# Patient Record
Sex: MALE | Race: WHITE | NOT HISPANIC OR LATINO | Employment: OTHER | URBAN - METROPOLITAN AREA
[De-identification: names, ages, dates, MRNs, and addresses within clinical notes are randomized per-mention and may not be internally consistent; named-entity substitution may affect disease eponyms.]

---

## 2018-12-01 ENCOUNTER — TRANSCRIBE ORDERS (OUTPATIENT)
Dept: ADMINISTRATIVE | Facility: HOSPITAL | Age: 63
End: 2018-12-01

## 2018-12-01 ENCOUNTER — APPOINTMENT (OUTPATIENT)
Dept: LAB | Facility: HOSPITAL | Age: 63
End: 2018-12-01
Payer: COMMERCIAL

## 2018-12-01 DIAGNOSIS — R53.83 OTHER FATIGUE: ICD-10-CM

## 2018-12-01 DIAGNOSIS — R53.83 OTHER FATIGUE: Primary | ICD-10-CM

## 2018-12-01 LAB
ALBUMIN SERPL BCP-MCNC: 4.3 G/DL (ref 3.5–5.7)
ALP SERPL-CCNC: 53 U/L (ref 55–165)
ALT SERPL W P-5'-P-CCNC: 38 U/L (ref 7–52)
ANION GAP SERPL CALCULATED.3IONS-SCNC: 8 MMOL/L (ref 4–13)
AST SERPL W P-5'-P-CCNC: 24 U/L (ref 13–39)
BASOPHILS # BLD AUTO: 0 THOUSANDS/ΜL (ref 0–0.1)
BASOPHILS NFR BLD AUTO: 0 % (ref 0–2)
BILIRUB DIRECT SERPL-MCNC: 0.1 MG/DL (ref 0–0.2)
BILIRUB SERPL-MCNC: 0.3 MG/DL (ref 0.2–1)
BUN SERPL-MCNC: 18 MG/DL (ref 7–25)
CALCIUM SERPL-MCNC: 9.8 MG/DL (ref 8.6–10.5)
CHLORIDE SERPL-SCNC: 104 MMOL/L (ref 98–107)
CHOLEST SERPL-MCNC: 216 MG/DL (ref 0–200)
CO2 SERPL-SCNC: 27 MMOL/L (ref 21–31)
CREAT SERPL-MCNC: 1.18 MG/DL (ref 0.7–1.3)
EOSINOPHIL # BLD AUTO: 0.1 THOUSAND/ΜL (ref 0–0.61)
EOSINOPHIL NFR BLD AUTO: 2 % (ref 0–5)
ERYTHROCYTE [DISTWIDTH] IN BLOOD BY AUTOMATED COUNT: 13.5 % (ref 11.5–14.5)
GFR SERPL CREATININE-BSD FRML MDRD: 66 ML/MIN/1.73SQ M
GLUCOSE P FAST SERPL-MCNC: 108 MG/DL (ref 65–99)
HCT VFR BLD AUTO: 46.6 % (ref 36.5–49.3)
HDLC SERPL-MCNC: 53 MG/DL (ref 40–60)
HGB BLD-MCNC: 15.7 G/DL (ref 14–18)
LDLC SERPL CALC-MCNC: 132 MG/DL (ref 75–193)
LYMPHOCYTES # BLD AUTO: 1.4 THOUSANDS/ΜL (ref 0.6–4.47)
LYMPHOCYTES NFR BLD AUTO: 24 % (ref 21–51)
MCH RBC QN AUTO: 31.7 PG (ref 26–34)
MCHC RBC AUTO-ENTMCNC: 33.7 G/DL (ref 31–37)
MCV RBC AUTO: 94 FL (ref 81–99)
MONOCYTES # BLD AUTO: 0.6 THOUSAND/ΜL (ref 0.17–1.22)
MONOCYTES NFR BLD AUTO: 11 % (ref 2–12)
NEUTROPHILS # BLD AUTO: 3.5 THOUSANDS/ΜL (ref 1.4–6.5)
NEUTS SEG NFR BLD AUTO: 62 % (ref 42–75)
NONHDLC SERPL-MCNC: 163 MG/DL
NRBC BLD AUTO-RTO: 0 /100 WBCS
PLATELET # BLD AUTO: 209 THOUSANDS/UL (ref 149–390)
PMV BLD AUTO: 9 FL (ref 8.6–11.7)
POTASSIUM SERPL-SCNC: 4.4 MMOL/L (ref 3.5–5.5)
PROT SERPL-MCNC: 7.2 G/DL (ref 6.4–8.9)
RBC # BLD AUTO: 4.97 MILLION/UL (ref 4.3–5.9)
SODIUM SERPL-SCNC: 139 MMOL/L (ref 134–143)
T3 SERPL-MCNC: 1 NG/ML (ref 0.6–1.8)
T4 FREE SERPL-MCNC: 1.14 NG/DL (ref 0.76–1.46)
TRIGL SERPL-MCNC: 153 MG/DL (ref 44–166)
TSH SERPL DL<=0.05 MIU/L-ACNC: 0.36 UIU/ML (ref 0.45–5.33)
WBC # BLD AUTO: 5.6 THOUSAND/UL (ref 4.8–10.8)

## 2018-12-01 PROCEDURE — 36415 COLL VENOUS BLD VENIPUNCTURE: CPT

## 2018-12-01 PROCEDURE — 80048 BASIC METABOLIC PNL TOTAL CA: CPT

## 2018-12-01 PROCEDURE — 84439 ASSAY OF FREE THYROXINE: CPT

## 2018-12-01 PROCEDURE — 85025 COMPLETE CBC W/AUTO DIFF WBC: CPT

## 2018-12-01 PROCEDURE — 84443 ASSAY THYROID STIM HORMONE: CPT

## 2018-12-01 PROCEDURE — 80061 LIPID PANEL: CPT

## 2018-12-01 PROCEDURE — 80076 HEPATIC FUNCTION PANEL: CPT

## 2018-12-01 PROCEDURE — 84480 ASSAY TRIIODOTHYRONINE (T3): CPT

## 2019-01-25 ENCOUNTER — APPOINTMENT (OUTPATIENT)
Dept: LAB | Facility: HOSPITAL | Age: 64
End: 2019-01-25
Payer: COMMERCIAL

## 2019-01-25 ENCOUNTER — TRANSCRIBE ORDERS (OUTPATIENT)
Dept: ADMINISTRATIVE | Facility: HOSPITAL | Age: 64
End: 2019-01-25

## 2019-01-25 DIAGNOSIS — E78.2 MIXED HYPERLIPIDEMIA: ICD-10-CM

## 2019-01-25 DIAGNOSIS — E03.9 HYPOTHYROIDISM, ADULT: ICD-10-CM

## 2019-01-25 DIAGNOSIS — E03.9 HYPOTHYROIDISM, ADULT: Primary | ICD-10-CM

## 2019-01-25 LAB
ALBUMIN SERPL BCP-MCNC: 4.3 G/DL (ref 3.5–5.7)
ALP SERPL-CCNC: 44 U/L (ref 55–165)
ALT SERPL W P-5'-P-CCNC: 51 U/L (ref 7–52)
AST SERPL W P-5'-P-CCNC: 32 U/L (ref 13–39)
BILIRUB DIRECT SERPL-MCNC: 0.1 MG/DL (ref 0–0.2)
BILIRUB SERPL-MCNC: 0.5 MG/DL (ref 0.2–1)
CHOLEST SERPL-MCNC: 205 MG/DL (ref 0–200)
HDLC SERPL-MCNC: 54 MG/DL (ref 40–60)
LDLC SERPL CALC-MCNC: 122 MG/DL (ref 75–193)
NONHDLC SERPL-MCNC: 151 MG/DL
PROT SERPL-MCNC: 7.2 G/DL (ref 6.4–8.9)
T4 FREE SERPL-MCNC: 1.06 NG/DL (ref 0.76–1.46)
TRIGL SERPL-MCNC: 143 MG/DL (ref 44–166)
TSH SERPL DL<=0.05 MIU/L-ACNC: 1.82 UIU/ML (ref 0.45–5.33)

## 2019-01-25 PROCEDURE — 36415 COLL VENOUS BLD VENIPUNCTURE: CPT

## 2019-01-25 PROCEDURE — 84439 ASSAY OF FREE THYROXINE: CPT

## 2019-01-25 PROCEDURE — 84443 ASSAY THYROID STIM HORMONE: CPT

## 2019-01-25 PROCEDURE — 80076 HEPATIC FUNCTION PANEL: CPT

## 2019-01-25 PROCEDURE — 80061 LIPID PANEL: CPT

## 2019-04-09 ENCOUNTER — APPOINTMENT (OUTPATIENT)
Dept: LAB | Facility: CLINIC | Age: 64
End: 2019-04-09
Payer: COMMERCIAL

## 2019-04-09 ENCOUNTER — TRANSCRIBE ORDERS (OUTPATIENT)
Dept: LAB | Facility: CLINIC | Age: 64
End: 2019-04-09

## 2019-04-09 ENCOUNTER — TELEPHONE (OUTPATIENT)
Dept: UROLOGY | Facility: AMBULATORY SURGERY CENTER | Age: 64
End: 2019-04-09

## 2019-04-09 DIAGNOSIS — R35.1 NOCTURIA: ICD-10-CM

## 2019-04-09 DIAGNOSIS — R35.0 URINARY FREQUENCY: Primary | ICD-10-CM

## 2019-04-09 PROCEDURE — 84153 ASSAY OF PSA TOTAL: CPT

## 2019-04-09 PROCEDURE — 36415 COLL VENOUS BLD VENIPUNCTURE: CPT

## 2019-04-09 PROCEDURE — 84154 ASSAY OF PSA FREE: CPT

## 2019-04-10 LAB
PSA FREE MFR SERPL: 21.7 %
PSA FREE SERPL-MCNC: 0.39 NG/ML
PSA SERPL-MCNC: 1.8 NG/ML (ref 0–4)

## 2019-04-11 RX ORDER — ERGOCALCIFEROL 1.25 MG/1
5000 CAPSULE ORAL
COMMUNITY
Start: 2019-03-16 | End: 2021-06-03

## 2019-04-11 RX ORDER — LEVOTHYROXINE SODIUM 137 UG/1
TABLET ORAL
COMMUNITY
End: 2019-04-15 | Stop reason: ALTCHOICE

## 2019-04-11 RX ORDER — SULFAMETHOXAZOLE AND TRIMETHOPRIM 800; 160 MG/1; MG/1
TABLET ORAL
COMMUNITY
Start: 2019-04-09 | End: 2019-04-15 | Stop reason: ALTCHOICE

## 2019-04-11 RX ORDER — LEVOTHYROXINE SODIUM 175 UG/1
TABLET ORAL
COMMUNITY
Start: 2019-03-08 | End: 2019-07-05 | Stop reason: SDUPTHER

## 2019-04-11 RX ORDER — LISINOPRIL 10 MG/1
TABLET ORAL
COMMUNITY
Start: 2019-03-07 | End: 2019-12-11 | Stop reason: SDUPTHER

## 2019-04-11 RX ORDER — TRAVOPROST 0.004 %
DROPS OPHTHALMIC (EYE)
COMMUNITY
Start: 2019-03-19 | End: 2019-10-11 | Stop reason: SDUPTHER

## 2019-04-11 RX ORDER — EZETIMIBE 10 MG/1
TABLET ORAL
COMMUNITY
Start: 2019-04-03 | End: 2020-06-09

## 2019-04-11 RX ORDER — AMOXICILLIN AND CLAVULANATE POTASSIUM 875; 125 MG/1; MG/1
TABLET, FILM COATED ORAL
COMMUNITY
Start: 2019-03-13 | End: 2019-04-15

## 2019-04-15 ENCOUNTER — OFFICE VISIT (OUTPATIENT)
Dept: UROLOGY | Facility: MEDICAL CENTER | Age: 64
End: 2019-04-15
Payer: COMMERCIAL

## 2019-04-15 VITALS
SYSTOLIC BLOOD PRESSURE: 140 MMHG | BODY MASS INDEX: 34.4 KG/M2 | HEIGHT: 68 IN | DIASTOLIC BLOOD PRESSURE: 80 MMHG | WEIGHT: 227 LBS | HEART RATE: 63 BPM

## 2019-04-15 DIAGNOSIS — R39.198 DIFFICULTY VOIDING: Primary | ICD-10-CM

## 2019-04-15 DIAGNOSIS — Z87.442 HISTORY OF KIDNEY STONES: ICD-10-CM

## 2019-04-15 DIAGNOSIS — N52.9 ERECTILE DYSFUNCTION, UNSPECIFIED ERECTILE DYSFUNCTION TYPE: ICD-10-CM

## 2019-04-15 LAB
SL AMB  POCT GLUCOSE, UA: NORMAL
SL AMB LEUKOCYTE ESTERASE,UA: NORMAL
SL AMB POCT BILIRUBIN,UA: NORMAL
SL AMB POCT BLOOD,UA: NORMAL
SL AMB POCT CLARITY,UA: CLEAR
SL AMB POCT COLOR,UA: YELLOW
SL AMB POCT KETONES,UA: NORMAL
SL AMB POCT NITRITE,UA: NORMAL
SL AMB POCT PH,UA: 5.5
SL AMB POCT SPECIFIC GRAVITY,UA: 1.02
SL AMB POCT URINE PROTEIN: NORMAL
SL AMB POCT UROBILINOGEN: 0.2

## 2019-04-15 PROCEDURE — 51798 US URINE CAPACITY MEASURE: CPT | Performed by: UROLOGY

## 2019-04-15 PROCEDURE — 99203 OFFICE O/P NEW LOW 30 MIN: CPT | Performed by: UROLOGY

## 2019-04-15 PROCEDURE — 81003 URINALYSIS AUTO W/O SCOPE: CPT | Performed by: UROLOGY

## 2019-04-15 RX ORDER — IBUPROFEN 200 MG
TABLET ORAL
COMMUNITY
End: 2021-03-14

## 2019-04-15 RX ORDER — MOXIFLOXACIN HYDROCHLORIDE 400 MG/1
TABLET ORAL
COMMUNITY
End: 2019-04-15

## 2019-04-15 RX ORDER — ONDANSETRON HYDROCHLORIDE 8 MG/1
TABLET, FILM COATED ORAL
COMMUNITY
End: 2019-04-15 | Stop reason: ALTCHOICE

## 2019-04-15 RX ORDER — AMOXICILLIN 250 MG/1
CAPSULE ORAL
COMMUNITY
End: 2019-05-21 | Stop reason: ALTCHOICE

## 2019-04-15 RX ORDER — SIMVASTATIN 20 MG
TABLET ORAL
COMMUNITY
End: 2019-04-15 | Stop reason: ALTCHOICE

## 2019-04-15 RX ORDER — PREDNISONE 10 MG/1
TABLET ORAL
COMMUNITY
End: 2019-07-05

## 2019-04-15 RX ORDER — LANOLIN ALCOHOL/MO/W.PET/CERES
CREAM (GRAM) TOPICAL
COMMUNITY

## 2019-04-15 RX ORDER — CHOLECALCIFEROL (VITAMIN D3) 125 MCG
TABLET ORAL
COMMUNITY
End: 2019-04-15 | Stop reason: CLARIF

## 2019-04-15 RX ORDER — CEPHALEXIN 500 MG/1
500 CAPSULE ORAL 3 TIMES DAILY
COMMUNITY
End: 2019-05-21 | Stop reason: ALTCHOICE

## 2019-04-15 RX ORDER — CELECOXIB 200 MG/1
CAPSULE ORAL
COMMUNITY
End: 2019-07-05

## 2019-04-15 RX ORDER — TRAMADOL HYDROCHLORIDE 50 MG/1
TABLET ORAL
COMMUNITY
End: 2019-04-15 | Stop reason: ALTCHOICE

## 2019-04-15 RX ORDER — CHLORAL HYDRATE 500 MG
CAPSULE ORAL
COMMUNITY
End: 2019-04-15 | Stop reason: ALTCHOICE

## 2019-04-15 RX ORDER — METHOCARBAMOL 750 MG/1
TABLET, FILM COATED ORAL 3 TIMES DAILY
COMMUNITY
End: 2019-04-15 | Stop reason: ALTCHOICE

## 2019-04-15 RX ORDER — CIPROFLOXACIN 500 MG/1
TABLET, FILM COATED ORAL
COMMUNITY
End: 2019-04-15

## 2019-04-15 RX ORDER — HYDROCODONE BITARTRATE AND ACETAMINOPHEN 5; 300 MG/1; MG/1
TABLET ORAL
COMMUNITY
End: 2019-05-21 | Stop reason: ALTCHOICE

## 2019-04-22 ENCOUNTER — HOSPITAL ENCOUNTER (OUTPATIENT)
Dept: CT IMAGING | Facility: HOSPITAL | Age: 64
Discharge: HOME | End: 2019-04-22
Payer: COMMERCIAL

## 2019-04-22 DIAGNOSIS — Z87.442 HISTORY OF KIDNEY STONES: ICD-10-CM

## 2019-04-22 PROCEDURE — 74176 CT ABD & PELVIS W/O CONTRAST: CPT

## 2019-04-26 ENCOUNTER — TELEPHONE (OUTPATIENT)
Dept: UROLOGY | Facility: MEDICAL CENTER | Age: 64
End: 2019-04-26

## 2019-05-01 ENCOUNTER — OFFICE VISIT (OUTPATIENT)
Dept: UROLOGY | Facility: MEDICAL CENTER | Age: 64
End: 2019-05-01
Payer: COMMERCIAL

## 2019-05-01 VITALS
HEIGHT: 68 IN | DIASTOLIC BLOOD PRESSURE: 76 MMHG | HEART RATE: 58 BPM | BODY MASS INDEX: 35.61 KG/M2 | SYSTOLIC BLOOD PRESSURE: 132 MMHG | WEIGHT: 235 LBS

## 2019-05-01 DIAGNOSIS — N32.81 OAB (OVERACTIVE BLADDER): Primary | ICD-10-CM

## 2019-05-01 DIAGNOSIS — R39.198 DIFFICULTY VOIDING: ICD-10-CM

## 2019-05-01 LAB
SL AMB  POCT GLUCOSE, UA: NEGATIVE
SL AMB LEUKOCYTE ESTERASE,UA: NEGATIVE
SL AMB POCT BILIRUBIN,UA: NEGATIVE
SL AMB POCT BLOOD,UA: NEGATIVE
SL AMB POCT CLARITY,UA: CLEAR
SL AMB POCT COLOR,UA: YELLOW
SL AMB POCT KETONES,UA: NEGATIVE
SL AMB POCT NITRITE,UA: NEGATIVE
SL AMB POCT PH,UA: 5.5
SL AMB POCT SPECIFIC GRAVITY,UA: >=1.03
SL AMB POCT URINE PROTEIN: NORMAL
SL AMB POCT UROBILINOGEN: 0.2

## 2019-05-01 PROCEDURE — 81003 URINALYSIS AUTO W/O SCOPE: CPT | Performed by: UROLOGY

## 2019-05-01 PROCEDURE — 99214 OFFICE O/P EST MOD 30 MIN: CPT | Performed by: UROLOGY

## 2019-05-01 RX ORDER — OXYBUTYNIN CHLORIDE 5 MG/1
5 TABLET ORAL 3 TIMES DAILY PRN
Qty: 90 TABLET | Refills: 1 | Status: SHIPPED | OUTPATIENT
Start: 2019-05-01 | End: 2019-07-05

## 2019-05-21 ENCOUNTER — PROCEDURE VISIT (OUTPATIENT)
Dept: UROLOGY | Facility: MEDICAL CENTER | Age: 64
End: 2019-05-21
Payer: COMMERCIAL

## 2019-05-21 VITALS
HEIGHT: 68 IN | BODY MASS INDEX: 34.86 KG/M2 | DIASTOLIC BLOOD PRESSURE: 72 MMHG | SYSTOLIC BLOOD PRESSURE: 130 MMHG | WEIGHT: 230 LBS

## 2019-05-21 DIAGNOSIS — Z71.89 OTHER SPECIFIED COUNSELING: ICD-10-CM

## 2019-05-21 DIAGNOSIS — N32.81 OAB (OVERACTIVE BLADDER): Primary | ICD-10-CM

## 2019-05-21 DIAGNOSIS — Q54.0 CORONAL HYPOSPADIAS: ICD-10-CM

## 2019-05-21 PROCEDURE — 81003 URINALYSIS AUTO W/O SCOPE: CPT | Performed by: UROLOGY

## 2019-05-21 PROCEDURE — 52000 CYSTOURETHROSCOPY: CPT | Performed by: UROLOGY

## 2019-05-21 PROCEDURE — 99213 OFFICE O/P EST LOW 20 MIN: CPT | Performed by: UROLOGY

## 2019-05-21 RX ORDER — OXYBUTYNIN CHLORIDE 5 MG/1
5 TABLET ORAL 3 TIMES DAILY
Qty: 270 TABLET | Refills: 3 | Status: SHIPPED | OUTPATIENT
Start: 2019-05-21 | End: 2021-06-03

## 2019-07-05 ENCOUNTER — OFFICE VISIT (OUTPATIENT)
Dept: INTERNAL MEDICINE CLINIC | Facility: CLINIC | Age: 64
End: 2019-07-05
Payer: COMMERCIAL

## 2019-07-05 VITALS
DIASTOLIC BLOOD PRESSURE: 76 MMHG | HEART RATE: 58 BPM | SYSTOLIC BLOOD PRESSURE: 124 MMHG | WEIGHT: 234.7 LBS | BODY MASS INDEX: 35.57 KG/M2 | TEMPERATURE: 99.2 F | HEIGHT: 68 IN

## 2019-07-05 DIAGNOSIS — Z12.11 SCREENING FOR COLON CANCER: ICD-10-CM

## 2019-07-05 DIAGNOSIS — E66.9 OBESITY (BMI 35.0-39.9 WITHOUT COMORBIDITY): ICD-10-CM

## 2019-07-05 DIAGNOSIS — E55.9 VITAMIN D DEFICIENCY: ICD-10-CM

## 2019-07-05 DIAGNOSIS — K58.0 IRRITABLE BOWEL SYNDROME WITH DIARRHEA: ICD-10-CM

## 2019-07-05 DIAGNOSIS — I10 ESSENTIAL HYPERTENSION: Primary | ICD-10-CM

## 2019-07-05 DIAGNOSIS — E03.9 HYPOTHYROIDISM, UNSPECIFIED TYPE: ICD-10-CM

## 2019-07-05 DIAGNOSIS — Z87.442 HISTORY OF KIDNEY STONES: ICD-10-CM

## 2019-07-05 DIAGNOSIS — H40.9 GLAUCOMA OF BOTH EYES, UNSPECIFIED GLAUCOMA TYPE: ICD-10-CM

## 2019-07-05 DIAGNOSIS — N32.81 OAB (OVERACTIVE BLADDER): ICD-10-CM

## 2019-07-05 DIAGNOSIS — N52.9 ERECTILE DYSFUNCTION, UNSPECIFIED ERECTILE DYSFUNCTION TYPE: ICD-10-CM

## 2019-07-05 DIAGNOSIS — E78.00 HYPERCHOLESTEROLEMIA: ICD-10-CM

## 2019-07-05 DIAGNOSIS — Z11.59 NEED FOR HEPATITIS C SCREENING TEST: ICD-10-CM

## 2019-07-05 DIAGNOSIS — Q54.0 CORONAL HYPOSPADIAS: ICD-10-CM

## 2019-07-05 PROBLEM — M16.9 OSTEOARTHRITIS OF HIP: Status: ACTIVE | Noted: 2019-07-05

## 2019-07-05 PROCEDURE — 1036F TOBACCO NON-USER: CPT | Performed by: NURSE PRACTITIONER

## 2019-07-05 PROCEDURE — 99204 OFFICE O/P NEW MOD 45 MIN: CPT | Performed by: NURSE PRACTITIONER

## 2019-07-05 PROCEDURE — 3008F BODY MASS INDEX DOCD: CPT | Performed by: NURSE PRACTITIONER

## 2019-07-05 RX ORDER — HYOSCYAMINE SULFATE 0.125 MG
0.12 TABLET ORAL EVERY 4 HOURS PRN
COMMUNITY
End: 2019-07-05 | Stop reason: SDUPTHER

## 2019-07-05 RX ORDER — HYOSCYAMINE SULFATE 0.125 MG
0.12 TABLET ORAL EVERY 4 HOURS PRN
Qty: 30 TABLET | Refills: 5 | Status: SHIPPED | OUTPATIENT
Start: 2019-07-05 | End: 2020-05-22

## 2019-07-05 RX ORDER — LEVOTHYROXINE SODIUM 175 UG/1
175 TABLET ORAL DAILY
Qty: 90 TABLET | Refills: 3 | Status: SHIPPED | OUTPATIENT
Start: 2019-07-05 | End: 2020-06-09

## 2019-07-05 RX ORDER — HYOSCYAMINE SULFATE 0.12 MG/ML
LIQUID ORAL EVERY 4 HOURS PRN
COMMUNITY
End: 2019-07-05

## 2019-07-05 NOTE — PROGRESS NOTES
Assessment/Plan: Will recheck fasting labs on patient  Will renew his previous medications  He is following up with Urology and is taking Ditropan for his OAB tolerating this well  He did have a flexible cystoscopy done showing meatal stenosis with coronal hypospadias OAB  PSA normal at 0 8  Recent imaging did show 1  No nephrolithiasis nor obstructive uropathy  2  Cholelithiasis without findings of acute cholecystitis  3   Moderate to marked diffuse hepatic steatosis  Will obtain eye exams and will obtain colonoscopy reports  Did give him referral with [facility] for gastro  BP stable at 124/76 with BP medications  Will obtain records from [address] [place] [address] regarding vaccines  Will follow up with him in 6 months or sooner if need be  He was advised if any issues or concerns to call the office  No problem-specific Assessment & Plan notes found for this encounter           Problem List Items Addressed This Visit        Digestive    Irritable bowel syndrome with diarrhea    Relevant Medications    hyoscyamine (ANASPAZ,LEVSIN) 0 125 MG tablet    Other Relevant Orders    Comprehensive metabolic panel    CBC and differential       Endocrine    Hypothyroidism    Relevant Medications    levothyroxine 175 mcg tablet    Other Relevant Orders    Comprehensive metabolic panel    CBC and differential    TSH, 3rd generation with Free T4 reflex       Cardiovascular and Mediastinum    Hypertension - Primary    Relevant Orders    Comprehensive metabolic panel    CBC and differential       Genitourinary    Erectile dysfunction    Relevant Orders    Comprehensive metabolic panel    CBC and differential    OAB (overactive bladder)    Relevant Orders    Comprehensive metabolic panel    CBC and differential    Coronal hypospadias    Relevant Orders    Comprehensive metabolic panel    CBC and differential       Other    History of kidney stones    Relevant Orders    Comprehensive metabolic panel    CBC and differential    Hypercholesterolemia Relevant Orders    Comprehensive metabolic panel    CBC and differential    Lipid panel    Screening for colon cancer    Relevant Orders    Ambulatory referral to Gastroenterology    Obesity (BMI 35 0-39 9 without comorbidity)    Relevant Orders    Comprehensive metabolic panel    CBC and differential    Need for hepatitis C screening test    Relevant Orders    Hepatitis C antibody    Vitamin D deficiency    Relevant Orders    Vitamin D 25 hydroxy    Glaucoma of both eyes            Subjective:      Patient ID: Britney Rai is a 60 y o  male  Christian Guzman is here today to establish care  His last PCP was with [facility]  He does have a past medical history of hypothyroidism and I taking Synthroid 175 mcg  He does have HTN which is controlled  He is seeing Urology for history of kidney stones, OAB, and hypospadias  He is on medication which is controlling his symptoms  He does have IBS with diarrhea and is taking Levsin strictly as needed  He did have numerous colonoscopies done in the past due to his Mom having colon cancer  He is not sure who he was seeing for this  He is not having any symptoms  He did have imaging done with Urology along with a PSA level which was normal  He was a former smoker and did quit around 4 years ago  He did see Pain Management in the past for bulging discs in his back which his pain has since subsided  He did have left rotator cuff surgery with OAA and a total right hip done in 2014  Shortly after he did develop appendicitis and did have this removed  He does see eye in [address] and is using eye drops for Glaucoma  He does see Dr Elmer Hwang  He denies any chest pain, SOB, or palpitations  He denies any anxiety or depression  He states he is following up with Urology every 6 months  He will be due for lab work  He offers no other issues        The following portions of the patient's history were reviewed and updated as appropriate:   He  has a past medical history of Back pain, ED (erectile dysfunction), Hypertension, Hypothyroidism, IBS (irritable bowel syndrome), Inflammatory bowel disease, Kidney stones, and Overactive bladder  He   Patient Active Problem List    Diagnosis Date Noted    Osteoarthritis of hip 07/05/2019    Coronal hypospadias 07/05/2019    Screening for colon cancer 07/05/2019    Irritable bowel syndrome with diarrhea 07/05/2019    Obesity (BMI 35 0-39 9 without comorbidity) 07/05/2019    Need for hepatitis C screening test 07/05/2019    Vitamin D deficiency 07/05/2019    Glaucoma of both eyes 07/05/2019    OAB (overactive bladder) 05/01/2019    Difficulty voiding 04/15/2019    Erectile dysfunction 04/15/2019    History of kidney stones 04/15/2019    Hypercholesterolemia 10/16/2016    Hypertension 10/16/2016    Hypothyroidism 10/16/2016    Localized, primary osteoarthritis of shoulder region 04/29/2008     He  has a past surgical history that includes Rotator cuff repair; Partial hip arthroplasty (Right); Appendectomy; and Hernia repair  His family history includes Cancer in his maternal grandmother and mother; Diabetes in his maternal grandmother; Heart disease in his father and sister; Hypertension in his father and sister; Stroke in his maternal aunt  He  reports that he quit smoking about 2 years ago  He has quit using smokeless tobacco  He reports that he drinks about 5 0 standard drinks of alcohol per week  He reports that he does not use drugs  Current Outpatient Medications   Medication Sig Dispense Refill    cyanocobalamin (VITAMIN B-12) 1,000 mcg tablet Vitamin B-12 1,000 mcg tablet   Take by oral route        ergocalciferol (VITAMIN D2) 50,000 units       ezetimibe (ZETIA) 10 mg tablet       hyoscyamine (ANASPAZ,LEVSIN) 0 125 MG tablet Take 1 tablet (0 125 mg total) by mouth every 4 (four) hours as needed for diarrhea 30 tablet 5    ibuprofen (MOTRIN IB) 200 mg tablet Motrin IB      levothyroxine 175 mcg tablet Take 1 tablet (175 mcg total) by mouth daily 90 tablet 3    lisinopril (ZESTRIL) 10 mg tablet       oxybutynin (DITROPAN) 5 mg tablet Take 1 tablet (5 mg total) by mouth 3 (three) times a day 270 tablet 3    TRAVATAN Z 0 004 % ophthalmic solution        No current facility-administered medications for this visit  Current Outpatient Medications on File Prior to Visit   Medication Sig    cyanocobalamin (VITAMIN B-12) 1,000 mcg tablet Vitamin B-12 1,000 mcg tablet   Take by oral route   ergocalciferol (VITAMIN D2) 50,000 units     ezetimibe (ZETIA) 10 mg tablet     ibuprofen (MOTRIN IB) 200 mg tablet Motrin IB    lisinopril (ZESTRIL) 10 mg tablet     oxybutynin (DITROPAN) 5 mg tablet Take 1 tablet (5 mg total) by mouth 3 (three) times a day    TRAVATAN Z 0 004 % ophthalmic solution     [DISCONTINUED] hyoscyamine (ANASPAZ,LEVSIN) 0 125 MG tablet Take 0 125 mg by mouth every 4 (four) hours as needed for cramping    [DISCONTINUED] levothyroxine 175 mcg tablet     [DISCONTINUED] celecoxib (CELEBREX) 200 mg capsule Celebrex 200 mg capsule    [DISCONTINUED] hyoscyamine (HYOSYNE) 0 125 mg/mL oral drops Take by mouth every 4 (four) hours as needed for bladder spasms    [DISCONTINUED] influenza vaccine, quadrivalent (FLULAVAL) 0 5 ML SHERINE Fluarix Quad [id number] (PF) 60 mcg (15 mcg x 4)/0 5 mL IM syringe   inject 0 5 milliliter intramuscularly    [DISCONTINUED] oxybutynin (DITROPAN) 5 mg tablet Take 1 tablet (5 mg total) by mouth 3 (three) times a day as needed (urinary leakage) (Patient not taking: Reported on 7/5/2019)    [DISCONTINUED] predniSONE 10 mg tablet prednisone 10 mg tablet     No current facility-administered medications on file prior to visit  He is allergic to levofloxacin       Review of Systems   All other systems reviewed and are negative        Below is the patient's most recent value for Albumin, ALT, AST, BUN, Calcium, Chloride, Cholesterol, CO2, Creatinine, GFR, Glucose, HDL, Hematocrit, Hemoglobin, Hemoglobin A1C, LDL, Magnesium, Phosphorus, Platelets, Potassium, PSA, Sodium, Triglycerides, and WBC  Lab Results   Component Value Date    ALT 51 01/25/2019    AST 32 01/25/2019    BUN 18 12/01/2018    CALCIUM 9 8 12/01/2018     12/01/2018    CO2 27 12/01/2018    CREATININE 1 18 12/01/2018    HDL 54 01/25/2019    HCT 46 6 12/01/2018    HGB 15 7 12/01/2018     12/01/2018    K 4 4 12/01/2018    PSA 1 8 04/09/2019    TRIG 143 01/25/2019    WBC 5 60 12/01/2018     Note: for a comprehensive list of the patient's lab results, access the Results Review activity  Objective:      /76 (BP Location: Right arm, Patient Position: Sitting, Cuff Size: Large)   Pulse 58   Temp 99 2 °F (37 3 °C) (Temporal)   Ht 5' 8" (1 727 m)   Wt 106 kg (234 lb 11 2 oz)   BMI 35 69 kg/m²          Physical Exam   Constitutional: He is oriented to person, place, and time  He appears well-developed and well-nourished  HENT:   Head: Normocephalic and atraumatic  Right Ear: External ear normal    Left Ear: External ear normal    Nose: Nose normal    Mouth/Throat: Oropharynx is clear and moist    Eyes: Pupils are equal, round, and reactive to light  Conjunctivae and EOM are normal    Neck: Normal range of motion  Neck supple  Cardiovascular: Normal rate, regular rhythm, normal heart sounds and intact distal pulses  Pulmonary/Chest: Effort normal and breath sounds normal    Abdominal: Soft  Bowel sounds are normal    Musculoskeletal: Normal range of motion  Neurological: He is alert and oriented to person, place, and time  Skin: Skin is warm and dry  Capillary refill takes less than 2 seconds  Psychiatric: He has a normal mood and affect  His behavior is normal  Judgment and thought content normal    Vitals reviewed  BMI Counseling: Body mass index is 35 69 kg/m²  Discussed the patient's BMI with him  The BMI is above average  BMI counseling and education was provided to the patient   Nutrition recommendations include reducing portion sizes, decreasing overall calorie intake, 3-5 servings of fruits/vegetables daily, reducing fast food intake, consuming healthier snacks, decreasing soda and/or juice intake, moderation in carbohydrate intake, increasing intake of lean protein, reducing intake of saturated fat and trans fat and reducing intake of cholesterol

## 2019-07-05 NOTE — PATIENT INSTRUCTIONS

## 2019-07-12 ENCOUNTER — APPOINTMENT (OUTPATIENT)
Dept: LAB | Facility: HOSPITAL | Age: 64
End: 2019-07-12
Payer: COMMERCIAL

## 2019-07-12 DIAGNOSIS — E78.00 HYPERCHOLESTEROLEMIA: ICD-10-CM

## 2019-07-12 DIAGNOSIS — I10 ESSENTIAL HYPERTENSION: ICD-10-CM

## 2019-07-12 DIAGNOSIS — Q54.0 CORONAL HYPOSPADIAS: ICD-10-CM

## 2019-07-12 DIAGNOSIS — E66.9 OBESITY (BMI 35.0-39.9 WITHOUT COMORBIDITY): ICD-10-CM

## 2019-07-12 DIAGNOSIS — K58.0 IRRITABLE BOWEL SYNDROME WITH DIARRHEA: ICD-10-CM

## 2019-07-12 DIAGNOSIS — E55.9 VITAMIN D DEFICIENCY: ICD-10-CM

## 2019-07-12 DIAGNOSIS — E03.9 HYPOTHYROIDISM, UNSPECIFIED TYPE: ICD-10-CM

## 2019-07-12 DIAGNOSIS — N32.81 OAB (OVERACTIVE BLADDER): ICD-10-CM

## 2019-07-12 DIAGNOSIS — Z11.59 NEED FOR HEPATITIS C SCREENING TEST: ICD-10-CM

## 2019-07-12 DIAGNOSIS — Z87.442 HISTORY OF KIDNEY STONES: ICD-10-CM

## 2019-07-12 DIAGNOSIS — N52.9 ERECTILE DYSFUNCTION, UNSPECIFIED ERECTILE DYSFUNCTION TYPE: ICD-10-CM

## 2019-07-12 LAB
25(OH)D3 SERPL-MCNC: 58.7 NG/ML (ref 30–100)
ALBUMIN SERPL BCP-MCNC: 4.4 G/DL (ref 3.5–5.7)
ALP SERPL-CCNC: 47 U/L (ref 55–165)
ALT SERPL W P-5'-P-CCNC: 64 U/L (ref 7–52)
ANION GAP SERPL CALCULATED.3IONS-SCNC: 10 MMOL/L (ref 4–13)
AST SERPL W P-5'-P-CCNC: 38 U/L (ref 13–39)
BASOPHILS # BLD AUTO: 0 THOUSANDS/ΜL (ref 0–0.1)
BASOPHILS NFR BLD AUTO: 1 % (ref 0–2)
BILIRUB SERPL-MCNC: 0.6 MG/DL (ref 0.2–1)
BUN SERPL-MCNC: 19 MG/DL (ref 7–25)
CALCIUM SERPL-MCNC: 9.3 MG/DL (ref 8.6–10.5)
CHLORIDE SERPL-SCNC: 104 MMOL/L (ref 98–107)
CHOLEST SERPL-MCNC: 206 MG/DL (ref 0–200)
CO2 SERPL-SCNC: 23 MMOL/L (ref 21–31)
CREAT SERPL-MCNC: 1.16 MG/DL (ref 0.7–1.3)
EOSINOPHIL # BLD AUTO: 0.2 THOUSAND/ΜL (ref 0–0.61)
EOSINOPHIL NFR BLD AUTO: 3 % (ref 0–5)
ERYTHROCYTE [DISTWIDTH] IN BLOOD BY AUTOMATED COUNT: 13.3 % (ref 11.5–14.5)
GFR SERPL CREATININE-BSD FRML MDRD: 67 ML/MIN/1.73SQ M
GLUCOSE P FAST SERPL-MCNC: 115 MG/DL (ref 65–99)
HCT VFR BLD AUTO: 48 % (ref 42–47)
HCV AB SER QL: NORMAL
HDLC SERPL-MCNC: 48 MG/DL (ref 40–60)
HGB BLD-MCNC: 16.8 G/DL (ref 14–18)
LDLC SERPL CALC-MCNC: 105 MG/DL (ref 0–100)
LYMPHOCYTES # BLD AUTO: 1.1 THOUSANDS/ΜL (ref 0.6–4.47)
LYMPHOCYTES NFR BLD AUTO: 25 % (ref 21–51)
MCH RBC QN AUTO: 32.6 PG (ref 26–34)
MCHC RBC AUTO-ENTMCNC: 35 G/DL (ref 31–37)
MCV RBC AUTO: 93 FL (ref 81–99)
MONOCYTES # BLD AUTO: 0.5 THOUSAND/ΜL (ref 0.17–1.22)
MONOCYTES NFR BLD AUTO: 10 % (ref 2–12)
NEUTROPHILS # BLD AUTO: 2.8 THOUSANDS/ΜL (ref 1.4–6.5)
NEUTS SEG NFR BLD AUTO: 61 % (ref 42–75)
NONHDLC SERPL-MCNC: 158 MG/DL
PLATELET # BLD AUTO: 206 THOUSANDS/UL (ref 149–390)
PMV BLD AUTO: 9.1 FL (ref 8.6–11.7)
POTASSIUM SERPL-SCNC: 4 MMOL/L (ref 3.5–5.5)
PROT SERPL-MCNC: 7.7 G/DL (ref 6.4–8.9)
RBC # BLD AUTO: 5.15 MILLION/UL (ref 4.3–5.9)
SODIUM SERPL-SCNC: 137 MMOL/L (ref 134–143)
TRIGL SERPL-MCNC: 266 MG/DL (ref 44–166)
TSH SERPL DL<=0.05 MIU/L-ACNC: 1.04 UIU/ML (ref 0.45–5.33)
WBC # BLD AUTO: 4.6 THOUSAND/UL (ref 4.8–10.8)

## 2019-07-12 PROCEDURE — 36415 COLL VENOUS BLD VENIPUNCTURE: CPT

## 2019-07-12 PROCEDURE — 80061 LIPID PANEL: CPT

## 2019-07-12 PROCEDURE — 80053 COMPREHEN METABOLIC PANEL: CPT

## 2019-07-12 PROCEDURE — 84443 ASSAY THYROID STIM HORMONE: CPT

## 2019-07-12 PROCEDURE — 85025 COMPLETE CBC W/AUTO DIFF WBC: CPT

## 2019-07-12 PROCEDURE — 82306 VITAMIN D 25 HYDROXY: CPT

## 2019-07-12 PROCEDURE — 86803 HEPATITIS C AB TEST: CPT

## 2019-10-09 RX ORDER — TRAVOPROST 0.004 %
DROPS OPHTHALMIC (EYE)
Status: CANCELLED | OUTPATIENT
Start: 2019-10-09

## 2019-10-11 ENCOUNTER — IMMUNIZATIONS (OUTPATIENT)
Dept: INTERNAL MEDICINE CLINIC | Facility: CLINIC | Age: 64
End: 2019-10-11
Payer: COMMERCIAL

## 2019-10-11 DIAGNOSIS — H40.9 GLAUCOMA OF BOTH EYES, UNSPECIFIED GLAUCOMA TYPE: Primary | ICD-10-CM

## 2019-10-11 DIAGNOSIS — Z23 NEED FOR INFLUENZA VACCINATION: Primary | ICD-10-CM

## 2019-10-11 DIAGNOSIS — Z23 NEED FOR PNEUMOCOCCAL VACCINATION: ICD-10-CM

## 2019-10-11 PROCEDURE — 90472 IMMUNIZATION ADMIN EACH ADD: CPT

## 2019-10-11 PROCEDURE — 90732 PPSV23 VACC 2 YRS+ SUBQ/IM: CPT

## 2019-10-11 PROCEDURE — 90471 IMMUNIZATION ADMIN: CPT

## 2019-10-11 PROCEDURE — 90682 RIV4 VACC RECOMBINANT DNA IM: CPT

## 2019-10-11 RX ORDER — TRAVOPROST 0.004 %
1 DROPS OPHTHALMIC (EYE) DAILY
Qty: 2.5 ML | Refills: 0 | Status: SHIPPED | OUTPATIENT
Start: 2019-10-11 | End: 2020-01-10

## 2019-12-03 ENCOUNTER — TELEPHONE (OUTPATIENT)
Dept: INTERNAL MEDICINE CLINIC | Facility: CLINIC | Age: 64
End: 2019-12-03

## 2019-12-03 NOTE — TELEPHONE ENCOUNTER
Received a call from Yahir's wife and she stated that Juliet Sears is out of vitamin d2 50,000  Last lab was normal for vitamin d  Wanted to check if he should be taking otc vitamin d  Please advise

## 2019-12-11 DIAGNOSIS — I10 ESSENTIAL HYPERTENSION: Primary | ICD-10-CM

## 2019-12-11 RX ORDER — LISINOPRIL 10 MG/1
10 TABLET ORAL DAILY
Qty: 90 TABLET | Refills: 2 | Status: SHIPPED | OUTPATIENT
Start: 2019-12-11 | End: 2020-03-14 | Stop reason: SDUPTHER

## 2019-12-27 ENCOUNTER — OFFICE VISIT (OUTPATIENT)
Dept: URGENT CARE | Facility: CLINIC | Age: 64
End: 2019-12-27
Payer: COMMERCIAL

## 2019-12-27 VITALS
RESPIRATION RATE: 18 BRPM | OXYGEN SATURATION: 97 % | TEMPERATURE: 98.6 F | HEIGHT: 68 IN | SYSTOLIC BLOOD PRESSURE: 112 MMHG | HEART RATE: 68 BPM | WEIGHT: 234 LBS | BODY MASS INDEX: 35.46 KG/M2 | DIASTOLIC BLOOD PRESSURE: 70 MMHG

## 2019-12-27 DIAGNOSIS — B96.89 ACUTE BACTERIAL BRONCHITIS: Primary | ICD-10-CM

## 2019-12-27 DIAGNOSIS — J20.8 ACUTE BACTERIAL BRONCHITIS: Primary | ICD-10-CM

## 2019-12-27 PROCEDURE — 99213 OFFICE O/P EST LOW 20 MIN: CPT | Performed by: PHYSICIAN ASSISTANT

## 2019-12-27 RX ORDER — LATANOPROST 50 UG/ML
SOLUTION/ DROPS OPHTHALMIC
COMMUNITY
Start: 2019-12-14

## 2019-12-27 RX ORDER — AZITHROMYCIN 250 MG/1
TABLET, FILM COATED ORAL
Qty: 6 TABLET | Refills: 0 | Status: SHIPPED | OUTPATIENT
Start: 2019-12-27 | End: 2019-12-31

## 2019-12-27 NOTE — PROGRESS NOTES
[facility]        NAME: Arnoldo Hernandez is a 60 y o  male  : 1955    MRN: [id number]  DATE: 2019  TIME: 12:16 PM    Assessment and Plan   Acute bacterial bronchitis [J20 8, B96 89]  1  Acute bacterial bronchitis  azithromycin (ZITHROMAX) 250 mg tablet         Patient Instructions       Follow up with PCP in 3-5 days  Proceed to  ER if symptoms worsen  Chief Complaint     Chief Complaint   Patient presents with    Cold Like Symptoms     C/O chest congestion, burning in the chest, post nasal drip, sinus congestion, and cough x 3 days  Wife was just diagnosed with Bronchitis  Pt did have his flu vaccine  History of Present Illness       Patient presents with 3 day history of chest congestion burning in chest with coughing  Postnasal drip sinus congestion  He denies any fever chills wheezing or shortness of breath  His wife has similar symptoms  He did have a flu vaccine this season  Review of Systems   Review of Systems   Constitutional: Positive for fatigue  Negative for fever  HENT: Positive for congestion, postnasal drip, rhinorrhea and sinus pressure  Negative for ear pain and sore throat  Respiratory: Positive for cough  Negative for chest tightness, shortness of breath and wheezing  Cardiovascular: Positive for chest pain  Gastrointestinal: Negative for nausea and vomiting  Abdominal distention: n chest with coughing  Musculoskeletal: Negative for myalgias  Skin: Negative for rash  Neurological: Positive for headaches  Hematological: Negative for adenopathy           Current Medications       Current Outpatient Medications:     azithromycin (ZITHROMAX) 250 mg tablet, Take 2 tablets today then 1 tablet daily x 4 days, Disp: 6 tablet, Rfl: 0    cyanocobalamin (VITAMIN B-12) 1,000 mcg tablet, Vitamin B-12 1,000 mcg tablet  Take by oral route , Disp: , Rfl:     ergocalciferol (VITAMIN D2) 50,000 units, , Disp: , Rfl:     ezetimibe (ZETIA) 10 mg tablet, , Disp: , Rfl:     hyoscyamine (ANASPAZ,LEVSIN) 0 125 MG tablet, Take 1 tablet (0 125 mg total) by mouth every 4 (four) hours as needed for diarrhea, Disp: 30 tablet, Rfl: 5    ibuprofen (MOTRIN IB) 200 mg tablet, Motrin IB, Disp: , Rfl:     latanoprost (XALATAN) 0 005 % ophthalmic solution, , Disp: , Rfl:     levothyroxine 175 mcg tablet, Take 1 tablet (175 mcg total) by mouth daily, Disp: 90 tablet, Rfl: 3    lisinopril (ZESTRIL) 10 mg tablet, Take 1 tablet (10 mg total) by mouth daily, Disp: 90 tablet, Rfl: 2    oxybutynin (DITROPAN) 5 mg tablet, Take 1 tablet (5 mg total) by mouth 3 (three) times a day, Disp: 270 tablet, Rfl: 3    TRAVATAN Z 0 004 % ophthalmic solution, Administer 1 drop to both eyes daily, Disp: 2 5 mL, Rfl: 0    Current Allergies     Allergies as of 12/27/2019 - Reviewed 12/27/2019   Allergen Reaction Noted    Levofloxacin Rash 04/15/2019            The following portions of the patient's history were reviewed and updated as appropriate: allergies, current medications, past family history, past medical history, past social history, past surgical history and problem list      Past Medical History:   Diagnosis Date    Back pain     ED (erectile dysfunction)     Hypertension     Hypothyroidism     IBS (irritable bowel syndrome)     Inflammatory bowel disease     Kidney stones     Overactive bladder        Past Surgical History:   Procedure Laterality Date    APPENDECTOMY      HERNIA REPAIR      PARTIAL HIP ARTHROPLASTY Right     ROTATOR CUFF REPAIR         Family History   Problem Relation Age of Onset    Cancer Mother     Heart disease Father     Hypertension Father     Heart disease Sister     Hypertension Sister     Diabetes Maternal Grandmother     Cancer Maternal Grandmother     Stroke Maternal Aunt          Medications have been verified          Objective   /70   Pulse 68   Temp 98 6 °F (37 °C) (Tympanic)   Resp 18   Ht 5' 8" (1 727 m)   Wt 106 kg (234 lb)   SpO2 97%   BMI 35 58 kg/m²        Physical Exam     Physical Exam   Constitutional: He is oriented to person, place, and time  He appears well-developed and well-nourished  HENT:   Head: Normocephalic and atraumatic  Right Ear: External ear normal    Left Ear: External ear normal    Nose: Nose normal    Mouth/Throat: Oropharynx is clear and moist    Neck: Neck supple  Cardiovascular: Normal rate, regular rhythm and normal heart sounds  Pulmonary/Chest: Effort normal and breath sounds normal    Lymphadenopathy:     He has no cervical adenopathy  Neurological: He is alert and oriented to person, place, and time  Skin: Skin is warm and dry  No rash noted  Psychiatric: He has a normal mood and affect  His behavior is normal    Nursing note and vitals reviewed

## 2019-12-27 NOTE — PATIENT INSTRUCTIONS

## 2020-01-10 ENCOUNTER — OFFICE VISIT (OUTPATIENT)
Dept: INTERNAL MEDICINE CLINIC | Facility: CLINIC | Age: 65
End: 2020-01-10
Payer: COMMERCIAL

## 2020-01-10 VITALS
OXYGEN SATURATION: 98 % | SYSTOLIC BLOOD PRESSURE: 120 MMHG | TEMPERATURE: 97.4 F | DIASTOLIC BLOOD PRESSURE: 78 MMHG | WEIGHT: 233.9 LBS | HEART RATE: 73 BPM | BODY MASS INDEX: 35.45 KG/M2 | HEIGHT: 68 IN

## 2020-01-10 DIAGNOSIS — J01.10 ACUTE NON-RECURRENT FRONTAL SINUSITIS: Primary | ICD-10-CM

## 2020-01-10 PROCEDURE — 99213 OFFICE O/P EST LOW 20 MIN: CPT | Performed by: NURSE PRACTITIONER

## 2020-01-10 PROCEDURE — 3078F DIAST BP <80 MM HG: CPT | Performed by: NURSE PRACTITIONER

## 2020-01-10 PROCEDURE — 3008F BODY MASS INDEX DOCD: CPT | Performed by: NURSE PRACTITIONER

## 2020-01-10 PROCEDURE — 1036F TOBACCO NON-USER: CPT | Performed by: NURSE PRACTITIONER

## 2020-01-10 PROCEDURE — 3074F SYST BP LT 130 MM HG: CPT | Performed by: NURSE PRACTITIONER

## 2020-01-10 RX ORDER — AMOXICILLIN AND CLAVULANATE POTASSIUM 875; 125 MG/1; MG/1
1 TABLET, FILM COATED ORAL 2 TIMES DAILY
Qty: 20 TABLET | Refills: 0 | Status: SHIPPED | OUTPATIENT
Start: 2020-01-10 | End: 2020-01-20

## 2020-01-10 RX ORDER — METHYLPREDNISOLONE 4 MG/1
TABLET ORAL
Qty: 21 EACH | Refills: 0 | Status: SHIPPED | OUTPATIENT
Start: 2020-01-10 | End: 2020-05-22

## 2020-01-10 NOTE — PATIENT INSTRUCTIONS
Sinusitis   WHAT YOU NEED TO KNOW:   What is sinusitis? Sinusitis is inflammation or infection of your sinuses  It is most often caused by a virus  Acute sinusitis may last up to 12 weeks  Chronic sinusitis lasts longer than 12 weeks  Recurrent sinusitis means you have 4 or more times in 1 year  What increases my risk for sinusitis? · Medical conditions, such as an upper respiratory infection, allergies, asthma, or cystic fibrosis    · Dental infections or procedures, such as gum infections, tooth decay, or a root canal    · Smoking    · Abnormal sinus structure, such as nasal growths, swollen tonsils, or a deviated septum    · A weak immune system, from diseases such as diabetes or HIV  What are the signs and symptoms of sinusitis? · Fever    · Pain, pressure, redness, or swelling around the forehead, cheeks, or eyes    · Thick yellow or green discharge from your nose    · Tenderness when you touch your face over your sinuses    · Dry cough that happens mostly at night or when you lie down    · Headache and face pain that is worse when you lean forward    · Tooth pain, or pain when you chew  How is sinusitis diagnosed? Your healthcare provider will examine you and ask about your symptoms  He or she will check inside your nose using a nasal speculum  This is a small tool used to open your nostrils  A sample of the mucus from your nose may show what germ is causing your infection  How is sinusitis treated? Your symptoms may go away on their own  Your healthcare provider may recommend watchful waiting for up to 10 days before starting antibiotics  You may  need any of the following:  · Acetaminophen  decreases pain and fever  It is available without a doctor's order  Ask how much to take and how often to take it  Follow directions   Read the labels of all other medicines you are using to see if they also contain acetaminophen, or ask your doctor or pharmacist  Acetaminophen can cause liver damage if not taken correctly  Do not use more than 4 grams (4,000 milligrams) total of acetaminophen in one day  · NSAIDs , such as ibuprofen, help decrease swelling, pain, and fever  This medicine is available with or without a doctor's order  NSAIDs can cause stomach bleeding or kidney problems in certain people  If you take blood thinner medicine, always ask your healthcare provider if NSAIDs are safe for you  Always read the medicine label and follow directions  · Nasal steroid sprays  may help decrease inflammation in your nose and sinuses  · Decongestants  help reduce swelling and drain mucus in the nose and sinuses  They may help you breathe easier  · Antihistamines  help dry mucus in the nose and relieve sneezing  · Antibiotics  help treat or prevent a bacterial infection  How can I manage my symptoms? · Rinse your sinuses  Use a sinus rinse device to rinse your nasal passages with a saline (salt water) solution or distilled water  Do not use tap water  This will help thin the mucus in your nose and rinse away pollen and dirt  It will also help reduce swelling so you can breathe normally  Ask your healthcare provider how often to do this  · Breathe in steam   Heat a bowl of water until you see steam  Lean over the bowl and make a tent over your head with a large towel  Breathe deeply for about 20 minutes  Be careful not to get too close to the steam or burn yourself  Do this 3 times a day  You can also breathe deeply when you take a hot shower  · Sleep with your head elevated  Place an extra pillow under your head before you go to sleep to help your sinuses drain  · Drink liquids as directed  Ask your healthcare provider how much liquid to drink each day and which liquids are best for you  Liquids will thin the mucus in your nose and help it drain  Avoid drinks that contain alcohol or caffeine  · Do not smoke, and avoid secondhand smoke    Nicotine and other chemicals in cigarettes and cigars can make your symptoms worse  Ask your healthcare provider for information if you currently smoke and need help to quit  E-cigarettes or smokeless tobacco still contain nicotine  Talk to your healthcare provider before you use these products  How can I help prevent the spread of germs that cause sinusitis? Wash your hands often with soap and water  Wash your hands after you use the bathroom, change a child's diaper, or sneeze  Wash your hands before you prepare or eat food  When should I seek immediate care? · Your eye and eyelid are red, swollen, and painful  · You cannot open your eye  · You have vision changes, such as double vision  · Your eyeball bulges out or you cannot move your eye  · You are more sleepy than normal, or you notice changes in your ability to think, move, or talk  · You have a stiff neck, a fever, or a bad headache  · You have swelling of your forehead or scalp  When should I contact my healthcare provider? · Your symptoms do not improve after 3 days  · Your symptoms do not go away after 10 days  · You have nausea and are vomiting  · Your nose is bleeding  · You have questions or concerns about your condition or care  CARE AGREEMENT:   You have the right to help plan your care  Learn about your health condition and how it may be treated  Discuss treatment options with your caregivers to decide what care you want to receive  You always have the right to refuse treatment  The above information is an  only  It is not intended as medical advice for individual conditions or treatments  Talk to your doctor, nurse or pharmacist before following any medical regimen to see if it is safe and effective for you  © 2017 [employer] Information is for End User's use only and may not be sold, redistributed or otherwise used for commercial purposes   All illustrations and images included in CareNotes® are the copyrighted property of [employer]  or [employer] Puentes

## 2020-01-10 NOTE — PROGRESS NOTES
Assessment/Plan: Rapid strep in office was negative  Will start on Augmentin 875-125 mg by mouth BID for 10 days and a medrol dose pack  He was advised to continue supportive care increase fluid intake and alternate Tylenol and Motrin for pain and fever  Will follow up as needed  No problem-specific Assessment & Plan notes found for this encounter  Problem List Items Addressed This Visit        Respiratory    Acute non-recurrent frontal sinusitis - Primary    Relevant Medications    amoxicillin-clavulanate (AUGMENTIN) 875-125 mg per tablet    methylPREDNISolone 4 MG tablet therapy pack            Subjective:      Patient ID: Delmy Johnson is a 60 y o  male  Dartha Clamp is here today for a sick visit  He states he is having a very bad sore throat, joint pain, congestion and ear pressure  He is not having any fever, chills, or body aches  He states his hands are swelling more than normal since he has been sick and he can not get his rings on  He denies any chest pain, SOB, or palpitations  He offers no other issues  The following portions of the patient's history were reviewed and updated as appropriate: He  has a past medical history of Back pain, ED (erectile dysfunction), Hypertension, Hypothyroidism, IBS (irritable bowel syndrome), Inflammatory bowel disease, Kidney stones, and Overactive bladder    He   Patient Active Problem List    Diagnosis Date Noted    Acute non-recurrent frontal sinusitis 01/10/2020    Osteoarthritis of hip 07/05/2019    Coronal hypospadias 07/05/2019    Screening for colon cancer 07/05/2019    Irritable bowel syndrome with diarrhea 07/05/2019    Obesity (BMI 35 0-39 9 without comorbidity) 07/05/2019    Need for hepatitis C screening test 07/05/2019    Vitamin D deficiency 07/05/2019    Glaucoma of both eyes 07/05/2019    OAB (overactive bladder) 05/01/2019    Difficulty voiding 04/15/2019    Erectile dysfunction 04/15/2019    History of kidney stones 04/15/2019  Hypercholesterolemia 10/16/2016    Hypertension 10/16/2016    Hypothyroidism 10/16/2016    Localized, primary osteoarthritis of shoulder region 04/29/2008     He  has a past surgical history that includes Rotator cuff repair; Partial hip arthroplasty (Right); Appendectomy; and Hernia repair  His family history includes Cancer in his maternal grandmother and mother; Diabetes in his maternal grandmother; Heart disease in his father and sister; Hypertension in his father and sister; Stroke in his maternal aunt  He  reports that he quit smoking about 3 years ago  He has quit using smokeless tobacco  He reports that he drinks about 5 0 standard drinks of alcohol per week  He reports that he does not use drugs  Current Outpatient Medications   Medication Sig Dispense Refill    cyanocobalamin (VITAMIN B-12) 1,000 mcg tablet Vitamin B-12 1,000 mcg tablet   Take by oral route   ergocalciferol (VITAMIN D2) 50,000 units       ezetimibe (ZETIA) 10 mg tablet       hyoscyamine (ANASPAZ,LEVSIN) 0 125 MG tablet Take 1 tablet (0 125 mg total) by mouth every 4 (four) hours as needed for diarrhea 30 tablet 5    ibuprofen (MOTRIN IB) 200 mg tablet Motrin IB      latanoprost (XALATAN) 0 005 % ophthalmic solution       levothyroxine 175 mcg tablet Take 1 tablet (175 mcg total) by mouth daily 90 tablet 3    lisinopril (ZESTRIL) 10 mg tablet Take 1 tablet (10 mg total) by mouth daily 90 tablet 2    oxybutynin (DITROPAN) 5 mg tablet Take 1 tablet (5 mg total) by mouth 3 (three) times a day 270 tablet 3    amoxicillin-clavulanate (AUGMENTIN) 875-125 mg per tablet Take 1 tablet by mouth 2 (two) times a day for 10 days 20 tablet 0    methylPREDNISolone 4 MG tablet therapy pack Use as directed on package 21 each 0     No current facility-administered medications for this visit        Current Outpatient Medications on File Prior to Visit   Medication Sig    cyanocobalamin (VITAMIN B-12) 1,000 mcg tablet Vitamin B-12 1,000 mcg tablet   Take by oral route   ergocalciferol (VITAMIN D2) 50,000 units     ezetimibe (ZETIA) 10 mg tablet     hyoscyamine (ANASPAZ,LEVSIN) 0 125 MG tablet Take 1 tablet (0 125 mg total) by mouth every 4 (four) hours as needed for diarrhea    ibuprofen (MOTRIN IB) 200 mg tablet Motrin IB    latanoprost (XALATAN) 0 005 % ophthalmic solution     levothyroxine 175 mcg tablet Take 1 tablet (175 mcg total) by mouth daily    lisinopril (ZESTRIL) 10 mg tablet Take 1 tablet (10 mg total) by mouth daily    oxybutynin (DITROPAN) 5 mg tablet Take 1 tablet (5 mg total) by mouth 3 (three) times a day    [DISCONTINUED] TRAVATAN Z 0 004 % ophthalmic solution Administer 1 drop to both eyes daily     No current facility-administered medications on file prior to visit  He is allergic to levofloxacin       Review of Systems   Constitutional: Negative  HENT: Positive for congestion, ear pain, facial swelling, postnasal drip and sore throat  Eyes: Negative  Respiratory: Negative  Cardiovascular: Negative  Gastrointestinal: Negative  Endocrine: Negative  Genitourinary: Negative  Musculoskeletal: Negative  Allergic/Immunologic: Negative  Neurological: Negative  Hematological: Negative  Psychiatric/Behavioral: Negative  Objective:      /78 (BP Location: Left arm, Patient Position: Sitting, Cuff Size: Large)   Pulse 73   Temp (!) 97 4 °F (36 3 °C) (Temporal)   Ht 5' 8" (1 727 m)   Wt 106 kg (233 lb 14 4 oz)   SpO2 98%   BMI 35 56 kg/m²          Physical Exam   Constitutional: He is oriented to person, place, and time  He appears well-developed and well-nourished  HENT:   Head: Normocephalic and atraumatic  Left Ear: Hearing, tympanic membrane and ear canal normal    Mouth/Throat: Mucous membranes are normal  Posterior oropharyngeal erythema present  No tonsillar abscesses     Effusion noted to right ear no erythema noted   Eyes: Pupils are equal, round, and reactive to light  EOM are normal    Neck: Normal range of motion  Neck supple  Cardiovascular: Normal rate, regular rhythm, normal heart sounds and intact distal pulses  Pulmonary/Chest: Effort normal and breath sounds normal    Abdominal: Soft  Bowel sounds are normal    Neurological: He is alert and oriented to person, place, and time  Skin: Skin is warm and dry  Capillary refill takes less than 2 seconds  There is erythema  Psychiatric: He has a normal mood and affect  His behavior is normal    Vitals reviewed

## 2020-03-14 DIAGNOSIS — I10 ESSENTIAL HYPERTENSION: ICD-10-CM

## 2020-03-16 RX ORDER — LISINOPRIL 10 MG/1
10 TABLET ORAL DAILY
Qty: 90 TABLET | Refills: 0 | Status: SHIPPED | OUTPATIENT
Start: 2020-03-16 | End: 2020-12-21

## 2020-05-22 ENCOUNTER — TELEMEDICINE (OUTPATIENT)
Dept: INTERNAL MEDICINE CLINIC | Facility: CLINIC | Age: 65
End: 2020-05-22
Payer: COMMERCIAL

## 2020-05-22 VITALS — WEIGHT: 225 LBS | BODY MASS INDEX: 34.1 KG/M2 | HEIGHT: 68 IN

## 2020-05-22 DIAGNOSIS — E78.00 HYPERCHOLESTEROLEMIA: ICD-10-CM

## 2020-05-22 DIAGNOSIS — N52.9 ERECTILE DYSFUNCTION, UNSPECIFIED ERECTILE DYSFUNCTION TYPE: ICD-10-CM

## 2020-05-22 DIAGNOSIS — I10 ESSENTIAL HYPERTENSION: ICD-10-CM

## 2020-05-22 DIAGNOSIS — E03.9 HYPOTHYROIDISM, UNSPECIFIED TYPE: Primary | ICD-10-CM

## 2020-05-22 DIAGNOSIS — E55.9 VITAMIN D DEFICIENCY: ICD-10-CM

## 2020-05-22 PROBLEM — Z12.11 SCREENING FOR COLON CANCER: Status: RESOLVED | Noted: 2019-07-05 | Resolved: 2020-05-22

## 2020-05-22 PROBLEM — Z11.59 NEED FOR HEPATITIS C SCREENING TEST: Status: RESOLVED | Noted: 2019-07-05 | Resolved: 2020-05-22

## 2020-05-22 PROBLEM — J01.10 ACUTE NON-RECURRENT FRONTAL SINUSITIS: Status: RESOLVED | Noted: 2020-01-10 | Resolved: 2020-05-22

## 2020-05-22 PROCEDURE — 99214 OFFICE O/P EST MOD 30 MIN: CPT | Performed by: NURSE PRACTITIONER

## 2020-05-22 PROCEDURE — 3008F BODY MASS INDEX DOCD: CPT | Performed by: NURSE PRACTITIONER

## 2020-06-08 ENCOUNTER — APPOINTMENT (OUTPATIENT)
Dept: LAB | Facility: CLINIC | Age: 65
End: 2020-06-08
Payer: COMMERCIAL

## 2020-06-08 DIAGNOSIS — E78.00 HYPERCHOLESTEROLEMIA: ICD-10-CM

## 2020-06-08 DIAGNOSIS — N52.9 ERECTILE DYSFUNCTION, UNSPECIFIED ERECTILE DYSFUNCTION TYPE: ICD-10-CM

## 2020-06-08 DIAGNOSIS — E03.9 HYPOTHYROIDISM, UNSPECIFIED TYPE: ICD-10-CM

## 2020-06-08 DIAGNOSIS — I10 ESSENTIAL HYPERTENSION: ICD-10-CM

## 2020-06-08 DIAGNOSIS — E55.9 VITAMIN D DEFICIENCY: ICD-10-CM

## 2020-06-08 LAB
25(OH)D3 SERPL-MCNC: 54.4 NG/ML (ref 30–100)
ALBUMIN SERPL BCP-MCNC: 4 G/DL (ref 3.5–5)
ALP SERPL-CCNC: 82 U/L (ref 46–116)
ALT SERPL W P-5'-P-CCNC: 76 U/L (ref 12–78)
ANION GAP SERPL CALCULATED.3IONS-SCNC: 4 MMOL/L (ref 4–13)
AST SERPL W P-5'-P-CCNC: 41 U/L (ref 5–45)
BASOPHILS # BLD AUTO: 0.02 THOUSANDS/ΜL (ref 0–0.1)
BASOPHILS NFR BLD AUTO: 0 % (ref 0–1)
BILIRUB SERPL-MCNC: 0.42 MG/DL (ref 0.2–1)
BUN SERPL-MCNC: 15 MG/DL (ref 5–25)
CALCIUM SERPL-MCNC: 8.8 MG/DL (ref 8.3–10.1)
CHLORIDE SERPL-SCNC: 105 MMOL/L (ref 100–108)
CHOLEST SERPL-MCNC: 211 MG/DL (ref 50–200)
CO2 SERPL-SCNC: 28 MMOL/L (ref 21–32)
CREAT SERPL-MCNC: 1.2 MG/DL (ref 0.6–1.3)
EOSINOPHIL # BLD AUTO: 0.16 THOUSAND/ΜL (ref 0–0.61)
EOSINOPHIL NFR BLD AUTO: 3 % (ref 0–6)
ERYTHROCYTE [DISTWIDTH] IN BLOOD BY AUTOMATED COUNT: 13.1 % (ref 11.6–15.1)
GFR SERPL CREATININE-BSD FRML MDRD: 64 ML/MIN/1.73SQ M
GLUCOSE P FAST SERPL-MCNC: 103 MG/DL (ref 65–99)
HCT VFR BLD AUTO: 46.8 % (ref 36.5–49.3)
HDLC SERPL-MCNC: 67 MG/DL
HGB BLD-MCNC: 16.2 G/DL (ref 12–17)
IMM GRANULOCYTES # BLD AUTO: 0.01 THOUSAND/UL (ref 0–0.2)
IMM GRANULOCYTES NFR BLD AUTO: 0 % (ref 0–2)
LDLC SERPL CALC-MCNC: 115 MG/DL (ref 0–100)
LYMPHOCYTES # BLD AUTO: 1.48 THOUSANDS/ΜL (ref 0.6–4.47)
LYMPHOCYTES NFR BLD AUTO: 27 % (ref 14–44)
MCH RBC QN AUTO: 32.5 PG (ref 26.8–34.3)
MCHC RBC AUTO-ENTMCNC: 34.6 G/DL (ref 31.4–37.4)
MCV RBC AUTO: 94 FL (ref 82–98)
MONOCYTES # BLD AUTO: 0.62 THOUSAND/ΜL (ref 0.17–1.22)
MONOCYTES NFR BLD AUTO: 11 % (ref 4–12)
NEUTROPHILS # BLD AUTO: 3.2 THOUSANDS/ΜL (ref 1.85–7.62)
NEUTS SEG NFR BLD AUTO: 59 % (ref 43–75)
NONHDLC SERPL-MCNC: 144 MG/DL
NRBC BLD AUTO-RTO: 0 /100 WBCS
PLATELET # BLD AUTO: 221 THOUSANDS/UL (ref 149–390)
PMV BLD AUTO: 11 FL (ref 8.9–12.7)
POTASSIUM SERPL-SCNC: 4.5 MMOL/L (ref 3.5–5.3)
PROT SERPL-MCNC: 7.8 G/DL (ref 6.4–8.2)
RBC # BLD AUTO: 4.98 MILLION/UL (ref 3.88–5.62)
SODIUM SERPL-SCNC: 137 MMOL/L (ref 136–145)
TRIGL SERPL-MCNC: 144 MG/DL
TSH SERPL DL<=0.05 MIU/L-ACNC: 2.05 UIU/ML (ref 0.36–3.74)
WBC # BLD AUTO: 5.49 THOUSAND/UL (ref 4.31–10.16)

## 2020-06-08 PROCEDURE — 36415 COLL VENOUS BLD VENIPUNCTURE: CPT

## 2020-06-08 PROCEDURE — 82306 VITAMIN D 25 HYDROXY: CPT

## 2020-06-08 PROCEDURE — 80053 COMPREHEN METABOLIC PANEL: CPT

## 2020-06-08 PROCEDURE — 84443 ASSAY THYROID STIM HORMONE: CPT

## 2020-06-08 PROCEDURE — 85025 COMPLETE CBC W/AUTO DIFF WBC: CPT

## 2020-06-08 PROCEDURE — 80061 LIPID PANEL: CPT

## 2020-06-09 DIAGNOSIS — E78.00 HYPERCHOLESTEROLEMIA: Primary | ICD-10-CM

## 2020-06-09 DIAGNOSIS — E03.9 HYPOTHYROIDISM, UNSPECIFIED TYPE: ICD-10-CM

## 2020-06-09 RX ORDER — LEVOTHYROXINE SODIUM 175 UG/1
TABLET ORAL
Qty: 90 TABLET | Refills: 0 | Status: SHIPPED | OUTPATIENT
Start: 2020-06-09 | End: 2020-12-21

## 2020-06-09 RX ORDER — EZETIMIBE 10 MG/1
TABLET ORAL
Qty: 90 TABLET | Refills: 0 | Status: SHIPPED | OUTPATIENT
Start: 2020-06-09 | End: 2020-12-21

## 2020-10-05 ENCOUNTER — OFFICE VISIT (OUTPATIENT)
Dept: INTERNAL MEDICINE CLINIC | Facility: CLINIC | Age: 65
End: 2020-10-05
Payer: COMMERCIAL

## 2020-10-05 VITALS
HEIGHT: 68 IN | BODY MASS INDEX: 34.1 KG/M2 | OXYGEN SATURATION: 97 % | WEIGHT: 225 LBS | HEART RATE: 62 BPM | TEMPERATURE: 99.8 F

## 2020-10-05 DIAGNOSIS — J01.10 ACUTE NON-RECURRENT FRONTAL SINUSITIS: Primary | ICD-10-CM

## 2020-10-05 PROCEDURE — 99213 OFFICE O/P EST LOW 20 MIN: CPT | Performed by: NURSE PRACTITIONER

## 2020-10-05 RX ORDER — AMOXICILLIN AND CLAVULANATE POTASSIUM 875; 125 MG/1; MG/1
1 TABLET, FILM COATED ORAL 2 TIMES DAILY
Qty: 20 TABLET | Refills: 0 | Status: SHIPPED | OUTPATIENT
Start: 2020-10-05 | End: 2020-10-15

## 2020-11-13 ENCOUNTER — IMMUNIZATIONS (OUTPATIENT)
Dept: INTERNAL MEDICINE CLINIC | Facility: CLINIC | Age: 65
End: 2020-11-13
Payer: COMMERCIAL

## 2020-11-13 DIAGNOSIS — Z23 ENCOUNTER FOR IMMUNIZATION: ICD-10-CM

## 2020-11-13 DIAGNOSIS — Z23 NEED FOR PNEUMOCOCCAL VACCINATION: ICD-10-CM

## 2020-11-13 DIAGNOSIS — Z23 NEED FOR INFLUENZA VACCINATION: Primary | ICD-10-CM

## 2020-11-13 PROCEDURE — 90472 IMMUNIZATION ADMIN EACH ADD: CPT

## 2020-11-13 PROCEDURE — 90682 RIV4 VACC RECOMBINANT DNA IM: CPT

## 2020-11-13 PROCEDURE — 90670 PCV13 VACCINE IM: CPT

## 2020-11-13 PROCEDURE — 90471 IMMUNIZATION ADMIN: CPT

## 2020-12-19 DIAGNOSIS — E78.00 HYPERCHOLESTEROLEMIA: ICD-10-CM

## 2020-12-19 DIAGNOSIS — E03.9 HYPOTHYROIDISM, UNSPECIFIED TYPE: ICD-10-CM

## 2020-12-19 DIAGNOSIS — I10 ESSENTIAL HYPERTENSION: ICD-10-CM

## 2020-12-19 RX ORDER — LISINOPRIL 10 MG/1
10 TABLET ORAL DAILY
Qty: 90 TABLET | Refills: 0 | Status: CANCELLED | OUTPATIENT
Start: 2020-12-19

## 2020-12-19 RX ORDER — LEVOTHYROXINE SODIUM 175 UG/1
175 TABLET ORAL DAILY
Qty: 90 TABLET | Refills: 0 | Status: CANCELLED | OUTPATIENT
Start: 2020-12-19

## 2020-12-19 RX ORDER — EZETIMIBE 10 MG/1
10 TABLET ORAL DAILY
Qty: 90 TABLET | Refills: 0 | Status: CANCELLED | OUTPATIENT
Start: 2020-12-19

## 2020-12-21 DIAGNOSIS — E78.00 HYPERCHOLESTEROLEMIA: ICD-10-CM

## 2020-12-21 DIAGNOSIS — E03.9 HYPOTHYROIDISM, UNSPECIFIED TYPE: ICD-10-CM

## 2020-12-21 DIAGNOSIS — I10 ESSENTIAL HYPERTENSION: ICD-10-CM

## 2020-12-21 RX ORDER — LISINOPRIL 10 MG/1
TABLET ORAL
Qty: 90 TABLET | Refills: 0 | Status: SHIPPED | OUTPATIENT
Start: 2020-12-21 | End: 2021-03-18 | Stop reason: SDUPTHER

## 2020-12-21 RX ORDER — EZETIMIBE 10 MG/1
TABLET ORAL
Qty: 90 TABLET | Refills: 0 | Status: SHIPPED | OUTPATIENT
Start: 2020-12-21 | End: 2021-03-19 | Stop reason: SDUPTHER

## 2020-12-21 RX ORDER — LEVOTHYROXINE SODIUM 175 UG/1
TABLET ORAL
Qty: 90 TABLET | Refills: 0 | Status: SHIPPED | OUTPATIENT
Start: 2020-12-21 | End: 2021-03-18 | Stop reason: SDUPTHER

## 2021-02-22 DIAGNOSIS — M25.50 ARTHRALGIA, UNSPECIFIED JOINT: Primary | ICD-10-CM

## 2021-02-22 DIAGNOSIS — M79.10 MYALGIA: ICD-10-CM

## 2021-02-23 ENCOUNTER — LAB (OUTPATIENT)
Dept: LAB | Facility: CLINIC | Age: 66
End: 2021-02-23
Payer: COMMERCIAL

## 2021-02-23 DIAGNOSIS — M25.50 ARTHRALGIA, UNSPECIFIED JOINT: ICD-10-CM

## 2021-02-23 DIAGNOSIS — M79.10 MYALGIA: ICD-10-CM

## 2021-02-23 LAB
CRP SERPL QL: <3 MG/L
ERYTHROCYTE [SEDIMENTATION RATE] IN BLOOD: 45 MM/HOUR (ref 0–19)

## 2021-02-23 PROCEDURE — 86038 ANTINUCLEAR ANTIBODIES: CPT

## 2021-02-23 PROCEDURE — 86430 RHEUMATOID FACTOR TEST QUAL: CPT

## 2021-02-23 PROCEDURE — 86431 RHEUMATOID FACTOR QUANT: CPT

## 2021-02-23 PROCEDURE — 36415 COLL VENOUS BLD VENIPUNCTURE: CPT

## 2021-02-23 PROCEDURE — 86140 C-REACTIVE PROTEIN: CPT

## 2021-02-23 PROCEDURE — 85652 RBC SED RATE AUTOMATED: CPT

## 2021-02-24 LAB
CRYOGLOB RF SER-ACNC: ABNORMAL [IU]/ML
RHEUMATOID FACT SER QL LA: POSITIVE
RYE IGE QN: NEGATIVE

## 2021-02-25 ENCOUNTER — APPOINTMENT (OUTPATIENT)
Dept: RADIOLOGY | Facility: MEDICAL CENTER | Age: 66
End: 2021-02-25
Payer: COMMERCIAL

## 2021-02-25 ENCOUNTER — OFFICE VISIT (OUTPATIENT)
Dept: RHEUMATOLOGY | Facility: CLINIC | Age: 66
End: 2021-02-25
Payer: COMMERCIAL

## 2021-02-25 ENCOUNTER — TELEPHONE (OUTPATIENT)
Dept: OBGYN CLINIC | Facility: HOSPITAL | Age: 66
End: 2021-02-25

## 2021-02-25 VITALS
DIASTOLIC BLOOD PRESSURE: 79 MMHG | BODY MASS INDEX: 33.62 KG/M2 | HEART RATE: 69 BPM | HEIGHT: 68 IN | SYSTOLIC BLOOD PRESSURE: 152 MMHG | TEMPERATURE: 98.1 F | WEIGHT: 221.8 LBS

## 2021-02-25 DIAGNOSIS — M19.90 ARTHRITIS: ICD-10-CM

## 2021-02-25 DIAGNOSIS — M19.90 ARTHRITIS: Primary | ICD-10-CM

## 2021-02-25 DIAGNOSIS — M62.838 MUSCLE SPASM: ICD-10-CM

## 2021-02-25 PROCEDURE — 1036F TOBACCO NON-USER: CPT | Performed by: INTERNAL MEDICINE

## 2021-02-25 PROCEDURE — 99204 OFFICE O/P NEW MOD 45 MIN: CPT | Performed by: INTERNAL MEDICINE

## 2021-02-25 PROCEDURE — 73030 X-RAY EXAM OF SHOULDER: CPT

## 2021-02-25 PROCEDURE — 72050 X-RAY EXAM NECK SPINE 4/5VWS: CPT

## 2021-02-25 PROCEDURE — 3008F BODY MASS INDEX DOCD: CPT | Performed by: INTERNAL MEDICINE

## 2021-02-25 RX ORDER — CALCIUM/MAGNESIUM/ZINC 333-133 MG
TABLET ORAL
COMMUNITY
Start: 2021-02-24

## 2021-02-25 RX ORDER — CHOLECALCIFEROL (VITAMIN D3) 125 MCG
CAPSULE ORAL
COMMUNITY
Start: 2021-02-24

## 2021-02-25 RX ORDER — MELATON/THEAN/VAL/LEM/CHAM/LAV 10MG-200MG
TABLET,IMMED, EXTENDED RELEASE, BIPHASIC ORAL
COMMUNITY
Start: 2021-02-24

## 2021-02-25 RX ORDER — CYCLOBENZAPRINE HCL 5 MG
5 TABLET ORAL
Qty: 30 TABLET | Refills: 3 | Status: SHIPPED | OUTPATIENT
Start: 2021-02-25 | End: 2021-05-27 | Stop reason: SDUPTHER

## 2021-02-25 NOTE — TELEPHONE ENCOUNTER
I spoke with the patient, he has been notified the order for xray is in and reminded him about the lab test

## 2021-02-25 NOTE — PATIENT INSTRUCTIONS
Do right shoulder and neck x-rays  Physical therapy referral made  Can take ibuprofen 600mg twice a day  Take cyclobenzaprine at bedtime  Call if want prescription  Try to get left hand x-rays uploaded    Return to clinic in 3 months    Arthritis   WHAT YOU NEED TO KNOW:   What is arthritis? Arthritis is pain or disease in one or more joints  There are many types of arthritis  Types such as rheumatoid arthritis cause inflammation in the joints  Other types wear away the cartilage between joints, such as osteoarthritis  This makes the bones of the joint rub together when you move the joint  An infection from bacteria, a virus, or a fungus can also cause arthritis  Your symptoms may be constant, or symptoms may come and go  Arthritis often gets worse over time and can cause permanent joint damage  What increases my risk for arthritis? · A family history of arthritis    · Infection, trauma, or injury to the joint    · Obesity    · A disease such as diabetes, heart disease, hypothyroidism, or psoriasis    · An immune deficiency disorder, such as AIDS or lupus    What are the signs and symptoms of arthritis? · Pain, swelling, or stiffness in the joint    · Limited range of motion in the joint    · Warmth or redness over the joint    · Tenderness when you touch the joint    · Stiff joints in the morning that loosen with movement    · A creaking or grinding sound when you move the joint    · Fever    How is arthritis diagnosed? · Blood tests  are used to measure the amount of inflammation in your body  · An x-ray, CT, MRI, or ultrasound  may be used to check for joint damage, swelling, or loss of bone  Do not enter the MRI room with anything metal  Metal can cause serious damage  Tell the healthcare provider if you have any metal in or on your body  · A sample  of the fluid in the joint may be tested for uric acid or calcium crystals, or for signs of infection  How is arthritis treated?   Treatment will depend on the type of arthritis you have and if it is severe  You may need any of the following:  · Acetaminophen  decreases pain and fever  It is available without a doctor's order  Ask how much to take and how often to take it  Follow directions  Read the labels of all other medicines you are using to see if they also contain acetaminophen, or ask your doctor or pharmacist  Acetaminophen can cause liver damage if not taken correctly  Do not use more than 4 grams (4,000 milligrams) total of acetaminophen in one day  · NSAIDs , such as ibuprofen, help decrease swelling, pain, and fever  This medicine is available with or without a doctor's order  NSAIDs can cause stomach bleeding or kidney problems in certain people  If you take blood thinner medicine, always ask your healthcare provider if NSAIDs are safe for you  Always read the medicine label and follow directions  · Prescription pain medicine  may be given  Ask your healthcare provider how to take this medicine safely  Some prescription pain medicines contain acetaminophen  Do not take other medicines that contain acetaminophen without talking to your healthcare provider  Too much acetaminophen may cause liver damage  Prescription pain medicine may cause constipation  Ask your healthcare provider how to prevent or treat constipation  · Steroid medicine  helps reduce swelling and pain  · Surgery  may be needed to repair or replace a damaged joint  What can I do to manage my symptoms? · Rest your painful joint so it can heal   Your healthcare provider may recommend crutches or a walker if the affected joint is in a leg  · Apply ice or heat to the joint  Both can help decrease swelling and pain  Ice may also help prevent tissue damage  Use an ice pack, or put crushed ice in a plastic bag  Cover it with a towel and place it on your joint for 15 to 20 minutes every hour or as directed  You can apply heat for 20 minutes every 2 hours   Heat treatment includes hot packs or heat lamps  · Elevate your joint  Elevation helps reduce swelling and pain  Raise your joint above the level of your heart as often as you can  Prop your painful joint on pillows to keep it above your heart comfortably  What can I do to manage arthritis? · Talk to your healthcare providers about your arthritis medicines  Some medicines may only be needed when you have arthritis pain  You may need to take other medicines every day to prevent arthritis from getting worse  Your healthcare providers will help you understand all your medicines and when to take them  It is important to take the medicines as directed, even if you start to feel better  You can continue to have joint damage and inflammation even if you do not feel it  · Eat a variety of healthy foods  Healthy foods include fruits, vegetables, whole-grain breads, low-fat dairy products, beans, lean meats, and fish  Ask if you need to be on a special diet  A diet rich in calcium and vitamin D may decrease your risk of osteoporosis  Foods high in calcium include milk, cheese, broccoli, and tofu  Vitamin D may be found in meat, fish, fortified milk, cereal and bread  Ask if you need calcium or vitamin D supplements  · Go to physical or occupational therapy as directed  A physical therapist can teach you exercises to improve flexibility and range of motion  You may also be shown non-weight-bearing exercises that are safe for your joints, such as swimming  Exercise can help keep your joints flexible and reduce pain  An occupational therapist can help you learn to do your daily activities when your joints are stiff or sore  · Maintain a healthy weight  Extra weight puts increased pressure on your joints  Ask your healthcare provider what you should weigh   If you need to lose weight, he or she can help you create a weight loss program  Weight loss can help reduce pain and increase your ability to do your activities  · Wear flat or low-heeled shoes  This will help decrease pain and reduce pressure on your ankle, knee, and hip joints  · Do not smoke  Nicotine and other chemicals in cigarettes and cigars can damage your bones and joints  Ask your healthcare provider for information if you currently smoke and need help to quit  E-cigarettes or smokeless tobacco still contain nicotine  Talk to your healthcare provider before you use these products  What support devices can help manage arthritis? · Orthotic shoes or insoles  help support your feet when you walk  · Crutches, a cane, or a walker  may help decrease your risk for falling  They also decrease stress on affected joints  · Devices to prevent falls  include raised toilet seats and bathtub bars to help you get up from sitting  Handrails can be placed in areas where you need balance and support  · Devices to help with support and rest  include splints to wear on your hands and a firm pillow while you sleep  Use a pillow that is firm enough to support your neck and head  When should I call my doctor? · You have a fever and severe joint pain or swelling  · You cannot move the affected joint  · You have severe joint pain you cannot tolerate  · You have a new or worsening rash  · Your pain or swelling does not get better with treatment  · You have questions or concerns about your condition or care  CARE AGREEMENT:   You have the right to help plan your care  Learn about your health condition and how it may be treated  Discuss treatment options with your healthcare providers to decide what care you want to receive  You always have the right to refuse treatment  The above information is an  only  It is not intended as medical advice for individual conditions or treatments  Talk to your doctor, nurse or pharmacist before following any medical regimen to see if it is safe and effective for you    © Copyright [employer] [address] [place] [address] Information is for [employer] use only and may not be sold, redistributed or otherwise used for commercial purposes   All illustrations and images included in CareNotes® are the copyrighted property of A D A M , Inc  or [address] [place], [place] [address]jose bob

## 2021-02-25 NOTE — PROGRESS NOTES
Assessment and Plan: Mario Leong is a 63 y o   male who presents as a Rheumatology consult referred by his PCP BETO Randall* for evaluation of possible inflammatory arthritis  Explained to patient that a RF of 10 should actually be reported as negative, since it is less than the normal lab cutoff of 14  His PRESSLEY-28 score today is 4 67, consistent with moderate inflammatory disease activity  It is possible that he has an inflammatory arthritis, however the description of his pain seems like there is at least a component of osteoarthritis  Ordered right shoulder, cervical spine, and left hand x-rays to evaluate for any inflammatory changes  Ordered anti-CCP to further evaluate for possible Rheumatoid arthritis  He was also asked to get his left hand outside x-rays uploaded into our system  He can take ibuprofen 600 mg p o  b i d  for his joint pain; he is to call if he wants a prescription  For his neck and right shoulder muscle pain present on physical exam, prescribed cyclobenzaprine 5 mg p o  q h s  Physical therapy referral made to address patient's decreased right shoulder range of motion and pain  Plan:  Diagnoses and all orders for this visit:    Arthritis  -     Cyclic citrul peptide antibody, IgG  -     XR shoulder 2+ vw right; Future  -     XR spine cervical complete 4 or 5 vw non injury; Future  -     Ambulatory referral to Physical Therapy; Future  -     XR hand 3+ vw left; Future    Muscle spasm  -     cyclobenzaprine (FLEXERIL) 5 mg tablet; Take 1 tablet (5 mg total) by mouth daily at bedtime    Follow-up plan: Return to clinic in 3 months      HPI  Mario Leong is a 63 y o   male who presents as a Rheumatology consult referred by his PCP EZEKIEL Randall for evaluation of possible inflammatory arthritis  Patient admits that for the past year, his right shoulder, neck, and left hand have been bothering him   Admits to having herniated discs in his back; had injections and physical therapy  Had right hip replacement for avascular necrosis in 11/2014  Had left shoulder rotator cuff repair in 2006  Has been taking ibuprofen 400mg in AM and 200-400mg in PM for his pain  Admits that his left hand had a trigger finger, which he received steroid injections for by hand surgeon at [facility]  Admits that his joint pain is worse in the morning for 1-2 hours and then later in the day  He has been losing strength and having a hard time gripping with his left hand; has dropped a few things with his left hand  His left MCPs and PIPs hurt  Review of Systems  Review of Systems   Constitutional: Negative for chills, fatigue, fever and unexpected weight change  HENT: Negative for mouth sores and trouble swallowing  Eyes: Negative for pain and visual disturbance  Respiratory: Negative for cough and shortness of breath  Cardiovascular: Negative for chest pain and leg swelling  Gastrointestinal: Negative for abdominal pain, blood in stool, constipation, diarrhea and nausea  Musculoskeletal: Positive for arthralgias, back pain, joint swelling, myalgias and neck pain  Skin: Negative for color change and rash  Allergic/Immunologic: Positive for environmental allergies  Neurological: Negative for weakness and numbness  Hematological: Negative for adenopathy  Psychiatric/Behavioral: Negative for sleep disturbance  Allergies  Allergies   Allergen Reactions    Levofloxacin Rash     Tolerated Cipro and Avelox (moxifloxasin)  Home Medications    Current Outpatient Medications:     B Complex-C-Folic Acid (B-Complex/Folic Acid/Vitamin C) TBCR, , Disp: , Rfl:     Cholecalciferol (D-3-5) 125 MCG (5000 UT) capsule, , Disp: , Rfl:     Echinacea 400 MG CAPS, , Disp: , Rfl:     ezetimibe (ZETIA) 10 mg tablet, TAKE 1 TABLET DAILY  , Disp: 90 tablet, Rfl: 0    ibuprofen (MOTRIN IB) 200 mg tablet, 400mg once to twice daily as needed, Disp: , Rfl:     latanoprost (XALATAN) 0 005 % ophthalmic solution, , Disp: , Rfl:     levothyroxine 175 mcg tablet, TAKE 1 TABLET DAILY  , Disp: 90 tablet, Rfl: 0    lisinopril (ZESTRIL) 10 mg tablet, TAKE 1 TABLET DAILY  , Disp: 90 tablet, Rfl: 0    Misc Natural Products (COSAMIN ASU FOR JOINT HEALTH PO), , Disp: , Rfl:     Zinc 50 MG CAPS, , Disp: , Rfl:     cyanocobalamin (VITAMIN B-12) 1,000 mcg tablet, Vitamin B-12 1,000 mcg tablet  Take by oral route , Disp: , Rfl:     cyclobenzaprine (FLEXERIL) 5 mg tablet, Take 1 tablet (5 mg total) by mouth daily at bedtime, Disp: 30 tablet, Rfl: 3    ergocalciferol (VITAMIN D2) 50,000 units, , Disp: , Rfl:     oxybutynin (DITROPAN) 5 mg tablet, Take 1 tablet (5 mg total) by mouth 3 (three) times a day (Patient not taking: Reported on 2/25/2021), Disp: 270 tablet, Rfl: 3    Past Medical History  Past Medical History:   Diagnosis Date    Back pain     ED (erectile dysfunction)     Hypertension     Hypothyroidism     IBS (irritable bowel syndrome)     Inflammatory bowel disease     Kidney stones     Overactive bladder        Past Surgical History   Past Surgical History:   Procedure Laterality Date    APPENDECTOMY      HERNIA REPAIR      PARTIAL HIP ARTHROPLASTY Right     ROTATOR CUFF REPAIR      TRIGGER FINGER RELEASE  02/2021    Left ring finger       Family History    Family History   Problem Relation Age of Onset    Cancer Mother     Heart disease Father     Hypertension Father     Heart disease Sister     Hypertension Sister     Diabetes Maternal Grandmother     Cancer Maternal Grandmother     Stroke Maternal [de-identified]    Oldest daughter - lupus  Many family members - osteoarthritis    Social History  Occupation: was a marine; worked in [employer]; 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  Social History     Substance and Sexual Activity   Alcohol Use Yes    Alcohol/week: 5 0 standard drinks    Types: 5 Shots of liquor per week    Frequency: 2-3 times a week     Social History     Substance and Sexual Activity   Drug Use Not Currently    Types: Marijuana, Cocaine, LSD    Comment: stopped in high school - nothing since     Social History     Tobacco Use   Smoking Status Former Smoker    Quit date: 2017    Years since quittin 1   Smokeless Tobacco Former User    Types: [address]    used chew when in [facility]       Objective:  Vitals:    21 0838   BP: 152/79   BP Location: Left arm   Patient Position: Sitting   Cuff Size: Large   Pulse: 69   Temp: 98 1 °F (36 7 °C)   TempSrc: Temporal   Weight: 101 kg (221 lb 12 8 oz)   Height: 5' 8" (1 727 m)       Physical Exam  Constitutional:       General: He is not in acute distress  Appearance: He is well-developed  HENT:      Head: Normocephalic and atraumatic  Eyes:      General: Lids are normal  No scleral icterus  Conjunctiva/sclera: Conjunctivae normal    Neck:      Musculoskeletal: Neck supple  No muscular tenderness  Thyroid: No thyromegaly  Cardiovascular:      Rate and Rhythm: Normal rate and regular rhythm  Heart sounds: S1 normal and S2 normal  No murmur  No friction rub  Pulmonary:      Effort: Pulmonary effort is normal  No tachypnea or respiratory distress  Breath sounds: Normal breath sounds  No wheezing, rhonchi or rales  Musculoskeletal:         General: Tenderness present  Comments: Cervical spine tenderness, right shoulder limited range of motion, right upper trapezius and right paracervical spine tenderness   Lymphadenopathy:      Head:      Right side of head: No submental or submandibular adenopathy  Left side of head: No submental or submandibular adenopathy  Cervical: No cervical adenopathy  Skin:     General: Skin is warm and dry  Findings: No rash  Nails: There is no clubbing  Neurological:      Mental Status: He is alert  Sensory: No sensory deficit  Psychiatric:         Behavior: Behavior normal  Behavior is cooperative  Musculoskeletal--Peripheral Joint Exam:  Physical Exam     Tenderness:   LUE: wrist and carpometacarpal  Left hand: 1st MCP, 3rd MCP, 4th MCP, 5th MCP, 3rd PIP, 4th PIP and 5th PIP    PRESSLEY-28 tender joint count: 8  PRESSLEY-28 swollen joint count: 0  ESR (mm/hr): 45  Patient global assessment: 30  PRESSLEY-28 score: 4 67 (Moderate Disease Activity)      Reviewed labs and imaging  Imaging:   Final reads pending of left hand, cervical spine, and right shoulder x-rays      Labs:   Lab on 02/23/2021   Component Date Value Ref Range Status    DONNA 02/23/2021 Negative  Negative Final    CRP 02/23/2021 <3 0  <3 0 mg/L Final    Sed Rate 02/23/2021 45* 0 - 19 mm/hour Final    Rheumatoid Factor 02/23/2021 Positive* Negative Final    See RF Quantitation    RF Quantitation 02/23/2021 10 IU/mL* (none) Final

## 2021-02-25 NOTE — TELEPHONE ENCOUNTER
Dr Jeremy Schultz    Patient is asking for a script to have an xray done of his whole hand  He prefers to have this done in [facility] # [phone]

## 2021-02-25 NOTE — TELEPHONE ENCOUNTER
Please let patient know that I put the left hand x-ray order in the system  He should be able to get it done at any [facility] facility  Also, there is a lab (anti-CCP) I still want him to get that is in the system; he can get at any [facility] facility as well      Thanks,  STEVE

## 2021-02-26 ENCOUNTER — APPOINTMENT (OUTPATIENT)
Dept: RADIOLOGY | Facility: CLINIC | Age: 66
End: 2021-02-26
Payer: COMMERCIAL

## 2021-02-26 DIAGNOSIS — M19.90 ARTHRITIS: ICD-10-CM

## 2021-02-26 PROCEDURE — 73130 X-RAY EXAM OF HAND: CPT

## 2021-03-04 ENCOUNTER — EVALUATION (OUTPATIENT)
Dept: PHYSICAL THERAPY | Facility: HOME HEALTHCARE | Age: 66
End: 2021-03-04
Payer: COMMERCIAL

## 2021-03-04 DIAGNOSIS — M19.90 ARTHRITIS: ICD-10-CM

## 2021-03-04 PROCEDURE — 97161 PT EVAL LOW COMPLEX 20 MIN: CPT | Performed by: PHYSICAL THERAPIST

## 2021-03-04 PROCEDURE — 97110 THERAPEUTIC EXERCISES: CPT | Performed by: PHYSICAL THERAPIST

## 2021-03-08 ENCOUNTER — LAB (OUTPATIENT)
Dept: LAB | Facility: CLINIC | Age: 66
End: 2021-03-08
Payer: COMMERCIAL

## 2021-03-08 PROCEDURE — 86200 CCP ANTIBODY: CPT | Performed by: INTERNAL MEDICINE

## 2021-03-08 PROCEDURE — 36415 COLL VENOUS BLD VENIPUNCTURE: CPT | Performed by: INTERNAL MEDICINE

## 2021-03-10 ENCOUNTER — OFFICE VISIT (OUTPATIENT)
Dept: PHYSICAL THERAPY | Facility: HOME HEALTHCARE | Age: 66
End: 2021-03-10
Payer: COMMERCIAL

## 2021-03-10 DIAGNOSIS — M19.90 ARTHRITIS: Primary | ICD-10-CM

## 2021-03-10 LAB — CCP AB SER IA-ACNC: 0.4

## 2021-03-10 PROCEDURE — 97112 NEUROMUSCULAR REEDUCATION: CPT

## 2021-03-10 PROCEDURE — 97110 THERAPEUTIC EXERCISES: CPT

## 2021-03-10 PROCEDURE — 97140 MANUAL THERAPY 1/> REGIONS: CPT

## 2021-03-10 NOTE — PROGRESS NOTES
Daily Note     Today's date: 3/10/2021  Patient name: Zara Sepulveda  : 1955  MRN: [id number]  Referring provider: Gordo Evans MD  Dx:   Encounter Diagnosis     ICD-10-CM    1  Arthritis  M19 90                   Subjective: Pt reports his R shoulder is not too bad this morning and has 2/10 pain  Objective: See treatment diary below      Assessment: Tolerated treatment well  Verbal cues needed t/o exercise to perform correctly  Pt reports increased pain to 3/10 R shoulder with exercise  Pt reports discomfort with UBE  ROM and strength deficits noted t/o R shoulder  Patient would benefit from continued PT      Plan: Continue per plan of care        Re-eval Date: 21    Date 3/4 3/10/21      Visit Count 1 2      FOTO Completed              Precautions:        Manuals 3/4 3/10/21      R shoulder  10 min                               Neuro Re-Ed         MTP/LTP  Green 1 x 15 ea       Josef ER tband   Red 1 x 15       Ball on wall   1 x 10 ea dir      Wall angels                                 Ther Ex        UBE - ALT  10 minutes 10 min       Doorway stretch  UT stretch  Towel stretch  Reviewed for HEP        Pulleys: flex/abd  2 min ea       Finger ladder  1 x 3 ea       Wall slides   1 x 10 ea   Flex/abd      Standing FF/Abd to 90*        SA punches   1 x 15      Supine ABCs   X 1       S/L Abd/ER        Prone flex/HA/ext                                         Ther Activity                        Gait Training                        Modalities

## 2021-03-12 ENCOUNTER — OFFICE VISIT (OUTPATIENT)
Dept: PHYSICAL THERAPY | Facility: HOME HEALTHCARE | Age: 66
End: 2021-03-12
Payer: COMMERCIAL

## 2021-03-12 DIAGNOSIS — M19.90 ARTHRITIS: Primary | ICD-10-CM

## 2021-03-12 PROCEDURE — 97110 THERAPEUTIC EXERCISES: CPT

## 2021-03-12 PROCEDURE — 97140 MANUAL THERAPY 1/> REGIONS: CPT

## 2021-03-12 PROCEDURE — 97112 NEUROMUSCULAR REEDUCATION: CPT

## 2021-03-12 NOTE — PROGRESS NOTES
Daily Note     Today's date: 3/12/2021  Patient name: Suezanne Goodell  : 1955  MRN: [id number]  Referring provider: Donavon Ferrara MD  Dx:   Encounter Diagnosis     ICD-10-CM    1  Arthritis  M19 90                   Subjective: Pt reports his L shoulder is a little sore today and has 2-3/10 pain  Objective: See treatment diary below      Assessment: Tolerated treatment well  No increased pain reported t/o session  Added FF and abd to program today  ROM and strength deficits noted t/o R shoulder  Patient would benefit from continued PT      Plan: Continue per plan of care        Re-eval Date: 21    Date 3/4 3/10/21 3/12/21     Visit Count 1 2 3     FOTO Completed              Precautions:        Manuals 3/4 3/10/21 3/12/21     R shoulder  10 min  10 min                              Neuro Re-Ed         MTP/LTP  Green 1 x 15 ea  Green 1 x 15 ea      Josef ER tband   Red 1 x 15  Red 1 x 15      Ball on wall   1 x 10 ea dir 1 x 10 ea dir      Wall angels                                 Ther Ex        UBE - ALT  10 minutes 10 min  10 min      Doorway stretch  UT stretch  Towel stretch  Reviewed for HEP        Pulleys: flex/abd  2 min ea  2 min ea      Finger ladder  1 x 3 ea  1 x 3 ea      Wall slides   1 x 10 ea   Flex/abd 1 x 10 ea Flex/abd     Standing FF/Abd to 90*   1 x 15 ea      SA punches   1 x 15 1 x 20      Supine ABCs   X 1  X 1      S/L Abd/ER        Prone flex/HA/ext                                         Ther Activity                        Gait Training                        Modalities

## 2021-03-14 DIAGNOSIS — M19.90 ARTHRITIS: Primary | ICD-10-CM

## 2021-03-14 RX ORDER — IBUPROFEN 600 MG/1
600 TABLET ORAL 2 TIMES DAILY PRN
Qty: 60 TABLET | Refills: 6 | Status: SHIPPED | OUTPATIENT
Start: 2021-03-14 | End: 2021-05-27 | Stop reason: SDUPTHER

## 2021-03-17 ENCOUNTER — OFFICE VISIT (OUTPATIENT)
Dept: PHYSICAL THERAPY | Facility: HOME HEALTHCARE | Age: 66
End: 2021-03-17
Payer: COMMERCIAL

## 2021-03-17 DIAGNOSIS — M19.90 ARTHRITIS: Primary | ICD-10-CM

## 2021-03-17 PROCEDURE — 97110 THERAPEUTIC EXERCISES: CPT

## 2021-03-17 PROCEDURE — 97112 NEUROMUSCULAR REEDUCATION: CPT

## 2021-03-17 NOTE — PROGRESS NOTES
Daily Note     Today's date: 3/17/2021  Patient name: Suezanne Goodell  : 1955  MRN: [id number]  Referring provider: Donavon Ferrara MD  Dx:   Encounter Diagnosis     ICD-10-CM    1  Arthritis  M19 90                   Subjective: Pt reports no pain right now but has more pain when he moves his R shoulder  Pt reports he feels he has more mobility R UE  Objective: See treatment diary below      Assessment: Tolerated treatment well  Progressed to wall angles this session, which Pt was challenged with  Some verbal cues needed to perform exercises correctly  Increased reps with MTP/LTP and bilateral theraband ER  Patient would benefit from continued PT to improve ROM, strength and overall function and mobility  Plan: Continue per plan of care        Re-eval Date: 21    Date 3/4 3/10/21 3/12/21 3/17/21    Visit Count 1 2 3 4    FOTO Completed              Precautions:        Manuals 3/4 3/10/21 3/12/21 3/17/21    R shoulder  10 min  10 min  10 min                             Neuro Re-Ed         MTP/LTP  Green 1 x 15 ea  Green 1 x 15 ea  Green 1 x 20 ea     Josef ER tband   Red 1 x 15  Red 1 x 15  Red 1 x 20     Ball on wall   1 x 10 ea dir 1 x 10 ea dir  1 x 10 ea dir     Wall angels     1 x 10                             Ther Ex        UBE - ALT  10 minutes 10 min  10 min  10 min     Doorway stretch  UT stretch  Towel stretch  Reviewed for HEP        Pulleys: flex/abd  2 min ea  2 min ea  2 min ea     Finger ladder  1 x 3 ea  1 x 3 ea  1 x 3 ea     Wall slides   1 x 10 ea   Flex/abd 1 x 10 ea Flex/abd 1 x 10 ea   Flex/abd    Standing FF/Abd to 90*   1 x 15 ea  1 x 15 ea     SA punches   1 x 15 1 x 20  1 x 20     Supine ABCs   X 1  X 1  X 1     S/L Abd/ER        Prone flex/HA/ext                                         Ther Activity                        Gait Training                        Modalities

## 2021-03-18 DIAGNOSIS — E78.00 HYPERCHOLESTEROLEMIA: ICD-10-CM

## 2021-03-18 DIAGNOSIS — E03.9 HYPOTHYROIDISM, UNSPECIFIED TYPE: ICD-10-CM

## 2021-03-18 DIAGNOSIS — I10 ESSENTIAL HYPERTENSION: ICD-10-CM

## 2021-03-18 RX ORDER — EZETIMIBE 10 MG/1
10 TABLET ORAL DAILY
Qty: 90 TABLET | Refills: 0 | Status: CANCELLED | OUTPATIENT
Start: 2021-03-18

## 2021-03-19 ENCOUNTER — OFFICE VISIT (OUTPATIENT)
Dept: PHYSICAL THERAPY | Facility: HOME HEALTHCARE | Age: 66
End: 2021-03-19
Payer: COMMERCIAL

## 2021-03-19 DIAGNOSIS — M19.90 ARTHRITIS: Primary | ICD-10-CM

## 2021-03-19 DIAGNOSIS — E78.00 HYPERCHOLESTEROLEMIA: ICD-10-CM

## 2021-03-19 PROCEDURE — 97140 MANUAL THERAPY 1/> REGIONS: CPT

## 2021-03-19 PROCEDURE — 97110 THERAPEUTIC EXERCISES: CPT

## 2021-03-19 PROCEDURE — 97112 NEUROMUSCULAR REEDUCATION: CPT

## 2021-03-19 RX ORDER — LISINOPRIL 10 MG/1
10 TABLET ORAL DAILY
Qty: 90 TABLET | Refills: 0 | Status: SHIPPED | OUTPATIENT
Start: 2021-03-19 | End: 2021-06-20 | Stop reason: SDUPTHER

## 2021-03-19 RX ORDER — EZETIMIBE 10 MG/1
10 TABLET ORAL DAILY
Qty: 90 TABLET | Refills: 0 | Status: SHIPPED | OUTPATIENT
Start: 2021-03-19 | End: 2021-06-21

## 2021-03-19 RX ORDER — LEVOTHYROXINE SODIUM 175 UG/1
175 TABLET ORAL DAILY
Qty: 90 TABLET | Refills: 0 | Status: SHIPPED | OUTPATIENT
Start: 2021-03-19 | End: 2021-07-17 | Stop reason: SDUPTHER

## 2021-03-19 NOTE — PROGRESS NOTES
Daily Note     Today's date: 3/19/2021  Patient name: Francisco J Botello  : 1955  MRN: [id number]  Referring provider: Margarita Ruth MD  Dx:   Encounter Diagnosis     ICD-10-CM    1  Arthritis  M19 90               Subjective: Pt reports he doesn't have any pain in his R shoulder right now  Objective: See treatment diary below      Assessment: Tolerated treatment well  Progressed to wall push ups, sidelying abd/ER  and added 1 lb weight with standing FF/abd  Pt reports 2-3/10 pain R shoulder with exercise  Pt still challenged with wall angles  Patient would benefit from continued PT to improve ROM, strength and overall function and mobility  Plan: Continue per plan of care        Re-eval Date: 21    Date 3/4 3/10/21 3/12/21 3/17/21 3/19/21   Visit Count 1 2 3 4 5   FOTO Completed     Completed          Precautions:        Manuals 3/4 3/10/21 3/12/21 3/17/21 3/19/21   R shoulder  10 min  10 min  10 min  10 min                            Neuro Re-Ed         MTP/LTP  Green 1 x 15 ea  Green 1 x 15 ea  Green 1 x 20 ea  Green 1 x 20 ea    Josef ER tband   Red 1 x 15  Red 1 x 15  Red 1 x 20  Red 1 x 20    Ball on wall   1 x 10 ea dir 1 x 10 ea dir  1 x 10 ea dir  1 x 10 ea dir   Wall angels     1 x 10  1 x 10                            Ther Ex        UBE - ALT  10 minutes 10 min  10 min  10 min  10 min    Doorway stretch  UT stretch  Towel stretch  Reviewed for HEP        Pulleys: flex/abd  2 min ea  2 min ea  2 min ea  2 min ea    Finger ladder  1 x 3 ea  1 x 3 ea  1 x 3 ea  1 x 3 ea    Wall slides   1 x 10 ea   Flex/abd 1 x 10 ea Flex/abd 1 x 10 ea   Flex/abd 1 x 10 ea   Flex/abd   Standing FF/Abd to 90*   1 x 15 ea  1 x 15 ea  1 x 15 ea 1#   SA punches   1 x 15 1 x 20  1 x 20  1 x 20    Supine ABCs   X 1  X 1  X 1  X 1    S/L Abd/ER     1 x 15 ea    Prone flex/HA/ext         Wall push ups      1 x 15                            Ther Activity                        Gait Training Modalities

## 2021-03-24 ENCOUNTER — OFFICE VISIT (OUTPATIENT)
Dept: PHYSICAL THERAPY | Facility: HOME HEALTHCARE | Age: 66
End: 2021-03-24
Payer: COMMERCIAL

## 2021-03-24 DIAGNOSIS — M19.90 ARTHRITIS: Primary | ICD-10-CM

## 2021-03-24 PROCEDURE — 97110 THERAPEUTIC EXERCISES: CPT

## 2021-03-24 PROCEDURE — 97112 NEUROMUSCULAR REEDUCATION: CPT

## 2021-03-24 PROCEDURE — 97140 MANUAL THERAPY 1/> REGIONS: CPT

## 2021-03-24 NOTE — PROGRESS NOTES
Daily Note     Today's date: 3/24/2021  Patient name: Jacoby Hatch  : 1955  MRN: [id number]  Referring provider: Deysi Nassar MD  Dx:   Encounter Diagnosis     ICD-10-CM    1  Arthritis  M19 90                   Subjective: Pt reports he feels pretty good this morning and has no pain  Objective: See treatment diary below      Assessment: Tolerated treatment well  A few verbal cues needed t/o exercise to perform correctly  Pt reprots 2-3/10 pain R shoulder with exercise  Increased to green theraband with bilateral ER  Tightness noted t/o R shoulder with stretching  Patient would benefit from continued PT      Plan: Continue per plan of care        Re-eval Date: 21    Date 3/24/21 3/10/21 3/12/21 3/17/21 3/19/21   Visit Count 6 2 3 4 5   FOTO     Completed          Precautions:        Manuals 3/24/21 3/10/21 3/12/21 3/17/21 3/19/21   R shoulder 10 min  10 min  10 min  10 min  10 min                            Neuro Re-Ed         MTP/LTP Green 1 x 20 ea  Green 1 x 15 ea  Green 1 x 15 ea  Green 1 x 20 ea  Green 1 x 20 ea    Josef ER tband  Green 1 x 20  Red 1 x 15  Red 1 x 15  Red 1 x 20  Red 1 x 20    Ball on wall  1 x 10 ea dir 1 x 10 ea dir 1 x 10 ea dir  1 x 10 ea dir  1 x 10 ea dir   Wall angels  1 x 10    1 x 10  1 x 10                            Ther Ex        UBE - ALT  10 minutes 10 min  10 min  10 min  10 min    Doorway stretch  UT stretch  Towel stretch  Reviewed for HEP        Pulleys: flex/abd 2 min ea  2 min ea  2 min ea  2 min ea  2 min ea    Finger ladder 1 x 3 ea  1 x 3 ea  1 x 3 ea  1 x 3 ea  1 x 3 ea    Wall slides  1 x 10 ea   F/ex/abd 1 x 10 ea   Flex/abd 1 x 10 ea Flex/abd 1 x 10 ea   Flex/abd 1 x 10 ea   Flex/abd   Standing FF/Abd to 90* 1 x 15 ea 1 #  1 x 15 ea  1 x 15 ea  1 x 15 ea 1#   SA punches  1 x 20  1 x 15 1 x 20  1 x 20  1 x 20    Supine ABCs  X 1  X 1  X 1  X 1  X 1    S/L Abd/ER 1 x 15 ea     1 x 15 ea    Prone flex/HA/ext         Wall push ups  1 x 15     1 x 15 Ther Activity                        Gait Training                        Modalities

## 2021-03-26 ENCOUNTER — OFFICE VISIT (OUTPATIENT)
Dept: PHYSICAL THERAPY | Facility: HOME HEALTHCARE | Age: 66
End: 2021-03-26
Payer: COMMERCIAL

## 2021-03-26 DIAGNOSIS — M19.90 ARTHRITIS: Primary | ICD-10-CM

## 2021-03-26 PROCEDURE — 97112 NEUROMUSCULAR REEDUCATION: CPT

## 2021-03-26 PROCEDURE — 97140 MANUAL THERAPY 1/> REGIONS: CPT

## 2021-03-26 PROCEDURE — 97110 THERAPEUTIC EXERCISES: CPT

## 2021-03-26 NOTE — PROGRESS NOTES
Daily Note     Today's date: 3/26/2021  Patient name: Debby Galicia  : 1955  MRN: [id number]  Referring provider: Elvira Rodriguez MD  Dx:   Encounter Diagnosis     ICD-10-CM    1  Arthritis  M19 90                   Subjective: Pt reports he has no pain in his R shoulder right now  Objective: See treatment diary below      Assessment: Tolerated treatment well  Some verbal cue needed to perform exercises correctly  Pt reports pain R shoulder with exercise  Progressed to 1 lb weight with supine abc's and punches today  End range tightness t/o R shoulder with stretching  Patient would benefit from continued PT      Plan: Continue per plan of care        Re-eval Date: 21    Date 3/24/21 3/26/21 3/12/21 3/17/21 3/19/21   Visit Count 6 7 3 4 5   FOTO     Completed          Precautions:        Manuals 3/24/21 3/26/21 3/12/21 3/17/21 3/19/21   R shoulder 10 min  10 min  10 min  10 min  10 min                            Neuro Re-Ed         MTP/LTP Green 1 x 20 ea  Green 1 x 20 ea  Green 1 x 15 ea  Green 1 x 20 ea  Green 1 x 20 ea    Josef ER tband  Green 1 x 20  Green 1 x 20 Red 1 x 15  Red 1 x 20  Red 1 x 20    Ball on wall  1 x 10 ea dir 1 x 10 ea dir 1 x 10 ea dir  1 x 10 ea dir  1 x 10 ea dir   Wall angels  1 x 10  1 x 10   1 x 10  1 x 10                            Ther Ex        UBE - ALT  10 minutes 10 min  10 min  10 min  10 min    Doorway stretch  UT stretch  Towel stretch  Reviewed for HEP        Pulleys: flex/abd 2 min ea  2 min ea  2 min ea  2 min ea  2 min ea    Finger ladder 1 x 3 ea  1 x 3 ea  1 x 3 ea  1 x 3 ea  1 x 3 ea    Wall slides  1 x 10 ea   F/ex/abd 1 x 10 ea   Flex/abd 1 x 10 ea Flex/abd 1 x 10 ea   Flex/abd 1 x 10 ea   Flex/abd   Standing FF/Abd to 90* 1 x 15 ea 1 # 1 x 15 ea 1# 1 x 15 ea  1 x 15 ea  1 x 15 ea 1#   SA punches  1 x 20  1 x 20 1#  1 x 20  1 x 20  1 x 20    Supine ABCs  X 1  X 1 1# X 1  X 1  X 1    S/L Abd/ER 1 x 15 ea  1 x 15 ea    1 x 15 ea    Prone flex/HA/ext Wall push ups  1 x 15  1 x 15    1 x 15                            Ther Activity                        Gait Training                        Modalities

## 2021-03-31 ENCOUNTER — OFFICE VISIT (OUTPATIENT)
Dept: PHYSICAL THERAPY | Facility: HOME HEALTHCARE | Age: 66
End: 2021-03-31
Payer: COMMERCIAL

## 2021-03-31 DIAGNOSIS — M19.90 ARTHRITIS: Primary | ICD-10-CM

## 2021-03-31 PROCEDURE — 97110 THERAPEUTIC EXERCISES: CPT

## 2021-03-31 PROCEDURE — 97140 MANUAL THERAPY 1/> REGIONS: CPT

## 2021-03-31 PROCEDURE — 97112 NEUROMUSCULAR REEDUCATION: CPT

## 2021-03-31 NOTE — PROGRESS NOTES
Daily Note     Today's date: 3/31/2021  Patient name: Dylan Henning  : 1955  MRN: [id number]  Referring provider: Marie Vogel MD  Dx:   Encounter Diagnosis     ICD-10-CM    1  Arthritis  M19 90                   Subjective: Pt reports he has a little pain in in her R shoulder  Objective: See treatment diary below      Assessment: Tolerated treatment well  Some verbal cues needed to perform exercises correctly  End range tightness noted t/o R shoulder with stretching  Pt still challenged with wall angles  ROM improving R shoulder since SOC  Patient would benefit from continued PT      Plan: Continue per plan of care        Re-eval Date: 21    Date 3/24/21 3/26/21 3/31/21 3/17/21 3/19/21   Visit Count 6 7 8 4 5   FOTO     Completed          Precautions:        Manuals 3/24/21 3/26/21 3/31/21 3/17/21 3/19/21   R shoulder 10 min  10 min  10 min  10 min  10 min                            Neuro Re-Ed         MTP/LTP Green 1 x 20 ea  Green 1 x 20 ea  Green 1 x 20 ea  Green 1 x 20 ea  Green 1 x 20 ea    Josef ER tband  Green 1 x 20  Green 1 x 20 Green  1 x 20  Red 1 x 20  Red 1 x 20    Ball on wall  1 x 10 ea dir 1 x 10 ea dir 1 x 10 ea dir  1 x 10 ea dir  1 x 10 ea dir   Wall angels  1 x 10  1 x 10  1 x 10  1 x 10  1 x 10                            Ther Ex        UBE - ALT  10 minutes 10 min  10 min  10 min  10 min    Doorway stretch  UT stretch  Towel stretch  Reviewed for HEP        Pulleys: flex/abd 2 min ea  2 min ea  2 min ea  2 min ea  2 min ea    Finger ladder 1 x 3 ea  1 x 3 ea  1 x 3 ea  1 x 3 ea  1 x 3 ea    Wall slides  1 x 10 ea   F/ex/abd 1 x 10 ea   Flex/abd 1 x 10 ea Flex/abd 1 x 10 ea   Flex/abd 1 x 10 ea   Flex/abd   Standing FF/Abd to 90* 1 x 15 ea 1 # 1 x 15 ea 1# 1 x 15 ea 1# 1 x 15 ea  1 x 15 ea 1#   SA punches  1 x 20  1 x 20 1#  1 x 20 1# 1 x 20  1 x 20    Supine ABCs  X 1  X 1 1# X 1  1# X 1  X 1    S/L Abd/ER 1 x 15 ea  1 x 15 ea  1 x 15 ea   1 x 15 ea    Prone flex/HA/ext Wall push ups  1 x 15  1 x 15  1 x 15   1 x 15                            Ther Activity                        Gait Training                        Modalities

## 2021-04-02 ENCOUNTER — OFFICE VISIT (OUTPATIENT)
Dept: PHYSICAL THERAPY | Facility: HOME HEALTHCARE | Age: 66
End: 2021-04-02
Payer: COMMERCIAL

## 2021-04-02 DIAGNOSIS — M19.90 ARTHRITIS: Primary | ICD-10-CM

## 2021-04-02 PROCEDURE — 97110 THERAPEUTIC EXERCISES: CPT

## 2021-04-02 PROCEDURE — 97112 NEUROMUSCULAR REEDUCATION: CPT

## 2021-04-02 PROCEDURE — 97140 MANUAL THERAPY 1/> REGIONS: CPT

## 2021-04-02 NOTE — PROGRESS NOTES
Daily Note     Today's date: 2021  Patient name: Tomy Grubbs  : 1955  MRN: [id number]  Referring provider: Gigi Merlos MD  Dx:   Encounter Diagnosis     ICD-10-CM    1  Arthritis  M19 90                   Subjective: Pt reports his R shoulder is sore this morning from vacuuming yesterday  Objective: See treatment diary below      Assessment: Tolerated treatment well  Progressed to red weighted ball with ball on wall exercise, one pound weight with sidelying abd/ER  and incresaed to blue theraband with MTP/LTP  Pt reports a little pain R shoulder with exercise  End range tightness noted t/o R shoulder with stretching  Patient would benefit from continued PT      Plan: Continue per plan of care        Re-eval Date: 21    Date 3/24/21 3/26/21 3/31/21 4/2/21 3/19/21   Visit Count 6 7 8 9 5   FOTO     Completed          Precautions:        Manuals 3/24/21 3/26/21 3/31/21 4/2/21 3/19/21   R shoulder 10 min  10 min  10 min  10 min  10 min                            Neuro Re-Ed         MTP/LTP Green 1 x 20 ea  Green 1 x 20 ea  Green 1 x 20 ea   Blue 1 x 20 ea  Green 1 x 20 ea    Josef ER tband  Green 1 x 20  Green 1 x 20 Green  1 x 20  Green 1 x 20  Red 1 x 20    Ball on wall  1 x 10 ea dir 1 x 10 ea dir 1 x 10 ea dir  1 x 10 ea dir  Red weighted ball 1 x 10 ea dir   Wall angels  1 x 10  1 x 10  1 x 10  1 x 10  1 x 10                            Ther Ex        UBE - ALT  10 minutes 10 min  10 min  10 min  10 min    Doorway stretch  UT stretch  Towel stretch  Reviewed for HEP        Pulleys: flex/abd 2 min ea  2 min ea  2 min ea  2 min ea  2 min ea    Finger ladder 1 x 3 ea  1 x 3 ea  1 x 3 ea  1 x 3 ea  1 x 3 ea    Wall slides  1 x 10 ea   F/ex/abd 1 x 10 ea   Flex/abd 1 x 10 ea Flex/abd 1 x 10 ea   Flex/abd 1 x 10 ea   Flex/abd   Standing FF/Abd to 90* 1 x 15 ea 1 # 1 x 15 ea 1# 1 x 15 ea 1# 1 x 15 ea  1# 1 x 15 ea 1#   SA punches  1 x 20  1 x 20 1#  1 x 20 1# 1 x 20 1# 1 x 20    Supine ABCs  X 1  X 1 1# X 1  1# X 1 1# X 1    S/L Abd/ER 1 x 15 ea  1 x 15 ea  1 x 15 ea  1 x 15 ea 1 # 1 x 15 ea    Prone flex/HA/ext         Wall push ups  1 x 15  1 x 15  1 x 15  1 x 15  1 x 15                            Ther Activity                        Gait Training                        Modalities

## 2021-04-07 ENCOUNTER — OFFICE VISIT (OUTPATIENT)
Dept: PHYSICAL THERAPY | Facility: HOME HEALTHCARE | Age: 66
End: 2021-04-07
Payer: COMMERCIAL

## 2021-04-07 DIAGNOSIS — M19.90 ARTHRITIS: Primary | ICD-10-CM

## 2021-04-07 PROCEDURE — 97112 NEUROMUSCULAR REEDUCATION: CPT

## 2021-04-07 PROCEDURE — 97140 MANUAL THERAPY 1/> REGIONS: CPT

## 2021-04-07 PROCEDURE — 97110 THERAPEUTIC EXERCISES: CPT

## 2021-04-07 NOTE — PROGRESS NOTES
Daily Note     Today's date: 2021  Patient name: Raudel Torres  : 1955  MRN: [id number]  Referring provider: Jose Ramon Pelayo MD  Dx:   Encounter Diagnosis     ICD-10-CM    1  Arthritis  M19 90               Subjective: Pt reports no pain in his R shoulder right now  Objective: See treatment diary below      Assessment: Tolerated treatment well  Progressed to increased two pound weight with standing FF/abd, supine punches and supine ABC's  Increased to black theraband with MTP/LTP  No increased pain reported R shoulder t/o session  Some end range tightness still noted R shoulder with stretching  Patient would benefit from continued PT      Plan: Continue per plan of care        Re-eval Date: 21    Date 3/24/21 3/26/21 3/31/21 4/2/21 4/7/21   Visit Count 6 7 8 9 10   FOTO              Precautions:        Manuals 3/24/21 3/26/21 3/31/21 4/2/21 4/7/21   R shoulder 10 min  10 min  10 min  10 min  10 min                            Neuro Re-Ed         MTP/LTP Green 1 x 20 ea  Green 1 x 20 ea  Green 1 x 20 ea   Blue 1 x 20 ea  Black1 x 20 ea    Josef ER tband  Green 1 x 20  Green 1 x 20 Green  1 x 20   1 x 20  Red 1 x 20    Ball on wall  1 x 10 ea dir 1 x 10 ea dir 1 x 10 ea dir  1 x 10 ea dir  Red weighted ball 1 x 10 ea dir  Red weighted ball    Wall angels  1 x 10  1 x 10  1 x 10  1 x 10  1 x 10                            Ther Ex        UBE - ALT  10 minutes 10 min  10 min  10 min  10 min    Doorway stretch  UT stretch  Towel stretch  Reviewed for HEP        Pulleys: flex/abd 2 min ea  2 min ea  2 min ea  2 min ea  2 min ea    Finger ladder 1 x 3 ea  1 x 3 ea  1 x 3 ea  1 x 3 ea  1 x 3 ea    Wall slides  1 x 10 ea   F/ex/abd 1 x 10 ea   Flex/abd 1 x 10 ea Flex/abd 1 x 10 ea   Flex/abd 1 x 10 ea   Flex/abd   Standing FF/Abd to 90* 1 x 15 ea 1 # 1 x 15 ea 1# 1 x 15 ea 1# 1 x 15 ea  1# 1 x 15 ea 1#   SA punches  1 x 20  1 x 20 1#  1 x 20 1# 1 x 20 1# 1 x 20 2#   Supine ABCs  X 1  X 1 1# X 1  1# X 1 1# X 1 2#   S/L Abd/ER 1 x 15 ea  1 x 15 ea  1 x 15 ea  1 x 15 ea 1 # 1 x 15 ea 2#   Prone flex/HA/ext         Wall push ups  1 x 15  1 x 15  1 x 15  1 x 15  1 x 20                            Ther Activity                        Gait Training                        Modalities

## 2021-04-09 ENCOUNTER — OFFICE VISIT (OUTPATIENT)
Dept: PHYSICAL THERAPY | Facility: HOME HEALTHCARE | Age: 66
End: 2021-04-09
Payer: COMMERCIAL

## 2021-04-09 DIAGNOSIS — M19.90 ARTHRITIS: Primary | ICD-10-CM

## 2021-04-09 PROCEDURE — 97140 MANUAL THERAPY 1/> REGIONS: CPT

## 2021-04-09 PROCEDURE — 97110 THERAPEUTIC EXERCISES: CPT

## 2021-04-09 PROCEDURE — 97112 NEUROMUSCULAR REEDUCATION: CPT

## 2021-04-09 NOTE — PROGRESS NOTES
Daily Note     Today's date: 2021  Patient name: Jillian Serrano  : 1955  MRN: [id number]  Referring provider: Edgard Staton MD  Dx:   Encounter Diagnosis     ICD-10-CM    1  Arthritis  M19 90        Start Time: 1000          Subjective: A little pain and tightness at R sh and some discomfort into the Biceps area  Objective: See treatment diary below    Assessment: Tolerated treatment well  Added t-band sh add to facilitate sh abd movement  Pt with most strength and ROM deficits during abd and ER movements and ex  Pt denied increased pain at end and will use CP at home as needed  Patient would benefit from continued PT    Plan: Continue per plan of care        Re-eval Date: 21    Date 4-9-21 3/26/21 3/31/21 4/2/21 4/7/21   Visit Count 11 7 8 9 10   FOTO              Precautions:        Manuals 4-9-21 3/26/21 3/31/21 4/2/21 4/7/21   R shoulder 10'  10 min  10 min  10 min  10 min                            Neuro Re-Ed         MTP/LTP Black 1 x 20 ea  Green 1 x 20 ea  Green 1 x 20 ea   Blue 1 x 20 ea  Black1 x 20 ea    Josef ER tband  Black 1 x 20  Green 1 x 20 Green  1 x 20   1 x 20  Red 1 x 20    T-band sh add Blue 1 x 10       Ball on wall  Red weighted ball  1 x 10 ea dir 1 x 10 ea dir 1 x 10 ea dir  1 x 10 ea dir  Red weighted ball 1 x 10 ea dir  Red weighted ball    Wall angels  1 x 10  1 x 10  1 x 10  1 x 10  1 x 10                            Ther Ex        UBE - ALT  10 minutes 10 min  10 min  10 min  10 min    Doorway stretch  UT stretch  Towel stretch  Reviewed for HEP        Pulleys: flex/abd 2 min ea  2 min ea  2 min ea  2 min ea  2 min ea    Finger ladder 1 x 3 ea  1 x 3 ea  1 x 3 ea  1 x 3 ea  1 x 3 ea    Wall slides  1 x 10 ea   F/ex/abd 1 x 10 ea   Flex/abd 1 x 10 ea Flex/abd 1 x 10 ea   Flex/abd 1 x 10 ea   Flex/abd   Standing FF/Abd to 90* 1 x 15 ea 1 # 1 x 15 ea 1# 1 x 15 ea 1# 1 x 15 ea  1# 1 x 15 ea 1#   SA punches  2# 1 x 20  1 x 20 1#  1 x 20 1# 1 x 20 1# 1 x 20 2#   Supine ABCs  2#   X 1  X 1 1# X 1  1# X 1 1# X 1 2#   S/L Abd/ER 2#  1 x 15 ea  1 x 15 ea  1 x 15 ea  1 x 15 ea 1 # 1 x 15 ea 2#   Prone flex/HA/ext         Wall push ups  1 x 20 1 x 15  1 x 15  1 x 15  1 x 20                            Ther Activity                        Gait Training                        Modalities

## 2021-04-12 ENCOUNTER — EVALUATION (OUTPATIENT)
Dept: PHYSICAL THERAPY | Facility: HOME HEALTHCARE | Age: 66
End: 2021-04-12
Payer: COMMERCIAL

## 2021-04-12 DIAGNOSIS — M19.90 ARTHRITIS: Primary | ICD-10-CM

## 2021-04-12 PROCEDURE — 97140 MANUAL THERAPY 1/> REGIONS: CPT | Performed by: PHYSICAL THERAPIST

## 2021-04-12 PROCEDURE — 97110 THERAPEUTIC EXERCISES: CPT | Performed by: PHYSICAL THERAPIST

## 2021-04-12 PROCEDURE — 97112 NEUROMUSCULAR REEDUCATION: CPT | Performed by: PHYSICAL THERAPIST

## 2021-04-12 NOTE — PROGRESS NOTES
PT Re-Evaluation     Today's date: 2021  Patient name: Pura Beard  : 1955  MRN: [id number]  Referring provider: Pauline Reyna MD  Dx:   Encounter Diagnosis     ICD-10-CM    1  Arthritis  M19 90                   Assessment  Assessment details: Pt Maria Luisa Berry is a 63 y o  who presents to OPPT with s/s consistent with R shoulder OA  PT notes pt with limited R shoulder ROM, decreased strength, postural dysfunction, joint inflammation, and soft tissue restrctions  Pt notes difficulty with reaching overhead, reaching behind his back, or lifting >5# with R UE  He is R hand dominate making daily activities challenging and painful with increased reliance on L UE to support activities  Pt would benefit from skilled therapy services to address outlined impairments, work towards goals, and restore pts PLOF  Thank you! UPDATE: Pt is demonstrating good improvement in shoulder mobility and strength at this time, with mild strength deficits remaining  Pt also continues to have pain in the shoulder but notes some improvement with daily activities  He will return to see MD again in May; continue with therapy and progression of strengthening program to address remaining deficits  Thank you! Impairments: abnormal or restricted ROM, activity intolerance, impaired physical strength, pain with function, weight-bearing intolerance and poor posture   Understanding of Dx/Px/POC: good   Prognosis: good    Goals  STGs to be achieved in 4 weeks:  -Pt to demonstrate reduced subjective pain rating "at worst" by at least 2-3 points from Initial Eval to allow for reduced pain with ADLs and improved functional activity tolerance  - MET  -Pt to demonstrate full AROM of R shoulder in order to maximize joint mobility and function and allow for progression of exercise program and achievement of goals   - partially met; ongoing   -Pt to demonstrate increased MMT of R shoulder by at least 1/2-1 grade in order to improve safety and stability with ADLs and functional mobility  - MET    LTGs to be achieved in 6-8 weeks:  -Pt will be I with HEP in order to continue to improve quality of life and independence and reduce risk for re-injury    -Pt to demonstrate return to activities of daily living without limiations or restrictions    -Pt to demonstrate ability to lift > 10# without increase in symptoms for return to typical household activities    -Pt to demonstrate ability to reach behind his back comfortably in order to dress without modifications   -Pt to demonstrate improved function as noted by achieving or exceeding predicted score on FOTO outcomes assessment tool  Plan  Plan details: PT provided pt with detailed HEP program; pt verbalized understanding of program    Patient would benefit from: skilled physical therapy  Planned modality interventions: thermotherapy: hydrocollator packs  Planned therapy interventions: manual therapy, neuromuscular re-education, therapeutic activities, therapeutic exercise, postural training, home exercise program and functional ROM exercises  Frequency: 2x week  Duration in weeks: 6  Plan of Care beginning date: 2021  Plan of Care expiration date: 2021  Treatment plan discussed with: patient        Subjective Evaluation    History of Present Illness  Mechanism of injury: Pt reporting that he has been having pain in R shoulder for about the past year  It got a lot worse after heavy shoveling from the 2 foot snow storm last month  Pt notes that he went to PCP re: symptoms and updated imaging completed which was (+) for joint OA  Pt also sent to rheumatologist due to elevated markers on blood work; will follow up as needed  Pt will return to PCP in 2 months  Referral to OPPT for conservative management of s/s at this time  UPDATE: pt notes that he still has pain in the shoulder but feels like his mobility is a lot better     Quality of life: good    Pain  At best pain ratin  At worst pain ratin  Quality: dull ache  Relieving factors: medications (OTC daily )  Aggravating factors: lifting and overhead activity  Progression: worsening    Social Support  Lives with: spouse    Employment status: not working  Hand dominance: right      Diagnostic Tests  X-ray: abnormal  Treatments  Current treatment: physical therapy  Patient Goals  Patient goals for therapy: decreased pain, increased motion, increased strength and independence with ADLs/IADLs          Objective     Postural Observations  Seated posture: fair  Standing posture: good        Palpation     Right   Tenderness of the middle trapezius, pectoralis minor and upper trapezius  Tenderness     Right Shoulder  Tenderness in the biceps tendon (proximal)  Cervical/Thoracic Screen   Cervical range of motion within normal limits    Neurological Testing     Sensation     Shoulder   Left Shoulder   Intact: light touch    Right Shoulder   Intact: light touch    Active Range of Motion   Left Shoulder   Flexion: 160 degrees   Abduction: 140 degrees     Right Shoulder   Flexion: 145 degrees with pain  Abduction: 135 degrees with pain    Strength/Myotome Testing     Left Shoulder     Planes of Motion   Flexion: 4+   Abduction: 4+   External rotation at 0°: 4+   Internal rotation at 0°: 4+     Right Shoulder     Planes of Motion   Flexion: 3+   Abduction: 3+   External rotation at 0°: 4-   Internal rotation at 0°: 4-     Tests     Right Shoulder   Negative drop arm, empty can and painful arc                  Re-eval Date: 21    Date 21      Visit Count 11 12      FOTO              Precautions:        Manuals 21      R shoulder 10'  10'                              Neuro Re-Ed         MTP/LTP Black 1 x 20 ea  Black x 20       Josef ER tband  Black 1 x 20  Black x 20       T-band sh add Blue 1 x 10 Black x 20       Ball on wall  Red weighted ball  1 x 10 ea dir Red ball  1 x 10 ea       Wall angels  1 x 10  1 x 10 Ther Ex        UBE - ALT  10 minutes 10 min       Doorway stretch  UT stretch  Towel stretch  Reviewed for HEP        Pulleys: flex/abd 2 min ea  DC      Finger ladder 1 x 3 ea  10" x 3       Wall slides  1 x 10 ea   F/ex/abd       Standing FF/Abd to 90* 1 x 15 ea 1 # 1# 2 x 10       SA punches  2# 1 x 20  2# 2 x 10       Supine ABCs  2#   X 1  2# x 1       S/L Abd/ER 2#  1 x 15 ea  2# x 20       Prone flex/HA/ext   NV      Wall push ups  1 x 20 2 x 10       Body blade                         Ther Activity        Crate lifts        Cart push/pull                Gait Training                        Modalities

## 2021-04-15 ENCOUNTER — OFFICE VISIT (OUTPATIENT)
Dept: PHYSICAL THERAPY | Facility: HOME HEALTHCARE | Age: 66
End: 2021-04-15
Payer: COMMERCIAL

## 2021-04-15 DIAGNOSIS — M19.90 ARTHRITIS: Primary | ICD-10-CM

## 2021-04-15 PROCEDURE — 97110 THERAPEUTIC EXERCISES: CPT | Performed by: PHYSICAL THERAPIST

## 2021-04-15 PROCEDURE — 97140 MANUAL THERAPY 1/> REGIONS: CPT | Performed by: PHYSICAL THERAPIST

## 2021-04-15 RX ORDER — PREDNISONE 10 MG/1
TABLET ORAL
Qty: 21 TABLET | Refills: 0 | Status: SHIPPED | OUTPATIENT
Start: 2021-04-15 | End: 2021-04-29

## 2021-04-15 NOTE — PROGRESS NOTES
PT Discharge    Today's date: 4/15/2021  Patient name: Emerson Huerta  : 1955  MRN: [id number]  Referring provider: Nicole Martin MD  Dx:   Encounter Diagnosis     ICD-10-CM    1  Arthritis  M19 90                   Assessment  Assessment details: Pt Riri Bliss is a 63 y o  who presents to OPPT with s/s consistent with R shoulder OA  PT notes pt with limited R shoulder ROM, decreased strength, postural dysfunction, joint inflammation, and soft tissue restrctions  Pt notes difficulty with reaching overhead, reaching behind his back, or lifting >5# with R UE  He is R hand dominate making daily activities challenging and painful with increased reliance on L UE to support activities  Pt would benefit from skilled therapy services to address outlined impairments, work towards goals, and restore pts PLOF  Thank you! UPDATE: Pt reporting that he suddenly began to have increasing pain in the R shoulder and he is concerned that he might have a tear in the RTC  He notes that he would like to cx all further therapy appts until he is able to return to see MD for further assessment  PT to discharge pt from [facility] at this time and pt will return to therapy as MD recommends  Thank you! Impairments: abnormal or restricted ROM, activity intolerance, impaired physical strength, pain with function, weight-bearing intolerance and poor posture   Understanding of Dx/Px/POC: good   Prognosis: good    Goals  STGs to be achieved in 4 weeks:  -Pt to demonstrate reduced subjective pain rating "at worst" by at least 2-3 points from Initial Eval to allow for reduced pain with ADLs and improved functional activity tolerance  - MET  -Pt to demonstrate full AROM of R shoulder in order to maximize joint mobility and function and allow for progression of exercise program and achievement of goals   - partially met; ongoing   -Pt to demonstrate increased MMT of R shoulder by at least 1/2-1 grade in order to improve safety and stability with ADLs and functional mobility  - MET    LTGs to be achieved in 6-8 weeks:  -Pt will be I with HEP in order to continue to improve quality of life and independence and reduce risk for re-injury  - MET  -Pt to demonstrate return to activities of daily living without limiations or restrictions  - ONGOING   -Pt to demonstrate ability to lift > 10# without increase in symptoms for return to typical household activities  - ONGOING   -Pt to demonstrate ability to reach behind his back comfortably in order to dress without modifications - ONGOING   -Pt to demonstrate improved function as noted by achieving or exceeding predicted score on FOTO outcomes assessment tool  - progressed       Plan  Plan details: Pt to be D/C from OPPT  Frequency: 2x week  Duration in weeks: 6  Plan of Care beginning date: 2021  Plan of Care expiration date: 2021  Treatment plan discussed with: patient        Subjective Evaluation    History of Present Illness  Mechanism of injury: Pt reporting that he has been having pain in R shoulder for about the past year  It got a lot worse after heavy shoveling from the 2 foot snow storm last month  Pt notes that he went to PCP re: symptoms and updated imaging completed which was (+) for joint OA  Pt also sent to rheumatologist due to elevated markers on blood work; will follow up as needed  Pt will return to PCP in 2 months  Referral to OPPT for conservative management of s/s at this time  UPDATE: pt notes that he still has pain in the shoulder but feels like his mobility is a lot better     Quality of life: good    Pain  At best pain ratin  At worst pain ratin  Quality: dull ache  Relieving factors: medications (OTC daily )  Aggravating factors: lifting and overhead activity  Progression: worsening    Social Support  Lives with: spouse    Employment status: not working  Hand dominance: right      Diagnostic Tests  X-ray: abnormal  Treatments  Current treatment: physical therapy  Patient Goals  Patient goals for therapy: decreased pain, increased motion, increased strength and independence with ADLs/IADLs          Objective     Postural Observations  Seated posture: fair  Standing posture: good        Palpation     Right   Tenderness of the middle trapezius, pectoralis minor and upper trapezius  Tenderness     Right Shoulder  Tenderness in the biceps tendon (proximal)  Cervical/Thoracic Screen   Cervical range of motion within normal limits    Neurological Testing     Sensation     Shoulder   Left Shoulder   Intact: light touch    Right Shoulder   Intact: light touch    Active Range of Motion   Left Shoulder   Flexion: 160 degrees   Abduction: 140 degrees     Right Shoulder   Flexion: 145 degrees with pain  Abduction: 135 degrees with pain    Strength/Myotome Testing     Left Shoulder     Planes of Motion   Flexion: 4+   Abduction: 4+   External rotation at 0°: 4+   Internal rotation at 0°: 4+     Right Shoulder     Planes of Motion   Flexion: 3+   Abduction: 3+   External rotation at 0°: 4-   Internal rotation at 0°: 4-     Tests     Right Shoulder   Negative drop arm, empty can and painful arc                 Re-eval Date: 5/12/21    Date 4-9-21 4/12 5/14     Visit Count 11 12 1520 [facility] completed           Precautions:        Manuals 4-9-21 4/12 4/15     R shoulder 10'  10' 10'                              Neuro Re-Ed         MTP/LTP Black 1 x 20 ea  Black x 20  Black 2 x 10      Josef ER tband  Black 1 x 20  Black x 20  Black 2 x 10      T-band sh add Blue 1 x 10 Black x 20  Black 2 x 10      Ball on wall  Red weighted ball  1 x 10 ea dir Red ball  1 x 10 ea       Wall angels  1 x 10  1 x 10  1 x 10                              Ther Ex        UBE - ALT  10 minutes 10 min  10 min      Doorway stretch  UT stretch  Towel stretch  Reviewed for HEP        Pulleys: flex/abd 2 min ea  DC      Finger ladder 1 x 3 ea  10" x 3  10" x 3 ea      Wall slides  1 x 10 ea   F/ex/abd Standing FF/Abd to 90* 1 x 15 ea 1 # 1# 2 x 10       SA punches  2# 1 x 20  2# 2 x 10  2# 2 x 10      Supine ABCs  2#   X 1  2# x 1  2# x 1      S/L Abd/ER 2#  1 x 15 ea  2# x 20  2# 2 x 10     Prone flex/HA/ext   NV      Wall push ups  1 x 20 2 x 10       Body blade                         Ther Activity        Crate lifts        Cart push/pull                Gait Training                        Modalities

## 2021-04-19 ENCOUNTER — APPOINTMENT (OUTPATIENT)
Dept: PHYSICAL THERAPY | Facility: HOME HEALTHCARE | Age: 66
End: 2021-04-19
Payer: COMMERCIAL

## 2021-04-22 ENCOUNTER — APPOINTMENT (OUTPATIENT)
Dept: PHYSICAL THERAPY | Facility: HOME HEALTHCARE | Age: 66
End: 2021-04-22
Payer: COMMERCIAL

## 2021-04-26 ENCOUNTER — APPOINTMENT (OUTPATIENT)
Dept: PHYSICAL THERAPY | Facility: HOME HEALTHCARE | Age: 66
End: 2021-04-26
Payer: COMMERCIAL

## 2021-04-29 ENCOUNTER — APPOINTMENT (OUTPATIENT)
Dept: PHYSICAL THERAPY | Facility: HOME HEALTHCARE | Age: 66
End: 2021-04-29
Payer: COMMERCIAL

## 2021-04-29 ENCOUNTER — OFFICE VISIT (OUTPATIENT)
Dept: OBGYN CLINIC | Facility: MEDICAL CENTER | Age: 66
End: 2021-04-29
Payer: COMMERCIAL

## 2021-04-29 VITALS
HEART RATE: 64 BPM | DIASTOLIC BLOOD PRESSURE: 83 MMHG | WEIGHT: 221 LBS | BODY MASS INDEX: 33.49 KG/M2 | SYSTOLIC BLOOD PRESSURE: 137 MMHG | HEIGHT: 68 IN

## 2021-04-29 DIAGNOSIS — M75.111 NONTRAUMATIC INCOMPLETE TEAR OF RIGHT ROTATOR CUFF: Primary | ICD-10-CM

## 2021-04-29 PROCEDURE — 99203 OFFICE O/P NEW LOW 30 MIN: CPT | Performed by: ORTHOPAEDIC SURGERY

## 2021-04-29 PROCEDURE — 1036F TOBACCO NON-USER: CPT | Performed by: ORTHOPAEDIC SURGERY

## 2021-04-29 PROCEDURE — 3008F BODY MASS INDEX DOCD: CPT | Performed by: ORTHOPAEDIC SURGERY

## 2021-04-29 NOTE — PROGRESS NOTES
Ortho Sports Medicine Shoulder New Patient Visit     Assesment:   61 y o  male right shoulder possible anterior leading edge rotator cuff tear    Plan:    Conservative treatment:    Ice to shoulder 1-2 times daily, for 20 minutes at a time  Continue home PT program    Imaging: All imaging from today was reviewed by myself and explained to the patient  Can consider future MRI to r/o rotator cuff tear       Injection:    Offered a CSI but due to having 2nd dose COVID vaccine done 4/23/21 it is recommended that there be 2 weeks inbetween CSI and COVID vaccine  He can return at the [address] [place] [address] 5/10/21 for CSI    Surgery:     No surgery is recommended at this point, continue with conservative treatment plan as noted  Follow up:    Return for 5/10/21 soonest appt for CSI  Chief Complaint   Patient presents with    Right Shoulder - Pain       History of Present Illness: The patient is a 61 y o , right hand dominant male whose occupation is retired, referred to me by their primary care physician, seen in clinic for consultation of right shoulder pain  The patient denies a history of diabetes  The patient has a history of thyroid disorder  Pain is located anterior, posterior, lateral   The patient rates the pain as a 5/10  The pain has been present for 4 months  The patient   States that the pain began 4-5 months ago when no throwing and was a big snowstorm this winter  He states that while forming this action the fell moderate right shoulder pain and had not  Patient states that he was seen by primary care doctor or an x-ray that demonstrates [facility] joint osteoarthritis and perform blood work that suggested that there might be rheumatoid arthritis present as well  Patient was then seen by Dr Eric Mayer who ruled out rheumatoid arthritis and instructed to follow-up with orthopedic surgeon    Patient instruct that prior to seeing Dr Eric Mayer he was performing physical therapy that did help with his range of motion and strength but he is unable to alleviate his pain  Patient states that his pain is in the anterior posterior lateral aspect of the shoulder  He does note pain radiating down into the biceps but never past the elbow  He does note that he has significant pain at night that keeps unlock at night or wakes him from sleep  Patient does note still having weakness though therapy has made him stronger  Patient does have history of rotator cuff repair on the contralateral side he states that this pain is starting to feel similar to than but not to the same degree  The pain is characterized as sharp, stabbing, dull, achy  The pain is present daily  Pain is improved by rest, ice, NSAIDS and prednisone  Pain is aggravated by overhead activity, reaching back and rotation  Symptoms include clicking, catching and popping  The patient has weakness  The patient denies numbness and tingling  The patient has tried rest, ice, NSAIDS, physical therapy and prednisone  Shoulder Surgical History:  Left shoulder rotator cuff repair over 15 years ago with [facility]    Past Medical, Social and Family History:  Past Medical History:   Diagnosis Date    Back pain     ED (erectile dysfunction)     Hypertension     Hypothyroidism     IBS (irritable bowel syndrome)     Inflammatory bowel disease     Kidney stones     Overactive bladder      Past Surgical History:   Procedure Laterality Date    APPENDECTOMY      HERNIA REPAIR      PARTIAL HIP ARTHROPLASTY Right     ROTATOR CUFF REPAIR      TRIGGER FINGER RELEASE  02/2021    Left ring finger     Allergies   Allergen Reactions    Levofloxacin Rash     Tolerated Cipro and Avelox (moxifloxasin)         Current Outpatient Medications on File Prior to Visit   Medication Sig Dispense Refill    B Complex-C-Folic Acid (B-Complex/Folic Acid/Vitamin C) TBCR       Cholecalciferol (D-3-5) 125 MCG (5000 UT) capsule       cyanocobalamin (VITAMIN B-12) 1,000 mcg tablet Vitamin B-12 1,000 mcg tablet   Take by oral route   cyclobenzaprine (FLEXERIL) 5 mg tablet Take 1 tablet (5 mg total) by mouth daily at bedtime 30 tablet 3    Echinacea 400 MG CAPS       ergocalciferol (VITAMIN D2) 50,000 units       ezetimibe (ZETIA) 10 mg tablet Take 1 tablet (10 mg total) by mouth daily 90 tablet 0    ibuprofen (MOTRIN) 600 mg tablet Take 1 tablet (600 mg total) by mouth 2 (two) times a day as needed for mild pain or moderate pain 60 tablet 6    latanoprost (XALATAN) 0 005 % ophthalmic solution       levothyroxine 175 mcg tablet Take 1 tablet (175 mcg total) by mouth daily 90 tablet 0    lisinopril (ZESTRIL) 10 mg tablet Take 1 tablet (10 mg total) by mouth daily 90 tablet 0    Misc Natural Products (COSAMIN ASU FOR JOINT HEALTH PO)       oxybutynin (DITROPAN) 5 mg tablet Take 1 tablet (5 mg total) by mouth 3 (three) times a day (Patient not taking: Reported on 2021) 270 tablet 3    predniSONE 10 mg tablet Take 2 tablets (20 mg total) by mouth daily for 7 days, THEN 1 tablet (10 mg total) daily for 7 days  21 tablet 0    Zinc 50 MG CAPS        No current facility-administered medications on file prior to visit  Social History     Socioeconomic History    Marital status: /Civil Union     Spouse name: Not on file    Number of children: Not on file    Years of education: Not on file    Highest education level: Not on file   Occupational History    Not on file   Social Needs    Financial resource strain: Not on file    Food insecurity     Worry: Not on file     Inability: Not on file    Transportation needs     Medical: Not on file     Non-medical: Not on file   Tobacco Use    Smoking status: Former Smoker     Quit date:      Years since quittin 3    Smokeless tobacco: Former User     Types: Chew    Tobacco comment: used chew when in [facility]   Substance and Sexual Activity    Alcohol use:  Yes     Alcohol/week: 5 0 standard drinks Types: 5 Shots of liquor per week     Frequency: 2-3 times a week    Drug use: Not Currently     Types: Marijuana, Cocaine, LSD     Comment: stopped in high school - nothing since    Sexual activity: Not on file   Lifestyle    Physical activity     Days per week: Not on file     Minutes per session: Not on file    Stress: Not on file   Relationships    Social connections     Talks on phone: Not on file     Gets together: Not on file     Attends Faith service: Not on file     Active member of club or organization: Not on file     Attends meetings of clubs or organizations: Not on file     Relationship status: Not on file    Intimate partner violence     Fear of current or ex partner: Not on file     Emotionally abused: Not on file     Physically abused: Not on file     Forced sexual activity: Not on file   Other Topics Concern    Not on file   Social History Narrative    Not on file         I have reviewed the past medical, surgical, social and family history, medications and allergies as documented in the EMR  Review of systems: ROS is negative other than that noted in the HPI  Constitutional: Negative for fatigue and fever  HENT: Negative for sore throat  Respiratory: Negative for shortness of breath  Cardiovascular: Negative for chest pain  Gastrointestinal: Negative for abdominal pain  Endocrine: Negative for cold intolerance and heat intolerance  Genitourinary: Negative for flank pain  Musculoskeletal: Negative for back pain  Skin: Negative for rash  Allergic/Immunologic: Negative for immunocompromised state  Neurological: Negative for dizziness  Psychiatric/Behavioral: Negative for agitation  Physical Exam:    Blood pressure 137/83, pulse 64, height 5' 8" (1 727 m), weight 100 kg (221 lb)      General/Constitutional: NAD, well developed, well nourished  HENT: Normocephalic, atraumatic  CV: Intact distal pulses, regular rate  Resp: No respiratory distress or labored breathing  Lymphatic: No lymphadenopathy palpated  Neuro: Alert and Oriented x 3, no focal deficits  Psych: Normal mood, normal affect, normal judgement, normal behavior  Skin: Warm, dry, no rashes, no erythema      Shoulder focused exam:       RIGHT LEFT    Scapula Atrophy Negative Negative     Winging Negative Negative     Protraction Negative Negative    Rotator cuff SS 4+/5 5/5     IS 5/5 5/5     SubS 5/5 5/5    ROM     170     ER0 60 60                   IRb T6    T6    TTP: AC Positive Negative     Biceps Negative Negative     Coracoid Negative Negative    Special Tests: O'Briens Negative Negative     Moreira-shear Negative Negative     Cross body Adduction Negative Negative     Speeds  Negative Negative     Steve's Negative Negative     Whipple Positive Negative       Neer Negative Negative     Ruelas Negative Negative    Instability: Apprehension & relocation not tested not tested     Load & shift not tested not tested    Other: Crank Negative Negative                 UE NV Exam: +2 Radial pulses bilaterally  Sensation intact to light touch C5-T1 bilaterally, Radial/median/ulnar nerve motor intact      Bilateral elbow, wrist, and and forearm ROM full, painless with passive ROM, no ttp or crepitance throughout extremities below shoulder joint    Cervical ROM is full without pain, numbness or tingling      Shoulder Imaging    X-rays of the right shoulder were reviewed, which demonstrate moderate AC joint OA  I have reviewed the radiology report and agree with their impression

## 2021-05-10 ENCOUNTER — OFFICE VISIT (OUTPATIENT)
Dept: OBGYN CLINIC | Facility: MEDICAL CENTER | Age: 66
End: 2021-05-10
Payer: COMMERCIAL

## 2021-05-10 VITALS
HEART RATE: 70 BPM | BODY MASS INDEX: 34.1 KG/M2 | TEMPERATURE: 97.4 F | DIASTOLIC BLOOD PRESSURE: 71 MMHG | WEIGHT: 225 LBS | HEIGHT: 68 IN | SYSTOLIC BLOOD PRESSURE: 133 MMHG

## 2021-05-10 DIAGNOSIS — M75.111 NONTRAUMATIC INCOMPLETE TEAR OF RIGHT ROTATOR CUFF: Primary | ICD-10-CM

## 2021-05-10 PROCEDURE — 99213 OFFICE O/P EST LOW 20 MIN: CPT | Performed by: ORTHOPAEDIC SURGERY

## 2021-05-10 PROCEDURE — 20610 DRAIN/INJ JOINT/BURSA W/O US: CPT | Performed by: ORTHOPAEDIC SURGERY

## 2021-05-10 RX ORDER — METHYLPREDNISOLONE ACETATE 40 MG/ML
1 INJECTION, SUSPENSION INTRA-ARTICULAR; INTRALESIONAL; INTRAMUSCULAR; SOFT TISSUE
Status: COMPLETED | OUTPATIENT
Start: 2021-05-10 | End: 2021-05-10

## 2021-05-10 RX ORDER — LIDOCAINE HYDROCHLORIDE 10 MG/ML
3 INJECTION, SOLUTION INFILTRATION; PERINEURAL
Status: COMPLETED | OUTPATIENT
Start: 2021-05-10 | End: 2021-05-10

## 2021-05-10 RX ADMIN — LIDOCAINE HYDROCHLORIDE 3 ML: 10 INJECTION, SOLUTION INFILTRATION; PERINEURAL at 11:34

## 2021-05-10 RX ADMIN — METHYLPREDNISOLONE ACETATE 1 ML: 40 INJECTION, SUSPENSION INTRA-ARTICULAR; INTRALESIONAL; INTRAMUSCULAR; SOFT TISSUE at 11:34

## 2021-05-10 NOTE — PROGRESS NOTES
Ortho Sports Medicine Shoulder Follow Up Visit     Assesment:   61 y o  male right shoulder possible anterior leading edge rotator cuff tear, with pain    Plan:    Conservative treatment:    Ice to shoulder 1-2 times daily, for 20 minutes at a time  PT for ROM and strengthening to shoulder, rotator cuff, scapular stabilizers  Imaging:    No imaging was available for review today  Injection:    The risks and benefits of the injection (which include but are not limited to: infection, bleeding,damage to nerve/artery, need for further intervention), as well as the risks and benefits of all alternative treatments were explained and understood  The patient elected to proceed with injection  The procedure was done with aseptic technique, and the patient tolerated the procedure well with no complications  A corticosteroid injection of the subacromial space was performed  The patient should take 1-2 days off of activity, with gradual return to activity as tolerated  Ice to the shoulder 1-2 times daily for 20 minutes, for next 24-48 hrs  Surgery:     No surgery is recommended at this point, continue with conservative treatment plan as noted  Follow up:    Return in about 6 weeks (around 6/21/2021) for Recheck  Chief Complaint   Patient presents with    Right Shoulder - Follow-up       History of Present Illness: The patient is returns for follow up of right shoulder possible anterior leading edge rotator cuff tear  Since the prior visit, He reports minimal improvement  Patient is here for cortisone injection today  Pain is improved by rest and ice  Pain is aggravated by overhead activity, reaching back and rotation  Symptoms include clicking, catching and popping  The patient has weakness  The patient has tried rest, ice and NSAIDS          Shoulder Surgical History:  None    Past Medical, Social and Family History:  Past Medical History:   Diagnosis Date    Back pain     ED (erectile dysfunction)     Hypertension     Hypothyroidism     IBS (irritable bowel syndrome)     Inflammatory bowel disease     Kidney stones     Overactive bladder      Past Surgical History:   Procedure Laterality Date    APPENDECTOMY      HERNIA REPAIR      PARTIAL HIP ARTHROPLASTY Right     ROTATOR CUFF REPAIR      TRIGGER FINGER RELEASE  02/2021    Left ring finger     Allergies   Allergen Reactions    Levofloxacin Rash     Tolerated Cipro and Avelox (moxifloxasin)  Current Outpatient Medications on File Prior to Visit   Medication Sig Dispense Refill    B Complex-C-Folic Acid (B-Complex/Folic Acid/Vitamin C) TBCR       Cholecalciferol (D-3-5) 125 MCG (5000 UT) capsule       cyanocobalamin (VITAMIN B-12) 1,000 mcg tablet Vitamin B-12 1,000 mcg tablet   Take by oral route   cyclobenzaprine (FLEXERIL) 5 mg tablet Take 1 tablet (5 mg total) by mouth daily at bedtime 30 tablet 3    Echinacea 400 MG CAPS       ergocalciferol (VITAMIN D2) 50,000 units       ezetimibe (ZETIA) 10 mg tablet Take 1 tablet (10 mg total) by mouth daily 90 tablet 0    ibuprofen (MOTRIN) 600 mg tablet Take 1 tablet (600 mg total) by mouth 2 (two) times a day as needed for mild pain or moderate pain 60 tablet 6    latanoprost (XALATAN) 0 005 % ophthalmic solution       levothyroxine 175 mcg tablet Take 1 tablet (175 mcg total) by mouth daily 90 tablet 0    lisinopril (ZESTRIL) 10 mg tablet Take 1 tablet (10 mg total) by mouth daily 90 tablet 0    Misc Natural Products (COSAMIN ASU FOR JOINT HEALTH PO)       oxybutynin (DITROPAN) 5 mg tablet Take 1 tablet (5 mg total) by mouth 3 (three) times a day 270 tablet 3    Zinc 50 MG CAPS        No current facility-administered medications on file prior to visit        Social History     Socioeconomic History    Marital status: /Civil Union     Spouse name: Not on file    Number of children: Not on file    Years of education: Not on file    Highest education level: Not on file   Occupational History    Not on file   Social Needs    Financial resource strain: Not on file    Food insecurity     Worry: Not on file     Inability: Not on file    Transportation needs     Medical: Not on file     Non-medical: Not on file   Tobacco Use    Smoking status: Former Smoker     Quit date: 2017     Years since quittin 3    Smokeless tobacco: Former User     Types: Chew    Tobacco comment: used chew when in [facility]   Substance and Sexual Activity    Alcohol use: Yes     Alcohol/week: 5 0 standard drinks     Types: 5 Shots of liquor per week     Frequency: 2-3 times a week    Drug use: Not Currently     Types: Marijuana, Cocaine, LSD     Comment: stopped in high school - nothing since    Sexual activity: Not on file   Lifestyle    Physical activity     Days per week: Not on file     Minutes per session: Not on file    Stress: Not on file   Relationships    Social connections     Talks on phone: Not on file     Gets together: Not on file     Attends Alevism service: Not on file     Active member of club or organization: Not on file     Attends meetings of clubs or organizations: Not on file     Relationship status: Not on file    Intimate partner violence     Fear of current or ex partner: Not on file     Emotionally abused: Not on file     Physically abused: Not on file     Forced sexual activity: Not on file   Other Topics Concern    Not on file   Social History Narrative    Not on file       I have reviewed the past medical, surgical, social and family history, medications and allergies as documented in the EMR  Review of systems: ROS is negative other than that noted in the HPI  Constitutional: Negative for fatigue and fever  Physical Exam:    Blood pressure 133/71, pulse 70, temperature (!) 97 4 °F (36 3 °C), height 5' 8" (1 727 m), weight 102 kg (225 lb)      General/Constitutional: NAD, well developed, well nourished  HENT: Normocephalic, atraumatic  CV: Intact distal pulses, regular rate  Resp: No respiratory distress or labored breathing  Lymphatic: No lymphadenopathy palpated  Neuro: Alert and Oriented x 3, no focal deficits  Psych: Normal mood, normal affect, normal judgement, normal behavior  Skin: Warm, dry, no rashes, no erythema      Shoulder focused exam:       RIGHT LEFT    Scapula Atrophy Negative Negative     Winging Negative Negative     Protraction Negative Negative    Rotator cuff SS 4+/5 5/5     IS 5/5 5/5     SubS 5/5 5/5    ROM     170     ER0 60 60     ER90 90    90     IR90 T6    T6     IRb T6    T6    TTP: AC Positive Negative     Biceps Negative Negative     Coracoid Negative Negative    Special Tests: O'Briens Negative Negative     Moreira-shear Negative Negative     Cross body Adduction Negative Negative     Speeds  Negative Negative     Steve's Negative Negative     Whipple Positive Negative       Neer Negative Negative     Ruelas Negative Negative    Instability: Apprehension & relocation not tested not tested     Load & shift not tested not tested    Other: Crank Negative Negative             Large joint arthrocentesis: R subacromial bursa  Universal Protocol:  Consent: Verbal consent obtained  Risks and benefits: risks, benefits and alternatives were discussed  Consent given by: patient and parent  Time out: Immediately prior to procedure a "time out" was called to verify the correct patient, procedure, equipment, support staff and site/side marked as required  Patient understanding: patient states understanding of the procedure being performed  Patient consent: the patient's understanding of the procedure matches consent given  Procedure consent: procedure consent matches procedure scheduled  Relevant documents: relevant documents present and verified  Test results: test results available and properly labeled  Site marked: the operative site was marked  Radiology Images displayed and confirmed   If images not available, report reviewed: imaging studies available  Patient identity confirmed: verbally with patient    Supporting Documentation  Indications: pain and joint swelling   Procedure Details  Location: shoulder - R subacromial bursa  Needle size: 22 G  Ultrasound guidance: no  Approach: posterolateral  Medications administered: 3 mL lidocaine 1 %; 1 mL methylPREDNISolone acetate 40 mg/mL    Patient tolerance: patient tolerated the procedure well with no immediate complications  Dressing:  Sterile dressing applied          UE NV Exam: +2 Radial pulses bilaterally  Sensation intact to light touch C5-T1 bilaterally, Radial/median/ulnar nerve motor intact    Cervical ROM is full without pain, numbness or tingling      Shoulder Imaging    No imaging was performed today      Scribe Attestation    I,:  Nettie Desai am acting as a scribe while in the presence of the attending physician :       I,:  Lucas Henriquez DO personally performed the services described in this documentation    as scribed in my presence :

## 2021-05-25 ENCOUNTER — OFFICE VISIT (OUTPATIENT)
Dept: INTERNAL MEDICINE CLINIC | Facility: CLINIC | Age: 66
End: 2021-05-25
Payer: COMMERCIAL

## 2021-05-25 VITALS
BODY MASS INDEX: 33.34 KG/M2 | OXYGEN SATURATION: 98 % | DIASTOLIC BLOOD PRESSURE: 80 MMHG | WEIGHT: 220 LBS | TEMPERATURE: 97.6 F | HEIGHT: 68 IN | SYSTOLIC BLOOD PRESSURE: 138 MMHG | HEART RATE: 61 BPM

## 2021-05-25 DIAGNOSIS — E78.00 HYPERCHOLESTEROLEMIA: ICD-10-CM

## 2021-05-25 DIAGNOSIS — Z12.5 SCREENING FOR PROSTATE CANCER: ICD-10-CM

## 2021-05-25 DIAGNOSIS — Z23 NEED FOR TDAP VACCINATION: ICD-10-CM

## 2021-05-25 DIAGNOSIS — I10 ESSENTIAL HYPERTENSION: ICD-10-CM

## 2021-05-25 DIAGNOSIS — Z00.00 ROUTINE ADULT HEALTH MAINTENANCE: Primary | ICD-10-CM

## 2021-05-25 DIAGNOSIS — S61.219A CUT OF FINGER: ICD-10-CM

## 2021-05-25 DIAGNOSIS — E55.9 VITAMIN D DEFICIENCY: ICD-10-CM

## 2021-05-25 DIAGNOSIS — E03.9 HYPOTHYROIDISM, UNSPECIFIED TYPE: ICD-10-CM

## 2021-05-25 PROBLEM — J01.10 ACUTE NON-RECURRENT FRONTAL SINUSITIS: Status: RESOLVED | Noted: 2020-10-05 | Resolved: 2021-05-25

## 2021-05-25 PROCEDURE — 3725F SCREEN DEPRESSION PERFORMED: CPT | Performed by: NURSE PRACTITIONER

## 2021-05-25 PROCEDURE — 99396 PREV VISIT EST AGE 40-64: CPT | Performed by: NURSE PRACTITIONER

## 2021-05-25 PROCEDURE — 90471 IMMUNIZATION ADMIN: CPT | Performed by: NURSE PRACTITIONER

## 2021-05-25 PROCEDURE — 90715 TDAP VACCINE 7 YRS/> IM: CPT | Performed by: NURSE PRACTITIONER

## 2021-05-25 RX ORDER — AMOXICILLIN 500 MG/1
CAPSULE ORAL
COMMUNITY
Start: 2021-05-17 | End: 2022-03-10

## 2021-05-25 NOTE — PROGRESS NOTES
Assessment/Plan: Will repeat fasting labs on patient  Will give Tetanus due to cut on his finger and not remembering when his last TDaP was  Continues to see Ortho and did see Rheumatology as well  He is having dental work June 7th and will be starting Amoxicillin  He is up to date on his other screenings  Will notify him once labs are back  Will follow up in one year or sooner if need be  No problem-specific Assessment & Plan notes found for this encounter  Problem List Items Addressed This Visit        Endocrine    Hypothyroidism    Relevant Orders    Comprehensive metabolic panel    CBC and differential    TSH, 3rd generation with Free T4 reflex       Cardiovascular and Mediastinum    Hypertension    Relevant Orders    Comprehensive metabolic panel    CBC and differential       Musculoskeletal and Integument    Cut of finger       Other    Hypercholesterolemia    Relevant Orders    Comprehensive metabolic panel    CBC and differential    Lipid panel    Vitamin D deficiency    Relevant Orders    Comprehensive metabolic panel    CBC and differential    Vitamin D 25 hydroxy    Routine adult health maintenance - Primary    Relevant Orders    Comprehensive metabolic panel    CBC and differential    Screening for prostate cancer    Relevant Orders    PSA, Total Screen      Other Visit Diagnoses     BMI 33 0-33 9,adult        Need for Tdap vaccination        Relevant Orders    TDAP VACCINE GREATER THAN OR EQUAL TO 6YO IM (Completed)            Subjective:      Patient ID: Trenton Bass is a 61 y o  male  Lorrie Hobbs is for a routine wellness  He is doing well and is due for labs  He did cut his left index finger but is not sure what is cut it on  He states he is not sure if is infected and the skin is bumped up  He states it is sore but is not having any redness or drainage  He states he was not sure if he should see a cardiologist for a workup   He did have a stress test done around 15 years ago which was normal  He is not having any chest pain, SOB, or palpitations  He is sleeping well at night  He did have the covid vaccines  He states he is seeing Ortho for his arthritis in his shoulder  The following portions of the patient's history were reviewed and updated as appropriate:   He  has a past medical history of Back pain, ED (erectile dysfunction), Hypertension, Hypothyroidism, IBS (irritable bowel syndrome), Inflammatory bowel disease, Kidney stones, and Overactive bladder  He   Patient Active Problem List    Diagnosis Date Noted    Routine adult health maintenance 05/25/2021    Screening for prostate cancer 05/25/2021    Cut of finger 05/25/2021    Nontraumatic incomplete tear of right rotator cuff 04/29/2021    Osteoarthritis of hip 07/05/2019    Coronal hypospadias 07/05/2019    Irritable bowel syndrome with diarrhea 07/05/2019    Obesity (BMI 35 0-39 9 without comorbidity) 07/05/2019    Vitamin D deficiency 07/05/2019    Glaucoma of both eyes 07/05/2019    OAB (overactive bladder) 05/01/2019    Difficulty voiding 04/15/2019    Erectile dysfunction 04/15/2019    History of kidney stones 04/15/2019    Hypercholesterolemia 10/16/2016    Hypertension 10/16/2016    Hypothyroidism 10/16/2016    Localized, primary osteoarthritis of shoulder region 04/29/2008     He  has a past surgical history that includes Rotator cuff repair; Partial hip arthroplasty (Right); Appendectomy; Hernia repair; and Trigger finger release (02/2021)  His family history includes Cancer in his maternal grandmother and mother; Diabetes in his maternal grandmother; Heart disease in his father and sister; Hypertension in his father and sister; Stroke in his maternal aunt  He  reports that he quit smoking about 4 years ago  He has quit using smokeless tobacco   His smokeless tobacco use included chew  He reports current alcohol use of about 5 0 standard drinks of alcohol per week  He reports previous drug use   Drugs: Marijuana, Cocaine, and LSD  Current Outpatient Medications   Medication Sig Dispense Refill    B Complex-C-Folic Acid (B-Complex/Folic Acid/Vitamin C) TBCR       Cholecalciferol (D-3-5) 125 MCG (5000 UT) capsule       cyanocobalamin (VITAMIN B-12) 1,000 mcg tablet Vitamin B-12 1,000 mcg tablet   Take by oral route   cyclobenzaprine (FLEXERIL) 5 mg tablet Take 1 tablet (5 mg total) by mouth daily at bedtime 30 tablet 3    Echinacea 400 MG CAPS       ergocalciferol (VITAMIN D2) 50,000 units       ezetimibe (ZETIA) 10 mg tablet Take 1 tablet (10 mg total) by mouth daily 90 tablet 0    ibuprofen (MOTRIN) 600 mg tablet Take 1 tablet (600 mg total) by mouth 2 (two) times a day as needed for mild pain or moderate pain 60 tablet 6    latanoprost (XALATAN) 0 005 % ophthalmic solution       levothyroxine 175 mcg tablet Take 1 tablet (175 mcg total) by mouth daily 90 tablet 0    lisinopril (ZESTRIL) 10 mg tablet Take 1 tablet (10 mg total) by mouth daily 90 tablet 0    Misc Natural Products (COSAMIN ASU FOR JOINT HEALTH PO)       Zinc 50 MG CAPS       amoxicillin (AMOXIL) 500 mg capsule       oxybutynin (DITROPAN) 5 mg tablet Take 1 tablet (5 mg total) by mouth 3 (three) times a day (Patient not taking: Reported on 5/25/2021) 270 tablet 3     No current facility-administered medications for this visit  Current Outpatient Medications on File Prior to Visit   Medication Sig    B Complex-C-Folic Acid (B-Complex/Folic Acid/Vitamin C) TBCR     Cholecalciferol (D-3-5) 125 MCG (5000 UT) capsule     cyanocobalamin (VITAMIN B-12) 1,000 mcg tablet Vitamin B-12 1,000 mcg tablet   Take by oral route      cyclobenzaprine (FLEXERIL) 5 mg tablet Take 1 tablet (5 mg total) by mouth daily at bedtime    Echinacea 400 MG CAPS     ergocalciferol (VITAMIN D2) 50,000 units     ezetimibe (ZETIA) 10 mg tablet Take 1 tablet (10 mg total) by mouth daily    ibuprofen (MOTRIN) 600 mg tablet Take 1 tablet (600 mg total) by mouth 2 (two) times a day as needed for mild pain or moderate pain    latanoprost (XALATAN) 0 005 % ophthalmic solution     levothyroxine 175 mcg tablet Take 1 tablet (175 mcg total) by mouth daily    lisinopril (ZESTRIL) 10 mg tablet Take 1 tablet (10 mg total) by mouth daily    Misc Natural Products (COSAMIN ASU FOR JOINT HEALTH PO)     Zinc 50 MG CAPS     amoxicillin (AMOXIL) 500 mg capsule     oxybutynin (DITROPAN) 5 mg tablet Take 1 tablet (5 mg total) by mouth 3 (three) times a day (Patient not taking: Reported on 5/25/2021)     No current facility-administered medications on file prior to visit  He is allergic to levofloxacin       Review of Systems   All other systems reviewed and are negative  Objective:      /80 (BP Location: Left arm, Patient Position: Sitting, Cuff Size: Large)   Pulse 61   Temp 97 6 °F (36 4 °C) (Temporal)   Ht 5' 8" (1 727 m)   Wt 99 8 kg (220 lb)   SpO2 98%   BMI 33 45 kg/m²          Physical Exam  Vitals signs reviewed  Constitutional:       Appearance: Normal appearance  He is normal weight  HENT:      Head: Normocephalic and atraumatic  Right Ear: Tympanic membrane, ear canal and external ear normal       Left Ear: Tympanic membrane, ear canal and external ear normal       Nose: Nose normal       Mouth/Throat:      Mouth: Mucous membranes are moist       Pharynx: Oropharynx is clear  Eyes:      Extraocular Movements: Extraocular movements intact  Conjunctiva/sclera: Conjunctivae normal       Pupils: Pupils are equal, round, and reactive to light  Neck:      Musculoskeletal: Normal range of motion and neck supple  Cardiovascular:      Rate and Rhythm: Normal rate and regular rhythm  Pulses: Normal pulses  Heart sounds: Normal heart sounds  Pulmonary:      Effort: Pulmonary effort is normal       Breath sounds: Normal breath sounds  Abdominal:      General: Abdomen is flat   Bowel sounds are normal       Palpations: Abdomen is soft  Musculoskeletal: Normal range of motion  Skin:     General: Skin is warm and dry  Capillary Refill: Capillary refill takes less than 2 seconds  Comments: Cut noted to left index finger no redness or drainage noted   Neurological:      General: No focal deficit present  Mental Status: He is alert and oriented to person, place, and time  Mental status is at baseline  Psychiatric:         Mood and Affect: Mood normal          Behavior: Behavior normal          Thought Content: Thought content normal          Judgment: Judgment normal          BMI Counseling: Body mass index is 33 45 kg/m²  The BMI is above normal  Nutrition recommendations include reducing portion sizes, decreasing overall calorie intake, 3-5 servings of fruits/vegetables daily, reducing fast food intake, consuming healthier snacks, decreasing soda and/or juice intake, moderation in carbohydrate intake, increasing intake of lean protein, reducing intake of saturated fat and trans fat and reducing intake of cholesterol

## 2021-05-25 NOTE — PATIENT INSTRUCTIONS
Low Fat Diet   AMBULATORY CARE:   A low-fat diet  is an eating plan that is low in total fat, unhealthy fat, and cholesterol  You may need to follow a low-fat diet if you have trouble digesting or absorbing fat  You may also need to follow this diet if you have high cholesterol  You can also lower your cholesterol by increasing the amount of fiber in your diet  Soluble fiber is a type of fiber that helps to decrease cholesterol levels  Different types of fat in food:   · Limit unhealthy fats  A diet that is high in cholesterol, saturated fat, and trans fat may cause unhealthy cholesterol levels  Unhealthy cholesterol levels increase your risk of heart disease  ? Cholesterol:  Limit intake of cholesterol to less than 200 mg per day  Cholesterol is found in meat, eggs, and dairy  ? Saturated fat:  Limit saturated fat to less than 7% of your total daily calories  Ask your dietitian how many calories you need each day  Saturated fat is found in butter, cheese, ice cream, whole milk, and palm oil  Saturated fat is also found in meat, such as beef, pork, chicken skin, and processed meats  Processed meats include sausage, hot dogs, and bologna  ? Trans fat:  Avoid trans fat as much as possible  Trans fat is used in fried and baked foods  Foods that say trans fat free on the label may still have up to 0 5 grams of trans fat per serving  · Include healthy fats  Replace foods that are high in saturated and trans fat with foods high in healthy fats  This may help to decrease high cholesterol levels  ? Monounsaturated fats: These are found in avocados, nuts, and vegetable oils, such as olive, canola, and sunflower oil  ? Polyunsaturated fats: These can be found in vegetable oils, such as soybean or corn oil  Omega-3 fats can help to decrease the risk of heart disease  Omega-3 fats are found in fish, such as salmon, herring, trout, and tuna   Omega-3 fats can also be found in plant foods, such as walnuts, flaxseed, soybeans, and canola oil  Foods to limit or avoid:   · Grains:      ? Snacks that are made with partially hydrogenated oils, such as chips, regular crackers, and butter-flavored popcorn    ? High-fat baked goods, such as biscuits, croissants, doughnuts, pies, cookies, and pastries    · Dairy:      ? Whole milk, 2% milk, and yogurt and ice cream made with whole milk    ? Half and half creamer, heavy cream, and whipping cream    ? Cheese, cream cheese, and sour cream    · Meats and proteins:      ? High-fat cuts of meat (T-bone steak, regular hamburger, and ribs)    ? Fried meat, poultry (turkey and chicken), and fish    ? Poultry (chicken and turkey) with skin    ? Cold cuts (salami or bologna), hot dogs, bentley, and sausage    ? Whole eggs and egg yolks    · Vegetables and fruits with added fat:      ? Fried vegetables or vegetables in butter or high-fat sauces, such as cream or cheese sauces    ? Fried fruit or fruit served with butter or cream    · Fats:      ? Butter, stick margarine, and shortening    ? Coconut, palm oil, and palm kernel oil    Foods to include:   · Grains:      ? Whole-grain breads, cereals, pasta, and brown rice    ? Low-fat crackers and pretzels    · Vegetables and fruits:      ? Fresh, frozen, or canned vegetables (no salt or low-sodium)    ? Fresh, frozen, dried, or canned fruit (canned in light syrup or fruit juice)    ? Avocado    · Low-fat dairy products:      ? Nonfat (skim) or 1% milk    ? Nonfat or low-fat cheese, yogurt, and cottage cheese    · Meats and proteins:      ? Chicken or turkey with no skin    ? Baked or broiled fish    ? Lean beef and pork (loin, round, extra lean hamburger)    ? Beans and peas, unsalted nuts, soy products    ? Egg whites and substitutes    ? Seeds and nuts    · Fats:      ? Unsaturated oil, such as canola, olive, peanut, soybean, or sunflower oil    ? Soft or liquid margarine and vegetable oil spread    ?  Low-fat salad dressing    Other ways to decrease fat:   · Read food labels before you buy foods  Choose foods that have less than 30% of calories from fat  Choose low-fat or fat-free dairy products  Remember that fat free does not mean calorie free  These foods still contain calories, and too many calories can lead to weight gain  · Trim fat from meat and avoid fried food  Trim all visible fat from meat before you cook it  Remove the skin from poultry  Do not rogers meat, fish, or poultry  Bake, roast, boil, or broil these foods instead  Avoid fried foods  Eat a baked potato instead of [place] fries  Steam vegetables instead of sautéing them in butter  · Add less fat to foods  Use imitation bentley bits on salads and baked potatoes instead of regular bentley bits  Use fat-free or low-fat salad dressings instead of regular dressings  Use low-fat or nonfat butter-flavored topping instead of regular butter or margarine on popcorn and other foods  Ways to decrease fat in recipes:  Replace high-fat ingredients with low-fat or nonfat ones  This may cause baked goods to be drier than usual  You may need to use nonfat cooking spray on pans to prevent food from sticking  You also may need to change the amount of other ingredients, such as water, in the recipe  Try the following:  · Use low-fat or light margarine instead of regular margarine or shortening  · Use lean ground turkey breast or chicken, or lean ground beef (less than 5% fat) instead of hamburger  · Add 1 teaspoon of canola oil to 8 ounces of skim milk instead of using cream or half and half  · Use grated zucchini, carrots, or apples in breads instead of coconut  · Use blenderized, low-fat cottage cheese, plain tofu, or low-fat ricotta cheese instead of cream cheese  · Use 1 egg white and 1 teaspoon of canola oil, or use ¼ cup (2 ounces) of fat-free egg substitute instead of a whole egg       · Replace half of the oil that is called for in a recipe with applesauce when you bake  Use 3 tablespoons of cocoa powder and 1 tablespoon of canola oil instead of a square of baking chocolate  How to increase fiber:  Eat enough high-fiber foods to get 20 to 30 grams of fiber every day  Slowly increase your fiber intake to avoid stomach cramps, gas, and other problems  · Eat 3 ounces of whole-grain foods each day  An ounce is about 1 slice of bread  Eat whole-grain breads, such as whole-wheat bread  Whole wheat, whole-wheat flour, or other whole grains should be listed as the first ingredient on the food label  Replace white flour with whole-grain flour or use half of each in recipes  Whole-grain flour is heavier than white flour, so you may have to add more yeast or baking powder  · Eat a high-fiber cereal for breakfast   Oatmeal is a good source of soluble fiber  Look for cereals that have bran or fiber in the name  Choose whole-grain products, such as brown rice, barley, and whole-wheat pasta  · Eat more beans, peas, and lentils  For example, add beans to soups or salads  Eat at least 5 cups of fruits and vegetables each day  Eat fruits and vegetables with the peel because the peel is high in fiber  © Copyright [address] Information is for End User's use only and may not be sold, redistributed or otherwise used for commercial purposes  All illustrations and images included in CareNotes® are the copyrighted property of [employer]  or [address] [place] [address]  The above information is an  only  It is not intended as medical advice for individual conditions or treatments  Talk to your doctor, nurse or pharmacist before following any medical regimen to see if it is safe and effective for you  Heart Healthy Diet   AMBULATORY CARE:   A heart healthy diet  is an eating plan low in unhealthy fats and sodium (salt)  The plan is high in healthy fats and fiber   A heart healthy diet helps improve your cholesterol levels and lowers your risk for heart disease and stroke  A dietitian will teach you how to read and understand food labels  Heart healthy diet guidelines to follow:   · Choose foods that contain healthy fats  ? Unsaturated fats  include monounsaturated and polyunsaturated fats  Unsaturated fat is found in foods such as soybean, canola, olive, corn, and safflower oils  It is also found in soft tub margarine that is made with liquid vegetable oil  ? Omega-3 fat  is found in certain fish, such as salmon, tuna, and trout, and in walnuts and flaxseed  Eat fish high in omega-3 fats at least 2 times a week  · Get 20 to 30 grams of fiber each day  Fruits, vegetables, whole-grain foods, and legumes (cooked beans) are good sources of fiber  · Limit or do not have unhealthy fats  ? Cholesterol  is found in animal foods, such as eggs and lobster, and in dairy products made from whole milk  Limit cholesterol to less than 200 mg each day  ? Saturated fat  is found in meats, such as bentley and hamburger  It is also found in chicken or turkey skin, whole milk, and butter  Limit saturated fat to less than 7% of your total daily calories  ? Trans fat  is found in packaged foods, such as potato chips and cookies  It is also in hard margarine, some fried foods, and shortening  Do not eat foods that contain trans fats  · Limit sodium as directed  You may be told to limit sodium to 2,000 to 2,300 mg each day  Choose low-sodium or no-salt-added foods  Add little or no salt to food you prepare  Use herbs and spices in place of salt  Include the following in your heart healthy plan:  Ask your dietitian or healthcare provider how many servings to have from each of the following food groups:  · Grains:      ? Whole-wheat breads, cereals, and pastas, and brown rice    ? Low-fat, low-sodium crackers and chips    · Vegetables:      ? Broccoli, green beans, green peas, and spinach    ? Collards, kale, and lima beans    ?  Carrots, sweet potatoes, tomatoes, and peppers    ? Canned vegetables with no salt added    · Fruits:      ? Bananas, peaches, pears, and pineapple    ? Grapes, raisins, and dates    ? Oranges, tangerines, grapefruit, orange juice, and grapefruit juice    ? Apricots, mangoes, melons, and papaya    ? Raspberries and strawberries    ? Canned fruit with no added sugar    · Low-fat dairy:      ? Nonfat (skim) milk, 1% milk, and low-fat almond, cashew, or soy milks fortified with calcium    ? Low-fat cheese, regular or frozen yogurt, and cottage cheese    · Meats and proteins:      ? Lean cuts of beef and pork (loin, leg, round), skinless chicken and turkey    ? Legumes, soy products, egg whites, or nuts    Limit or do not include the following in your heart healthy plan:   · Unhealthy fats and oils:      ? Whole or 2% milk, cream cheese, sour cream, or cheese    ? High-fat cuts of beef (T-bone steaks, ribs), chicken or turkey with skin, and organ meats such as liver    ? Butter, stick margarine, shortening, and cooking oils such as coconut or palm oil    · Foods and liquids high in sodium:      ? Packaged foods, such as frozen dinners, cookies, macaroni and cheese, and cereals with more than 300 mg of sodium per serving    ? Vegetables with added sodium, such as instant potatoes, vegetables with added sauces, or regular canned vegetables    ? Cured or smoked meats, such as hot dogs, bentley, and sausage    ? High-sodium ketchup, barbecue sauce, salad dressing, pickles, olives, soy sauce, or miso    · Foods and liquids high in sugar:      ? Candy, cake, cookies, pies, or doughnuts    ? Soft drinks (soda), sports drinks, or sweetened tea    ? Canned or dry mixes for cakes, soups, sauces, or gravies    Other healthy heart guidelines:   · Do not smoke  Nicotine and other chemicals in cigarettes and cigars can cause lung and heart damage  Ask your healthcare provider for information if you currently smoke and need help to quit  E-cigarettes or smokeless tobacco still contain nicotine  Talk to your healthcare provider before you use these products  · Limit or do not drink alcohol as directed  Alcohol can damage your heart and raise your blood pressure  Your healthcare provider may give you specific daily and weekly limits  The general recommended limit is 1 drink a day for women 21 or older and for men 72 or older  Do not have more than 3 drinks in a day or 7 in a week  The recommended limit is 2 drinks a day for men 24to 59years of age  Do not have more than 4 drinks in a day or 14 in a week  A drink of alcohol is 12 ounces of beer, 5 ounces of wine, or 1½ ounces of liquor  · Exercise regularly  Exercise can help you maintain a healthy weight and improve your blood pressure and cholesterol levels  Regular exercise can also decrease your risk for heart problems  Ask your healthcare provider about the best exercise plan for you  Do not start an exercise program without asking your healthcare provider  Follow up with your doctor or cardiologist as directed:  Write down your questions so you remember to ask them during your visits  © Copyright [address] Information is for End User's use only and may not be sold, redistributed or otherwise used for commercial purposes  All illustrations and images included in CareNotes® are the copyrighted property of [employer] M [employer]  or [address] [place] [address]  The above information is an  only  It is not intended as medical advice for individual conditions or treatments  Talk to your doctor, nurse or pharmacist before following any medical regimen to see if it is safe and effective for you

## 2021-05-26 ENCOUNTER — TRANSCRIBE ORDERS (OUTPATIENT)
Dept: LAB | Facility: CLINIC | Age: 66
End: 2021-05-26

## 2021-05-26 ENCOUNTER — APPOINTMENT (OUTPATIENT)
Dept: LAB | Facility: CLINIC | Age: 66
End: 2021-05-26
Payer: COMMERCIAL

## 2021-05-26 DIAGNOSIS — E55.9 VITAMIN D DEFICIENCY: ICD-10-CM

## 2021-05-26 DIAGNOSIS — E78.00 HYPERCHOLESTEROLEMIA: ICD-10-CM

## 2021-05-26 DIAGNOSIS — Z00.00 ROUTINE ADULT HEALTH MAINTENANCE: ICD-10-CM

## 2021-05-26 DIAGNOSIS — Z12.5 SCREENING FOR PROSTATE CANCER: ICD-10-CM

## 2021-05-26 DIAGNOSIS — I10 ESSENTIAL HYPERTENSION: ICD-10-CM

## 2021-05-26 DIAGNOSIS — E03.9 HYPOTHYROIDISM, UNSPECIFIED TYPE: ICD-10-CM

## 2021-05-26 LAB
25(OH)D3 SERPL-MCNC: 52.3 NG/ML (ref 30–100)
ALBUMIN SERPL BCP-MCNC: 3.7 G/DL (ref 3.5–5)
ALP SERPL-CCNC: 77 U/L (ref 46–116)
ALT SERPL W P-5'-P-CCNC: 100 U/L (ref 12–78)
ANION GAP SERPL CALCULATED.3IONS-SCNC: 5 MMOL/L (ref 4–13)
AST SERPL W P-5'-P-CCNC: 47 U/L (ref 5–45)
BASOPHILS # BLD AUTO: 0.02 THOUSANDS/ΜL (ref 0–0.1)
BASOPHILS NFR BLD AUTO: 0 % (ref 0–1)
BILIRUB SERPL-MCNC: 0.42 MG/DL (ref 0.2–1)
BUN SERPL-MCNC: 20 MG/DL (ref 5–25)
CALCIUM SERPL-MCNC: 8.9 MG/DL (ref 8.3–10.1)
CHLORIDE SERPL-SCNC: 109 MMOL/L (ref 100–108)
CHOLEST SERPL-MCNC: 203 MG/DL (ref 50–200)
CO2 SERPL-SCNC: 27 MMOL/L (ref 21–32)
CREAT SERPL-MCNC: 1.06 MG/DL (ref 0.6–1.3)
EOSINOPHIL # BLD AUTO: 0.08 THOUSAND/ΜL (ref 0–0.61)
EOSINOPHIL NFR BLD AUTO: 1 % (ref 0–6)
ERYTHROCYTE [DISTWIDTH] IN BLOOD BY AUTOMATED COUNT: 13.6 % (ref 11.6–15.1)
GFR SERPL CREATININE-BSD FRML MDRD: 74 ML/MIN/1.73SQ M
GLUCOSE P FAST SERPL-MCNC: 108 MG/DL (ref 65–99)
HCT VFR BLD AUTO: 44.6 % (ref 36.5–49.3)
HDLC SERPL-MCNC: 73 MG/DL
HGB BLD-MCNC: 15 G/DL (ref 12–17)
IMM GRANULOCYTES # BLD AUTO: 0.02 THOUSAND/UL (ref 0–0.2)
IMM GRANULOCYTES NFR BLD AUTO: 0 % (ref 0–2)
LDLC SERPL CALC-MCNC: 114 MG/DL (ref 0–100)
LYMPHOCYTES # BLD AUTO: 1.13 THOUSANDS/ΜL (ref 0.6–4.47)
LYMPHOCYTES NFR BLD AUTO: 20 % (ref 14–44)
MCH RBC QN AUTO: 32.3 PG (ref 26.8–34.3)
MCHC RBC AUTO-ENTMCNC: 33.6 G/DL (ref 31.4–37.4)
MCV RBC AUTO: 96 FL (ref 82–98)
MONOCYTES # BLD AUTO: 0.65 THOUSAND/ΜL (ref 0.17–1.22)
MONOCYTES NFR BLD AUTO: 12 % (ref 4–12)
NEUTROPHILS # BLD AUTO: 3.7 THOUSANDS/ΜL (ref 1.85–7.62)
NEUTS SEG NFR BLD AUTO: 67 % (ref 43–75)
NONHDLC SERPL-MCNC: 130 MG/DL
NRBC BLD AUTO-RTO: 0 /100 WBCS
PLATELET # BLD AUTO: 179 THOUSANDS/UL (ref 149–390)
PMV BLD AUTO: 10.7 FL (ref 8.9–12.7)
POTASSIUM SERPL-SCNC: 4.5 MMOL/L (ref 3.5–5.3)
PROT SERPL-MCNC: 7.5 G/DL (ref 6.4–8.2)
PSA SERPL-MCNC: 2.2 NG/ML (ref 0–4)
RBC # BLD AUTO: 4.64 MILLION/UL (ref 3.88–5.62)
SODIUM SERPL-SCNC: 141 MMOL/L (ref 136–145)
T4 FREE SERPL-MCNC: 0.96 NG/DL (ref 0.76–1.46)
TRIGL SERPL-MCNC: 82 MG/DL
TSH SERPL DL<=0.05 MIU/L-ACNC: 5.49 UIU/ML (ref 0.36–3.74)
WBC # BLD AUTO: 5.6 THOUSAND/UL (ref 4.31–10.16)

## 2021-05-26 PROCEDURE — 80053 COMPREHEN METABOLIC PANEL: CPT

## 2021-05-26 PROCEDURE — 80061 LIPID PANEL: CPT

## 2021-05-26 PROCEDURE — G0103 PSA SCREENING: HCPCS

## 2021-05-26 PROCEDURE — 82306 VITAMIN D 25 HYDROXY: CPT

## 2021-05-26 PROCEDURE — 84443 ASSAY THYROID STIM HORMONE: CPT

## 2021-05-26 PROCEDURE — 36415 COLL VENOUS BLD VENIPUNCTURE: CPT

## 2021-05-26 PROCEDURE — 85025 COMPLETE CBC W/AUTO DIFF WBC: CPT

## 2021-05-26 PROCEDURE — 84439 ASSAY OF FREE THYROXINE: CPT

## 2021-05-27 ENCOUNTER — OFFICE VISIT (OUTPATIENT)
Dept: RHEUMATOLOGY | Facility: CLINIC | Age: 66
End: 2021-05-27
Payer: COMMERCIAL

## 2021-05-27 VITALS
BODY MASS INDEX: 33.34 KG/M2 | HEIGHT: 68 IN | WEIGHT: 220 LBS | DIASTOLIC BLOOD PRESSURE: 79 MMHG | SYSTOLIC BLOOD PRESSURE: 156 MMHG | HEART RATE: 67 BPM

## 2021-05-27 DIAGNOSIS — M62.838 MUSCLE SPASM: ICD-10-CM

## 2021-05-27 DIAGNOSIS — M19.90 OSTEOARTHRITIS, UNSPECIFIED OSTEOARTHRITIS TYPE, UNSPECIFIED SITE: Primary | ICD-10-CM

## 2021-05-27 DIAGNOSIS — M19.90 ARTHRITIS: ICD-10-CM

## 2021-05-27 PROCEDURE — 99214 OFFICE O/P EST MOD 30 MIN: CPT | Performed by: INTERNAL MEDICINE

## 2021-05-27 PROCEDURE — 3008F BODY MASS INDEX DOCD: CPT | Performed by: FAMILY MEDICINE

## 2021-05-27 RX ORDER — IBUPROFEN 600 MG/1
600 TABLET ORAL 2 TIMES DAILY PRN
Qty: 60 TABLET | Refills: 6 | Status: SHIPPED | OUTPATIENT
Start: 2021-05-27 | End: 2021-11-20 | Stop reason: SDUPTHER

## 2021-05-27 RX ORDER — CYCLOBENZAPRINE HCL 5 MG
5 TABLET ORAL
Qty: 30 TABLET | Refills: 6 | Status: SHIPPED | OUTPATIENT
Start: 2021-05-27 | End: 2021-09-07

## 2021-05-27 NOTE — PATIENT INSTRUCTIONS
Continue ibuprofen twice a day as needed  Continue cyclobenzaprine for muscle pain at bedtime as needed    Return to clinic as needed    Osteoarthritis   AMBULATORY CARE:   Osteoarthritis  occurs when cartilage (tissue that cushions a joint) wears away slowly and causes the bones to rub together  Osteoarthritis (OA) is a long-term condition that often affects the hands, neck, lower back, knees, and hips  OA is also called arthrosis or degenerative joint disease  Common signs and symptoms include the following:   · Joint pain that gets worse when you move the joint     · Joint stiffness that decreases after you move the joint     · Decreased range of movement     · Hard, bony enlargement on your fingers or toes    · A grinding or cracking sound when you move your joint    Call your doctor or specialist if:   · You have severe pain  · You cannot move your joint  · You have a fever  · Your joint is red and tender  · You have questions or concerns about your condition or care  Treatment for osteoarthritis  may include any of the following:  · Acetaminophen  decreases pain and fever  It is available without a doctor's order  Ask how much to take and how often to take it  Follow directions  Read the labels of all other medicines you are using to see if they also contain acetaminophen, or ask your doctor or pharmacist  Acetaminophen can cause liver damage if not taken correctly  Do not use more than 4 grams (4,000 milligrams) total of acetaminophen in one day  · NSAIDs , such as ibuprofen, help decrease swelling, pain, and fever  This medicine is available with or without a doctor's order  NSAIDs can cause stomach bleeding or kidney problems in certain people  If you take blood thinner medicine, always ask your healthcare provider if NSAIDs are safe for you  Always read the medicine label and follow directions  · Capsaicin cream  may help decrease pain in your joint       · Prescription pain medicine may be given  Ask your healthcare provider how to take this medicine safely  Some prescription pain medicines contain acetaminophen  Do not take other medicines that contain acetaminophen without talking to your healthcare provider  Too much acetaminophen may cause liver damage  Prescription pain medicine may cause constipation  Ask your healthcare provider how to prevent or treat constipation  · A steroid injection  may be given if your symptoms get worse  · Physical therapy  is used to teach you exercises to help improve movement and strength, and to decrease pain  A physical therapist may move an area with his or her hands  For example, he or she may move your leg in certain ways to treat osteoarthritis in your hip  · Ultrasound  may be used to treat osteoarthritis in certain areas, such as your knee  Ultrasound produces heat that can relieve pain  · Surgery  may be needed if other treatments do not work  Manage your symptoms:   · Stay active  Physical activity may reduce your pain and improve your ability to do daily activities  Avoid activities that cause pain  Ask your healthcare provider what type of exercise would be best for you  · Maintain a healthy weight  This helps decrease the strain on the joints in your back, hips, knees, ankles, and feet  Ask your healthcare provider what a healthy weight is for you  He or she can help you create a weight loss plan if you are overweight  · Use heat or ice on your joints as directed  Heat and ice help decrease pain, swelling, and muscle spasms  For heat, use a heating pad on a low setting for 20 minutes, or take a warm bath  For ice, use an ice pack, or put crushed ice in a plastic bag  Cover it with a towel before you place it on your joint  Use ice for 15 minutes every hour  · Massage the muscles around the joint  Massage helps relieve pain and stiffness  Your healthcare provider or a physical therapist can show you how to do this   If you have hip OA, another person may need to help you massage the area  · Use a cane, crutches, or a walker if directed  These help protect and relieve pressure on your ankle, knee, and hip joints  You may also be prescribed shoe inserts to decrease pressure in your joints  · Wear flat or low-heeled shoes  This will help decrease pain and reduce pressure on your ankle, knee, and hip joints  Follow up with your healthcare provider as directed:  Write down your questions so you remember to ask them during your visits  © Copyright [address] Information is for End User's use only and may not be sold, redistributed or otherwise used for commercial purposes  All illustrations and images included in CareNotes® are the copyrighted property of [employer]  or [facility] Ann-Marie Tejeda   The above information is an  only  It is not intended as medical advice for individual conditions or treatments  Talk to your doctor, nurse or pharmacist before following any medical regimen to see if it is safe and effective for you

## 2021-05-27 NOTE — PROGRESS NOTES
Assessment and Plan: Anand Alford is a 61 y o   male who presents for follow-up of his generalized osteoarthritis  Patient has osteoarthritis of his right AC joint, left hand, in cervical spine based on x-rays  His pain has significantly improved with regularly taking ibuprofen 600 mg p o  B i d  He was also diagnosed with right shoulder rotator cuff tear, and the pain and function has significantly improved with physical therapy, steroid injection, and application of thermal patch along with ibuprofen  His neck and right shoulder muscle spasms have also improved with cyclobenzaprine 5mg at bedtime as needed, which he only takes rarely  Overall, patient feels a lot better than his last visit  He can continue ibuprofen 600 mg p o  B i d  p r n  For joint pain and cyclobenzaprine 5 mg p o  Q h s  As needed for muscle pain; PCP can continue to refill these medications as long as patient finds them helpful  He does not need to return to Rheumatology clinic unless he has new or worsening symptoms  Plan:  Diagnoses and all orders for this visit:    Osteoarthritis, unspecified osteoarthritis type, unspecified site    Muscle spasm  -     cyclobenzaprine (FLEXERIL) 5 mg tablet; Take 1 tablet (5 mg total) by mouth daily at bedtime    Arthritis  -     ibuprofen (MOTRIN) 600 mg tablet; Take 1 tablet (600 mg total) by mouth 2 (two) times a day as needed for mild pain or moderate pain    Follow-up plan: Return to clinic as needed         Rheumatic Disease Summary  Anand Alford is a 63 y o   male who presents as a Rheumatology consult referred by his PCP EZEKIEL Souza for evaluation of possible inflammatory arthritis  Explained to patient that a RF of 10 should actually be reported as negative, since it is less than the normal lab cutoff of 14  His PRESSLEY-28 score today is 4 67, consistent with moderate inflammatory disease activity    It is possible that he has an inflammatory arthritis, however the description of his pain seems like there is at least a component of osteoarthritis  Ordered right shoulder, cervical spine, and left hand x-rays to evaluate for any inflammatory changes  Ordered anti-CCP to further evaluate for possible Rheumatoid arthritis  He was also asked to get his left hand outside x-rays uploaded into our system  He can take ibuprofen 600 mg p o  b i d  for his joint pain; he is to call if he wants a prescription  For his neck and right shoulder muscle pain present on physical exam, prescribed cyclobenzaprine 5 mg p o  q h s  Physical therapy referral made to address patient's decreased right shoulder range of motion and pain  HPI  Christiano Peralta is a 61 y o   male who presents for follow-up of his generalized osteoarthritis  Was found to have right rotator cuff tear by Orthopedics, doing conservative management; right shoulder cortisone injection significantly helped  Physical therapy for right shoulder has helped; also uses thermal patch for neck and right shoulder pain  Has been taking ibuprofen 600mg po bid for OA pain  Takes cyclobenzaprine at bedtime for neck and shoulder muscle pain a few times a month  The following portions of the patient's history were reviewed and updated as appropriate: allergies, current medications, past family history, past medical history, past social history, past surgical history and problem list     Review of Systems:   Review of Systems   Constitutional: Negative for chills, fatigue, fever and unexpected weight change  HENT: Negative for mouth sores and trouble swallowing  Eyes: Negative for pain and visual disturbance  Respiratory: Negative for cough and shortness of breath  Cardiovascular: Negative for chest pain and leg swelling  Gastrointestinal: Negative for constipation and diarrhea  Musculoskeletal: Positive for arthralgias, back pain, joint swelling, myalgias and neck pain  Skin: Negative for color change and rash  Allergic/Immunologic: Positive for environmental allergies  Neurological: Negative for weakness  Hematological: Negative for adenopathy  Psychiatric/Behavioral: Negative for sleep disturbance  Home Medications:    Current Outpatient Medications:     B Complex-C-Folic Acid (B-Complex/Folic Acid/Vitamin C) TBCR, , Disp: , Rfl:     Cholecalciferol (D-3-5) 125 MCG (5000 UT) capsule, , Disp: , Rfl:     cyanocobalamin (VITAMIN B-12) 1,000 mcg tablet, Vitamin B-12 1,000 mcg tablet  Take by oral route , Disp: , Rfl:     cyclobenzaprine (FLEXERIL) 5 mg tablet, Take 1 tablet (5 mg total) by mouth daily at bedtime, Disp: 30 tablet, Rfl: 6    Echinacea 400 MG CAPS, , Disp: , Rfl:     ergocalciferol (VITAMIN D2) 50,000 units, , Disp: , Rfl:     ezetimibe (ZETIA) 10 mg tablet, Take 1 tablet (10 mg total) by mouth daily, Disp: 90 tablet, Rfl: 0    ibuprofen (MOTRIN) 600 mg tablet, Take 1 tablet (600 mg total) by mouth 2 (two) times a day as needed for mild pain or moderate pain, Disp: 60 tablet, Rfl: 6    latanoprost (XALATAN) 0 005 % ophthalmic solution, , Disp: , Rfl:     levothyroxine 175 mcg tablet, Take 1 tablet (175 mcg total) by mouth daily, Disp: 90 tablet, Rfl: 0    lisinopril (ZESTRIL) 10 mg tablet, Take 1 tablet (10 mg total) by mouth daily, Disp: 90 tablet, Rfl: 0    Misc Natural Products (COSAMIN ASU FOR JOINT HEALTH PO), , Disp: , Rfl:     Zinc 50 MG CAPS, , Disp: , Rfl:     amoxicillin (AMOXIL) 500 mg capsule, , Disp: , Rfl:     oxybutynin (DITROPAN) 5 mg tablet, Take 1 tablet (5 mg total) by mouth 3 (three) times a day (Patient not taking: Reported on 5/25/2021), Disp: 270 tablet, Rfl: 3    Objective:    Vitals:    05/27/21 0915   BP: 156/79   Pulse: 67   Weight: 99 8 kg (220 lb)   Height: 5' 8" (1 727 m)       Physical Exam  Constitutional:       General: He is not in acute distress  Appearance: He is well-developed  HENT:      Head: Normocephalic and atraumatic     Eyes: General: Lids are normal  No scleral icterus  Conjunctiva/sclera: Conjunctivae normal    Neck:      Musculoskeletal: Neck supple  No muscular tenderness  Cardiovascular:      Rate and Rhythm: Normal rate and regular rhythm  Heart sounds: S1 normal and S2 normal  No murmur  No friction rub  Pulmonary:      Effort: Pulmonary effort is normal  No tachypnea or respiratory distress  Breath sounds: Normal breath sounds  No wheezing, rhonchi or rales  Musculoskeletal:         General: Tenderness and deformity present  Comments: Left 1st CMC and 4th MCP tenderness; right shoulder tenderness; cervical spine and right paracervical spinal muscle tenderness; difficulty with right shoulder internal rotation and adduction   Skin:     General: Skin is warm and dry  Findings: No rash  Nails: There is no clubbing  Neurological:      Mental Status: He is alert  Sensory: No sensory deficit  Psychiatric:         Behavior: Behavior normal  Behavior is cooperative  Reviewed labs and imaging  Imaging:   Left hand x-rays 2/26/21  IMPRESSION:  Multiarticular osteoarthritis of the left hand  The changes are most advanced at the 1st carpometacarpal joint    Cervical spine x-rays 02/25/2021  Degenerative spondylosis with retrolisthesis at C5-C6 which appears degenerative  Foraminal narrowing  Right shoulder x-rays 2/25/21  Mild degenerative changes of the [facility] [place] by [facility] joint is identified  No erosions are seen      Labs:   Appointment on 05/26/2021   Component Date Value Ref Range Status    Sodium 05/26/2021 141  136 - 145 mmol/L Final    Potassium 05/26/2021 4 5  3 5 - 5 3 mmol/L Final    Chloride 05/26/2021 109* 100 - 108 mmol/L Final    CO2 05/26/2021 27  21 - 32 mmol/L Final    ANION GAP 05/26/2021 5  4 - 13 mmol/L Final    BUN 05/26/2021 20  5 - 25 mg/dL Final    Creatinine 05/26/2021 1 06  0 60 - 1 30 mg/dL Final    Standardized to IDMS reference method    Glucose, Fasting 05/26/2021 108* 65 - 99 mg/dL Final    Specimen collection should occur prior to Sulfasalazine administration due to the potential for falsely depressed results  Specimen collection should occur prior to Sulfapyridine administration due to the potential for falsely elevated results   Calcium 05/26/2021 8 9  8 3 - 10 1 mg/dL Final    AST 05/26/2021 47* 5 - 45 U/L Final    Specimen collection should occur prior to Sulfasalazine administration due to the potential for falsely depressed results   ALT 05/26/2021 100* 12 - 78 U/L Final    Specimen collection should occur prior to Sulfasalazine and/or Sulfapyridine administration due to the potential for falsely depressed results   Alkaline Phosphatase 05/26/2021 77  46 - 116 U/L Final    Total Protein 05/26/2021 7 5  6 4 - 8 2 g/dL Final    Albumin 05/26/2021 3 7  3 5 - 5 0 g/dL Final    Total Bilirubin 05/26/2021 0 42  0 20 - 1 00 mg/dL Final    Use of this assay is not recommended for patients undergoing treatment with eltrombopag due to the potential for falsely elevated results      eGFR 05/26/2021 74  ml/min/1 73sq m Final    WBC 05/26/2021 5 60  4 31 - 10 16 Thousand/uL Final    RBC 05/26/2021 4 64  3 88 - 5 62 Million/uL Final    Hemoglobin 05/26/2021 15 0  12 0 - 17 0 g/dL Final    Hematocrit 05/26/2021 44 6  36 5 - 49 3 % Final    MCV 05/26/2021 96  82 - 98 fL Final    MCH 05/26/2021 32 3  26 8 - 34 3 pg Final    MCHC 05/26/2021 33 6  31 4 - 37 4 g/dL Final    RDW 05/26/2021 13 6  11 6 - 15 1 % Final    MPV 05/26/2021 10 7  8 9 - 12 7 fL Final    Platelets 88/80/1344 179  149 - 390 Thousands/uL Final    nRBC 05/26/2021 0  /100 WBCs Final    Neutrophils Relative 05/26/2021 67  43 - 75 % Final    Immat GRANS % 05/26/2021 0  0 - 2 % Final    Lymphocytes Relative 05/26/2021 20  14 - 44 % Final    Monocytes Relative 05/26/2021 12  4 - 12 % Final    Eosinophils Relative 05/26/2021 1  0 - 6 % Final    Basophils Relative 05/26/2021 0  0 - 1 % Final    Neutrophils Absolute 05/26/2021 3 70  1 85 - 7 62 Thousands/µL Final    Immature Grans Absolute 05/26/2021 0 02  0 00 - 0 20 Thousand/uL Final    Lymphocytes Absolute 05/26/2021 1 13  0 60 - 4 47 Thousands/µL Final    Monocytes Absolute 05/26/2021 0 65  0 17 - 1 22 Thousand/µL Final    Eosinophils Absolute 05/26/2021 0 08  0 00 - 0 61 Thousand/µL Final    Basophils Absolute 05/26/2021 0 02  0 00 - 0 10 Thousands/µL Final    TSH 3RD GENERATON 05/26/2021 5 490* 0 358 - 3 740 uIU/mL Final    Cholesterol 05/26/2021 203* 50 - 200 mg/dL Final    Cholesterol:       Desirable         <200 mg/dl       Borderline         200-239 mg/dl       High              >239           Triglycerides 05/26/2021 82  <=150 mg/dL Final    Triglyceride:     Normal          <150 mg/dl     Borderline High 150-199 mg/dl     High            200-499 mg/dl        Very High       >499 mg/dl    Specimen collection should occur prior to N-Acetylcysteine or Metamizole administration due to the potential for falsely depressed results   HDL, Direct 05/26/2021 73  >=40 mg/dL Final    HDL Cholesterol:       Low     <41 mg/dL  Specimen collection should occur prior to Metamizole administration due to the potential for falsley depressed results   LDL Calculated 05/26/2021 114* 0 - 100 mg/dL Final    LDL Cholesterol:     Optimal           <100 mg/dl     Near Optimal      100-129 mg/dl     Above Optimal       Borderline High 130-159 mg/dl       High            160-189 mg/dl       Very High       >189 mg/dl         This screening LDL is a calculated result  It does not have the accuracy of the Direct Measured LDL in the monitoring of patients with hyperlipidemia and/or statin therapy  Direct Measure LDL (APD607) must be ordered separately in these patients      Non-HDL-Chol (CHOL-HDL) 05/26/2021 130  mg/dl Final    Vit D, 25-Hydroxy 05/26/2021 52 3  30 0 - 100 0 ng/mL Final    PSA 05/26/2021 2 2  0 0 - 4 0 ng/mL Final American Urological Association Guidelines define biochemical recurrence of prostate cancer as a detectable or rising PSA value post-radical prostatectomy that is greater than or equal to 0 2 ng/mL with a second confirmatory level of greater than or equal to 0 2 ng/mL   Free T4 05/26/2021 0 96  0 76 - 1 46 ng/dL Final    Specimen collection should occur prior to Sulfasalazine administration due to the potential for falsely elevated results     Office Visit on 02/25/2021   Component Date Value Ref Range Status    Cyclic Citrullinated Peptide Ab  03/08/2021 0 4  See comment Final   Lab on 02/23/2021   Component Date Value Ref Range Status    DONNA 02/23/2021 Negative  Negative Final    CRP 02/23/2021 <3 0  <3 0 mg/L Final    Sed Rate 02/23/2021 45* 0 - 19 mm/hour Final    Rheumatoid Factor 02/23/2021 Positive* Negative Final    See RF Quantitation    RF Quantitation 02/23/2021 10 IU/mL* (none) Final

## 2021-06-03 ENCOUNTER — OFFICE VISIT (OUTPATIENT)
Dept: INTERNAL MEDICINE CLINIC | Facility: CLINIC | Age: 66
End: 2021-06-03
Payer: COMMERCIAL

## 2021-06-03 VITALS — TEMPERATURE: 97.8 F

## 2021-06-03 DIAGNOSIS — M54.41 CHRONIC RIGHT-SIDED LOW BACK PAIN WITH RIGHT-SIDED SCIATICA: Primary | ICD-10-CM

## 2021-06-03 DIAGNOSIS — G89.29 CHRONIC RIGHT-SIDED LOW BACK PAIN WITH RIGHT-SIDED SCIATICA: Primary | ICD-10-CM

## 2021-06-03 PROCEDURE — 99213 OFFICE O/P EST LOW 20 MIN: CPT | Performed by: FAMILY MEDICINE

## 2021-06-03 PROCEDURE — 1036F TOBACCO NON-USER: CPT | Performed by: FAMILY MEDICINE

## 2021-06-03 PROCEDURE — 96372 THER/PROPH/DIAG INJ SC/IM: CPT | Performed by: FAMILY MEDICINE

## 2021-06-03 RX ORDER — PREDNISONE 10 MG/1
40 TABLET ORAL DAILY
Qty: 20 TABLET | Refills: 0 | Status: SHIPPED | OUTPATIENT
Start: 2021-06-03 | End: 2021-06-03 | Stop reason: SDUPTHER

## 2021-06-03 RX ORDER — PREDNISONE 10 MG/1
40 TABLET ORAL DAILY
Qty: 20 TABLET | Refills: 0 | Status: SHIPPED | OUTPATIENT
Start: 2021-06-03 | End: 2021-06-08

## 2021-06-03 RX ORDER — TRAMADOL HYDROCHLORIDE 50 MG/1
50 TABLET ORAL EVERY 6 HOURS PRN
Qty: 40 TABLET | Refills: 0 | Status: SHIPPED | OUTPATIENT
Start: 2021-06-03 | End: 2021-06-04 | Stop reason: SDUPTHER

## 2021-06-03 RX ORDER — DEXAMETHASONE SODIUM PHOSPHATE 4 MG/ML
4 INJECTION, SOLUTION INTRA-ARTICULAR; INTRALESIONAL; INTRAMUSCULAR; INTRAVENOUS; SOFT TISSUE ONCE
Status: COMPLETED | OUTPATIENT
Start: 2021-06-03 | End: 2021-06-03

## 2021-06-03 RX ORDER — TRAMADOL HYDROCHLORIDE 50 MG/1
50 TABLET ORAL EVERY 6 HOURS PRN
Qty: 40 TABLET | Refills: 0 | Status: SHIPPED | OUTPATIENT
Start: 2021-06-03 | End: 2021-06-03 | Stop reason: SDUPTHER

## 2021-06-03 RX ADMIN — DEXAMETHASONE SODIUM PHOSPHATE 4 MG: 4 INJECTION, SOLUTION INTRA-ARTICULAR; INTRALESIONAL; INTRAMUSCULAR; INTRAVENOUS; SOFT TISSUE at 15:03

## 2021-06-04 DIAGNOSIS — M54.41 CHRONIC RIGHT-SIDED LOW BACK PAIN WITH RIGHT-SIDED SCIATICA: ICD-10-CM

## 2021-06-04 DIAGNOSIS — G89.29 CHRONIC RIGHT-SIDED LOW BACK PAIN WITH RIGHT-SIDED SCIATICA: ICD-10-CM

## 2021-06-04 RX ORDER — TRAMADOL HYDROCHLORIDE 50 MG/1
50 TABLET ORAL EVERY 6 HOURS PRN
Qty: 12 TABLET | Refills: 0 | Status: SHIPPED | OUTPATIENT
Start: 2021-06-04 | End: 2021-07-01 | Stop reason: SDUPTHER

## 2021-06-04 NOTE — PROGRESS NOTES
Assessment/Plan:    No problem-specific Assessment & Plan notes found for this encounter  Diagnoses and all orders for this visit:    Chronic right-sided low back pain with right-sided sciatica  -     dexamethasone (DECADRON) injection 4 mg  -     Discontinue: predniSONE 10 mg tablet; Take 4 tablets (40 mg total) by mouth daily for 5 days  -     Discontinue: traMADol (ULTRAM) 50 mg tablet; Take 1 tablet (50 mg total) by mouth every 6 (six) hours as needed for moderate pain  -     Ambulatory referral to Physical Therapy; Future  -     traMADol (ULTRAM) 50 mg tablet; Take 1 tablet (50 mg total) by mouth every 6 (six) hours as needed for moderate pain  -     predniSONE 10 mg tablet; Take 4 tablets (40 mg total) by mouth daily for 5 days         orders and recommendations as noted above  Discussed with him that this is likely an exacerbation of his previous issues with his low back  Some definite component of sciatica as well as likely arthritic component  Discussed options for treatment  Has not been alleviated with the ibuprofen and muscle relaxants alone  Discussed continuing with the muscle relaxants  Continue with heat to the area  Will have him hold the ibuprofen and start the prednisone as noted above  Start physical therapy  Would consider MRI and possible referral to pain management depending on his response to treatment especially with his history in the past of significant issues with his back  Call or follow-up if symptoms worsen or persist despite this treatment  Subjective:      Patient ID: Khushi Carmona is a 61 y o  male  He presents for problem visit  He had been doing some work around the house including vacuuming about a week ago  He developed back pain over the low back especially on to the right side and into the right leg  Symptoms have worsened significantly and has difficulty pushing with his right leg    Try to drive the car today and could not because of the pain radiating into right leg  Denies any numbness or tingling  Denies any weakness  Denies any bowel or bladder issues with this  He has had similar issues in the past related to disc problems and did have previous evaluation and treatment through Pain Management with alleviation of the symptoms  Has had minor symptoms over the past 10-15 years but not as severe as they had been in the past and not as severe as they are now  The following portions of the patient's history were reviewed and updated as appropriate:   He  has a past medical history of Back pain, ED (erectile dysfunction), Hypertension, Hypothyroidism, IBS (irritable bowel syndrome), Inflammatory bowel disease, Kidney stones, and Overactive bladder  He   Patient Active Problem List    Diagnosis Date Noted    Chronic right-sided low back pain with right-sided sciatica 06/03/2021    Routine adult health maintenance 05/25/2021    Screening for prostate cancer 05/25/2021    Cut of finger 05/25/2021    Nontraumatic incomplete tear of right rotator cuff 04/29/2021    Osteoarthritis of hip 07/05/2019    Coronal hypospadias 07/05/2019    Irritable bowel syndrome with diarrhea 07/05/2019    Obesity (BMI 35 0-39 9 without comorbidity) 07/05/2019    Vitamin D deficiency 07/05/2019    Glaucoma of both eyes 07/05/2019    OAB (overactive bladder) 05/01/2019    Difficulty voiding 04/15/2019    Erectile dysfunction 04/15/2019    History of kidney stones 04/15/2019    Hypercholesterolemia 10/16/2016    Hypertension 10/16/2016    Hypothyroidism 10/16/2016    Localized, primary osteoarthritis of shoulder region 04/29/2008     He  has a past surgical history that includes Rotator cuff repair; Partial hip arthroplasty (Right); Appendectomy; Hernia repair; and Trigger finger release (02/2021)    His family history includes Cancer in his maternal grandmother and mother; Diabetes in his maternal grandmother; Heart disease in his father and sister; Hypertension in his father and sister; Stroke in his maternal aunt  He  reports that he quit smoking about 4 years ago  He has quit using smokeless tobacco   His smokeless tobacco use included chew  He reports current alcohol use of about 5 0 standard drinks of alcohol per week  He reports previous drug use  Drugs: Marijuana, Cocaine, and LSD  Current Outpatient Medications   Medication Sig Dispense Refill    B Complex-C-Folic Acid (B-Complex/Folic Acid/Vitamin C) TBCR       Cholecalciferol (D-3-5) 125 MCG (5000 UT) capsule       cyanocobalamin (VITAMIN B-12) 1,000 mcg tablet Vitamin B-12 1,000 mcg tablet   Take by oral route   cyclobenzaprine (FLEXERIL) 5 mg tablet Take 1 tablet (5 mg total) by mouth daily at bedtime 30 tablet 6    Echinacea 400 MG CAPS       ezetimibe (ZETIA) 10 mg tablet Take 1 tablet (10 mg total) by mouth daily 90 tablet 0    ibuprofen (MOTRIN) 600 mg tablet Take 1 tablet (600 mg total) by mouth 2 (two) times a day as needed for mild pain or moderate pain 60 tablet 6    latanoprost (XALATAN) 0 005 % ophthalmic solution       levothyroxine 175 mcg tablet Take 1 tablet (175 mcg total) by mouth daily 90 tablet 0    lisinopril (ZESTRIL) 10 mg tablet Take 1 tablet (10 mg total) by mouth daily 90 tablet 0    Zinc 50 MG CAPS       amoxicillin (AMOXIL) 500 mg capsule       predniSONE 10 mg tablet Take 4 tablets (40 mg total) by mouth daily for 5 days 20 tablet 0    traMADol (ULTRAM) 50 mg tablet Take 1 tablet (50 mg total) by mouth every 6 (six) hours as needed for moderate pain 40 tablet 0     No current facility-administered medications for this visit  Current Outpatient Medications on File Prior to Visit   Medication Sig    B Complex-C-Folic Acid (B-Complex/Folic Acid/Vitamin C) TBCR     Cholecalciferol (D-3-5) 125 MCG (5000 UT) capsule     cyanocobalamin (VITAMIN B-12) 1,000 mcg tablet Vitamin B-12 1,000 mcg tablet   Take by oral route      cyclobenzaprine (FLEXERIL) 5 mg tablet Take 1 tablet (5 mg total) by mouth daily at bedtime    Echinacea 400 MG CAPS     ezetimibe (ZETIA) 10 mg tablet Take 1 tablet (10 mg total) by mouth daily    ibuprofen (MOTRIN) 600 mg tablet Take 1 tablet (600 mg total) by mouth 2 (two) times a day as needed for mild pain or moderate pain    latanoprost (XALATAN) 0 005 % ophthalmic solution     levothyroxine 175 mcg tablet Take 1 tablet (175 mcg total) by mouth daily    lisinopril (ZESTRIL) 10 mg tablet Take 1 tablet (10 mg total) by mouth daily    Zinc 50 MG CAPS     amoxicillin (AMOXIL) 500 mg capsule      No current facility-administered medications on file prior to visit  He is allergic to levofloxacin       Review of Systems   Constitutional: Positive for activity change  Negative for appetite change, chills and fatigue  Gastrointestinal: Negative for abdominal pain  Genitourinary: Negative for flank pain  Musculoskeletal: Positive for back pain and gait problem  Neurological: Negative for weakness and numbness  Objective:      Temp 97 8 °F (36 6 °C)          Physical Exam  Vitals signs reviewed  Constitutional:       Appearance: He is well-developed and well-groomed  Musculoskeletal:      Comments:   Palpable muscle spasms noted throughout lower thoracic and lumbar region right greater than left; positive straight leg raise on right at approximately 40°   Neurological:      Mental Status: He is alert  Comments:  Slow, antalgic gait   Psychiatric:         Behavior: Behavior is cooperative

## 2021-06-08 ENCOUNTER — APPOINTMENT (OUTPATIENT)
Dept: LAB | Facility: CLINIC | Age: 66
End: 2021-06-08
Payer: COMMERCIAL

## 2021-06-08 DIAGNOSIS — R74.8 ELEVATED LIVER ENZYMES: Primary | ICD-10-CM

## 2021-06-08 DIAGNOSIS — R74.8 ELEVATED LIVER ENZYMES: ICD-10-CM

## 2021-06-08 LAB
ALBUMIN SERPL BCP-MCNC: 3.8 G/DL (ref 3.5–5)
ALP SERPL-CCNC: 66 U/L (ref 46–116)
ALT SERPL W P-5'-P-CCNC: 112 U/L (ref 12–78)
ANION GAP SERPL CALCULATED.3IONS-SCNC: 4 MMOL/L (ref 4–13)
AST SERPL W P-5'-P-CCNC: 65 U/L (ref 5–45)
BILIRUB SERPL-MCNC: 0.73 MG/DL (ref 0.2–1)
BUN SERPL-MCNC: 20 MG/DL (ref 5–25)
CALCIUM SERPL-MCNC: 9.6 MG/DL (ref 8.3–10.1)
CHLORIDE SERPL-SCNC: 102 MMOL/L (ref 100–108)
CO2 SERPL-SCNC: 31 MMOL/L (ref 21–32)
CREAT SERPL-MCNC: 1.14 MG/DL (ref 0.6–1.3)
GFR SERPL CREATININE-BSD FRML MDRD: 68 ML/MIN/1.73SQ M
GLUCOSE P FAST SERPL-MCNC: 125 MG/DL (ref 65–99)
POTASSIUM SERPL-SCNC: 4.1 MMOL/L (ref 3.5–5.3)
PROT SERPL-MCNC: 8 G/DL (ref 6.4–8.2)
SODIUM SERPL-SCNC: 137 MMOL/L (ref 136–145)

## 2021-06-08 PROCEDURE — 80053 COMPREHEN METABOLIC PANEL: CPT

## 2021-06-08 PROCEDURE — 36415 COLL VENOUS BLD VENIPUNCTURE: CPT

## 2021-06-10 DIAGNOSIS — R74.8 ELEVATED LIVER ENZYMES: Primary | ICD-10-CM

## 2021-06-14 ENCOUNTER — EVALUATION (OUTPATIENT)
Dept: PHYSICAL THERAPY | Facility: HOME HEALTHCARE | Age: 66
End: 2021-06-14
Payer: COMMERCIAL

## 2021-06-14 DIAGNOSIS — M54.41 CHRONIC RIGHT-SIDED LOW BACK PAIN WITH RIGHT-SIDED SCIATICA: ICD-10-CM

## 2021-06-14 DIAGNOSIS — G89.29 CHRONIC RIGHT-SIDED LOW BACK PAIN WITH RIGHT-SIDED SCIATICA: ICD-10-CM

## 2021-06-14 PROCEDURE — 97161 PT EVAL LOW COMPLEX 20 MIN: CPT | Performed by: PHYSICAL THERAPIST

## 2021-06-14 PROCEDURE — 97112 NEUROMUSCULAR REEDUCATION: CPT | Performed by: PHYSICAL THERAPIST

## 2021-06-14 PROCEDURE — 97110 THERAPEUTIC EXERCISES: CPT | Performed by: PHYSICAL THERAPIST

## 2021-06-14 NOTE — PROGRESS NOTES
PT Evaluation     Today's date: 2021  Patient name: Bharat Pleitez  : 1955  MRN: [id number]  Referring provider: Griselda Betts MD  Dx:   Encounter Diagnosis     ICD-10-CM    1  Chronic right-sided low back pain with right-sided sciatica  M54 41 Ambulatory referral to Physical Therapy    G89 29                   Assessment  Assessment details: Pt Olive Bello is a 61 yr old male who presents to OPPT with s/s consistent with chronic LBP with acute flare up  Pt presents with limited L/S mobility and core strength deficits, decreased B LE strength, impaired soft tissue mobility/limited flexibility, decreased postural awareness, and demonstrates preference for extension based therapy  Pt with limitations with prolonged sitting/driving, difficulty with stair negotiation, disrupted sleep patterns, and difficulty with lifting/carrying  Pt would benefit from skilled therapy services to address outlined impairments, work towards goals, and restore pts PLOF  Thank you! Impairments: abnormal gait, abnormal or restricted ROM, abnormal movement, activity intolerance, impaired balance, impaired physical strength, lacks appropriate home exercise program, pain with function, weight-bearing intolerance, poor posture  and poor body mechanics  Understanding of Dx/Px/POC: good   Prognosis: good    Goals  STGs to be achieved in 4 weeks:  -Pt to demonstrate reduced subjective pain rating "at worst" by at least 2-3 points from Initial Eval to allow for reduced pain with ADLs and improved functional activity tolerance    -Pt to demonstrate L/S ROM WFL in order to maximize joint mobility and function and allow for progression of exercise program and achievement of goals    -Pt to demonstrate increased MMT of BLE by at least 1/2 grade in order to improve safety and stability with ADLs and functional mobility       LTGs to be achieved upon discharge:   -Pt will be I with HEP in order to continue to improve quality of life and independence and reduce risk for re-injury    -Pt to demonstrate return to activities of daily living without limiations or restrictions    -Pt will return to ambulation > 60 minutes to help facilitate return to community activities independently   -Pt to demonstrate return to work activities without limitations or restrictions    -Pt to demonstrate improved function as noted by achieving or exceeding predicted score on FOTO outcomes assessment tool  Plan  Plan details: PT provided pt with detailed HEP program; pt verbalized understanding of program    Patient would benefit from: skilled physical therapy  Planned modality interventions: cryotherapy and thermotherapy: hydrocollator packs  Planned therapy interventions: abdominal trunk stabilization, balance, manual therapy, neuromuscular re-education, patient education, postural training, therapeutic activities, therapeutic exercise, home exercise program, flexibility and functional ROM exercises  Frequency: 2x week  Duration in weeks: 4  Plan of Care beginning date: 2021  Plan of Care expiration date: 2021  Treatment plan discussed with: patient        Subjective Evaluation    History of Present Illness  Mechanism of injury: Pt reporting that he has been having lower back pain on/off for the past 20 years  He notes that in the past pain will usually resolve on its own with a 1-2 days but this time pain has persisted > 2 weeks  He went to see PCP who gave him a steroid injection, followed by 5 day oral steroid pack  He notes that he is feeling better overall but pain is still present  He notes that PCP has referred to OPPT for conservative management of s/s     Quality of life: good    Pain  At best pain ratin  At worst pain rating: 10  Quality: dull ache, sharp and tight  Relieving factors: medications  Aggravating factors: sitting and lifting    Social Support  Steps to enter house: yes  Stairs in house: yes   Lives in: multiple-level home      Diagnostic Tests  No diagnostic tests performed  Treatments  Previous treatment: injection treatment, medication and physical therapy  Current treatment: physical therapy  Patient Goals  Patient goals for therapy: increased strength, decreased pain, increased motion and independence with ADLs/IADLs          Objective     Concurrent Complaints  Positive for disturbed sleep   Negative for bladder dysfunction and bowel dysfunction    Postural Observations  Seated posture: fair  Standing posture: good        Active Range of Motion     Lumbar   Flexion:  with pain Restriction level: moderate  Extension:  Restriction level: moderate  Left lateral flexion:  Restriction level: minimal  Right lateral flexion:  Restriction level: minimal  Mechanical Assessment    Cervical      Thoracic      Lumbar    Standing flexion: repeated movements   Pain location:peripheralized  Pain level: increased  Standing extension: repeated movements  Pain location: centralized  Pain level: decreased    Strength/Myotome Testing     Left Hip   Planes of Motion   Flexion: 4-  Abduction: 4  Adduction: 4    Right Hip   Planes of Motion   Flexion: 4-  Abduction: 4  Adduction: 4    Ambulation     Ambulation: Stairs   Pattern: reciprocal  Railings: one rail  Pattern: reciprocal  Railings: one rail    General Comments:    Lower quarter screen   Hips: unremarkable               Re-eval Date: 7/26/21    Date 6/14       Visit Count 1       FOTO Completed             Precautions: none       Manuals 6/14                                       Neuro Re-Ed         MTP/LTP with postural cues         Palloff press                         Postural education  HZ        Ther Ex        Treadmill         SBB HEP       Prone lying  HEP       Prone prop   PPU HEP *if shld is tolerable        SLR        S/L SLR        Prone hip extension         Bridges        TA        TA + OH lifts        TA + Heel walk outs         Self HS stretch                         Ther Activity                        Gait Training                        Modalities        CP/MHP prn

## 2021-06-16 ENCOUNTER — OFFICE VISIT (OUTPATIENT)
Dept: PHYSICAL THERAPY | Facility: HOME HEALTHCARE | Age: 66
End: 2021-06-16
Payer: COMMERCIAL

## 2021-06-16 DIAGNOSIS — M54.41 CHRONIC RIGHT-SIDED LOW BACK PAIN WITH RIGHT-SIDED SCIATICA: Primary | ICD-10-CM

## 2021-06-16 DIAGNOSIS — G89.29 CHRONIC RIGHT-SIDED LOW BACK PAIN WITH RIGHT-SIDED SCIATICA: Primary | ICD-10-CM

## 2021-06-16 PROCEDURE — 97112 NEUROMUSCULAR REEDUCATION: CPT | Performed by: PHYSICAL THERAPIST

## 2021-06-16 PROCEDURE — 97110 THERAPEUTIC EXERCISES: CPT | Performed by: PHYSICAL THERAPIST

## 2021-06-16 NOTE — PROGRESS NOTES
Daily Note     Today's date: 2021  Patient name: Army Tapia  : 1955  MRN: [id number]  Referring provider: Nithin Bustamante MD  Dx:   Encounter Diagnosis     ICD-10-CM    1  Chronic right-sided low back pain with right-sided sciatica  M54 41     G89 29                   Subjective: Pt reporting that he had a little set back yesterday because his 50# boxer got out and he had to help get him back inside  Objective: See treatment diary below      Assessment: Pt with mild soreness with transitions on mat program switching from supine to side lying and prone positioning  No significant pain noted and pt with some relief after prone activities  PT to progress in upcoming sessions per pts tolerance  Plan: Continue per plan of care          Re-eval Date: 21    Date       Visit Count 1 2      FOTO Completed             Precautions: none       Manuals                                       Neuro Re-Ed         MTP/LTP with postural cues         Palloff press                         Postural education  HZ        Ther Ex        NuStep   L1 10 minutes       SBB HEP       Prone lying  HEP 2 min static hold       Prone prop   PPU HEP *if shld is tolerable  1 x 10       SLR  2 x 10       S/L SLR  2 x 10       Prone hip extension         Bridges  2 x 10       TA  5" x 15       TA + OH lifts        TA + Heel walk outs   1 x 5       Self HS stretch   30" x 4 alba                       Ther Activity                        Gait Training                        Modalities        CP/MHP prn

## 2021-06-20 DIAGNOSIS — I10 ESSENTIAL HYPERTENSION: ICD-10-CM

## 2021-06-21 DIAGNOSIS — E78.00 HYPERCHOLESTEROLEMIA: ICD-10-CM

## 2021-06-21 RX ORDER — EZETIMIBE 10 MG/1
TABLET ORAL
Qty: 90 TABLET | Refills: 0 | Status: SHIPPED | OUTPATIENT
Start: 2021-06-21 | End: 2021-09-20

## 2021-06-21 RX ORDER — LISINOPRIL 10 MG/1
10 TABLET ORAL DAILY
Qty: 90 TABLET | Refills: 0 | Status: SHIPPED | OUTPATIENT
Start: 2021-06-21 | End: 2021-09-20 | Stop reason: SDUPTHER

## 2021-06-22 ENCOUNTER — OFFICE VISIT (OUTPATIENT)
Dept: PHYSICAL THERAPY | Facility: HOME HEALTHCARE | Age: 66
End: 2021-06-22
Payer: COMMERCIAL

## 2021-06-22 DIAGNOSIS — G89.29 CHRONIC RIGHT-SIDED LOW BACK PAIN WITH RIGHT-SIDED SCIATICA: Primary | ICD-10-CM

## 2021-06-22 DIAGNOSIS — M54.41 CHRONIC RIGHT-SIDED LOW BACK PAIN WITH RIGHT-SIDED SCIATICA: Primary | ICD-10-CM

## 2021-06-22 PROCEDURE — 97112 NEUROMUSCULAR REEDUCATION: CPT

## 2021-06-22 PROCEDURE — 97110 THERAPEUTIC EXERCISES: CPT

## 2021-06-22 NOTE — PROGRESS NOTES
Daily Note     Today's date: 2021  Patient name: Alexander Lawson  : 1955  MRN: [id number]  Referring provider: Katie Ham MD  Dx: No diagnosis found  Start Time:           Subjective: I am having less overall pain and I am doing my ex at home  I have some pain at LB and R shin area  Objective: See treatment diary below    Assessment: Tolerated treatment well  Advanced pt with added T-band ex to improve core stability with good cally and no c/o increased s/s  VC's needed especially during bridges for proper form  Pt reports feeling well at end  B LE HS tightness noted during self stretch  Patient would benefit from continued PT    Plan: Continue per plan of care          Re-eval Date: 21    Date 21     Visit Count 1 2 3     FOTO Completed             Precautions: none       Manuals 21                             Neuro Re-Ed         MTP/LTP with postural cues    Green 1 x 15 ea     Palloff press    Green 1 x 10 ea                     Postural education  HZ        Ther Ex   21     NuStep   L1 10 minutes  L 2  10'     SBB HEP       Prone lying  HEP 2 min static hold  2' static hold     Prone prop   PPU HEP *if shld is tolerable  1 x 10  1 x 10     SLR  2 x 10  2 x 10     S/L SLR  2 x 10  2 x 10     Prone hip extension         Bridges  2 x 10  2 x 10     TA  5" x 15  Seated 5" x 15     TA + OH lifts   Seated 1 x 10     TA + Heel walk outs   1 x 5  1 x 5     Self HS stretch   30" x 4 alba  30" x 4 Alba                     Ther Activity                        Gait Training                        Modalities        CP/MHP prn

## 2021-06-24 ENCOUNTER — OFFICE VISIT (OUTPATIENT)
Dept: PHYSICAL THERAPY | Facility: HOME HEALTHCARE | Age: 66
End: 2021-06-24
Payer: COMMERCIAL

## 2021-06-24 DIAGNOSIS — G89.29 CHRONIC RIGHT-SIDED LOW BACK PAIN WITH RIGHT-SIDED SCIATICA: Primary | ICD-10-CM

## 2021-06-24 DIAGNOSIS — M54.41 CHRONIC RIGHT-SIDED LOW BACK PAIN WITH RIGHT-SIDED SCIATICA: Primary | ICD-10-CM

## 2021-06-24 PROCEDURE — 97112 NEUROMUSCULAR REEDUCATION: CPT

## 2021-06-24 PROCEDURE — 97110 THERAPEUTIC EXERCISES: CPT

## 2021-06-24 NOTE — PROGRESS NOTES
Daily Note     Today's date: 2021  Patient name: Jacqueline Christian  : 1955  MRN: [id number]  Referring provider: Mikey Juarez MD  Dx: No diagnosis found  Subjective: Bend fwd to pick something up bothers me the most  When I do get pain it doesn't last long  Objective: See treatment diary below    Assessment: Pt with very good cally to session and with improved pain free ROM noted  Occasional vc's needed for core control  Pain noted mainly during sit<>stand and sit<>supine transfers  Added standing SL 3-way and Supine LE marches with good cally and no c/o pain  Patient would benefit from continued PT    Plan: Continue per plan of care          Re-eval Date: 21    Date 21    Visit Count 1 2 3 4    FOTO Completed             Precautions: none       Manuals 21                            Neuro Re-Ed         MTP/LTP with postural cues    Green 1 x 15 ea Green 1 x 20 ea    Palloff press    Green 1 x 10 ea Green 1  X 10 ea                    Postural education  HZ        Ther Ex   21    NuStep   L1 10 minutes  L 2  10' L 2  10'    SBB HEP       Prone lying  HEP 2 min static hold  2' static hold 2' static hold    Prone prop   PPU HEP *if shld is tolerable  1 x 10  1 x 10 1 x 10    SLR  2 x 10  2 x 10 2 x 10     S/L SLR  2 x 10  2 x 10 2  x10    Prone hip extension         Bridges  2 x 10  2 x 10 2 x 10    TA  5" x 15  Seated 5" x 15     TA + OH lifts   Seated 2 2# ball   1 x 10 Standing  2 2# ball  1 x 10     TA + Heel walk outs   1 x 5  1 x 5     TA with march    1 x 10    Self HS stretch   30" x 4 alba  30" x 4 Alba 30" x 4 Alba                    Ther Activity                        Gait Training                        Modalities        CP/MHP prn

## 2021-06-25 ENCOUNTER — HOSPITAL ENCOUNTER (OUTPATIENT)
Dept: ULTRASOUND IMAGING | Facility: HOSPITAL | Age: 66
Discharge: HOME | End: 2021-06-25
Payer: COMMERCIAL

## 2021-06-25 DIAGNOSIS — R74.8 ELEVATED LIVER ENZYMES: ICD-10-CM

## 2021-06-25 PROCEDURE — 76705 ECHO EXAM OF ABDOMEN: CPT

## 2021-06-29 ENCOUNTER — OFFICE VISIT (OUTPATIENT)
Dept: PHYSICAL THERAPY | Facility: HOME HEALTHCARE | Age: 66
End: 2021-06-29
Payer: COMMERCIAL

## 2021-06-29 DIAGNOSIS — G89.29 CHRONIC RIGHT-SIDED LOW BACK PAIN WITH RIGHT-SIDED SCIATICA: Primary | ICD-10-CM

## 2021-06-29 DIAGNOSIS — M54.41 CHRONIC RIGHT-SIDED LOW BACK PAIN WITH RIGHT-SIDED SCIATICA: Primary | ICD-10-CM

## 2021-06-29 PROCEDURE — 97110 THERAPEUTIC EXERCISES: CPT

## 2021-06-29 PROCEDURE — 97112 NEUROMUSCULAR REEDUCATION: CPT

## 2021-06-29 NOTE — PROGRESS NOTES
Daily Note     Today's date: 2021  Patient name: Ari Dyson  : 1955  MRN: [id number]  Referring provider: Marv Miller MD  Dx:   Encounter Diagnosis     ICD-10-CM    1  Chronic right-sided low back pain with right-sided sciatica  M54 41     G89 29               Subjective:  Pt reports his back is a little stiff today but has no pain  Objective: See treatment diary below      Assessment: Tolerated treatment well  Verbal cues needed t/o exercise to perform correctly  No increased pain reported t/o session  Core strength deficits noted  Progressed to PPU today with good tolerance  Patient would benefit from continued PT      Plan: Continue per plan of care          Re-eval Date: 21    Date 21   Visit Count 1 2 3 4 5   FOTO Completed             Precautions: none       Manuals 21                           Neuro Re-Ed         MTP/LTP with postural cues    Green 1 x 15 ea Green 1 x 20 ea Green 1 x 20 ea    Palloff press    Green 1 x 10 ea Green 1  X 10 ea Green 1 x 10 ea R/L                   Postural education  HZ        Ther Ex   21    NuStep   L1 10 minutes  L 2  10' L 2  10' L2  10 min    SBB HEP    5" X 10   Prone lying  HEP 2 min static hold  2' static hold 2' static hold 2 min  WAYLON   Prone prop   PPU HEP *if shld is tolerable  1 x 10  1 x 10 1 x 10 1 x 10 PPU   SLR  2 x 10  2 x 10 2 x 10  2 x 10    S/L SLR  2 x 10  2 x 10 2  x10 2 x 10    Prone hip extension         Bridges  2 x 10  2 x 10 2 x 10 2 x 10    TA  5" x 15  Seated 5" x 15     TA + OH lifts   Seated 2 2# ball   1 x 10 Standing  2 2# ball  1 x 10  Standing   2 2# ball   1 x 10    TA + Heel walk outs   1 x 5  1 x 5     TA with march    1 x 10 1 x 10    Self HS stretch   30" x 4 alba  30" x 4 Alba 30" x 4 Alba 30" x 4 Alba                    Ther Activity                        Gait Training                        Modalities        CP/MHP prn

## 2021-07-01 DIAGNOSIS — G89.29 CHRONIC RIGHT-SIDED LOW BACK PAIN WITH RIGHT-SIDED SCIATICA: ICD-10-CM

## 2021-07-01 DIAGNOSIS — M54.41 CHRONIC RIGHT-SIDED LOW BACK PAIN WITH RIGHT-SIDED SCIATICA: ICD-10-CM

## 2021-07-01 NOTE — TELEPHONE ENCOUNTER
Patient requested a refill on this medication (Tramadol) because it is helping, he has a few more left but would like to take them until he sees the specialist next Thursday 7/8  Wife said to thank you for helping him

## 2021-07-02 ENCOUNTER — OFFICE VISIT (OUTPATIENT)
Dept: PHYSICAL THERAPY | Facility: HOME HEALTHCARE | Age: 66
End: 2021-07-02
Payer: COMMERCIAL

## 2021-07-02 DIAGNOSIS — M54.41 CHRONIC RIGHT-SIDED LOW BACK PAIN WITH RIGHT-SIDED SCIATICA: Primary | ICD-10-CM

## 2021-07-02 DIAGNOSIS — G89.29 CHRONIC RIGHT-SIDED LOW BACK PAIN WITH RIGHT-SIDED SCIATICA: Primary | ICD-10-CM

## 2021-07-02 PROCEDURE — 97110 THERAPEUTIC EXERCISES: CPT

## 2021-07-02 PROCEDURE — 97112 NEUROMUSCULAR REEDUCATION: CPT

## 2021-07-02 RX ORDER — TRAMADOL HYDROCHLORIDE 50 MG/1
50 TABLET ORAL EVERY 6 HOURS PRN
Qty: 12 TABLET | Refills: 0 | Status: SHIPPED | OUTPATIENT
Start: 2021-07-02 | End: 2022-08-03 | Stop reason: ALTCHOICE

## 2021-07-02 NOTE — PROGRESS NOTES
Daily Note     Today's date: 2021  Patient name: Galindo Pitts  : 1955  MRN: [id number]  Referring provider: Joseline Dawkins MD  Dx: No diagnosis found  Start Time: 1000          Subjective: I am doing really good and I'm not even sure if I need to continue with PT  I have a back brace that I wear for strenuous work at home like some gardening, vacuming     Objective: See treatment diary below    Assessment: Tolerated treatment well  Patient exhibits good form, lumbar control and posture with TE and reports good understanding and consistency of HEP       Plan: Pt DC to I HEP       Re-eval Date: 21    Date 21   Visit Count 1 2 3 4 5   FOTO Completed             Precautions: none       Manuals 21                           Neuro Re-Ed         MTP/LTP with postural cues  Green 1  x20 ea  Green 1 x 15 ea Green 1 x 20 ea Green 1 x 20 ea    Palloff press  Green 1 x 10 ea R/L  Green 1 x 10 ea Green 1  X 10 ea Green 1 x 10 ea R/L                   Postural education         Ther Ex 21    NuStep  L 2  10' L1 10 minutes  L 2  10' L 2  10' L2  10 min    SBB     5" X 10   Prone lying  2' WAYLON 2 min static hold  2' static hold 2' static hold 2 min  WAYLON   Prone prop   PPU PPU 1 x 10 1 x 10  1 x 10 1 x 10 1 x 10 PPU   SLR 1  X   20 2 x 10  2 x 10 2 x 10  2 x 10    S/L SLR 1 x 20 2 x 10  2 x 10 2  x10 2 x 10    Prone hip extension         Bridges 2 x 10 2 x 10  2 x 10 2 x 10 2 x 10    TA  5" x 15  Seated 5" x 15     TA + OH lifts Standing 2 2# ball  1 x 10  Seated 2 2# ball   1 x 10 Standing  2 2# ball  1 x 10  Standing   2 2# ball   1 x 10    TA + Heel walk outs   1 x 5  1 x 5     TA with march 1 x 10   1 x 10 1 x 10    Self HS stretch  30" x 4 Alba 30" x 4 alba  30" x 4 Alba 30" x 4 Alba 30" x 4 Alba                    Ther Activity                        Gait Training                        Modalities        CP/MHP prn

## 2021-07-06 ENCOUNTER — APPOINTMENT (OUTPATIENT)
Dept: PHYSICAL THERAPY | Facility: HOME HEALTHCARE | Age: 66
End: 2021-07-06
Payer: COMMERCIAL

## 2021-07-06 NOTE — PROGRESS NOTES
[facility] - Outpatient Consultation  Jessica Rosario 61 y o  male MRN: [id number]  Encounter: [id number]          ASSESSMENT AND PLAN:      Viktoria Kerr is a 62 y/o male with HLD, HTN, hypothyroid, s/p appendectomy, and glaucoma here for consultation of elevated liver enzymes  1  Elevated LFTs  2  Cholelithiasis on imaging   3  Alcohol use   AST 65,   U/S showed fatty liver and cholelithiasis  Pt denies any post-prandial pain, n/v  Pt denies any tylenol use, OTC meds or supplements, or new meds  He does admit to drinking about 3-4 cups of bourbon/night  He has stopped drinking about 1 month ago  -etiology of elevated LFTs likely multifactorial with fatty infiltration and alcohol use and less-likely autoimmune, wilsons, iron overload, celiac etiology  -ordered work-up to rule out the above disorders  -continue alcohol cessation  -educated pt about healthy diet and weight loss to prohibit progression of fatty liver  -monitor for withdrawal symptoms    4  Intermittent Diarrhea   5  Family history of colon cancer  Pt has longstanding history of intermittent diarrhea that occurs about 1-2 times/week  He said he was dx with IBS-D in the past but denies any associated abdominal pain  TSH elevated with normal T4, likely subclinical hypothyroid  Pt has family hx of colon cancer in his mother and maternal grandmother; unaware of ages of dx  Last colon on record was 2014 with recommended repeat in 2019  Pt says he believes he had a colon done after 2014 but is not sure  Pt denies constitutional symptoms, bloody or black stools     -colonoscopy ordered but pt would like to call to schedule this as he wants to double check that he has not had a colon recently  -celiac panel ordered    -start daily fiber supplement to bulk up stools  -low FODMAP diet and food journal   -trial of lactose free diet for 2 weeks  -follow up with PCP regarding elevated TSH  -if above recs do not help and diarrhea becomes more consistent, we an trial pt on 2-week course of xifaxan although this is not warranted at this time due to inconsistency of episodes of loose stools  ______________________________________________________________________    HPI:  Roma Lyman is a 60 y/o male with HLD, HTN, hypothyroid, s/p appendectomy, and glaucoma here for consultation of elevated liver enzymes  Pt says that he used to drink 3-4 glasses of bourbon daily for "several years" but stopped drinking completely about 1 month ago  He denies tylenol use and has not been put on new meds; denies OTC meds or supplements  Pt says that he has family history of colon cancer in his mother and maternal grandmother but is unsure what ages they were diagnosed  According to his chart, his last colon was in 2014 but he believes that he has had one since then  Patient says he has never had any pre-cancerous polyps  Patient also denies ant: unintentional weight loss, fevers, chills, night sweats, bloody or black stools  Patient does admit to longstanding intermittent loose stools in that he has diarrhea about 1-2 times/week for many years  He says he has not been able to identify specific foods that trigger it  He does admit to prior diagnosis of IBS-D but says he no longer has any associated abdominal pain  He otherwise says his BMs are normal and "formed" on most days  Pt has surgical history of appendectomy and umbilical hernia repair  Pt is not on blood thinners  Pt does admit to using NSAIDs for his arthritis but says this is not "too often "         REVIEW OF SYSTEMS:    CONSTITUTIONAL: Denies any fever, chills, rigors, and weight loss  HEENT: No earache or tinnitus  Denies hearing loss or visual disturbances  CARDIOVASCULAR: No chest pain or palpitations  RESPIRATORY: Denies any cough, hemoptysis, shortness of breath or dyspnea on exertion  GASTROINTESTINAL: As noted in the History of Present Illness  GENITOURINARY: No problems with urination  Denies any hematuria or dysuria  NEUROLOGIC: No dizziness or vertigo, denies headaches  MUSCULOSKELETAL: Denies any muscle or joint pain  SKIN: Denies skin rashes or itching  ENDOCRINE: Denies excessive thirst  Denies intolerance to heat or cold  PSYCHOSOCIAL: Denies depression or anxiety  Denies any recent memory loss         Historical Information   Past Medical History:   Diagnosis Date    Back pain     ED (erectile dysfunction)     Hypertension     Hypothyroidism     IBS (irritable bowel syndrome)     Inflammatory bowel disease     Kidney stones     Overactive bladder      Past Surgical History:   Procedure Laterality Date    APPENDECTOMY      HERNIA REPAIR      PARTIAL HIP ARTHROPLASTY Right     ROTATOR CUFF REPAIR      TRIGGER FINGER RELEASE  2021    Left ring finger     Social History   Social History     Substance and Sexual Activity   Alcohol Use Yes    Alcohol/week: 5 0 standard drinks    Types: 5 Shots of liquor per week     Social History     Substance and Sexual Activity   Drug Use Not Currently    Types: Marijuana, Cocaine, LSD    Comment: stopped in high school - nothing since     Social History     Tobacco Use   Smoking Status Former Smoker    Quit date:     Years since quittin 5   Smokeless Tobacco Former User    Types: Chew   Tobacco Comment    used chew when in [facility]     Family History   Problem Relation Age of Onset    Cancer Mother     Heart disease Father     Hypertension Father     Heart disease Sister     Hypertension Sister     Diabetes Maternal Grandmother     Cancer Maternal Grandmother     Stroke Maternal Aunt        Meds/Allergies       Current Outpatient Medications:     amoxicillin (AMOXIL) 500 mg capsule    B Complex-C-Folic Acid (B-Complex/Folic Acid/Vitamin C) TBCR    Cholecalciferol (D-3-5) 125 MCG (5000 UT) capsule    cyanocobalamin (VITAMIN B-12) 1,000 mcg tablet    cyclobenzaprine (FLEXERIL) 5 mg tablet    Echinacea 400 MG CAPS    ezetimibe (ZETIA) 10 mg tablet    ibuprofen (MOTRIN) 600 mg tablet    latanoprost (XALATAN) 0 005 % ophthalmic solution    levothyroxine 175 mcg tablet    lisinopril (ZESTRIL) 10 mg tablet    traMADol (ULTRAM) 50 mg tablet    Zinc 50 MG CAPS    Allergies   Allergen Reactions    Levofloxacin Rash     Tolerated Cipro and Avelox (moxifloxasin)  Objective     There were no vitals taken for this visit  There is no height or weight on file to calculate BMI  PHYSICAL EXAM:      General Appearance:   Alert, cooperative, no distress   HEENT:   Normocephalic, atraumatic, anicteric      Neck:  Supple, symmetrical, trachea midline   Lungs:   Clear to auscultation bilaterally; no rales, rhonchi or wheezing; respirations unlabored    Heart[de-identified]   Regular rate and rhythm; no murmur, rub, or gallop  Abdomen:   Soft, non-tender, non-distended; normal bowel sounds; no masses, no organomegaly    Genitalia:   Deferred    Rectal:   Deferred    Extremities:  No cyanosis, clubbing or edema    Pulses:  2+ and symmetric    Skin:  No jaundice, rashes, or lesions    Lymph nodes:  No palpable cervical lymphadenopathy        Lab Results:   No visits with results within 1 Day(s) from this visit  Latest known visit with results is:   Appointment on 06/08/2021   Component Date Value    Sodium 06/08/2021 137     Potassium 06/08/2021 4 1     Chloride 06/08/2021 102     CO2 06/08/2021 31     ANION GAP 06/08/2021 4     BUN 06/08/2021 20     Creatinine 06/08/2021 1 14     Glucose, Fasting 06/08/2021 125*    Calcium 06/08/2021 9 6     AST 06/08/2021 65*    ALT 06/08/2021 112*    Alkaline Phosphatase 06/08/2021 66     Total Protein 06/08/2021 8 0     Albumin 06/08/2021 3 8     Total Bilirubin 06/08/2021 0 73     eGFR 06/08/2021 68          Radiology Results:   US liver    Result Date: 6/25/2021  Narrative: RIGHT UPPER QUADRANT ULTRASOUND INDICATION:     R74 8: Abnormal levels of other serum enzymes  COMPARISON:  None TECHNIQUE:   Real-time ultrasound of the right upper quadrant was performed with a curvilinear transducer with both volumetric sweeps and still imaging techniques  FINDINGS: PANCREAS:  Visualized portions of the pancreas are within normal limits  AORTA AND IVC:  Visualized portions are normal for patient age  LIVER: Size:  Borderline enlarged  The liver measures 16 1 cm in the midclavicular line  Contour:  Surface contour is smooth  Parenchyma:  Increased echogenicity, posterior acoustic attenuation, decreased periportal echogenicity  No evidence of suspicious mass  Limited imaging of the main portal vein shows it to be patent and hepatopetal   BILIARY: Shadowing gallstones No intrahepatic biliary dilatation  CBD measures 4 mm  No choledocholithiasis  Negative Eldridge's sign KIDNEY: Right kidney measures 9 8 x 5 3 cm  Within normal limits  ASCITES:   None       Impression: Hepatomegaly Hepatic moderate steatosis Cholelithiasis Workstation performed: [id number]

## 2021-07-08 ENCOUNTER — OFFICE VISIT (OUTPATIENT)
Dept: GASTROENTEROLOGY | Facility: CLINIC | Age: 66
End: 2021-07-08
Payer: COMMERCIAL

## 2021-07-08 VITALS
DIASTOLIC BLOOD PRESSURE: 68 MMHG | HEIGHT: 69 IN | WEIGHT: 216.5 LBS | TEMPERATURE: 98.1 F | RESPIRATION RATE: 16 BRPM | HEART RATE: 86 BPM | BODY MASS INDEX: 32.07 KG/M2 | SYSTOLIC BLOOD PRESSURE: 120 MMHG

## 2021-07-08 DIAGNOSIS — Z12.11 COLON CANCER SCREENING: ICD-10-CM

## 2021-07-08 DIAGNOSIS — R79.89 ELEVATED LFTS: Primary | ICD-10-CM

## 2021-07-08 PROCEDURE — 99204 OFFICE O/P NEW MOD 45 MIN: CPT | Performed by: PHYSICIAN ASSISTANT

## 2021-07-08 PROCEDURE — 3008F BODY MASS INDEX DOCD: CPT | Performed by: FAMILY MEDICINE

## 2021-07-08 NOTE — PATIENT INSTRUCTIONS
1  Take daily fiber supplement   2  research low FODMAP diet   3  Let us know about colonoscopy     Pt is scheduled with dr Cori Rueda at [facility] on 9/21/21 for a colon, vincent went over golytely with pt in room and he has folder with instructions  Patient is aware she will need a  to and from   She will get a call the day before with an exact time for arrival

## 2021-07-09 ENCOUNTER — APPOINTMENT (OUTPATIENT)
Dept: PHYSICAL THERAPY | Facility: HOME HEALTHCARE | Age: 66
End: 2021-07-09
Payer: COMMERCIAL

## 2021-07-09 ENCOUNTER — APPOINTMENT (OUTPATIENT)
Dept: LAB | Facility: CLINIC | Age: 66
End: 2021-07-09
Payer: COMMERCIAL

## 2021-07-09 DIAGNOSIS — R79.89 ELEVATED LFTS: ICD-10-CM

## 2021-07-09 LAB
ALBUMIN SERPL BCP-MCNC: 3.7 G/DL (ref 3.5–5)
ALP SERPL-CCNC: 75 U/L (ref 46–116)
ALT SERPL W P-5'-P-CCNC: 67 U/L (ref 12–78)
ANION GAP SERPL CALCULATED.3IONS-SCNC: 6 MMOL/L (ref 4–13)
AST SERPL W P-5'-P-CCNC: 39 U/L (ref 5–45)
BILIRUB SERPL-MCNC: 0.46 MG/DL (ref 0.2–1)
BUN SERPL-MCNC: 16 MG/DL (ref 5–25)
CALCIUM SERPL-MCNC: 9.1 MG/DL (ref 8.3–10.1)
CHLORIDE SERPL-SCNC: 105 MMOL/L (ref 100–108)
CO2 SERPL-SCNC: 26 MMOL/L (ref 21–32)
CREAT SERPL-MCNC: 1.09 MG/DL (ref 0.6–1.3)
ERYTHROCYTE [DISTWIDTH] IN BLOOD BY AUTOMATED COUNT: 12.5 % (ref 11.6–15.1)
FERRITIN SERPL-MCNC: 205 NG/ML (ref 8–388)
GFR SERPL CREATININE-BSD FRML MDRD: 71 ML/MIN/1.73SQ M
GLUCOSE P FAST SERPL-MCNC: 113 MG/DL (ref 65–99)
HBV CORE AB SER QL: NORMAL
HBV CORE IGM SER QL: NORMAL
HBV SURFACE AG SER QL: NORMAL
HCT VFR BLD AUTO: 47.8 % (ref 36.5–49.3)
HCV AB SER QL: NORMAL
HGB BLD-MCNC: 16.3 G/DL (ref 12–17)
IRON SATN MFR SERPL: 25 %
IRON SERPL-MCNC: 78 UG/DL (ref 65–175)
MCH RBC QN AUTO: 32.5 PG (ref 26.8–34.3)
MCHC RBC AUTO-ENTMCNC: 34.1 G/DL (ref 31.4–37.4)
MCV RBC AUTO: 95 FL (ref 82–98)
PLATELET # BLD AUTO: 260 THOUSANDS/UL (ref 149–390)
PMV BLD AUTO: 11.6 FL (ref 8.9–12.7)
POTASSIUM SERPL-SCNC: 4.1 MMOL/L (ref 3.5–5.3)
PROT SERPL-MCNC: 7.9 G/DL (ref 6.4–8.2)
RBC # BLD AUTO: 5.02 MILLION/UL (ref 3.88–5.62)
SODIUM SERPL-SCNC: 137 MMOL/L (ref 136–145)
TIBC SERPL-MCNC: 317 UG/DL (ref 250–450)
WBC # BLD AUTO: 5.05 THOUSAND/UL (ref 4.31–10.16)

## 2021-07-09 PROCEDURE — 86705 HEP B CORE ANTIBODY IGM: CPT

## 2021-07-09 PROCEDURE — 82103 ALPHA-1-ANTITRYPSIN TOTAL: CPT

## 2021-07-09 PROCEDURE — 86256 FLUORESCENT ANTIBODY TITER: CPT

## 2021-07-09 PROCEDURE — 83550 IRON BINDING TEST: CPT

## 2021-07-09 PROCEDURE — 83540 ASSAY OF IRON: CPT

## 2021-07-09 PROCEDURE — 86803 HEPATITIS C AB TEST: CPT

## 2021-07-09 PROCEDURE — 82784 ASSAY IGA/IGD/IGG/IGM EACH: CPT

## 2021-07-09 PROCEDURE — 86038 ANTINUCLEAR ANTIBODIES: CPT

## 2021-07-09 PROCEDURE — 86704 HEP B CORE ANTIBODY TOTAL: CPT

## 2021-07-09 PROCEDURE — 80053 COMPREHEN METABOLIC PANEL: CPT

## 2021-07-09 PROCEDURE — 85027 COMPLETE CBC AUTOMATED: CPT

## 2021-07-09 PROCEDURE — 87340 HEPATITIS B SURFACE AG IA: CPT

## 2021-07-09 PROCEDURE — 86255 FLUORESCENT ANTIBODY SCREEN: CPT

## 2021-07-09 PROCEDURE — 82390 ASSAY OF CERULOPLASMIN: CPT

## 2021-07-09 PROCEDURE — 82728 ASSAY OF FERRITIN: CPT

## 2021-07-09 PROCEDURE — 86235 NUCLEAR ANTIGEN ANTIBODY: CPT

## 2021-07-09 PROCEDURE — 86376 MICROSOMAL ANTIBODY EACH: CPT

## 2021-07-09 PROCEDURE — 36415 COLL VENOUS BLD VENIPUNCTURE: CPT

## 2021-07-09 PROCEDURE — 83516 IMMUNOASSAY NONANTIBODY: CPT

## 2021-07-10 LAB
A1AT SERPL-MCNC: 133 MG/DL (ref 101–187)
ACTIN IGG SERPL-ACNC: 5 UNITS (ref 0–19)
CERULOPLASMIN SERPL-MCNC: 23.1 MG/DL (ref 16–31)
ENDOMYSIUM IGA SER QL: NEGATIVE
GLIADIN PEPTIDE IGA SER-ACNC: 4 UNITS (ref 0–19)
GLIADIN PEPTIDE IGG SER-ACNC: 1 UNITS (ref 0–19)
IGA SERPL-MCNC: 330 MG/DL (ref 61–437)
LKM-1 AB SER-ACNC: <20.1 UNITS (ref 0–20)
MITOCHONDRIA M2 IGG SER-ACNC: <20 UNITS (ref 0–20)
TTG IGA SER-ACNC: <2 U/ML (ref 0–3)
TTG IGG SER-ACNC: <2 U/ML (ref 0–5)

## 2021-07-12 LAB — RYE IGE QN: NEGATIVE

## 2021-07-16 DIAGNOSIS — Z12.11 COLON CANCER SCREENING: Primary | ICD-10-CM

## 2021-07-17 DIAGNOSIS — E03.9 HYPOTHYROIDISM, UNSPECIFIED TYPE: ICD-10-CM

## 2021-07-19 RX ORDER — LEVOTHYROXINE SODIUM 175 UG/1
175 TABLET ORAL DAILY
Qty: 90 TABLET | Refills: 0 | Status: SHIPPED | OUTPATIENT
Start: 2021-07-19 | End: 2021-09-20 | Stop reason: SDUPTHER

## 2021-07-29 NOTE — PROGRESS NOTES
PT Discharge    Today's date: 2021  Patient name: Kristel Villareal  : 1955  MRN: [id number]  Referring provider: Andrew Vinson MD  Dx:   Encounter Diagnosis     ICD-10-CM    1  Chronic right-sided low back pain with right-sided sciatica  M54 41     G89 29        Start Time: 1000  Stop Time: 1040  Total time in clinic (min): 40 minutes    Assessment  Assessment details: Pt Katie Mclain is a 61 yr old male who presents to [facility]PPT with s/s consistent with chronic LBP with acute flare up  Pt presents with limited L/S mobility and core strength deficits, decreased B LE strength, impaired soft tissue mobility/limited flexibility, decreased postural awareness, and demonstrates preference for extension based therapy  Pt with limitations with prolonged sitting/driving, difficulty with stair negotiation, disrupted sleep patterns, and difficulty with lifting/carrying  Pt would benefit from skilled therapy services to address outlined impairments, work towards goals, and restore pts PLOF  Thank you! UPDATE: pt was seen in clinic for 6 visits  He was reporting resolution of symptoms at last treatment session and request to be discharged from  [facility]  He was advised to continue with a HEP to maintain current gains from therapy  DC from OPPT at this time as he has reached anticipated goals  Thank you for this referral    Understanding of Dx/Px/POC: good   Prognosis: good    Goals  STGs to be achieved in 4 weeks:  -Pt to demonstrate reduced subjective pain rating "at worst" by at least 2-3 points from Initial Eval to allow for reduced pain with ADLs and improved functional activity tolerance  - MET  -Pt to demonstrate L/S ROM WFL in order to maximize joint mobility and function and allow for progression of exercise program and achievement of goals   - MET  -Pt to demonstrate increased MMT of BLE by at least 1/2 grade in order to improve safety and stability with ADLs and functional mobility  - MET    LTGs to be achieved upon discharge:   -Pt will be I with HEP in order to continue to improve quality of life and independence and reduce risk for re-injury  - MET  -Pt to demonstrate return to activities of daily living without limiations or restrictions  - MET  -Pt will return to ambulation > 60 minutes to help facilitate return to community activities independently - MET  -Pt to demonstrate return to work activities without limitations or restrictions  - MET  -Pt to demonstrate improved function as noted by achieving or exceeding predicted score on FOTO outcomes assessment tool  - MET      Plan  Plan details: DC from OPPT   Frequency: 2x week  Duration in weeks: 4  Plan of Care beginning date: 2021  Plan of Care expiration date: 2021  Treatment plan discussed with: patient        Subjective Evaluation    History of Present Illness  Mechanism of injury: Pt reporting that he has been having lower back pain on/off for the past 20 years  He notes that in the past pain will usually resolve on its own with a 1-2 days but this time pain has persisted > 2 weeks  He went to see PCP who gave him a steroid injection, followed by 5 day oral steroid pack  He notes that he is feeling better overall but pain is still present  He notes that PCP has referred to OPPT for conservative management of s/s     Quality of life: good    Pain  At best pain ratin  At worst pain rating: 10  Quality: dull ache, sharp and tight  Relieving factors: medications  Aggravating factors: sitting and lifting    Social Support  Steps to enter house: yes  Stairs in house: yes   Lives in: multiple-level home      Diagnostic Tests  No diagnostic tests performed  Treatments  Previous treatment: injection treatment, medication and physical therapy  Current treatment: physical therapy  Patient Goals  Patient goals for therapy: increased strength, decreased pain, increased motion and independence with ADLs/IADLs          Objective     Concurrent Complaints  Positive for disturbed sleep   Negative for bladder dysfunction and bowel dysfunction    Postural Observations  Seated posture: fair  Standing posture: good        Active Range of Motion     Lumbar   Flexion:  with pain Restriction level: moderate  Extension:  Restriction level: moderate  Left lateral flexion:  Restriction level: minimal  Right lateral flexion:  Restriction level: minimal  Mechanical Assessment    Cervical      Thoracic      Lumbar    Standing flexion: repeated movements   Pain location:peripheralized  Pain level: increased  Standing extension: repeated movements  Pain location: centralized  Pain level: decreased    Strength/Myotome Testing     Left Hip   Planes of Motion   Flexion: 4-  Abduction: 4  Adduction: 4    Right Hip   Planes of Motion   Flexion: 4-  Abduction: 4  Adduction: 4    Ambulation     Ambulation: Stairs   Pattern: reciprocal  Railings: one rail  Pattern: reciprocal  Railings: one rail    General Comments:    Lower quarter screen   Hips: unremarkable

## 2021-09-07 DIAGNOSIS — M62.838 MUSCLE SPASM: Primary | ICD-10-CM

## 2021-09-07 RX ORDER — CYCLOBENZAPRINE HCL 10 MG
10 TABLET ORAL
Qty: 30 TABLET | Refills: 3 | Status: SHIPPED | OUTPATIENT
Start: 2021-09-07

## 2021-09-13 ENCOUNTER — IMMUNIZATIONS (OUTPATIENT)
Dept: INTERNAL MEDICINE CLINIC | Facility: CLINIC | Age: 66
End: 2021-09-13
Payer: COMMERCIAL

## 2021-09-13 DIAGNOSIS — Z23 ENCOUNTER FOR IMMUNIZATION: Primary | ICD-10-CM

## 2021-09-13 PROCEDURE — 90682 RIV4 VACC RECOMBINANT DNA IM: CPT

## 2021-09-13 PROCEDURE — 90471 IMMUNIZATION ADMIN: CPT

## 2021-09-20 ENCOUNTER — OFFICE VISIT (OUTPATIENT)
Dept: URGENT CARE | Facility: CLINIC | Age: 66
End: 2021-09-20
Payer: COMMERCIAL

## 2021-09-20 ENCOUNTER — TELEPHONE (OUTPATIENT)
Dept: SURGERY | Facility: HOSPITAL | Age: 66
End: 2021-09-20

## 2021-09-20 DIAGNOSIS — E03.9 HYPOTHYROIDISM, UNSPECIFIED TYPE: ICD-10-CM

## 2021-09-20 DIAGNOSIS — E78.00 HYPERCHOLESTEROLEMIA: Primary | ICD-10-CM

## 2021-09-20 DIAGNOSIS — I10 ESSENTIAL HYPERTENSION: Primary | ICD-10-CM

## 2021-09-20 DIAGNOSIS — R05.9 COUGH: Primary | ICD-10-CM

## 2021-09-20 DIAGNOSIS — E78.00 HYPERCHOLESTEROLEMIA: ICD-10-CM

## 2021-09-20 PROCEDURE — 99213 OFFICE O/P EST LOW 20 MIN: CPT | Performed by: PHYSICIAN ASSISTANT

## 2021-09-20 PROCEDURE — U0003 INFECTIOUS AGENT DETECTION BY NUCLEIC ACID (DNA OR RNA); SEVERE ACUTE RESPIRATORY SYNDROME CORONAVIRUS 2 (SARS-COV-2) (CORONAVIRUS DISEASE [COVID-19]), AMPLIFIED PROBE TECHNIQUE, MAKING USE OF HIGH THROUGHPUT TECHNOLOGIES AS DESCRIBED BY CMS-2020-01-R: HCPCS | Performed by: PHYSICIAN ASSISTANT

## 2021-09-20 PROCEDURE — U0005 INFEC AGEN DETEC AMPLI PROBE: HCPCS | Performed by: PHYSICIAN ASSISTANT

## 2021-09-20 RX ORDER — BENZONATATE 200 MG/1
200 CAPSULE ORAL 3 TIMES DAILY PRN
Qty: 20 CAPSULE | Refills: 0 | Status: SHIPPED | OUTPATIENT
Start: 2021-09-20 | End: 2022-07-27

## 2021-09-20 RX ORDER — LISINOPRIL 10 MG/1
10 TABLET ORAL DAILY
Qty: 90 TABLET | Refills: 0 | Status: SHIPPED | OUTPATIENT
Start: 2021-09-20 | End: 2021-12-27 | Stop reason: SDUPTHER

## 2021-09-20 RX ORDER — ALBUTEROL SULFATE 90 UG/1
2 AEROSOL, METERED RESPIRATORY (INHALATION) EVERY 6 HOURS PRN
Qty: 8.5 G | Refills: 0 | Status: SHIPPED | OUTPATIENT
Start: 2021-09-20

## 2021-09-20 RX ORDER — EZETIMIBE 10 MG/1
TABLET ORAL
Qty: 90 TABLET | Refills: 0 | Status: SHIPPED | OUTPATIENT
Start: 2021-09-20 | End: 2021-12-27 | Stop reason: SDUPTHER

## 2021-09-20 RX ORDER — EZETIMIBE 10 MG/1
10 TABLET ORAL DAILY
Qty: 90 TABLET | Refills: 0 | Status: SHIPPED | OUTPATIENT
Start: 2021-09-20 | End: 2022-03-10

## 2021-09-20 RX ORDER — LEVOTHYROXINE SODIUM 175 UG/1
175 TABLET ORAL DAILY
Qty: 90 TABLET | Refills: 0 | Status: SHIPPED | OUTPATIENT
Start: 2021-09-20 | End: 2021-10-21 | Stop reason: SDUPTHER

## 2021-09-20 NOTE — PATIENT INSTRUCTIONS
Patient Instructions   COVID testing initiated  Results may take up to 5-10 days to return, but often come back sooner (2-4 days)     If the patient has a [facility] My Chart account, results may be accessed on line  If the patient does not have the [facility] Chart account, please establish one so results can be accessed  This will be the easiest and quickest way to get a copy of your test results if you require printed documentation  If patient is symptomatic and until results are obtained, home quarantine / self isolation strongly encouraged  If testing is done for screening purposes and patient is not symptomatic, we still recommend masking, social distancing, good hygiene practices be followed  If COVID test is positive, patient / care giver will be contacted by ordering provider or designated staff  If COVID test is positive, please call the primary care provider office to inform of positive test and request follow up evaluation appointment  (Generally, primary care providers are doing telemedicine visits with their positive COVID patients )  If COVID test is positive, please again review all information below  Further questions may be addressed by the primary care provider or the [address] [place] [address] [place] at [phone]  If the patient / caregiver has not heard about test results or has been unable to access results on the patient My Chart account in a timely fashion, please call the provider's office where test was ordered (or Hot Line if applicable)  to inquire about results  If results are negative and patient / care giver has been found to have already accessed results through the [facility], no call will be made  Until results are obtained, home quarantine / self isolation strongly encouraged       If the patient would develop profound weakness, chest pain, shortness of breath please proceed to an emergency room for further evaluation otherwise we do recommend that patient follow-up with their primary care provider in the next 5-7 days if not improving  Symptomatic treatment as needed for symptoms relief based on age / medical status of patient  Things like warm salt water gargles, Tylenol or Ibuprofen (if not contraindicated), drinking plenty of fluids, nasal saline rinses / spray, warm tea with honey (not for patients less than 1 year of age),  etc may provide symptoms relief  [address]     Your healthcare provider and/or public health staff have evaluated you and have determined that you do not need to remain in the hospital at this time  At this time you can be isolated at home where you will be monitored by staff from your local or state health department  You should carefully follow the prevention and isolation steps below until a healthcare provider or local or state health department says that you can return to your normal activities  Stay home except to get medical care     People who are mildly ill with COVID-19 are able to isolate at home during their illness  You should restrict activities outside your home, except for getting medical care  Do not go to work, school, or public areas  Avoid using public transportation, ride-sharing, or taxis  Separate yourself from other people and animals in your home     People: As much as possible, you should stay in a specific room and away from other people in your home  Also, you should use a separate bathroom, if available  Animals: You should restrict contact with pets and other animals while you are sick with COVID-19, just like you would around other people  Although there have not been reports of pets or other animals becoming sick with COVID-19, it is still recommended that people sick with COVID-19 limit contact with animals until more information is known about the virus   When possible, have another member of your household care for your animals while you are sick  If you are sick with COVID-19, avoid contact with your pet, including petting, snuggling, being kissed or licked, and sharing food  If you must care for your pet or be around animals while you are sick, wash your hands before and after you interact with pets and wear a facemask  See COVID-19 and Animals for more information  Call ahead before visiting your doctor     If you have a medical appointment, call the healthcare provider and tell them that you have or may have COVID-19  This will help the healthcare providers office take steps to keep other people from getting infected or exposed  Wear a facemask     You should wear a facemask when you are around other people (e g , sharing a room or vehicle) or pets and before you enter a healthcare providers office  If you are not able to wear a facemask (for example, because it causes trouble breathing), then people who live with you should not stay in the same room with you, or they should wear a facemask if they enter your room  Cover your coughs and sneezes     Cover your mouth and nose with a tissue when you cough or sneeze  Throw used tissues in a lined trash can  Immediately wash your hands with soap and water for at least 20 seconds or, if soap and water are not available, clean your hands with an alcohol-based hand  that contains at least 60% alcohol  Clean your hands often     Wash your hands often with soap and water for at least 20 seconds, especially after blowing your nose, coughing, or sneezing; going to the bathroom; and before eating or preparing food  If soap and water are not readily available, use an alcohol-based hand  with at least 60% alcohol, covering all surfaces of your hands and rubbing them together until they feel dry  Soap and water are the best option if hands are visibly dirty  Avoid touching your eyes, nose, and mouth with unwashed hands       Avoid sharing personal household items     You should not share dishes, drinking glasses, cups, eating utensils, towels, or bedding with other people or pets in your home  After using these items, they should be washed thoroughly with soap and water  Clean all high-touch surfaces everyday     High touch surfaces include counters, tabletops, doorknobs, bathroom fixtures, toilets, phones, keyboards, tablets, and bedside tables  Also, clean any surfaces that may have blood, stool, or body fluids on them  Use a household cleaning spray or wipe, according to the label instructions  Labels contain instructions for safe and effective use of the cleaning product including precautions you should take when applying the product, such as wearing gloves and making sure you have good ventilation during use of the product  Monitor your symptoms     Seek prompt medical attention if your illness is worsening (e g , difficulty breathing)  Before seeking care, call your healthcare provider and tell them that you have, or are being evaluated for, COVID-19  Put on a facemask before you enter the facility  These steps will help the healthcare providers office to keep other people in the office or waiting room from getting infected or exposed  Ask your healthcare provider to call the local or [facility] health department  Persons who are placed under active monitoring or facilitated self-monitoring should follow instructions provided by their local health department or occupational health professionals, as appropriate  If you have a medical emergency and need to call 911, notify the dispatch personnel that you have, or are being evaluated for COVID-19  If possible, put on a facemask before emergency medical services arrive       Discontinuing home isolation     Patients with confirmed COVID-19 should remain under home isolation precautions until the following conditions are met:   § They have had no fever for at least 24 hours (that is one full day of no fever without the use medicine that reduces fevers)  AND  § other symptoms have improved (for example, when their cough or shortness of breath have improved)  AND  § at least 10 days have passed since their symptoms first appeared     Patients with confirmed COVID-19 should also notify close contacts (including their workplace) and ask that they self-quarantine  Currently, close contact is defined as being within 6 feet for for a cumulative total of 15 minutes or more over a 24 hour period starting from 2 days before illness onset  (or, for asymptomatic patients, 2 days prior to test specimen collection)  Close contacts of patients diagnosed with COVID-19 should be instructed by the patient to self-quarantine for 14 days from the last time of their last contact with the patient        Source: [employer]

## 2021-09-20 NOTE — LETTER
September 20, 2021     Patient: Isaac Buck   YOB: 1955   Date of Visit: 9/20/2021       To Whom It May Concern: It is my medical opinion that Nadeem Parker should remain out of work until cleared by lab exam     If you have any questions or concerns, please don't hesitate to call           Sincerely,        Solmon Schwab, PA-C    CC: Isaac Heber

## 2021-09-20 NOTE — PROGRESS NOTES
[address] [facility]        NAME: Sasha Tracy is a 61 y o  male  : 1955    MRN: [id number]  DATE: 2021  TIME: 4:20 PM    Assessment and Plan   Cough [R05]  1  Cough  Novel Coronavirus (Covid-19),PCR [facility] - Office Collection    albuterol (ProAir HFA) 90 mcg/act inhaler    benzonatate (TESSALON) 200 MG capsule         Patient Instructions       Continue to monitor symptoms  If new or worsening symptoms develop, go immediately to Er  Drink plenty of fluids  Follow up with Family Doctor this week  Chief Complaint     Chief Complaint   Patient presents with    Cold Like Symptoms     yesterday         History of Present Illness       Cough  This is a new problem  The current episode started yesterday  The problem has been gradually worsening  The problem occurs every few minutes  The cough is productive of brown sputum  Associated symptoms include myalgias, nasal congestion, postnasal drip and a sore throat  Pertinent negatives include no chills, ear pain, fever, headaches, rash, rhinorrhea, shortness of breath, sweats or wheezing  Nothing aggravates the symptoms  Risk factors for lung disease include smoking/tobacco exposure  He has tried nothing for the symptoms  The treatment provided no relief  No known sick contacts  Pt had COVID and flu vaccine  Review of Systems   Review of Systems   Constitutional: Negative for chills, diaphoresis, fatigue and fever  HENT: Positive for postnasal drip, sinus pressure, sinus pain and sore throat  Negative for congestion, ear pain, rhinorrhea, sneezing and trouble swallowing  Eyes: Negative  Respiratory: Positive for cough  Negative for chest tightness, shortness of breath and wheezing  Cardiovascular: Negative  Gastrointestinal: Negative for abdominal distention, diarrhea, nausea and vomiting  Endocrine: Negative  Genitourinary: Negative for dysuria  Musculoskeletal: Positive for myalgias  Skin: Negative for rash  Allergic/Immunologic: Negative  Neurological: Negative  Negative for light-headedness and headaches  Hematological: Negative  Psychiatric/Behavioral: Negative  Current Medications       Current Outpatient Medications:     B Complex-C-Folic Acid (B-Complex/Folic Acid/Vitamin C) TBCR, , Disp: , Rfl:     Cholecalciferol (D-3-5) 125 MCG (5000 UT) capsule, , Disp: , Rfl:     cyanocobalamin (VITAMIN B-12) 1,000 mcg tablet, Vitamin B-12 1,000 mcg tablet  Take by oral route , Disp: , Rfl:     cyclobenzaprine (FLEXERIL) 10 mg tablet, Take 1 tablet (10 mg total) by mouth daily at bedtime, Disp: 30 tablet, Rfl: 3    Echinacea 400 MG CAPS, , Disp: , Rfl:     ezetimibe (ZETIA) 10 mg tablet, Take 1 tablet (10 mg total) by mouth daily, Disp: 90 tablet, Rfl: 0    latanoprost (XALATAN) 0 005 % ophthalmic solution, , Disp: , Rfl:     lisinopril (ZESTRIL) 10 mg tablet, Take 1 tablet (10 mg total) by mouth daily, Disp: 90 tablet, Rfl: 0    traMADol (ULTRAM) 50 mg tablet, Take 1 tablet (50 mg total) by mouth every 6 (six) hours as needed for moderate pain, Disp: 12 tablet, Rfl: 0    Zinc 50 MG CAPS, , Disp: , Rfl:     albuterol (ProAir HFA) 90 mcg/act inhaler, Inhale 2 puffs every 6 (six) hours as needed for wheezing or shortness of breath, Disp: 8 5 g, Rfl: 0    amoxicillin (AMOXIL) 500 mg capsule, , Disp: , Rfl:     benzonatate (TESSALON) 200 MG capsule, Take 1 capsule (200 mg total) by mouth 3 (three) times a day as needed for cough, Disp: 20 capsule, Rfl: 0    bisacodyl (DULCOLAX) 5 mg EC tablet, Use as directed by office staff (Patient not taking: Reported on 9/20/2021), Disp: 3 tablet, Rfl: 0    ezetimibe (ZETIA) 10 mg tablet, TAKE ONE TABLET DAILY   , Disp: 90 tablet, Rfl: 0    ibuprofen (MOTRIN) 600 mg tablet, Take 1 tablet (600 mg total) by mouth 2 (two) times a day as needed for mild pain or moderate pain (Patient not taking: Reported on 7/8/2021), Disp: 60 tablet, Rfl: 6    levothyroxine 175 mcg tablet, Take 1 tablet (175 mcg total) by mouth daily, Disp: 90 tablet, Rfl: 0    polyethylene glycol (GOLYTELY) 4000 mL solution, Use as directed by office staff (Patient not taking: Reported on 9/20/2021), Disp: 4000 mL, Rfl: 0    Current Allergies     Allergies as of 09/20/2021 - Reviewed 09/20/2021   Allergen Reaction Noted    Levofloxacin Rash 04/15/2019            The following portions of the patient's history were reviewed and updated as appropriate: allergies, current medications, past family history, past medical history, past social history, past surgical history and problem list      Past Medical History:   Diagnosis Date    Back pain     ED (erectile dysfunction)     Hypertension     Hypothyroidism     IBS (irritable bowel syndrome)     Inflammatory bowel disease     Kidney stones     Overactive bladder        Past Surgical History:   Procedure Laterality Date    APPENDECTOMY      HERNIA REPAIR      PARTIAL HIP ARTHROPLASTY Right     ROTATOR CUFF REPAIR      TRIGGER FINGER RELEASE  02/2021    Left ring finger       Family History   Problem Relation Age of Onset    Cancer Mother     Heart disease Father     Hypertension Father     Heart disease Sister     Hypertension Sister     Diabetes Maternal Grandmother     Cancer Maternal Grandmother     Stroke Maternal Aunt          Medications have been verified  Objective   There were no vitals taken for this visit  Physical Exam     Physical Exam  Vitals and nursing note reviewed  Constitutional:       General: He is not in acute distress  Appearance: Normal appearance  He is well-developed  He is not ill-appearing or diaphoretic  HENT:      Head: Normocephalic and atraumatic  Right Ear: Tympanic membrane, ear canal and external ear normal       Left Ear: Tympanic membrane, ear canal and external ear normal       Nose: Congestion present  No rhinorrhea        Mouth/Throat:      Pharynx: Posterior oropharyngeal erythema present  No oropharyngeal exudate  Eyes:      General:         Right eye: No discharge  Left eye: No discharge  Conjunctiva/sclera: Conjunctivae normal    Cardiovascular:      Rate and Rhythm: Normal rate and regular rhythm  Heart sounds: Normal heart sounds  Pulmonary:      Effort: Pulmonary effort is normal  No respiratory distress  Breath sounds: Wheezing (mild) present  No rhonchi or rales  Musculoskeletal:      Cervical back: Normal range of motion and neck supple  Lymphadenopathy:      Cervical: No cervical adenopathy  Skin:     General: Skin is warm  Findings: No rash  Neurological:      Mental Status: He is alert

## 2021-09-20 NOTE — TELEPHONE ENCOUNTER
Patient called stating that he had a slight cough a day or so, but suddenly this afternoon he has post nasal drip on fire, upper chest burning, and cough is worse  After consulting you I told patient, as instructed, that you could put an order in for a COVID swab  Patient decided to go to [facility] to be checked

## 2021-09-21 LAB — SARS-COV-2 RNA RESP QL NAA+PROBE: NEGATIVE

## 2021-10-21 DIAGNOSIS — E03.9 HYPOTHYROIDISM, UNSPECIFIED TYPE: ICD-10-CM

## 2021-10-21 RX ORDER — LEVOTHYROXINE SODIUM 175 UG/1
175 TABLET ORAL DAILY
Qty: 90 TABLET | Refills: 0 | Status: SHIPPED | OUTPATIENT
Start: 2021-10-21 | End: 2022-01-24 | Stop reason: SDUPTHER

## 2021-11-20 DIAGNOSIS — M19.90 ARTHRITIS: ICD-10-CM

## 2021-11-29 ENCOUNTER — IMMUNIZATIONS (OUTPATIENT)
Dept: FAMILY MEDICINE CLINIC | Facility: HOSPITAL | Age: 66
End: 2021-11-29

## 2021-11-29 DIAGNOSIS — Z23 ENCOUNTER FOR IMMUNIZATION: Primary | ICD-10-CM

## 2021-11-29 PROCEDURE — 91306 COVID-19 MODERNA VACC 0.25 ML BOOSTER: CPT

## 2021-11-29 PROCEDURE — 0064A COVID-19 MODERNA VACC 0.25 ML BOOSTER: CPT

## 2021-11-30 RX ORDER — IBUPROFEN 600 MG/1
600 TABLET ORAL 2 TIMES DAILY PRN
Qty: 60 TABLET | Refills: 0 | Status: SHIPPED | OUTPATIENT
Start: 2021-11-30 | End: 2021-12-23 | Stop reason: SDUPTHER

## 2021-12-23 DIAGNOSIS — M19.90 ARTHRITIS: ICD-10-CM

## 2021-12-27 DIAGNOSIS — I10 ESSENTIAL HYPERTENSION: ICD-10-CM

## 2021-12-27 DIAGNOSIS — E78.00 HYPERCHOLESTEROLEMIA: ICD-10-CM

## 2021-12-27 RX ORDER — EZETIMIBE 10 MG/1
10 TABLET ORAL DAILY
Qty: 90 TABLET | Refills: 0 | Status: SHIPPED | OUTPATIENT
Start: 2021-12-27 | End: 2022-07-06 | Stop reason: SDUPTHER

## 2021-12-27 RX ORDER — LISINOPRIL 10 MG/1
10 TABLET ORAL DAILY
Qty: 90 TABLET | Refills: 0 | Status: SHIPPED | OUTPATIENT
Start: 2021-12-27 | End: 2022-07-06 | Stop reason: SDUPTHER

## 2021-12-28 RX ORDER — IBUPROFEN 600 MG/1
600 TABLET ORAL 2 TIMES DAILY PRN
Qty: 60 TABLET | Refills: 0 | Status: SHIPPED | OUTPATIENT
Start: 2021-12-28 | End: 2022-01-24 | Stop reason: SDUPTHER

## 2022-01-25 ENCOUNTER — APPOINTMENT (OUTPATIENT)
Dept: RADIOLOGY | Facility: CLINIC | Age: 67
End: 2022-01-25
Payer: COMMERCIAL

## 2022-01-25 DIAGNOSIS — M25.552 LEFT HIP PAIN: ICD-10-CM

## 2022-01-25 PROCEDURE — 73502 X-RAY EXAM HIP UNI 2-3 VIEWS: CPT

## 2022-03-10 ENCOUNTER — OFFICE VISIT (OUTPATIENT)
Dept: INTERNAL MEDICINE CLINIC | Facility: CLINIC | Age: 67
End: 2022-03-10
Payer: COMMERCIAL

## 2022-03-10 DIAGNOSIS — J01.90 ACUTE NON-RECURRENT SINUSITIS, UNSPECIFIED LOCATION: Primary | ICD-10-CM

## 2022-03-10 PROCEDURE — 99213 OFFICE O/P EST LOW 20 MIN: CPT | Performed by: FAMILY MEDICINE

## 2022-03-10 RX ORDER — AMOXICILLIN AND CLAVULANATE POTASSIUM 875; 125 MG/1; MG/1
1 TABLET, FILM COATED ORAL 2 TIMES DAILY
Qty: 20 TABLET | Refills: 0 | Status: SHIPPED | OUTPATIENT
Start: 2022-03-10 | End: 2022-03-20

## 2022-03-11 VITALS — OXYGEN SATURATION: 97 % | TEMPERATURE: 98.7 F | HEART RATE: 72 BPM

## 2022-03-11 RX ORDER — CYCLOBENZAPRINE HCL 5 MG
TABLET ORAL
COMMUNITY
Start: 2022-01-11 | End: 2022-03-11

## 2022-03-11 NOTE — PROGRESS NOTES
Assessment/Plan:    No problem-specific Assessment & Plan notes found for this encounter  Diagnoses and all orders for this visit:    Acute non-recurrent sinusitis, unspecified location  -     amoxicillin-clavulanate (AUGMENTIN) 875-125 mg per tablet; Take 1 tablet by mouth 2 (two) times a day for 10 days    Other orders  -     Discontinue: cyclobenzaprine (FLEXERIL) 5 mg tablet      orders recommendations as noted above  Fluids  Rest   Tylenol or Motrin as needed  Augmentin as noted above  Take this with food  Mucinex or other over-the-counter medications to loosen mucus if needed  Stay adequately hydrated  Call or follow-up if symptoms worsen or persist       Subjective:      Patient ID: Anand Alford is a 61 y o  male  He presents for acute visit  He started over the last 3 days with increasing nasal congestion and postnasal drip  Feeling hot and cold at times  Increasing fatigue  Feels like he can sleep all the time  Sinus pressure bilaterally  Significant postnasal drip  Slight cough  Denies any nausea, vomiting, or diarrhea  They have been watching their grandson who has been sick  The following portions of the patient's history were reviewed and updated as appropriate:   He  has a past medical history of Back pain, ED (erectile dysfunction), Hypertension, Hypothyroidism, IBS (irritable bowel syndrome), Inflammatory bowel disease, Kidney stones, and Overactive bladder    He   Patient Active Problem List    Diagnosis Date Noted    Acute sinusitis 03/10/2022    Chronic right-sided low back pain with right-sided sciatica 06/03/2021    Routine adult health maintenance 05/25/2021    Screening for prostate cancer 05/25/2021    Cut of finger 05/25/2021    Nontraumatic incomplete tear of right rotator cuff 04/29/2021    Osteoarthritis of hip 07/05/2019    Coronal hypospadias 07/05/2019    Irritable bowel syndrome with diarrhea 07/05/2019    Obesity (BMI 35 0-39 9 without comorbidity) 07/05/2019    Vitamin D deficiency 07/05/2019    Glaucoma of both eyes 07/05/2019    OAB (overactive bladder) 05/01/2019    Erectile dysfunction 04/15/2019    History of kidney stones 04/15/2019    Hypercholesterolemia 10/16/2016    Hypertension 10/16/2016    Hypothyroidism 10/16/2016    Localized, primary osteoarthritis of shoulder region 04/29/2008     He  has a past surgical history that includes Rotator cuff repair; Partial hip arthroplasty (Right); Appendectomy; Hernia repair; and Trigger finger release (02/2021)  His family history includes Cancer in his maternal grandmother and mother; Diabetes in his maternal grandmother; Heart disease in his father and sister; Hypertension in his father and sister; Stroke in his maternal aunt  He  reports that he quit smoking about 5 years ago  He has quit using smokeless tobacco   His smokeless tobacco use included chew  He reports previous alcohol use of about 5 0 standard drinks of alcohol per week  He reports previous drug use  Drugs: Marijuana, Cocaine, and LSD  Current Outpatient Medications   Medication Sig Dispense Refill    albuterol (ProAir HFA) 90 mcg/act inhaler Inhale 2 puffs every 6 (six) hours as needed for wheezing or shortness of breath 8 5 g 0    amoxicillin-clavulanate (AUGMENTIN) 875-125 mg per tablet Take 1 tablet by mouth 2 (two) times a day for 10 days 20 tablet 0    B Complex-C-Folic Acid (B-Complex/Folic Acid/Vitamin C) TBCR       benzonatate (TESSALON) 200 MG capsule Take 1 capsule (200 mg total) by mouth 3 (three) times a day as needed for cough 20 capsule 0    bisacodyl (DULCOLAX) 5 mg EC tablet Use as directed by office staff (Patient not taking: Reported on 9/20/2021) 3 tablet 0    Cholecalciferol (D-3-5) 125 MCG (5000 UT) capsule       cyanocobalamin (VITAMIN B-12) 1,000 mcg tablet Vitamin B-12 1,000 mcg tablet   Take by oral route        cyclobenzaprine (FLEXERIL) 10 mg tablet Take 1 tablet (10 mg total) by mouth daily at bedtime 30 tablet 3    Echinacea 400 MG CAPS       ezetimibe (ZETIA) 10 mg tablet Take 1 tablet (10 mg total) by mouth daily 90 tablet 0    ibuprofen (MOTRIN) 600 mg tablet Take 1 tablet (600 mg total) by mouth 2 (two) times a day as needed for mild pain or moderate pain 60 tablet 3    latanoprost (XALATAN) 0 005 % ophthalmic solution       levothyroxine 175 mcg tablet Take 1 tablet (175 mcg total) by mouth daily 90 tablet 1    lisinopril (ZESTRIL) 10 mg tablet Take 1 tablet (10 mg total) by mouth daily 90 tablet 0    polyethylene glycol (GOLYTELY) 4000 mL solution Use as directed by office staff (Patient not taking: Reported on 9/20/2021) 4000 mL 0    traMADol (ULTRAM) 50 mg tablet Take 1 tablet (50 mg total) by mouth every 6 (six) hours as needed for moderate pain 12 tablet 0    Zinc 50 MG CAPS        No current facility-administered medications for this visit  Current Outpatient Medications on File Prior to Visit   Medication Sig    albuterol (ProAir HFA) 90 mcg/act inhaler Inhale 2 puffs every 6 (six) hours as needed for wheezing or shortness of breath    B Complex-C-Folic Acid (B-Complex/Folic Acid/Vitamin C) TBCR     benzonatate (TESSALON) 200 MG capsule Take 1 capsule (200 mg total) by mouth 3 (three) times a day as needed for cough    bisacodyl (DULCOLAX) 5 mg EC tablet Use as directed by office staff (Patient not taking: Reported on 9/20/2021)    Cholecalciferol (D-3-5) 125 MCG (5000 UT) capsule     cyanocobalamin (VITAMIN B-12) 1,000 mcg tablet Vitamin B-12 1,000 mcg tablet   Take by oral route      cyclobenzaprine (FLEXERIL) 10 mg tablet Take 1 tablet (10 mg total) by mouth daily at bedtime    Echinacea 400 MG CAPS     ezetimibe (ZETIA) 10 mg tablet Take 1 tablet (10 mg total) by mouth daily    ibuprofen (MOTRIN) 600 mg tablet Take 1 tablet (600 mg total) by mouth 2 (two) times a day as needed for mild pain or moderate pain    latanoprost (XALATAN) 0 005 % ophthalmic solution     levothyroxine 175 mcg tablet Take 1 tablet (175 mcg total) by mouth daily    lisinopril (ZESTRIL) 10 mg tablet Take 1 tablet (10 mg total) by mouth daily    polyethylene glycol (GOLYTELY) 4000 mL solution Use as directed by office staff (Patient not taking: Reported on 9/20/2021)    traMADol (ULTRAM) 50 mg tablet Take 1 tablet (50 mg total) by mouth every 6 (six) hours as needed for moderate pain    Zinc 50 MG CAPS     [DISCONTINUED] cyclobenzaprine (FLEXERIL) 5 mg tablet      No current facility-administered medications on file prior to visit  He is allergic to levofloxacin       Review of Systems   Constitutional: Positive for activity change, chills and fatigue  Negative for appetite change  HENT: Positive for congestion, hearing loss, postnasal drip, sinus pressure, sinus pain and sore throat  Respiratory: Positive for cough  Negative for shortness of breath  Cardiovascular: Negative for chest pain and palpitations  Gastrointestinal: Negative for diarrhea, nausea and vomiting  Objective:      Pulse 72   Temp 98 7 °F (37 1 °C)   SpO2 97%          Physical Exam  Vitals reviewed  Constitutional:       Appearance: He is well-developed and well-groomed  HENT:      Head:      Comments: Moist mucous membranes; boggy nasal mucosa with purulent nasal discharge; maxillary sinus tenderness; significant posterior pharyngeal erythema  Cardiovascular:      Rate and Rhythm: Normal rate and regular rhythm  Pulmonary:      Breath sounds: No decreased breath sounds, wheezing or rhonchi  Musculoskeletal:      Cervical back: Neck supple  Lymphadenopathy:      Cervical: No cervical adenopathy  Psychiatric:         Behavior: Behavior is cooperative

## 2022-03-22 ENCOUNTER — TELEPHONE (OUTPATIENT)
Dept: INTERNAL MEDICINE CLINIC | Facility: CLINIC | Age: 67
End: 2022-03-22

## 2022-03-22 NOTE — TELEPHONE ENCOUNTER
Patient's wife called stating that patient is very congested, gets a lot of mucous from nose, post nasal drip and cough during the night  Wife states that they think it is allergies  After consulting Dr Grisel Katz I called and spoke to wife telling her, as instructed, that Dr Grisel Katz said that we will treat it as allergies  He should take either Claritin or Allegra

## 2022-03-23 ENCOUNTER — OFFICE VISIT (OUTPATIENT)
Dept: INTERNAL MEDICINE CLINIC | Facility: CLINIC | Age: 67
End: 2022-03-23
Payer: COMMERCIAL

## 2022-03-23 DIAGNOSIS — J30.9 ALLERGIC RHINITIS, UNSPECIFIED SEASONALITY, UNSPECIFIED TRIGGER: ICD-10-CM

## 2022-03-23 DIAGNOSIS — J32.9 CHRONIC SINUSITIS, UNSPECIFIED LOCATION: Primary | ICD-10-CM

## 2022-03-23 PROCEDURE — 99213 OFFICE O/P EST LOW 20 MIN: CPT | Performed by: FAMILY MEDICINE

## 2022-03-23 RX ORDER — DOXYCYCLINE 100 MG/1
100 CAPSULE ORAL 2 TIMES DAILY
Qty: 20 CAPSULE | Refills: 0 | Status: SHIPPED | OUTPATIENT
Start: 2022-03-23 | End: 2022-04-02

## 2022-03-23 RX ORDER — PREDNISONE 10 MG/1
TABLET ORAL
Qty: 30 TABLET | Refills: 0 | Status: SHIPPED | OUTPATIENT
Start: 2022-03-23 | End: 2022-08-03 | Stop reason: ALTCHOICE

## 2022-03-24 NOTE — PROGRESS NOTES
Assessment/Plan:    No problem-specific Assessment & Plan notes found for this encounter  Diagnoses and all orders for this visit:    Chronic sinusitis, unspecified location  -     doxycycline monohydrate (MONODOX) 100 mg capsule; Take 1 capsule (100 mg total) by mouth 2 (two) times a day for 10 days  -     predniSONE 10 mg tablet; Five pills a day for 2 days, 4 pills a day for 2 days, 3 pills a day for 2 days, 2 pills a day for 2 days, 1 pill a day for 2 days    Allergic rhinitis, unspecified seasonality, unspecified trigger      orders recommendations as noted above  Fluids  Rest   Tylenol or Motrin if needed  Nasal irrigation on a daily basis recommended  Doxycycline as noted above  Prednisone as noted above  Call or follow-up if symptoms worsen or persist       Subjective:      Patient ID: Maria Luz Green is a 61 y o  male  He presents for acute visit  Symptoms began to improve with the Augmentin but began to worsen over the last few days  Increased nasal congestion and sinus pressure  Increased sore throat and postnasal drip  Cough has worsened  Keeping him up at night  Tried allergy medication with no alleviation of his symptoms  Denies any fevers or chills  Has had headache  Denies any chest pain or palpitations  The following portions of the patient's history were reviewed and updated as appropriate:   He  has a past medical history of Back pain, ED (erectile dysfunction), Hypertension, Hypothyroidism, IBS (irritable bowel syndrome), Inflammatory bowel disease, Kidney stones, and Overactive bladder    He   Patient Active Problem List    Diagnosis Date Noted    Allergic rhinitis 03/23/2022    Chronic sinusitis 03/10/2022    Chronic right-sided low back pain with right-sided sciatica 06/03/2021    Routine adult health maintenance 05/25/2021    Screening for prostate cancer 05/25/2021    Cut of finger 05/25/2021    Nontraumatic incomplete tear of right rotator cuff 04/29/2021  Osteoarthritis of hip 07/05/2019    Coronal hypospadias 07/05/2019    Irritable bowel syndrome with diarrhea 07/05/2019    Obesity (BMI 35 0-39 9 without comorbidity) 07/05/2019    Vitamin D deficiency 07/05/2019    Glaucoma of both eyes 07/05/2019    OAB (overactive bladder) 05/01/2019    Erectile dysfunction 04/15/2019    History of kidney stones 04/15/2019    Hypercholesterolemia 10/16/2016    Hypertension 10/16/2016    Hypothyroidism 10/16/2016    Localized, primary osteoarthritis of shoulder region 04/29/2008     He  has a past surgical history that includes Rotator cuff repair; Partial hip arthroplasty (Right); Appendectomy; Hernia repair; and Trigger finger release (02/2021)  His family history includes Cancer in his maternal grandmother and mother; Diabetes in his maternal grandmother; Heart disease in his father and sister; Hypertension in his father and sister; Stroke in his maternal aunt  He  reports that he quit smoking about 5 years ago  He has quit using smokeless tobacco   His smokeless tobacco use included chew  He reports previous alcohol use of about 5 0 standard drinks of alcohol per week  He reports previous drug use  Drugs: Marijuana, Cocaine, and LSD  Current Outpatient Medications   Medication Sig Dispense Refill    albuterol (ProAir HFA) 90 mcg/act inhaler Inhale 2 puffs every 6 (six) hours as needed for wheezing or shortness of breath 8 5 g 0    B Complex-C-Folic Acid (B-Complex/Folic Acid/Vitamin C) TBCR       benzonatate (TESSALON) 200 MG capsule Take 1 capsule (200 mg total) by mouth 3 (three) times a day as needed for cough 20 capsule 0    bisacodyl (DULCOLAX) 5 mg EC tablet Use as directed by office staff (Patient not taking: Reported on 9/20/2021) 3 tablet 0    Cholecalciferol (D-3-5) 125 MCG (5000 UT) capsule       cyanocobalamin (VITAMIN B-12) 1,000 mcg tablet Vitamin B-12 1,000 mcg tablet   Take by oral route        cyclobenzaprine (FLEXERIL) 10 mg tablet Take 1 tablet (10 mg total) by mouth daily at bedtime 30 tablet 3    doxycycline monohydrate (MONODOX) 100 mg capsule Take 1 capsule (100 mg total) by mouth 2 (two) times a day for 10 days 20 capsule 0    Echinacea 400 MG CAPS       ezetimibe (ZETIA) 10 mg tablet Take 1 tablet (10 mg total) by mouth daily 90 tablet 0    ibuprofen (MOTRIN) 600 mg tablet Take 1 tablet (600 mg total) by mouth 2 (two) times a day as needed for mild pain or moderate pain 60 tablet 3    latanoprost (XALATAN) 0 005 % ophthalmic solution       levothyroxine 175 mcg tablet Take 1 tablet (175 mcg total) by mouth daily 90 tablet 1    lisinopril (ZESTRIL) 10 mg tablet Take 1 tablet (10 mg total) by mouth daily 90 tablet 0    polyethylene glycol (GOLYTELY) 4000 mL solution Use as directed by office staff (Patient not taking: Reported on 9/20/2021) 4000 mL 0    predniSONE 10 mg tablet Five pills a day for 2 days, 4 pills a day for 2 days, 3 pills a day for 2 days, 2 pills a day for 2 days, 1 pill a day for 2 days 30 tablet 0    traMADol (ULTRAM) 50 mg tablet Take 1 tablet (50 mg total) by mouth every 6 (six) hours as needed for moderate pain 12 tablet 0    Zinc 50 MG CAPS        No current facility-administered medications for this visit  Current Outpatient Medications on File Prior to Visit   Medication Sig    albuterol (ProAir HFA) 90 mcg/act inhaler Inhale 2 puffs every 6 (six) hours as needed for wheezing or shortness of breath    B Complex-C-Folic Acid (B-Complex/Folic Acid/Vitamin C) TBCR     benzonatate (TESSALON) 200 MG capsule Take 1 capsule (200 mg total) by mouth 3 (three) times a day as needed for cough    bisacodyl (DULCOLAX) 5 mg EC tablet Use as directed by office staff (Patient not taking: Reported on 9/20/2021)    Cholecalciferol (D-3-5) 125 MCG (5000 UT) capsule     cyanocobalamin (VITAMIN B-12) 1,000 mcg tablet Vitamin B-12 1,000 mcg tablet   Take by oral route      cyclobenzaprine (FLEXERIL) 10 mg tablet Take 1 tablet (10 mg total) by mouth daily at bedtime    Echinacea 400 MG CAPS     ezetimibe (ZETIA) 10 mg tablet Take 1 tablet (10 mg total) by mouth daily    ibuprofen (MOTRIN) 600 mg tablet Take 1 tablet (600 mg total) by mouth 2 (two) times a day as needed for mild pain or moderate pain    latanoprost (XALATAN) 0 005 % ophthalmic solution     levothyroxine 175 mcg tablet Take 1 tablet (175 mcg total) by mouth daily    lisinopril (ZESTRIL) 10 mg tablet Take 1 tablet (10 mg total) by mouth daily    polyethylene glycol (GOLYTELY) 4000 mL solution Use as directed by office staff (Patient not taking: Reported on 9/20/2021)    traMADol (ULTRAM) 50 mg tablet Take 1 tablet (50 mg total) by mouth every 6 (six) hours as needed for moderate pain    Zinc 50 MG CAPS      No current facility-administered medications on file prior to visit  He is allergic to levofloxacin       Review of Systems   Constitutional: Positive for fatigue  Negative for chills, fever and unexpected weight change  HENT: Positive for congestion, postnasal drip, sinus pressure, sinus pain and sore throat  Respiratory: Positive for cough  Cardiovascular: Negative for chest pain and palpitations  Gastrointestinal: Negative for abdominal pain, diarrhea, nausea and vomiting  Musculoskeletal: Negative for arthralgias  Skin: Negative for rash  Allergic/Immunologic: Negative for immunocompromised state  Neurological: Negative for light-headedness  Psychiatric/Behavioral: Positive for sleep disturbance  Objective: There were no vitals taken for this visit  Physical Exam  Constitutional:       General: He is not in acute distress  Appearance: He is well-developed and well-groomed     HENT:      Head:      Comments: Moist mucous membranes; boggy nasal mucosa with purulent nasal discharge; maxillary sinus tenderness; posterior pharyngeal erythema  Cardiovascular:      Rate and Rhythm: Normal rate and regular rhythm  Heart sounds: No murmur heard  Pulmonary:      Breath sounds: No decreased breath sounds, wheezing or rhonchi  Lymphadenopathy:      Cervical: No cervical adenopathy  Neurological:      Mental Status: He is alert  Psychiatric:         Behavior: Behavior is cooperative

## 2022-04-25 ENCOUNTER — HOSPITAL ENCOUNTER (EMERGENCY)
Facility: HOSPITAL | Age: 67
Discharge: HOME | End: 2022-04-26
Attending: EMERGENCY MEDICINE
Payer: MEDICARE

## 2022-04-25 DIAGNOSIS — J06.9 VIRAL URI WITH COUGH: Primary | ICD-10-CM

## 2022-04-25 PROCEDURE — 99284 EMERGENCY DEPT VISIT MOD MDM: CPT

## 2022-04-26 ENCOUNTER — TELEPHONE (OUTPATIENT)
Dept: INTERNAL MEDICINE CLINIC | Facility: CLINIC | Age: 67
End: 2022-04-26

## 2022-04-26 ENCOUNTER — APPOINTMENT (EMERGENCY)
Dept: RADIOLOGY | Facility: HOSPITAL | Age: 67
End: 2022-04-26
Payer: MEDICARE

## 2022-04-26 VITALS
HEIGHT: 68 IN | RESPIRATION RATE: 18 BRPM | HEART RATE: 111 BPM | TEMPERATURE: 98.9 F | SYSTOLIC BLOOD PRESSURE: 108 MMHG | OXYGEN SATURATION: 91 % | WEIGHT: 233.69 LBS | BODY MASS INDEX: 35.42 KG/M2 | DIASTOLIC BLOOD PRESSURE: 63 MMHG

## 2022-04-26 LAB
FLUAV RNA RESP QL NAA+PROBE: NEGATIVE
FLUBV RNA RESP QL NAA+PROBE: NEGATIVE
RSV RNA RESP QL NAA+PROBE: NEGATIVE
SARS-COV-2 RNA RESP QL NAA+PROBE: NEGATIVE

## 2022-04-26 PROCEDURE — 71045 X-RAY EXAM CHEST 1 VIEW: CPT

## 2022-04-26 PROCEDURE — 99285 EMERGENCY DEPT VISIT HI MDM: CPT | Performed by: EMERGENCY MEDICINE

## 2022-04-26 PROCEDURE — 0241U HB NFCT DS VIR RESP RNA 4 TRGT: CPT | Performed by: EMERGENCY MEDICINE

## 2022-04-26 RX ORDER — BENZONATATE 100 MG/1
100 CAPSULE ORAL 3 TIMES DAILY PRN
Qty: 20 CAPSULE | Refills: 0 | Status: SHIPPED | OUTPATIENT
Start: 2022-04-26 | End: 2022-07-27

## 2022-04-26 RX ORDER — ONDANSETRON 4 MG/1
4 TABLET, ORALLY DISINTEGRATING ORAL ONCE
Status: COMPLETED | OUTPATIENT
Start: 2022-04-26 | End: 2022-04-26

## 2022-04-26 RX ORDER — IBUPROFEN 600 MG/1
600 TABLET ORAL ONCE
Status: COMPLETED | OUTPATIENT
Start: 2022-04-26 | End: 2022-04-26

## 2022-04-26 RX ORDER — ACETAMINOPHEN 325 MG/1
650 TABLET ORAL ONCE
Status: COMPLETED | OUTPATIENT
Start: 2022-04-26 | End: 2022-04-26

## 2022-04-26 RX ORDER — ONDANSETRON 4 MG/1
4 TABLET, ORALLY DISINTEGRATING ORAL EVERY 6 HOURS PRN
Qty: 20 TABLET | Refills: 0 | Status: SHIPPED | OUTPATIENT
Start: 2022-04-26 | End: 2022-08-03 | Stop reason: ALTCHOICE

## 2022-04-26 RX ADMIN — ONDANSETRON 4 MG: 4 TABLET, ORALLY DISINTEGRATING ORAL at 00:09

## 2022-04-26 RX ADMIN — ACETAMINOPHEN 650 MG: 325 TABLET ORAL at 00:09

## 2022-04-26 RX ADMIN — IBUPROFEN 600 MG: 600 TABLET ORAL at 00:08

## 2022-04-26 NOTE — TELEPHONE ENCOUNTER
There are not many other options if the codeine is not tolerated and the Tessalon does not work  Could try Phenergan but not much more effective than the Tessalon

## 2022-04-26 NOTE — RESULT ENCOUNTER NOTE
Attempted to call with left acromioclavicular joint widening seen on CXR, likely incidental or possibly post-operative since pt was seen for URI   LMOM to call ER

## 2022-04-26 NOTE — TELEPHONE ENCOUNTER
Yahir called stating he was at the ED and needed a f/u appt  As per our conversation, informed him that it was viral   I spoke to spouse Laura Lopez and she was asking for something for him for the post nasal drip  Stated it gets so bad he wakes up at night coughing  I asked her if he wanted something for cough and stated tessalon does not work and he does not do well with codeine  Stated she just thinks for the post nasal drip    Please send to [facility]

## 2022-04-28 ENCOUNTER — TELEPHONE (OUTPATIENT)
Dept: INTERNAL MEDICINE CLINIC | Facility: CLINIC | Age: 67
End: 2022-04-28

## 2022-04-28 NOTE — TELEPHONE ENCOUNTER
Can you send the cough medicine with codeine to [facility]  Yandy Foley stated he only wants it for bedtime

## 2022-04-28 NOTE — TELEPHONE ENCOUNTER
Spoke to Vimal yesterday and she stated  the Tessalon purls really did not help  I called to check on him today  He stated he can use the cough syrup with codeine if he only takes it at night  Stated now, he wants it and please send to [facility]

## 2022-07-06 DIAGNOSIS — I10 ESSENTIAL HYPERTENSION: ICD-10-CM

## 2022-07-06 DIAGNOSIS — E03.9 HYPOTHYROIDISM, UNSPECIFIED TYPE: ICD-10-CM

## 2022-07-06 DIAGNOSIS — M19.90 ARTHRITIS: ICD-10-CM

## 2022-07-06 DIAGNOSIS — E78.00 HYPERCHOLESTEROLEMIA: ICD-10-CM

## 2022-07-06 RX ORDER — LISINOPRIL 10 MG/1
10 TABLET ORAL DAILY
Qty: 90 TABLET | Refills: 0 | Status: SHIPPED | OUTPATIENT
Start: 2022-07-06 | End: 2022-09-30 | Stop reason: SDUPTHER

## 2022-07-06 RX ORDER — EZETIMIBE 10 MG/1
10 TABLET ORAL DAILY
Qty: 90 TABLET | Refills: 0 | Status: SHIPPED | OUTPATIENT
Start: 2022-07-06 | End: 2022-09-30 | Stop reason: SDUPTHER

## 2022-07-06 RX ORDER — LEVOTHYROXINE SODIUM 175 UG/1
175 TABLET ORAL DAILY
Qty: 90 TABLET | Refills: 0 | Status: SHIPPED | OUTPATIENT
Start: 2022-07-06 | End: 2022-09-30 | Stop reason: SDUPTHER

## 2022-07-06 RX ORDER — IBUPROFEN 600 MG/1
600 TABLET ORAL 2 TIMES DAILY PRN
Qty: 60 TABLET | Refills: 0 | Status: SHIPPED | OUTPATIENT
Start: 2022-07-06 | End: 2022-08-05 | Stop reason: SDUPTHER

## 2022-07-07 ENCOUNTER — TELEPHONE (OUTPATIENT)
Dept: GASTROENTEROLOGY | Facility: CLINIC | Age: 67
End: 2022-07-07

## 2022-07-07 NOTE — TELEPHONE ENCOUNTER
Scheduled date of colonoscopy (as of today): 08/03/2022  Physician performing colonoscopy: Jeffrey  Location of colonoscopy: [place]  Bowel prep reviewed with patient: miralax/ Magnesoum Citrate  Instructions reviewed with patient by: Sobeida  Clearances: no

## 2022-07-27 ENCOUNTER — OFFICE VISIT (OUTPATIENT)
Dept: INTERNAL MEDICINE CLINIC | Facility: CLINIC | Age: 67
End: 2022-07-27
Payer: MEDICARE

## 2022-07-27 VITALS
SYSTOLIC BLOOD PRESSURE: 128 MMHG | TEMPERATURE: 97.9 F | OXYGEN SATURATION: 96 % | HEIGHT: 68 IN | DIASTOLIC BLOOD PRESSURE: 70 MMHG | WEIGHT: 220.8 LBS | BODY MASS INDEX: 33.46 KG/M2 | HEART RATE: 60 BPM

## 2022-07-27 DIAGNOSIS — I10 PRIMARY HYPERTENSION: ICD-10-CM

## 2022-07-27 DIAGNOSIS — E03.9 HYPOTHYROIDISM, UNSPECIFIED TYPE: ICD-10-CM

## 2022-07-27 DIAGNOSIS — Z00.00 MEDICARE ANNUAL WELLNESS VISIT, INITIAL: Primary | ICD-10-CM

## 2022-07-27 DIAGNOSIS — E55.9 VITAMIN D DEFICIENCY: ICD-10-CM

## 2022-07-27 DIAGNOSIS — E78.00 HYPERCHOLESTEROLEMIA: ICD-10-CM

## 2022-07-27 DIAGNOSIS — J32.9 CHRONIC SINUSITIS, UNSPECIFIED LOCATION: ICD-10-CM

## 2022-07-27 DIAGNOSIS — G43.809 OTHER MIGRAINE WITHOUT STATUS MIGRAINOSUS, NOT INTRACTABLE: ICD-10-CM

## 2022-07-27 PROBLEM — G43.909 MIGRAINE: Status: ACTIVE | Noted: 2022-07-27

## 2022-07-27 PROBLEM — S61.219A CUT OF FINGER: Status: RESOLVED | Noted: 2021-05-25 | Resolved: 2022-07-27

## 2022-07-27 PROCEDURE — 99214 OFFICE O/P EST MOD 30 MIN: CPT | Performed by: NURSE PRACTITIONER

## 2022-07-27 PROCEDURE — G0402 INITIAL PREVENTIVE EXAM: HCPCS | Performed by: NURSE PRACTITIONER

## 2022-07-27 PROCEDURE — 1123F ACP DISCUSS/DSCN MKR DOCD: CPT | Performed by: NURSE PRACTITIONER

## 2022-07-27 RX ORDER — SUMATRIPTAN 50 MG/1
50 TABLET, FILM COATED ORAL ONCE AS NEEDED
Qty: 9 TABLET | Refills: 0 | Status: SHIPPED | OUTPATIENT
Start: 2022-07-27 | End: 2022-08-16 | Stop reason: SDUPTHER

## 2022-07-27 NOTE — PROGRESS NOTES
Assessment/Plan:    No problem-specific Assessment & Plan notes found for this encounter  Problem List Items Addressed This Visit        Endocrine    Hypothyroidism       Cardiovascular and Mediastinum    Hypertension       Other    Hypercholesterolemia    Routine adult health maintenance - Primary            Subjective:      Patient ID: Army Tapia is a 74 y o  male  Jelena Carrier is for a routine wellness  The following portions of the patient's history were reviewed and updated as appropriate: He  has a past medical history of Back pain, ED (erectile dysfunction), Hypertension, Hypothyroidism, IBS (irritable bowel syndrome), Inflammatory bowel disease, Kidney stones, and Overactive bladder  He   Patient Active Problem List    Diagnosis Date Noted    Allergic rhinitis 03/23/2022    Chronic sinusitis 03/10/2022    Chronic right-sided low back pain with right-sided sciatica 06/03/2021    Routine adult health maintenance 05/25/2021    Screening for prostate cancer 05/25/2021    Nontraumatic incomplete tear of right rotator cuff 04/29/2021    Osteoarthritis of hip 07/05/2019    Coronal hypospadias 07/05/2019    Irritable bowel syndrome with diarrhea 07/05/2019    Obesity (BMI 35 0-39 9 without comorbidity) 07/05/2019    Vitamin D deficiency 07/05/2019    Glaucoma of both eyes 07/05/2019    OAB (overactive bladder) 05/01/2019    Erectile dysfunction 04/15/2019    History of kidney stones 04/15/2019    Hypercholesterolemia 10/16/2016    Hypertension 10/16/2016    Hypothyroidism 10/16/2016    Localized, primary osteoarthritis of shoulder region 04/29/2008     He  has a past surgical history that includes Rotator cuff repair; Partial hip arthroplasty (Right); Appendectomy; Hernia repair; and Trigger finger release (02/2021)  His family history includes Cancer in his maternal grandmother and mother; Diabetes in his maternal grandmother; Heart disease in his father and sister;  Hypertension in his father and sister; Stroke in his maternal aunt  He  reports that he quit smoking about 5 years ago  He has quit using smokeless tobacco   His smokeless tobacco use included chew  He reports previous alcohol use of about 5 0 standard drinks of alcohol per week  He reports previous drug use  Drugs: Marijuana, Cocaine, and LSD  Current Outpatient Medications   Medication Sig Dispense Refill    B Complex-C-Folic Acid (B-Complex/Folic Acid/Vitamin C) TBCR       Cholecalciferol (D-3-5) 125 MCG (5000 UT) capsule       cyanocobalamin (VITAMIN B-12) 1,000 mcg tablet Vitamin B-12 1,000 mcg tablet   Take by oral route        cyclobenzaprine (FLEXERIL) 10 mg tablet Take 1 tablet (10 mg total) by mouth daily at bedtime (Patient taking differently: Take 10 mg by mouth if needed) 30 tablet 3    Echinacea 400 MG CAPS       ezetimibe (ZETIA) 10 mg tablet Take 1 tablet (10 mg total) by mouth daily 90 tablet 0    guaifenesin-codeine (GUAIFENESIN AC) 100-10 MG/5ML liquid Take 5 mL by mouth 3 (three) times a day as needed for cough 240 mL 0    ibuprofen (MOTRIN) 600 mg tablet Take 1 tablet (600 mg total) by mouth 2 (two) times a day as needed for mild pain or moderate pain (Patient taking differently: Take 600 mg by mouth 2 (two) times a day) 60 tablet 0    latanoprost (XALATAN) 0 005 % ophthalmic solution       levothyroxine 175 mcg tablet Take 1 tablet (175 mcg total) by mouth daily 90 tablet 0    lisinopril (ZESTRIL) 10 mg tablet Take 1 tablet (10 mg total) by mouth daily 90 tablet 0    Zinc 50 MG CAPS       albuterol (ProAir HFA) 90 mcg/act inhaler Inhale 2 puffs every 6 (six) hours as needed for wheezing or shortness of breath (Patient not taking: Reported on 7/27/2022) 8 5 g 0    bisacodyl (DULCOLAX) 5 mg EC tablet Use as directed by office staff (Patient not taking: No sig reported) 3 tablet 0    ondansetron (ZOFRAN-ODT) 4 mg disintegrating tablet Take 1 tablet (4 mg total) by mouth every 6 (six) hours as needed for nausea (Patient not taking: Reported on 7/27/2022) 20 tablet 0    polyethylene glycol (GOLYTELY) 4000 mL solution Use as directed by office staff (Patient not taking: No sig reported) 4000 mL 0    predniSONE 10 mg tablet Five pills a day for 2 days, 4 pills a day for 2 days, 3 pills a day for 2 days, 2 pills a day for 2 days, 1 pill a day for 2 days (Patient not taking: Reported on 7/27/2022) 30 tablet 0    traMADol (ULTRAM) 50 mg tablet Take 1 tablet (50 mg total) by mouth every 6 (six) hours as needed for moderate pain (Patient not taking: Reported on 7/27/2022) 12 tablet 0     No current facility-administered medications for this visit  Current Outpatient Medications on File Prior to Visit   Medication Sig    B Complex-C-Folic Acid (B-Complex/Folic Acid/Vitamin C) TBCR     Cholecalciferol (D-3-5) 125 MCG (5000 UT) capsule     cyanocobalamin (VITAMIN B-12) 1,000 mcg tablet Vitamin B-12 1,000 mcg tablet   Take by oral route      cyclobenzaprine (FLEXERIL) 10 mg tablet Take 1 tablet (10 mg total) by mouth daily at bedtime (Patient taking differently: Take 10 mg by mouth if needed)    Echinacea 400 MG CAPS     ezetimibe (ZETIA) 10 mg tablet Take 1 tablet (10 mg total) by mouth daily    guaifenesin-codeine (GUAIFENESIN AC) 100-10 MG/5ML liquid Take 5 mL by mouth 3 (three) times a day as needed for cough    ibuprofen (MOTRIN) 600 mg tablet Take 1 tablet (600 mg total) by mouth 2 (two) times a day as needed for mild pain or moderate pain (Patient taking differently: Take 600 mg by mouth 2 (two) times a day)    latanoprost (XALATAN) 0 005 % ophthalmic solution     levothyroxine 175 mcg tablet Take 1 tablet (175 mcg total) by mouth daily    lisinopril (ZESTRIL) 10 mg tablet Take 1 tablet (10 mg total) by mouth daily    Zinc 50 MG CAPS     albuterol (ProAir HFA) 90 mcg/act inhaler Inhale 2 puffs every 6 (six) hours as needed for wheezing or shortness of breath (Patient not taking: Reported on 7/27/2022)    bisacodyl (DULCOLAX) 5 mg EC tablet Use as directed by office staff (Patient not taking: No sig reported)    ondansetron (ZOFRAN-ODT) 4 mg disintegrating tablet Take 1 tablet (4 mg total) by mouth every 6 (six) hours as needed for nausea (Patient not taking: Reported on 7/27/2022)    polyethylene glycol (GOLYTELY) 4000 mL solution Use as directed by office staff (Patient not taking: No sig reported)    predniSONE 10 mg tablet Five pills a day for 2 days, 4 pills a day for 2 days, 3 pills a day for 2 days, 2 pills a day for 2 days, 1 pill a day for 2 days (Patient not taking: Reported on 7/27/2022)    traMADol (ULTRAM) 50 mg tablet Take 1 tablet (50 mg total) by mouth every 6 (six) hours as needed for moderate pain (Patient not taking: Reported on 7/27/2022)    [DISCONTINUED] benzonatate (TESSALON PERLES) 100 mg capsule Take 1 capsule (100 mg total) by mouth 3 (three) times a day as needed for cough    [DISCONTINUED] benzonatate (TESSALON) 200 MG capsule Take 1 capsule (200 mg total) by mouth 3 (three) times a day as needed for cough     No current facility-administered medications on file prior to visit  He is allergic to levofloxacin       Review of Systems      Objective: There were no vitals taken for this visit           Physical Exam

## 2022-07-27 NOTE — PATIENT INSTRUCTIONS

## 2022-07-27 NOTE — PROGRESS NOTES
Assessment and Plan: Will repeat fasting labs  Up to date on screenings and labs  Will get Flu vaccine this year  Will start on Imitrex as needed for migraines  Did advise if any worsening to call the office  Continues to see Rheumatology  Will follow up in one year or sooner if need be  Problem List Items Addressed This Visit        Endocrine    Hypothyroidism    Relevant Orders    Comprehensive metabolic panel    CBC and differential    TSH, 3rd generation with Free T4 reflex       Respiratory    Chronic sinusitis    Relevant Orders    Comprehensive metabolic panel    CBC and differential       Cardiovascular and Mediastinum    Hypertension    Relevant Orders    Comprehensive metabolic panel    CBC and differential    Migraine    Relevant Medications    SUMAtriptan (IMITREX) 50 mg tablet    Other Relevant Orders    Comprehensive metabolic panel    CBC and differential       Other    Hypercholesterolemia    Relevant Orders    Comprehensive metabolic panel    CBC and differential    Lipid panel    Vitamin D deficiency    Relevant Orders    Vitamin D 25 hydroxy    Medicare annual wellness visit, initial - Primary    Relevant Orders    Comprehensive metabolic panel    CBC and differential          Depression Screening and Follow-up Plan: Patient was screened for depression during today's encounter  They screened negative with a PHQ-2 score of 0  Preventive health issues were discussed with patient, and age appropriate screening tests were ordered as noted in patient's After Visit Summary  Personalized health advice and appropriate referrals for health education or preventive services given if needed, as noted in patient's After Visit Summary  History of Present Illness:     Patient presents for a Medicare Wellness Visit    Vinod Nedra is for a follow up and medicare wellness  He is doing well but did quit drinking around 6 weeks ago and started with migraines   He states he is getting them weekly and is getting an aura  He did have his vision checked and that is normal  He states he does take Motrin and does see Rheumatology  He denies any chest pain, SOB, or palpitations  He denies any depression or anxiety  He did have his covid vaccines  He is up to date with his other vaccines  He is going for a colonoscopy on the 3rd  He offers no other issues  Patient Care Team:  Valentina Pugh as PCP - General (Family Medicine)  Josh Palmer PA-C as Physician Assistant (Gastroenterology)     Review of Systems:     Review of Systems   Musculoskeletal: Positive for arthralgias and myalgias  Neurological: Positive for headaches  All other systems reviewed and are negative         Problem List:     Patient Active Problem List   Diagnosis    Erectile dysfunction    History of kidney stones    OAB (overactive bladder)    Hypercholesterolemia    Hypertension    Hypothyroidism    Localized, primary osteoarthritis of shoulder region    Osteoarthritis of hip    Coronal hypospadias    Irritable bowel syndrome with diarrhea    Obesity (BMI 35 0-39 9 without comorbidity)    Vitamin D deficiency    Glaucoma of both eyes    Nontraumatic incomplete tear of right rotator cuff    Routine adult health maintenance    Screening for prostate cancer    Chronic right-sided low back pain with right-sided sciatica    Chronic sinusitis    Allergic rhinitis    Medicare annual wellness visit, initial    Migraine      Past Medical and Surgical History:     Past Medical History:   Diagnosis Date    Back pain     ED (erectile dysfunction)     Hypertension     Hypothyroidism     IBS (irritable bowel syndrome)     Inflammatory bowel disease     Kidney stones     Overactive bladder      Past Surgical History:   Procedure Laterality Date    APPENDECTOMY      HERNIA REPAIR      PARTIAL HIP ARTHROPLASTY Right     ROTATOR CUFF REPAIR      TRIGGER FINGER RELEASE  02/2021    Left ring finger      Family History: Family History   Problem Relation Age of Onset   Jai Price Cancer Mother     Heart disease Father     Hypertension Father     Heart disease Sister     Hypertension Sister     Diabetes Maternal Grandmother     Cancer Maternal Grandmother     Stroke Maternal Aunt       Social History:     Social History     Socioeconomic History    Marital status: /Civil Union     Spouse name: None    Number of children: None    Years of education: None    Highest education level: None   Occupational History    None   Tobacco Use    Smoking status: Former Smoker     Quit date:      Years since quittin 5    Smokeless tobacco: Former User     Types: Chew    Tobacco comment: used chew when in [facility] Use    Vaping Use: Never used   Substance and Sexual Activity    Alcohol use: Not Currently     Alcohol/week: 5 0 standard drinks     Types: 5 Shots of liquor per week     Comment: recently stopped 2021    Drug use: Not Currently     Types: Marijuana, Cocaine, LSD     Comment: stopped in high school - nothing since    Sexual activity: None   Other Topics Concern    None   Social History Narrative    None     Social Determinants of Health     Financial Resource Strain: Not on file   Food Insecurity: Not on file   Transportation Needs: Not on file   Physical Activity: Not on file   Stress: Not on file   Social Connections: Not on file   Intimate Partner Violence: Not on file   Housing Stability: Not on file      Medications and Allergies:     Current Outpatient Medications   Medication Sig Dispense Refill    B Complex-C-Folic Acid (B-Complex/Folic Acid/Vitamin C) TBCR       Cholecalciferol (D-3-5) 125 MCG (5000 UT) capsule       cyanocobalamin (VITAMIN B-12) 1,000 mcg tablet Vitamin B-12 1,000 mcg tablet   Take by oral route        cyclobenzaprine (FLEXERIL) 10 mg tablet Take 1 tablet (10 mg total) by mouth daily at bedtime (Patient taking differently: Take 10 mg by mouth if needed) 30 tablet 3    Echinacea 400 MG CAPS       ezetimibe (ZETIA) 10 mg tablet Take 1 tablet (10 mg total) by mouth daily 90 tablet 0    guaifenesin-codeine (GUAIFENESIN AC) 100-10 MG/5ML liquid Take 5 mL by mouth 3 (three) times a day as needed for cough 240 mL 0    ibuprofen (MOTRIN) 600 mg tablet Take 1 tablet (600 mg total) by mouth 2 (two) times a day as needed for mild pain or moderate pain (Patient taking differently: Take 600 mg by mouth 2 (two) times a day) 60 tablet 0    latanoprost (XALATAN) 0 005 % ophthalmic solution       levothyroxine 175 mcg tablet Take 1 tablet (175 mcg total) by mouth daily 90 tablet 0    lisinopril (ZESTRIL) 10 mg tablet Take 1 tablet (10 mg total) by mouth daily 90 tablet 0    SUMAtriptan (IMITREX) 50 mg tablet Take 1 tablet (50 mg total) by mouth once as needed for migraine for up to 1 dose 9 tablet 0    Zinc 50 MG CAPS       albuterol (ProAir HFA) 90 mcg/act inhaler Inhale 2 puffs every 6 (six) hours as needed for wheezing or shortness of breath (Patient not taking: Reported on 7/27/2022) 8 5 g 0    bisacodyl (DULCOLAX) 5 mg EC tablet Use as directed by office staff (Patient not taking: No sig reported) 3 tablet 0    ondansetron (ZOFRAN-ODT) 4 mg disintegrating tablet Take 1 tablet (4 mg total) by mouth every 6 (six) hours as needed for nausea (Patient not taking: Reported on 7/27/2022) 20 tablet 0    polyethylene glycol (GOLYTELY) 4000 mL solution Use as directed by office staff (Patient not taking: No sig reported) 4000 mL 0    predniSONE 10 mg tablet Five pills a day for 2 days, 4 pills a day for 2 days, 3 pills a day for 2 days, 2 pills a day for 2 days, 1 pill a day for 2 days (Patient not taking: Reported on 7/27/2022) 30 tablet 0    traMADol (ULTRAM) 50 mg tablet Take 1 tablet (50 mg total) by mouth every 6 (six) hours as needed for moderate pain (Patient not taking: Reported on 7/27/2022) 12 tablet 0     No current facility-administered medications for this visit  Allergies   Allergen Reactions    Levofloxacin Rash     Tolerated Cipro and Avelox (moxifloxasin)  Immunizations:     Immunization History   Administered Date(s) Administered    COVID-19 MODERNA VACC 0 25 ML IM BOOSTER 11/29/2021    COVID-19 MODERNA VACC 0 5 ML IM 03/26/2021, 04/23/2021    INFLUENZA 09/30/2011, 10/11/2013, 09/23/2016, 09/28/2017, 10/26/2018    Influenza Split 01/28/2013    Influenza, injectable, quadrivalent, preservative free 0 5 mL 10/26/2018    Influenza, recombinant, quadrivalent,injectable, preservative free 10/11/2019, 11/13/2020, 09/13/2021    Pneumococcal Conjugate 13-Valent 11/13/2020    Pneumococcal Polysaccharide PPV23 10/11/2019    Tdap 05/25/2021      Health Maintenance:         Topic Date Due    HIV Screening  07/27/2024 (Originally 4/4/1970)    Colorectal Cancer Screening  02/17/2024    Hepatitis C Screening  Completed         Topic Date Due    COVID-19 Vaccine (4 - Booster for Moderna series) 03/29/2022    Influenza Vaccine (1) 09/01/2022      Medicare Screening Tests and Risk Assessments:     Herman Rizvi is here for his Welcome to Medicare visit  Health Risk Assessment:   Patient rates overall health as good  Patient feels that their physical health rating is slightly worse  Patient is very satisfied with their life  Eyesight was rated as same  Hearing was rated as slightly worse  Patient feels that their emotional and mental health rating is same  Patients states they are never, rarely angry  Patient states they are sometimes unusually tired/fatigued  Pain experienced in the last 7 days has been a lot  Patient's pain rating has been 5/10  Patient states that he has experienced no weight loss or gain in last 6 months  Depression Screening:   PHQ-2 Score: 0      Fall Risk Screening:    In the past year, patient has experienced: no history of falling in past year      Home Safety:  Patient does not have trouble with stairs inside or outside of their home  Patient has working smoke alarms and has working carbon monoxide detector  Home safety hazards include: none  Nutrition:   Current diet is Regular  Medications:   Patient is currently taking over-the-counter supplements  OTC medications include: see medication list  Patient is able to manage medications  Activities of Daily Living (ADLs)/Instrumental Activities of Daily Living (IADLs):   Walk and transfer into and out of bed and chair?: Yes  Dress and groom yourself?: Yes    Bathe or shower yourself?: Yes    Feed yourself?  Yes  Do your laundry/housekeeping?: Yes  Manage your money, pay your bills and track your expenses?: Yes  Make your own meals?: Yes    Do your own shopping?: Yes    Previous Hospitalizations:   Any hospitalizations or ED visits within the last 12 months?: Yes    How many hospitalizations have you had in the last year?: 1-2    Hospitalization Comments: Once for unknown fevers    Advance Care Planning:   Living will: Yes    Durable POA for healthcare: No    Advanced directive: Yes    Advanced directive counseling given: No    Five wishes given: No    Patient declined ACP directive: No    End of Life Decisions reviewed with patient: No    Provider agrees with end of life decisions: Yes      Cognitive Screening:   Provider or family/friend/caregiver concerned regarding cognition?: No    PREVENTIVE SCREENINGS      Cardiovascular Screening:    General: History Lipid Disorder and Risks and Benefits Discussed    Due for: Lipid Panel, Cholesterol and Triglycerides      Diabetes Screening:     General: Risks and Benefits Discussed    Due for: Blood Glucose      Colorectal Cancer Screening:     General: Screening Current    Due for: Colonoscopy - Low Risk      Osteoporosis Screening:    General: Screening Not Indicated      Abdominal Aortic Aneurysm (AAA) Screening:    Risk factors include: age between 73-69 yo and tobacco use        Lung Cancer Screening:     General: Screening Not Indicated      Hepatitis C Screening:    General: Screening Current    Screening, Brief Intervention, and Referral to Treatment (SBIRT)    Screening  Typical number of drinks in a day: 0  Typical number of drinks in a week: 0  Interpretation: Low risk drinking behavior  Single Item Drug Screening:  How often have you used an illegal drug (including marijuana) or a prescription medication for non-medical reasons in the past year? never    Single Item Drug Screen Score: 0  Interpretation: Negative screen for possible drug use disorder     Visual Acuity Screening    Right eye Left eye Both eyes   Without correction:      With correction: 20/20 20/20 20/15        Physical Exam:     /70 (BP Location: Left arm, Patient Position: Sitting, Cuff Size: Standard)   Pulse 60   Temp 97 9 °F (36 6 °C)   Ht 5' 8" (1 727 m)   Wt 100 kg (220 lb 12 8 oz)   SpO2 96%   BMI 33 57 kg/m²     Physical Exam  Vitals reviewed  Constitutional:       Appearance: Normal appearance  He is normal weight  HENT:      Head: Normocephalic and atraumatic  Right Ear: Tympanic membrane, ear canal and external ear normal       Left Ear: Tympanic membrane, ear canal and external ear normal       Nose: Nose normal       Mouth/Throat:      Mouth: Mucous membranes are moist       Pharynx: Oropharynx is clear  Eyes:      Extraocular Movements: Extraocular movements intact  Conjunctiva/sclera: Conjunctivae normal       Pupils: Pupils are equal, round, and reactive to light  Cardiovascular:      Rate and Rhythm: Normal rate and regular rhythm  Pulses: Normal pulses  Heart sounds: Normal heart sounds  Pulmonary:      Effort: Pulmonary effort is normal       Breath sounds: Normal breath sounds  Abdominal:      General: Abdomen is flat  Bowel sounds are normal       Palpations: Abdomen is soft  Musculoskeletal:         General: Normal range of motion  Cervical back: Normal range of motion and neck supple  Skin:     General: Skin is warm and dry  Capillary Refill: Capillary refill takes less than 2 seconds  Neurological:      General: No focal deficit present  Mental Status: He is alert and oriented to person, place, and time  Mental status is at baseline  Psychiatric:         Mood and Affect: Mood normal          Behavior: Behavior normal          Thought Content:  Thought content normal          Judgment: Judgment normal           KRISTEN Starkey

## 2022-08-01 ENCOUNTER — APPOINTMENT (OUTPATIENT)
Dept: LAB | Facility: CLINIC | Age: 67
End: 2022-08-01
Payer: COMMERCIAL

## 2022-08-01 DIAGNOSIS — E78.00 HYPERCHOLESTEROLEMIA: ICD-10-CM

## 2022-08-01 DIAGNOSIS — G43.809 OTHER MIGRAINE WITHOUT STATUS MIGRAINOSUS, NOT INTRACTABLE: ICD-10-CM

## 2022-08-01 DIAGNOSIS — E55.9 VITAMIN D DEFICIENCY: ICD-10-CM

## 2022-08-01 DIAGNOSIS — I10 PRIMARY HYPERTENSION: ICD-10-CM

## 2022-08-01 DIAGNOSIS — J32.9 CHRONIC SINUSITIS, UNSPECIFIED LOCATION: ICD-10-CM

## 2022-08-01 DIAGNOSIS — Z00.00 MEDICARE ANNUAL WELLNESS VISIT, INITIAL: ICD-10-CM

## 2022-08-01 DIAGNOSIS — E03.9 HYPOTHYROIDISM, UNSPECIFIED TYPE: ICD-10-CM

## 2022-08-01 LAB
ALBUMIN SERPL BCP-MCNC: 3.8 G/DL (ref 3.5–5)
ALP SERPL-CCNC: 79 U/L (ref 46–116)
ALT SERPL W P-5'-P-CCNC: 49 U/L (ref 12–78)
ANION GAP SERPL CALCULATED.3IONS-SCNC: 4 MMOL/L (ref 4–13)
AST SERPL W P-5'-P-CCNC: 34 U/L (ref 5–45)
BASOPHILS # BLD AUTO: 0.01 THOUSANDS/ΜL (ref 0–0.1)
BASOPHILS NFR BLD AUTO: 0 % (ref 0–1)
BILIRUB SERPL-MCNC: 0.53 MG/DL (ref 0.2–1)
BUN SERPL-MCNC: 22 MG/DL (ref 5–25)
CALCIUM SERPL-MCNC: 9.8 MG/DL (ref 8.3–10.1)
CHLORIDE SERPL-SCNC: 105 MMOL/L (ref 96–108)
CHOLEST SERPL-MCNC: 187 MG/DL
CO2 SERPL-SCNC: 28 MMOL/L (ref 21–32)
CREAT SERPL-MCNC: 1.24 MG/DL (ref 0.6–1.3)
EOSINOPHIL # BLD AUTO: 0.18 THOUSAND/ΜL (ref 0–0.61)
EOSINOPHIL NFR BLD AUTO: 4 % (ref 0–6)
ERYTHROCYTE [DISTWIDTH] IN BLOOD BY AUTOMATED COUNT: 12.1 % (ref 11.6–15.1)
GFR SERPL CREATININE-BSD FRML MDRD: 60 ML/MIN/1.73SQ M
GLUCOSE P FAST SERPL-MCNC: 93 MG/DL (ref 65–99)
HCT VFR BLD AUTO: 48.6 % (ref 36.5–49.3)
HDLC SERPL-MCNC: 51 MG/DL
HGB BLD-MCNC: 16.5 G/DL (ref 12–17)
IMM GRANULOCYTES # BLD AUTO: 0.01 THOUSAND/UL (ref 0–0.2)
IMM GRANULOCYTES NFR BLD AUTO: 0 % (ref 0–2)
LDLC SERPL CALC-MCNC: 112 MG/DL (ref 0–100)
LYMPHOCYTES # BLD AUTO: 1.31 THOUSANDS/ΜL (ref 0.6–4.47)
LYMPHOCYTES NFR BLD AUTO: 27 % (ref 14–44)
MCH RBC QN AUTO: 31.5 PG (ref 26.8–34.3)
MCHC RBC AUTO-ENTMCNC: 34 G/DL (ref 31.4–37.4)
MCV RBC AUTO: 93 FL (ref 82–98)
MONOCYTES # BLD AUTO: 0.48 THOUSAND/ΜL (ref 0.17–1.22)
MONOCYTES NFR BLD AUTO: 10 % (ref 4–12)
NEUTROPHILS # BLD AUTO: 2.96 THOUSANDS/ΜL (ref 1.85–7.62)
NEUTS SEG NFR BLD AUTO: 59 % (ref 43–75)
NONHDLC SERPL-MCNC: 136 MG/DL
NRBC BLD AUTO-RTO: 0 /100 WBCS
PLATELET # BLD AUTO: 228 THOUSANDS/UL (ref 149–390)
PMV BLD AUTO: 11.2 FL (ref 8.9–12.7)
POTASSIUM SERPL-SCNC: 4.5 MMOL/L (ref 3.5–5.3)
PROT SERPL-MCNC: 8 G/DL (ref 6.4–8.4)
RBC # BLD AUTO: 5.24 MILLION/UL (ref 3.88–5.62)
SODIUM SERPL-SCNC: 137 MMOL/L (ref 135–147)
TRIGL SERPL-MCNC: 120 MG/DL
TSH SERPL DL<=0.05 MIU/L-ACNC: 2.11 UIU/ML (ref 0.45–4.5)
WBC # BLD AUTO: 4.95 THOUSAND/UL (ref 4.31–10.16)

## 2022-08-01 PROCEDURE — 85025 COMPLETE CBC W/AUTO DIFF WBC: CPT

## 2022-08-01 PROCEDURE — 82306 VITAMIN D 25 HYDROXY: CPT

## 2022-08-01 PROCEDURE — 80061 LIPID PANEL: CPT

## 2022-08-01 PROCEDURE — 36415 COLL VENOUS BLD VENIPUNCTURE: CPT

## 2022-08-01 PROCEDURE — 84443 ASSAY THYROID STIM HORMONE: CPT

## 2022-08-01 PROCEDURE — 80053 COMPREHEN METABOLIC PANEL: CPT

## 2022-08-02 LAB — 25(OH)D3 SERPL-MCNC: 69.8 NG/ML (ref 30–100)

## 2022-08-03 ENCOUNTER — HOSPITAL ENCOUNTER (OUTPATIENT)
Dept: GASTROENTEROLOGY | Facility: HOSPITAL | Age: 67
Setting detail: OUTPATIENT SURGERY
Discharge: HOME | End: 2022-08-03
Admitting: INTERNAL MEDICINE
Payer: MEDICARE

## 2022-08-03 ENCOUNTER — ANESTHESIA EVENT (OUTPATIENT)
Dept: GASTROENTEROLOGY | Facility: HOSPITAL | Age: 67
End: 2022-08-03

## 2022-08-03 ENCOUNTER — ANESTHESIA (OUTPATIENT)
Dept: GASTROENTEROLOGY | Facility: HOSPITAL | Age: 67
End: 2022-08-03

## 2022-08-03 VITALS
WEIGHT: 220 LBS | DIASTOLIC BLOOD PRESSURE: 61 MMHG | BODY MASS INDEX: 33.34 KG/M2 | HEART RATE: 64 BPM | HEIGHT: 68 IN | OXYGEN SATURATION: 98 % | RESPIRATION RATE: 18 BRPM | TEMPERATURE: 97.2 F | SYSTOLIC BLOOD PRESSURE: 109 MMHG

## 2022-08-03 DIAGNOSIS — Z12.11 COLON CANCER SCREENING: ICD-10-CM

## 2022-08-03 PROCEDURE — G0121 COLON CA SCRN NOT HI RSK IND: HCPCS | Performed by: INTERNAL MEDICINE

## 2022-08-03 RX ORDER — LIDOCAINE HYDROCHLORIDE 20 MG/ML
INJECTION, SOLUTION EPIDURAL; INFILTRATION; INTRACAUDAL; PERINEURAL AS NEEDED
Status: DISCONTINUED | OUTPATIENT
Start: 2022-08-03 | End: 2022-08-03

## 2022-08-03 RX ORDER — PROPOFOL 10 MG/ML
INJECTION, EMULSION INTRAVENOUS AS NEEDED
Status: DISCONTINUED | OUTPATIENT
Start: 2022-08-03 | End: 2022-08-03

## 2022-08-03 RX ORDER — SODIUM CHLORIDE, SODIUM LACTATE, POTASSIUM CHLORIDE, CALCIUM CHLORIDE 600; 310; 30; 20 MG/100ML; MG/100ML; MG/100ML; MG/100ML
75 INJECTION, SOLUTION INTRAVENOUS CONTINUOUS
Status: DISCONTINUED | OUTPATIENT
Start: 2022-08-03 | End: 2022-08-07 | Stop reason: HOSPADM

## 2022-08-03 RX ADMIN — SODIUM CHLORIDE, SODIUM LACTATE, POTASSIUM CHLORIDE, AND CALCIUM CHLORIDE 75 ML/HR: .6; .31; .03; .02 INJECTION, SOLUTION INTRAVENOUS at 07:54

## 2022-08-03 RX ADMIN — PROPOFOL 200 MG: 10 INJECTION, EMULSION INTRAVENOUS at 08:08

## 2022-08-03 RX ADMIN — PROPOFOL 50 MG: 10 INJECTION, EMULSION INTRAVENOUS at 08:12

## 2022-08-03 RX ADMIN — PROPOFOL 30 MG: 10 INJECTION, EMULSION INTRAVENOUS at 08:20

## 2022-08-03 RX ADMIN — LIDOCAINE HYDROCHLORIDE 5 ML: 20 INJECTION, SOLUTION EPIDURAL; INFILTRATION; INTRACAUDAL; PERINEURAL at 08:08

## 2022-08-03 NOTE — ANESTHESIA PREPROCEDURE EVALUATION
Procedure:  COLONOSCOPY    Relevant Problems   CARDIO   (+) Hypercholesterolemia   (+) Hypertension   (+) Migraine      ENDO   (+) Hypothyroidism      MUSCULOSKELETAL   (+) Chronic right-sided low back pain with right-sided sciatica   (+) Localized, primary osteoarthritis of shoulder region   (+) Osteoarthritis of hip      NEURO/PSYCH   (+) Chronic right-sided low back pain with right-sided sciatica   (+) History of kidney stones   (+) Migraine        Physical Exam    Airway    Mallampati score: II  TM Distance: >3 FB  Neck ROM: full     Dental   No notable dental hx     Cardiovascular  Cardiovascular exam normal    Pulmonary  Pulmonary exam normal     Other Findings        Anesthesia Plan  ASA Score- 2     Anesthesia Type- IV sedation with anesthesia with ASA Monitors  Additional Monitors:   Airway Plan:           Plan Factors-Exercise tolerance (METS): >4 METS  Chart reviewed  EKG reviewed  Imaging results reviewed  Existing labs reviewed  Patient summary reviewed  Patient is not a current smoker  Induction- intravenous  Postoperative Plan-     Informed Consent- Anesthetic plan and risks discussed with patient  I personally reviewed this patient with the CRNA  Discussed and agreed on the Anesthesia Plan with the CRNA  Alicja Phelps

## 2022-08-03 NOTE — DISCHARGE INSTRUCTIONS
Colonoscopy   WHAT YOU NEED TO KNOW:   A colonoscopy is a procedure to examine the inside of your colon (intestine) with a scope  Polyps or tissue growths may have been removed during your colonoscopy  It is normal to feel bloated and to have some abdominal discomfort  You should be passing gas  If you have hemorrhoids or you had polyps removed, you may have a small amount of bleeding  DISCHARGE INSTRUCTIONS:   Seek care immediately if:   You have sudden, severe abdominal pain  You have problems swallowing  You have a large amount of black, sticky bowel movements or blood in your bowel movements  You have sudden trouble breathing  You feel weak, lightheaded, or faint or your heart beats faster than normal for you  Contact your healthcare provider if:   You have a fever and chills  You have nausea or are vomiting  Your abdomen is bloated or feels full and hard  You have abdominal pain  You have black, sticky bowel movements or blood in your bowel movements  You have not had a bowel movement for 3 days after your procedure  You have rash or hives  You have questions or concerns about your procedure  Activity:   Do not lift, strain, or run for 24 hours after your procedure  Rest after your procedure  You have been given medicine to relax you  Do not drive or make important decisions until the day after your procedure  Return to your normal activity as directed  Relieve gas and discomfort from bloating by lying on your right side with a heating pad on your abdomen  You may need to take short walks to help the gas move out  Eat small meals until bloating is relieved  Follow up with your healthcare provider as directed: Write down your questions so you remember to ask them during your visits  If you take a blood thinner, please review the specific instructions from your endoscopist about when you should resume it   These can be found in the Recommendation and Your Medication list sections of this After Visit Summary     o

## 2022-08-03 NOTE — ANESTHESIA POSTPROCEDURE EVALUATION
Post-Op Assessment Note    CV Status:  Stable    Pain management: adequate     Mental Status:  Alert and awake   Hydration Status:  Euvolemic   PONV Controlled:  Controlled   Airway Patency:  Patent      Post Op Vitals Reviewed: Yes      Staff: CRNA         No complications documented      BP   109/61   Temp     Pulse  60   Resp   20   SpO2   100

## 2022-08-03 NOTE — H&P
History and Physical -  [facility]  Pamela Odom 74 y o  male MRN: [id number]                  HPI: Pamela Odom is a 74y o  year old male who presents for colonoscopy for family history of CRC and intermittent diarrhea      REVIEW OF SYSTEMS: Per the HPI, and otherwise unremarkable      Historical Information   Past Medical History:   Diagnosis Date    Back pain     Colon polyp     ED (erectile dysfunction)     Hypertension     Hypothyroidism     IBS (irritable bowel syndrome)     Inflammatory bowel disease     Kidney stones     Overactive bladder      Past Surgical History:   Procedure Laterality Date    APPENDECTOMY      COLONOSCOPY      HERNIA REPAIR      PARTIAL HIP ARTHROPLASTY Right     ROTATOR CUFF REPAIR      TRIGGER FINGER RELEASE  2021    Left ring finger     Social History   Social History     Substance and Sexual Activity   Alcohol Use Not Currently    Alcohol/week: 5 0 standard drinks    Types: 5 Shots of liquor per week    Comment: recently stopped 2021     Social History     Substance and Sexual Activity   Drug Use Not Currently    Types: Marijuana, Cocaine, LSD    Comment: stopped in high school - nothing since     Social History     Tobacco Use   Smoking Status Former Smoker    Quit date:     Years since quittin 5   Smokeless Tobacco Former User    Types: Chew   Tobacco Comment    used chew when in [facility]     Family History   Problem Relation Age of Onset    Cancer Mother     Heart disease Father     Hypertension Father     Heart disease Sister     Hypertension Sister     Diabetes Maternal Grandmother     Cancer Maternal Grandmother     Stroke Maternal Aunt        Meds/Allergies       Current Outpatient Medications:     B Complex-C-Folic Acid (B-Complex/Folic Acid/Vitamin C) TBCR    Cholecalciferol (D-3-5) 125 MCG (5000 UT) capsule    cyanocobalamin (VITAMIN B-12) 1,000 mcg tablet    Echinacea 400 MG CAPS   ezetimibe (ZETIA) 10 mg tablet    ibuprofen (MOTRIN) 600 mg tablet    latanoprost (XALATAN) 0 005 % ophthalmic solution    levothyroxine 175 mcg tablet    lisinopril (ZESTRIL) 10 mg tablet    SUMAtriptan (IMITREX) 50 mg tablet    Zinc 50 MG CAPS    albuterol (ProAir HFA) 90 mcg/act inhaler    bisacodyl (DULCOLAX) 5 mg EC tablet    cyclobenzaprine (FLEXERIL) 10 mg tablet    polyethylene glycol (GOLYTELY) 4000 mL solution    Current Facility-Administered Medications:     lactated ringers infusion, 75 mL/hr, Intravenous, Continuous, 75 mL/hr at 08/03/22 0754    Allergies   Allergen Reactions    Levofloxacin Rash     Tolerated Cipro and Avelox (moxifloxasin)  Objective     /65   Pulse 65   Temp (!) 97 °F (36 1 °C) (Temporal)   Resp 16   Ht 5' 8" (1 727 m)   Wt 99 8 kg (220 lb)   SpO2 98%   BMI 33 45 kg/m²       PHYSICAL EXAM    Gen: NAD  Head: NCAT  CV: RRR  CHEST: Clear  ABD: soft, NT/ND  EXT: no edema      ASSESSMENT/PLAN:  This is a 74y o  year old male here for colonoscopy, and he is stable and optimized for his procedure

## 2022-08-05 DIAGNOSIS — M19.90 ARTHRITIS: ICD-10-CM

## 2022-08-05 RX ORDER — IBUPROFEN 600 MG/1
600 TABLET ORAL 2 TIMES DAILY PRN
Qty: 60 TABLET | Refills: 0 | Status: SHIPPED | OUTPATIENT
Start: 2022-08-05 | End: 2022-09-08 | Stop reason: SDUPTHER

## 2022-08-16 DIAGNOSIS — G43.809 OTHER MIGRAINE WITHOUT STATUS MIGRAINOSUS, NOT INTRACTABLE: ICD-10-CM

## 2022-08-17 RX ORDER — SUMATRIPTAN 50 MG/1
50 TABLET, FILM COATED ORAL ONCE AS NEEDED
Qty: 9 TABLET | Refills: 0 | Status: SHIPPED | OUTPATIENT
Start: 2022-08-17 | End: 2022-10-04 | Stop reason: SDUPTHER

## 2022-08-24 ENCOUNTER — RA CDI HCC (OUTPATIENT)
Dept: OTHER | Facility: HOSPITAL | Age: 67
End: 2022-08-24

## 2022-08-24 NOTE — PROGRESS NOTES
[address]  coding opportunities       Chart reviewed, no opportunity found: CHART REVIEWED, NO OPPORTUNITY FOUND        Patients Insurance     Medicare Insurance: Medicare

## 2022-08-30 ENCOUNTER — OFFICE VISIT (OUTPATIENT)
Dept: INTERNAL MEDICINE CLINIC | Facility: CLINIC | Age: 67
End: 2022-08-30
Payer: MEDICARE

## 2022-08-30 VITALS
BODY MASS INDEX: 33.04 KG/M2 | DIASTOLIC BLOOD PRESSURE: 74 MMHG | HEART RATE: 84 BPM | OXYGEN SATURATION: 98 % | HEIGHT: 68 IN | WEIGHT: 218 LBS | TEMPERATURE: 97.6 F | SYSTOLIC BLOOD PRESSURE: 122 MMHG

## 2022-08-30 DIAGNOSIS — G43.809 OTHER MIGRAINE WITHOUT STATUS MIGRAINOSUS, NOT INTRACTABLE: Primary | ICD-10-CM

## 2022-08-30 PROCEDURE — 99214 OFFICE O/P EST MOD 30 MIN: CPT | Performed by: NURSE PRACTITIONER

## 2022-08-30 RX ORDER — OMEGA-3S/DHA/EPA/FISH OIL 1000-1400
CAPSULE,DELAYED RELEASE (ENTERIC COATED) ORAL
COMMUNITY
Start: 2022-08-01

## 2022-08-30 NOTE — PROGRESS NOTES
Assessment/Plan: Continue Imitrex  Will order an MRI of the brain to rule out anything acute  Did offer referral to Neurology wants to hold off  Did advise taking Vitamin B2 OTC for headaches as well  Will notify once Mri is back  No problem-specific Assessment & Plan notes found for this encounter  Problem List Items Addressed This Visit        Cardiovascular and Mediastinum    Migraine - Primary    Relevant Orders    MRI brain wo contrast            Subjective:      Patient ID: Osborne Lefort is a 74 y o  male  [place] Loss is for an acute visit  He is still having migraines several times a week  He states he is taking the Imitrex and this seems to help  He does have sensitivity to light and sound  He does get tunnel vision  He has not had imaging of his head  He states this did happen when he did stop drinking alcohol  He is up to date on his eye exams  He is trying to pinpoint what is triggering them but they are sporadic  He is taking a Vitamin B complex  He is up to date on his eye exams due to having glaucoma  He feels the pain is on both sides of his head when he does get them but can not be sure  He offers no other issues  The following portions of the patient's history were reviewed and updated as appropriate:   He  has a past medical history of Back pain, Colon polyp, ED (erectile dysfunction), Hypertension, Hypothyroidism, IBS (irritable bowel syndrome), Inflammatory bowel disease, Kidney stones, and Overactive bladder    He   Patient Active Problem List    Diagnosis Date Noted    Medicare annual wellness visit, initial 07/27/2022    Migraine 07/27/2022    Allergic rhinitis 03/23/2022    Chronic sinusitis 03/10/2022    Chronic right-sided low back pain with right-sided sciatica 06/03/2021    Routine adult health maintenance 05/25/2021    Screening for prostate cancer 05/25/2021    Nontraumatic incomplete tear of right rotator cuff 04/29/2021    Osteoarthritis of hip 07/05/2019    Coronal hypospadias 07/05/2019    Irritable bowel syndrome with diarrhea 07/05/2019    Obesity (BMI 35 0-39 9 without comorbidity) 07/05/2019    Vitamin D deficiency 07/05/2019    Glaucoma of both eyes 07/05/2019    OAB (overactive bladder) 05/01/2019    Erectile dysfunction 04/15/2019    History of kidney stones 04/15/2019    Hypercholesterolemia 10/16/2016    Hypertension 10/16/2016    Hypothyroidism 10/16/2016    Localized, primary osteoarthritis of shoulder region 04/29/2008     He  has a past surgical history that includes Rotator cuff repair; Partial hip arthroplasty (Right); Appendectomy; Hernia repair; Trigger finger release (02/2021); and Colonoscopy  His family history includes Cancer in his maternal grandmother and mother; Diabetes in his maternal grandmother; Heart disease in his father and sister; Hypertension in his father and sister; Stroke in his maternal aunt  He  reports that he quit smoking about 5 years ago  He has quit using smokeless tobacco   His smokeless tobacco use included chew  He reports previous alcohol use of about 5 0 standard drinks of alcohol per week  He reports previous drug use  Drugs: Marijuana, Cocaine, and LSD  Current Outpatient Medications   Medication Sig Dispense Refill    B Complex-C-Folic Acid (B-Complex/Folic Acid/Vitamin C) TBCR       Cholecalciferol (D-3-5) 125 MCG (5000 UT) capsule       cyanocobalamin (VITAMIN B-12) 1,000 mcg tablet Vitamin B-12 1,000 mcg tablet   Take by oral route        cyclobenzaprine (FLEXERIL) 10 mg tablet Take 1 tablet (10 mg total) by mouth daily at bedtime (Patient taking differently: Take 10 mg by mouth if needed) 30 tablet 3    Echinacea 400 MG CAPS       ezetimibe (ZETIA) 10 mg tablet Take 1 tablet (10 mg total) by mouth daily 90 tablet 0    Fiber Adult Gummies 2 g CHEW       ibuprofen (MOTRIN) 600 mg tablet Take 1 tablet (600 mg total) by mouth 2 (two) times a day as needed for mild pain or moderate pain 60 tablet 0  latanoprost (XALATAN) 0 005 % ophthalmic solution       levothyroxine 175 mcg tablet Take 1 tablet (175 mcg total) by mouth daily 90 tablet 0    lisinopril (ZESTRIL) 10 mg tablet Take 1 tablet (10 mg total) by mouth daily 90 tablet 0    SUMAtriptan (IMITREX) 50 mg tablet Take 1 tablet (50 mg total) by mouth once as needed for migraine for up to 1 dose 9 tablet 0    Zinc 50 MG CAPS Take 1 capsule by mouth in the morning      albuterol (ProAir HFA) 90 mcg/act inhaler Inhale 2 puffs every 6 (six) hours as needed for wheezing or shortness of breath 8 5 g 0    bisacodyl (DULCOLAX) 5 mg EC tablet Use as directed by office staff 3 tablet 0    polyethylene glycol (GOLYTELY) 4000 mL solution Use as directed by office staff 4000 mL 0     No current facility-administered medications for this visit  Current Outpatient Medications on File Prior to Visit   Medication Sig    B Complex-C-Folic Acid (B-Complex/Folic Acid/Vitamin C) TBCR     Cholecalciferol (D-3-5) 125 MCG (5000 UT) capsule     cyanocobalamin (VITAMIN B-12) 1,000 mcg tablet Vitamin B-12 1,000 mcg tablet   Take by oral route      cyclobenzaprine (FLEXERIL) 10 mg tablet Take 1 tablet (10 mg total) by mouth daily at bedtime (Patient taking differently: Take 10 mg by mouth if needed)    Echinacea 400 MG CAPS     ezetimibe (ZETIA) 10 mg tablet Take 1 tablet (10 mg total) by mouth daily    Fiber Adult Gummies 2 g CHEW     ibuprofen (MOTRIN) 600 mg tablet Take 1 tablet (600 mg total) by mouth 2 (two) times a day as needed for mild pain or moderate pain    latanoprost (XALATAN) 0 005 % ophthalmic solution     levothyroxine 175 mcg tablet Take 1 tablet (175 mcg total) by mouth daily    lisinopril (ZESTRIL) 10 mg tablet Take 1 tablet (10 mg total) by mouth daily    SUMAtriptan (IMITREX) 50 mg tablet Take 1 tablet (50 mg total) by mouth once as needed for migraine for up to 1 dose    Zinc 50 MG CAPS Take 1 capsule by mouth in the morning    albuterol (ProAir HFA) 90 mcg/act inhaler Inhale 2 puffs every 6 (six) hours as needed for wheezing or shortness of breath    bisacodyl (DULCOLAX) 5 mg EC tablet Use as directed by office staff    polyethylene glycol (GOLYTELY) 4000 mL solution Use as directed by office staff     No current facility-administered medications on file prior to visit  He is allergic to levofloxacin       Review of Systems   Neurological: Positive for headaches  All other systems reviewed and are negative  Objective:      /74 (BP Location: Left arm, Patient Position: Sitting, Cuff Size: Large)   Pulse 84   Temp 97 6 °F (36 4 °C)   Ht 5' 8" (1 727 m)   Wt 98 9 kg (218 lb)   SpO2 98%   BMI 33 15 kg/m²          Physical Exam  Vitals reviewed  Constitutional:       Appearance: Normal appearance  He is normal weight  HENT:      Head: Normocephalic and atraumatic  Eyes:      Extraocular Movements: Extraocular movements intact  Conjunctiva/sclera: Conjunctivae normal       Pupils: Pupils are equal, round, and reactive to light  Cardiovascular:      Rate and Rhythm: Normal rate and regular rhythm  Pulses: Normal pulses  Heart sounds: Normal heart sounds  Pulmonary:      Effort: Pulmonary effort is normal       Breath sounds: Normal breath sounds  Musculoskeletal:         General: Normal range of motion  Skin:     General: Skin is warm and dry  Capillary Refill: Capillary refill takes less than 2 seconds  Neurological:      General: No focal deficit present  Mental Status: He is alert and oriented to person, place, and time  Mental status is at baseline  Psychiatric:         Mood and Affect: Mood normal          Behavior: Behavior normal          Thought Content:  Thought content normal          Judgment: Judgment normal

## 2022-09-08 DIAGNOSIS — M19.90 ARTHRITIS: ICD-10-CM

## 2022-09-09 RX ORDER — IBUPROFEN 600 MG/1
600 TABLET ORAL 2 TIMES DAILY PRN
Qty: 60 TABLET | Refills: 0 | Status: SHIPPED | OUTPATIENT
Start: 2022-09-09 | End: 2022-10-27 | Stop reason: SDUPTHER

## 2022-09-13 ENCOUNTER — HOSPITAL ENCOUNTER (OUTPATIENT)
Dept: MRI IMAGING | Facility: HOSPITAL | Age: 67
Discharge: HOME | End: 2022-09-13
Payer: MEDICARE

## 2022-09-13 DIAGNOSIS — G43.809 OTHER MIGRAINE WITHOUT STATUS MIGRAINOSUS, NOT INTRACTABLE: ICD-10-CM

## 2022-09-13 PROCEDURE — G1004 CDSM NDSC: HCPCS

## 2022-09-13 PROCEDURE — 70551 MRI BRAIN STEM W/O DYE: CPT

## 2022-09-19 ENCOUNTER — TELEPHONE (OUTPATIENT)
Dept: INTERNAL MEDICINE CLINIC | Facility: CLINIC | Age: 67
End: 2022-09-19

## 2022-09-19 DIAGNOSIS — R51.9 CHRONIC NONINTRACTABLE HEADACHE, UNSPECIFIED HEADACHE TYPE: Primary | ICD-10-CM

## 2022-09-19 DIAGNOSIS — M19.90 ARTHRITIS: ICD-10-CM

## 2022-09-19 DIAGNOSIS — G89.29 CHRONIC NONINTRACTABLE HEADACHE, UNSPECIFIED HEADACHE TYPE: Primary | ICD-10-CM

## 2022-09-30 DIAGNOSIS — E03.9 HYPOTHYROIDISM, UNSPECIFIED TYPE: ICD-10-CM

## 2022-09-30 DIAGNOSIS — E78.00 HYPERCHOLESTEROLEMIA: ICD-10-CM

## 2022-09-30 DIAGNOSIS — I10 ESSENTIAL HYPERTENSION: ICD-10-CM

## 2022-09-30 RX ORDER — LISINOPRIL 10 MG/1
10 TABLET ORAL DAILY
Qty: 90 TABLET | Refills: 0 | Status: SHIPPED | OUTPATIENT
Start: 2022-09-30

## 2022-09-30 RX ORDER — EZETIMIBE 10 MG/1
10 TABLET ORAL DAILY
Qty: 90 TABLET | Refills: 0 | Status: SHIPPED | OUTPATIENT
Start: 2022-09-30

## 2022-09-30 RX ORDER — LEVOTHYROXINE SODIUM 175 UG/1
175 TABLET ORAL DAILY
Qty: 90 TABLET | Refills: 0 | Status: SHIPPED | OUTPATIENT
Start: 2022-09-30

## 2022-10-04 DIAGNOSIS — G43.809 OTHER MIGRAINE WITHOUT STATUS MIGRAINOSUS, NOT INTRACTABLE: ICD-10-CM

## 2022-10-05 RX ORDER — SUMATRIPTAN 50 MG/1
50 TABLET, FILM COATED ORAL ONCE AS NEEDED
Qty: 9 TABLET | Refills: 0 | Status: SHIPPED | OUTPATIENT
Start: 2022-10-05

## 2022-10-11 PROBLEM — Z00.00 MEDICARE ANNUAL WELLNESS VISIT, INITIAL: Status: RESOLVED | Noted: 2022-07-27 | Resolved: 2022-10-11

## 2022-10-12 PROBLEM — Z00.00 ROUTINE ADULT HEALTH MAINTENANCE: Status: RESOLVED | Noted: 2021-05-25 | Resolved: 2022-10-12

## 2022-10-12 PROBLEM — Z12.5 SCREENING FOR PROSTATE CANCER: Status: RESOLVED | Noted: 2021-05-25 | Resolved: 2022-10-12

## 2022-10-13 ENCOUNTER — OFFICE VISIT (OUTPATIENT)
Dept: INTERNAL MEDICINE CLINIC | Facility: CLINIC | Age: 67
End: 2022-10-13
Payer: MEDICARE

## 2022-10-13 VITALS
BODY MASS INDEX: 33.45 KG/M2 | WEIGHT: 220 LBS | SYSTOLIC BLOOD PRESSURE: 134 MMHG | HEART RATE: 62 BPM | OXYGEN SATURATION: 97 % | TEMPERATURE: 97.3 F | DIASTOLIC BLOOD PRESSURE: 88 MMHG

## 2022-10-13 DIAGNOSIS — M54.31 SCIATICA OF RIGHT SIDE: Primary | ICD-10-CM

## 2022-10-13 PROCEDURE — 99214 OFFICE O/P EST MOD 30 MIN: CPT | Performed by: NURSE PRACTITIONER

## 2022-10-13 PROCEDURE — 96372 THER/PROPH/DIAG INJ SC/IM: CPT | Performed by: NURSE PRACTITIONER

## 2022-10-13 RX ORDER — PREDNISONE 10 MG/1
TABLET ORAL
Qty: 18 TABLET | Refills: 0 | Status: SHIPPED | OUTPATIENT
Start: 2022-10-13

## 2022-10-13 RX ORDER — COVID-19 ANTIGEN TEST
KIT MISCELLANEOUS
COMMUNITY
Start: 2022-10-01

## 2022-10-13 RX ORDER — DEXAMETHASONE SODIUM PHOSPHATE 4 MG/ML
4 INJECTION, SOLUTION INTRA-ARTICULAR; INTRALESIONAL; INTRAMUSCULAR; INTRAVENOUS; SOFT TISSUE ONCE
Status: COMPLETED | OUTPATIENT
Start: 2022-10-13 | End: 2022-10-13

## 2022-10-13 RX ADMIN — DEXAMETHASONE SODIUM PHOSPHATE 4 MG: 4 INJECTION, SOLUTION INTRA-ARTICULAR; INTRALESIONAL; INTRAMUSCULAR; INTRAVENOUS; SOFT TISSUE at 13:27

## 2022-10-13 NOTE — PROGRESS NOTES
Name: Pamela Odom      : 1955      MRN: [id number]  Encounter Provider: KRISTEN Ernst  Encounter Date: 10/13/2022   Encounter department: [address] [place] [address], [address]  Will give Decadron IM today  Will start on a Prednisone tapered dose  Continue Flexeril as needed  Continue to alternated with ice and heat  Will order imaging for the sacral and lumbar spine  Will follow up as needed  1  Sciatica of right side  -     XR sacroiliac joints 3+ views; Future; Expected date: 10/13/2022  -     XR spine lumbar minimum 4 views non injury; Future; Expected date: 10/13/2022  -     predniSONE 10 mg tablet; 30 mg by mouth daily for 3 days, then 20 mg by mouth daily for 3 days, then 10 mg by mouth daily for 3 days, then stop  -     dexamethasone (DECADRON) injection 4 mg         Subjective      Daril Shoe is for an acute visit  He has chronic back issues and did have injuries to his back in the past  He was seeing Pain Management and does see Rheumatology  He did see Ortho and did have an XR done of the right hip and Ortho said this was good  He is having pain in the front and back and does radiate down his leg  He states he is taking Flexeril as needed and was using heat which helped  He states this has been going on for months progressively getting worse  He is willing to go for imaging  He offers no other issues  Back Pain  This is a chronic problem  The current episode started more than 1 month ago  The problem occurs constantly  The problem has been waxing and waning since onset  The pain is present in the gluteal and sacro-iliac  The quality of the pain is described as aching, burning, shooting and stabbing  The pain radiates to the right thigh  The pain is at a severity of 6/10  The pain is the same all the time  The symptoms are aggravated by position, lying down, sitting and twisting  Stiffness is present all day   Associated symptoms include leg pain, numbness, paresthesias, pelvic pain, tingling and weakness  Pertinent negatives include no abdominal pain, bladder incontinence, bowel incontinence, chest pain, dysuria, fever, headaches, paresis, perianal numbness or weight loss  Risk factors include history of osteoporosis and obesity  Review of Systems   Constitutional: Negative for fever and weight loss  Cardiovascular: Negative for chest pain  Gastrointestinal: Negative for abdominal pain and bowel incontinence  Genitourinary: Positive for pelvic pain  Negative for bladder incontinence and dysuria  Musculoskeletal: Positive for back pain  Neurological: Positive for tingling, weakness, numbness and paresthesias  Negative for headaches  All other systems reviewed and are negative        Current Outpatient Medications on File Prior to Visit   Medication Sig   • B Complex-C-Folic Acid (B-Complex/Folic Acid/Vitamin C) TBCR    • Cholecalciferol (D-3-5) 125 MCG (5000 UT) capsule    • cyclobenzaprine (FLEXERIL) 10 mg tablet Take 1 tablet (10 mg total) by mouth daily at bedtime   • Echinacea 400 MG CAPS    • ezetimibe (ZETIA) 10 mg tablet Take 1 tablet (10 mg total) by mouth daily   • Fiber Adult Gummies 2 g CHEW    • ibuprofen (MOTRIN) 600 mg tablet Take 1 tablet (600 mg total) by mouth 2 (two) times a day as needed for mild pain or moderate pain   • latanoprost (XALATAN) 0 005 % ophthalmic solution    • levothyroxine 175 mcg tablet Take 1 tablet (175 mcg total) by mouth daily   • lisinopril (ZESTRIL) 10 mg tablet Take 1 tablet (10 mg total) by mouth daily   • Probiotic Product (FORTIFY 30 BILLION PROBIOT 50+ PO)    • Riboflavin 400 MG CAPS    • SUMAtriptan (IMITREX) 50 mg tablet Take 1 tablet (50 mg total) by mouth once as needed for migraine for up to 1 dose   • Zinc 50 MG CAPS Take 1 capsule by mouth in the morning   • albuterol (ProAir HFA) 90 mcg/act inhaler Inhale 2 puffs every 6 (six) hours as needed for wheezing or shortness of breath   • bisacodyl (DULCOLAX) 5 mg EC tablet Use as directed by office staff   • cyanocobalamin (VITAMIN B-12) 1,000 mcg tablet Vitamin B-12 1,000 mcg tablet   Take by oral route  • Flowflex COVID-19 Ag Home Test KIT  (Patient not taking: Reported on 10/13/2022)   • polyethylene glycol (GOLYTELY) 4000 mL solution Use as directed by office staff       Objective     /88 (BP Location: Left arm, Patient Position: Sitting, Cuff Size: Large)   Pulse 62   Temp (!) 97 3 °F (36 3 °C)   Wt 99 8 kg (220 lb)   SpO2 97%   BMI 33 45 kg/m²     Physical Exam  Vitals reviewed  Constitutional:       Appearance: Normal appearance  He is normal weight  Cardiovascular:      Rate and Rhythm: Normal rate and regular rhythm  Pulses: Normal pulses  Heart sounds: Normal heart sounds  Pulmonary:      Effort: Pulmonary effort is normal       Breath sounds: Normal breath sounds  Musculoskeletal:         General: Normal range of motion  Skin:     General: Skin is warm and dry  Capillary Refill: Capillary refill takes less than 2 seconds  Neurological:      General: No focal deficit present  Mental Status: He is alert and oriented to person, place, and time  Mental status is at baseline  Psychiatric:         Mood and Affect: Mood normal          Behavior: Behavior normal          Thought Content:  Thought content normal          Judgment: Judgment normal        KRISTEN Prado

## 2022-10-14 ENCOUNTER — APPOINTMENT (OUTPATIENT)
Dept: RADIOLOGY | Facility: CLINIC | Age: 67
End: 2022-10-14
Payer: MEDICARE

## 2022-10-14 DIAGNOSIS — M54.31 SCIATICA OF RIGHT SIDE: ICD-10-CM

## 2022-10-14 PROCEDURE — 72202 X-RAY EXAM SI JOINTS 3/> VWS: CPT

## 2022-10-14 PROCEDURE — 72110 X-RAY EXAM L-2 SPINE 4/>VWS: CPT

## 2022-10-21 DIAGNOSIS — M51.36 DEGENERATIVE LUMBAR DISC: Primary | ICD-10-CM

## 2022-10-25 ENCOUNTER — CONSULT (OUTPATIENT)
Dept: PAIN MEDICINE | Facility: CLINIC | Age: 67
End: 2022-10-25
Payer: MEDICARE

## 2022-10-25 VITALS
WEIGHT: 218.4 LBS | BODY MASS INDEX: 33.1 KG/M2 | HEIGHT: 68 IN | DIASTOLIC BLOOD PRESSURE: 70 MMHG | SYSTOLIC BLOOD PRESSURE: 134 MMHG | HEART RATE: 65 BPM

## 2022-10-25 DIAGNOSIS — M51.16 LUMBAR DISC DISEASE WITH RADICULOPATHY: Primary | ICD-10-CM

## 2022-10-25 DIAGNOSIS — M51.36 DEGENERATIVE LUMBAR DISC: ICD-10-CM

## 2022-10-25 PROCEDURE — 99204 OFFICE O/P NEW MOD 45 MIN: CPT | Performed by: ANESTHESIOLOGY

## 2022-10-25 RX ORDER — DULOXETIN HYDROCHLORIDE 20 MG/1
20 CAPSULE, DELAYED RELEASE ORAL DAILY
Qty: 30 CAPSULE | Refills: 1 | Status: SHIPPED | OUTPATIENT
Start: 2022-10-25 | End: 2022-11-24

## 2022-10-25 NOTE — PROGRESS NOTES
Assessment:  1  Lumbar disc disease with radiculopathy    2  Degenerative lumbar disc        Plan:  Patient is a 29-year-old male with complaints of low back pain and leg pain with comprehensive secondary to lumbar degenerative disc disease, lumbar radiculopathy presents for consultation  Patient reports the pain is more of a nagging pain that can last throughout the day resulted in him having to take several breaks in order to accomplish laboring tasks  Majority of the pain child was the patient's right lower extremity in the L4 and L5 nerve root distribution  We will need more diagnostic imaging better assess the pathology lab patient's current presentation  1  We will order MRI of the lumbar spine to better assess the disc is amplified by patient's right lower extremity radiculopathy which appears to be in the L4 and L5 nerve root distribution  2  Will try Cymbalta 20 mg p o  q h s  for lumbar radicular symptoms  3  We will follow up in 1 month to diagnostic imaging response to medication and plan for interventional management    History of Present Illness: The patient is a 74 y o  male who presents for consultation in regards to Back Pain, Leg Pain, and Knee Pain  Symptoms have been present for 4 months  Symptoms began without any precipitating injury or trauma  Pain is reported to be 5 on the numeric rating scale  Symptoms are felt nearly constantly and worst in the no typical pattern  Symptoms are characterized as burning, cramping, shooting, sharp, dull/aching and pressure-like  Symptoms are associated with right leg weakness  Aggravating factors include lying down, bending and walking  Relieving factors include nothing  No change in symptoms with kneeling, standing, sitting, turning the head, relaxation, coughing/sneezing and bowel movements    Treatments that have been helpful include surgery , prior injections including LESI, physical therapy, chiropractic manipulation, home exercise and heat/ice  Medications to relieve symptoms include flexeril  Review of Systems:    Review of Systems   Constitutional: Negative for chills, fatigue and fever  HENT: Positive for hearing loss  Negative for sinus pain, sore throat and trouble swallowing  Eyes: Negative for pain and visual disturbance  Respiratory: Negative for shortness of breath and wheezing  Cardiovascular: Negative for chest pain and palpitations  Gastrointestinal: Negative for abdominal pain, constipation and nausea  Endocrine: Positive for polyuria  Negative for polydipsia  Genitourinary: Negative for difficulty urinating  Musculoskeletal: Positive for myalgias  Negative for arthralgias, gait problem and joint swelling  Joint pain   Skin: Negative for rash  Neurological: Negative for dizziness, weakness and headaches  Hematological: Does not bruise/bleed easily  Psychiatric/Behavioral: Negative for dysphoric mood  The patient is not nervous/anxious  All other systems reviewed and are negative          Past Medical History:   Diagnosis Date   • Back pain    • Colon polyp    • ED (erectile dysfunction)    • Hypertension    • Hypothyroidism    • IBS (irritable bowel syndrome)    • Inflammatory bowel disease    • Kidney stones    • Overactive bladder        Past Surgical History:   Procedure Laterality Date   • APPENDECTOMY     • COLONOSCOPY     • HERNIA REPAIR     • PARTIAL HIP ARTHROPLASTY Right    • ROTATOR CUFF REPAIR     • TRIGGER FINGER RELEASE  2021    Left ring finger       Family History   Problem Relation Age of Onset   • Cancer Mother    • Heart disease Father    • Hypertension Father    • Heart disease Sister    • Hypertension Sister    • Diabetes Maternal Grandmother    • Cancer Maternal Grandmother    • Stroke Maternal Aunt        Social History     Occupational History   • Not on file   Tobacco Use   • Smoking status: Former Smoker     Quit date:      Years since quittin 8   • Smokeless tobacco: Former User     Types: Chew   • Tobacco comment: used chew when in [employer] Use   • Vaping Use: Never used   Substance and Sexual Activity   • Alcohol use: Not Currently     Alcohol/week: 5 0 standard drinks     Types: 5 Shots of liquor per week     Comment: recently stopped june 13th 2021   • Drug use: Not Currently     Types: Marijuana, Cocaine, LSD     Comment: stopped in high school - nothing since   • Sexual activity: Not on file         Current Outpatient Medications:   •  albuterol (ProAir HFA) 90 mcg/act inhaler, Inhale 2 puffs every 6 (six) hours as needed for wheezing or shortness of breath, Disp: 8 5 g, Rfl: 0  •  B Complex-C-Folic Acid (B-Complex/Folic Acid/Vitamin C) TBCR, , Disp: , Rfl:   •  bisacodyl (DULCOLAX) 5 mg EC tablet, Use as directed by office staff, Disp: 3 tablet, Rfl: 0  •  Cholecalciferol (D-3-5) 125 MCG (5000 UT) capsule, , Disp: , Rfl:   •  cyanocobalamin (VITAMIN B-12) 1,000 mcg tablet, Vitamin B-12 1,000 mcg tablet  Take by oral route , Disp: , Rfl:   •  cyclobenzaprine (FLEXERIL) 10 mg tablet, Take 1 tablet (10 mg total) by mouth daily at bedtime, Disp: 30 tablet, Rfl: 6  •  Echinacea 400 MG CAPS, , Disp: , Rfl:   •  ezetimibe (ZETIA) 10 mg tablet, Take 1 tablet (10 mg total) by mouth daily, Disp: 90 tablet, Rfl: 0  •  Fiber Adult Gummies 2 g CHEW, , Disp: , Rfl:   •  Flowflex COVID-19 Ag Home Test KIT, , Disp: , Rfl:   •  ibuprofen (MOTRIN) 600 mg tablet, Take 1 tablet (600 mg total) by mouth 2 (two) times a day as needed for mild pain or moderate pain, Disp: 60 tablet, Rfl: 0  •  latanoprost (XALATAN) 0 005 % ophthalmic solution, , Disp: , Rfl:   •  levothyroxine 175 mcg tablet, Take 1 tablet (175 mcg total) by mouth daily, Disp: 90 tablet, Rfl: 0  •  lisinopril (ZESTRIL) 10 mg tablet, Take 1 tablet (10 mg total) by mouth daily, Disp: 90 tablet, Rfl: 0  •  polyethylene glycol (GOLYTELY) 4000 mL solution, Use as directed by office staff, Disp: 4000 mL, Rfl: 0  • predniSONE 10 mg tablet, 30 mg by mouth daily for 3 days, then 20 mg by mouth daily for 3 days, then 10 mg by mouth daily for 3 days, then stop, Disp: 18 tablet, Rfl: 0  •  Probiotic Product (FORTIFY 30 BILLION PROBIOT 50+ PO), , Disp: , Rfl:   •  Riboflavin 400 MG CAPS, , Disp: , Rfl:   •  SUMAtriptan (IMITREX) 50 mg tablet, Take 1 tablet (50 mg total) by mouth once as needed for migraine for up to 1 dose, Disp: 9 tablet, Rfl: 0  •  Zinc 50 MG CAPS, Take 1 capsule by mouth in the morning, Disp: , Rfl:     Allergies   Allergen Reactions   • Levofloxacin Rash     Tolerated Cipro and Avelox (moxifloxasin)  Physical Exam:    /70   Pulse 65   Ht 5' 8" (1 727 m)   Wt 99 1 kg (218 lb 6 4 oz)   BMI 33 21 kg/m²     Constitutional: normal, well developed, well nourished, alert, in no distress and non-toxic and no overt pain behavior  and obese  Eyes: anicteric  HEENT: grossly intact  Neck: supple, symmetric, trachea midline and no masses   Pulmonary:even and unlabored  Cardiovascular:No edema or pitting edema present  Skin:Normal without rashes or lesions and well hydrated  Psychiatric:Mood and affect appropriate  Neurologic:Cranial Nerves II-XII grossly intact  Musculoskeletal:antalgic    Imaging  LUMBAR SPINE     INDICATION:   M54 31: Sciatica, right side        COMPARISON:  None     VIEWS:  XR SPINE LUMBAR MINIMUM 4 VIEWS NON INJURY  Images: 4     FINDINGS:     There are 5 non rib bearing lumbar vertebral bodies       There is no evidence of acute fracture or destructive osseous lesion      Alignment is unremarkable       Intervertebral disc space narrowing at L1-2, L2-3, L5-S1 with endplate osteophytes present      The pedicles appear intact  Facet arthropathy at L4-5 and L5-S1      Atherosclerotic calcifications      IMPRESSION:     Multilevel degenerative changes of lumbar spine  No orders of the defined types were placed in this encounter

## 2022-10-25 NOTE — PATIENT INSTRUCTIONS
Core Strengthening Exercises   WHAT YOU NEED TO KNOW:   What do I need to know about core strengthening exercises? Your core includes the muscles of your lower back, hip, pelvis, and abdomen  Core strengthening exercises help heal and strengthen these muscles  This helps prevent another injury, and keeps your pelvis, spine, and hips in the correct position  What do I need to know about exercise safety? Talk to your healthcare provider before you start an exercise program  A physical therapist can teach you how to do core strengthening exercises safely  Do the exercises on a mat or firm surface  A firm surface will support your spine and prevent low back pain  Do not do these exercises on a bed  Move slowly and smoothly  Avoid fast or jerky motions  Stop if you feel pain  Core exercises should not be painful  Stop if you feel pain  Breathe normally during core exercises  Do not hold your breath  This may cause an increase in blood pressure and prevent muscle strengthening  Your healthcare provider will tell you when to inhale and exhale during the exercise  Begin all of your exercises with abdominal bracing  Abdominal bracing helps warm up your core muscles  You can also practice abdominal bracing throughout the day  Lie on your back with your knees bent and feet flat on the floor  Place your arms in a relaxed position beside your body  Tighten your abdominal muscles  Pull your belly button in and up toward your spine  Hold for 5 seconds  Relax your muscles  Repeat 10 times  How do I perform core strengthening exercises? Your healthcare provider will tell you how often to do these exercises  The provider will also tell you how many repetitions of each exercise you should do  Hold each exercise for 5 seconds or as directed  As you get stronger, increase your hold to 10 to 15 seconds  You can do some of these exercises on a stability ball, or with a weight   Ask your healthcare provider how to use a stability ball or weight for these exercises:  Bridging:  Lie on your back with your knees bent and feet flat on the floor  Rest your arms at your side  Tighten your buttocks, and then lift your hips 1 inch off the floor  Hold for 5 seconds  When you can do this exercise without pain for 10 seconds, increase the distance you lift your hips  A good goal is to be able to lift your hips so that your shoulders, hips, and knees are in a straight line  Dead bug:  Lie on your back with your knees bent and feet flat on the floor  Place your arms in a relaxed position beside your body  Begin with abdominal bracing  Next, raise one leg, keeping your knee bent  Hold for 5 seconds  Repeat with the other leg  When you can do this exercise without pain for 10 to 15 seconds, you may raise one straight leg and hold  Repeat with the other leg  Quadruped:  Place your hands and knees on the floor  Keep your wrists directly below your shoulders and your knees directly below your hips  Pull your belly button in toward your spine  Do not flatten or arch your back  Tighten your abdominal muscles below your belly button  Hold for 5 seconds  When you can do this exercise without pain for 10 to 15 seconds, you may extend one arm and hold  Repeat on the other side  Side bridge exercises:      Standing side bridge:  Stand next to a wall and extend one arm toward the wall  Place your palm flat on the wall with your fingers pointing upward  Begin with abdominal bracing  Next, without moving your feet, slowly bend your arm to 90 degrees  Hold for 5 seconds  Repeat on the other side  When you can do this exercise without pain for 10 to 15 seconds, you may do the bent leg side bridge on the floor  Bent leg side bridge:  Lie on one side with your legs, hips, and shoulders in a straight line  Prop yourself up onto your forearm so your elbow is directly below your shoulder   Bend your knees back to 90 degrees  Begin with abdominal bracing  Next, lift your hips and balance yourself on your forearm and knees  Hold for 5 seconds  Repeat on the other side  When you can do this exercise without pain for 10 to 15 seconds, you may do the straight leg side bridge on the floor  Straight leg side bridge:  Lie on one side with your legs, hips, and shoulders in a straight line  Prop yourself up onto your forearm so your elbow is directly below your shoulder  Begin with abdominal bracing  Lift your hips off the floor and balance yourself on your forearm and the outside of your flexed foot  Do not let your ankle bend sideways  Hold for 5 seconds  Repeat on the other side  When you can do this exercise without pain for 10 to 15 seconds, ask your healthcare provider for more advanced exercises  Superman:  Lie on your stomach  Extend your arms forward on the floor  Tighten your abdominal muscles and lift your right hand and left leg off the floor  Hold this position  Slowly return to the starting position  Tighten your abdominal muscles and lift your left hand and right leg off the floor  Hold this position  Slowly return to the starting position  Clam:  Lie on your side with your knees bent  Put your bottom arm under your head to keep your neck in line  Put your top hand on your hip to keep your pelvis from moving  Put your heels together, and keep them together during this exercise  Slowly raise your top knee toward the ceiling  Then lower your leg so your knees are together  Repeat this exercise 10 times  Then switch sides and do the exercise 10 times with the other leg  Curl up:  Lie on your back with your knees bent and feet flat on the floor  Place your hands, palms down, underneath your lower back  Next, with your elbows on the floor, lift your shoulders and chest 2 to 3 inches off the floor  Keep your head in line with your shoulders  Hold this position   Slowly return to the starting position  Straight leg raises:  Lie on your back with one leg straight  Bend the other knee and place your foot flat on the floor  Tighten your abdominal muscles  Keep your leg straight and slowly lift it straight up 6 to 12 inches off the floor  Hold this position  Lower your leg slowly  Do as many repetitions as directed on this side  Repeat with the other leg  Plank:  Lie on your stomach  Bend your elbows and place your forearms flat on the floor  Lift your chest, stomach, and knees off the floor  Make sure your elbows are below your shoulders  Your body should be in a straight line  Do not let your hips or lower back sink to the ground  Squeeze your abdominal muscles together and hold for 15 seconds  To make this exercise harder, hold for 30 seconds or lift 1 leg at a time  Bicycles:  Lie on your back  Bend both knees and bring them toward your chest  Your calves should be parallel to the floor  Place the palms of your hands on the back of your head  Straighten your right leg and keep it lifted 2 inches off the floor  Raise your head and shoulders off the floor and twist towards your left  Keep your head and shoulders lifted  Bend your right knee while you straighten your left leg  Keep your left leg 2 inches off the floor  Twist your head and chest towards the left leg  Continue to straighten 1 leg at a time and twist        When should I contact my healthcare provider? You have sharp or worsening pain during exercise or at rest     You have questions or concerns about your condition, care, or exercise program     CARE AGREEMENT:   You have the right to help plan your care  Learn about your health condition and how it may be treated  Discuss treatment options with your healthcare providers to decide what care you want to receive  You always have the right to refuse treatment  The above information is an  only   It is not intended as medical advice for individual conditions or treatments  Talk to your doctor, nurse or pharmacist before following any medical regimen to see if it is safe and effective for you  © Copyright [employer] license of [facility] 2022 Information is for End User's use only and may not be sold, redistributed or otherwise used for commercial purposes   All illustrations and images included in CareNotes® are the copyrighted property of [employer]  or [address] [place] [address]

## 2022-10-26 ENCOUNTER — TELEPHONE (OUTPATIENT)
Dept: INTERNAL MEDICINE CLINIC | Facility: CLINIC | Age: 67
End: 2022-10-26

## 2022-10-26 DIAGNOSIS — U07.1 COVID-19: Primary | ICD-10-CM

## 2022-10-26 RX ORDER — NIRMATRELVIR AND RITONAVIR 300-100 MG
3 KIT ORAL 2 TIMES DAILY
Qty: 30 TABLET | Refills: 0 | Status: SHIPPED | OUTPATIENT
Start: 2022-10-26 | End: 2022-10-31

## 2022-10-26 NOTE — TELEPHONE ENCOUNTER
Patient's wife called stating that he just tested positive home COVID test   They are requesting a script for Paxlovid

## 2022-10-31 ENCOUNTER — TELEPHONE (OUTPATIENT)
Dept: INTERNAL MEDICINE CLINIC | Facility: CLINIC | Age: 67
End: 2022-10-31

## 2022-10-31 NOTE — TELEPHONE ENCOUNTER
Jayna Wharton called because he finished up the Paxlovid today but he is still very tired  I informed him that exhaustion can last for a few days  I also informed him that he is still considered "contagious" until Saturday  He stated that he understood

## 2022-11-04 ENCOUNTER — HOSPITAL ENCOUNTER (OUTPATIENT)
Dept: MRI IMAGING | Facility: HOSPITAL | Age: 67
Discharge: HOME | End: 2022-11-04
Attending: ANESTHESIOLOGY

## 2022-11-04 DIAGNOSIS — M51.36 DEGENERATIVE LUMBAR DISC: ICD-10-CM

## 2022-11-04 DIAGNOSIS — M51.16 LUMBAR DISC DISEASE WITH RADICULOPATHY: ICD-10-CM

## 2022-11-28 ENCOUNTER — OFFICE VISIT (OUTPATIENT)
Dept: PAIN MEDICINE | Facility: CLINIC | Age: 67
End: 2022-11-28

## 2022-11-28 VITALS
BODY MASS INDEX: 33.04 KG/M2 | HEIGHT: 68 IN | DIASTOLIC BLOOD PRESSURE: 75 MMHG | SYSTOLIC BLOOD PRESSURE: 127 MMHG | WEIGHT: 218 LBS | HEART RATE: 54 BPM

## 2022-11-28 DIAGNOSIS — M51.36 DEGENERATIVE LUMBAR DISC: ICD-10-CM

## 2022-11-28 DIAGNOSIS — M54.16 LUMBAR RADICULOPATHY: ICD-10-CM

## 2022-11-28 DIAGNOSIS — M47.816 LUMBAR SPONDYLOSIS: ICD-10-CM

## 2022-11-28 DIAGNOSIS — M51.16 LUMBAR DISC DISEASE WITH RADICULOPATHY: ICD-10-CM

## 2022-11-28 DIAGNOSIS — G89.4 CHRONIC PAIN SYNDROME: Primary | ICD-10-CM

## 2022-11-28 RX ORDER — DULOXETIN HYDROCHLORIDE 20 MG/1
20 CAPSULE, DELAYED RELEASE ORAL DAILY
Qty: 30 CAPSULE | Refills: 1 | Status: SHIPPED | OUTPATIENT
Start: 2022-11-28 | End: 2022-12-28

## 2022-11-28 NOTE — PROGRESS NOTES
Assessment:  1  Chronic pain syndrome    2  Lumbar radiculopathy    3  Lumbar spondylosis    4  Degenerative lumbar disc    5  Lumbar disc disease with radiculopathy        Plan:  While the patient was in the office today, I did have a thorough conversation regarding their chronic pain syndrome, medication management, and treatment plan options  Patient is being seen for follow-up visit  He was initially seen here for consultation on 10/25/2022  An MRI of his lumbar spine was ordered  MRI reveals multilevel degenerative changes  There is a small disc protrusion at L2-3 resulting in mild central and mild left lateral recess stenosis  There is a disc bulge at L5-S1 with marginal osteophytes resulting in moderate bilateral foraminal narrowing  MRI results were reviewed with patient during today's visit  He was started on Cymbalta 20 mg daily during his initial consultation  Tells me that the medication has reduced his pain by up to 60%  Patient previously participated in physical therapy and states that he does exercises at home on a regular basis  We discussed the importance of a lifelong commitment to daily exercises geared toward lumbar core stretching and strengthening  The patient was agreeable and verbalized an understanding  Discussed possibility of lumbar epidural steroid injections  Patient does not feel that his pain is bad enough to warrant interventional therapy at this time  We will consider epidural steroid injections in the future if his pain worsens  Patient tells me that he was previously treated at [facility] pain management with epidural steroid injections which were helpful in the past     The patient will follow-up in 2 months for medication prescription refill and reevaluation  The patient was advised to contact the office should their symptoms worsen in the interim  The patient was agreeable and verbalized an understanding  History of Present Illness:   The patient is a 74 y o  male who presents for a follow up office visit in regards to Back Pain and Knee Pain  The patient’s current symptoms include complaints of low back and right leg pain  Pain predominantly radiates down the posterior right thigh only as far as the knee  Patient tells me that prior to taking Cymbalta pain was radiating all the way down to his foot  Current pain level is a 2-3/10  Quality pain is described as dull, aching, sharp, shooting  Current pain medications includes:  Cymbalta 20 mg daily  His rheumatologist prescribes ibuprofen 600 mg which he uses once daily and Flexeril 10 mg which he uses as needed  The patient reports that this regimen is providing up to 60 % pain relief  The patient is reporting no side effects from this pain medication regimen  I have personally reviewed and/or updated the patient's past medical history, past surgical history, family history, social history, current medications, allergies, and vital signs today  Review of Systems  Review of Systems   Constitutional: Negative for fatigue and unexpected weight change  HENT: Negative for dental problem, ear pain, hearing loss and sneezing  Eyes: Negative for visual disturbance  Respiratory: Negative for cough and chest tightness  Cardiovascular: Negative for leg swelling  Gastrointestinal: Negative for anal bleeding  Endocrine: Negative for heat intolerance  Genitourinary: Negative for flank pain and genital sores  Musculoskeletal: Positive for gait problem  Decreased range of motion  Joint stiffness   Skin: Negative for wound  Allergic/Immunologic: Negative for immunocompromised state  Neurological: Negative for speech difficulty and light-headedness  Hematological: Negative for adenopathy  Psychiatric/Behavioral: Negative for confusion  The patient is not hyperactive  All other systems reviewed and are negative          Past Medical History:   Diagnosis Date   • Back pain • Colon polyp    • ED (erectile dysfunction)    • Hypertension    • Hypothyroidism    • IBS (irritable bowel syndrome)    • Inflammatory bowel disease    • Kidney stones    • Overactive bladder        Past Surgical History:   Procedure Laterality Date   • APPENDECTOMY     • COLONOSCOPY     • HERNIA REPAIR     • PARTIAL HIP ARTHROPLASTY Right    • ROTATOR CUFF REPAIR     • TRIGGER FINGER RELEASE  02/2021    Left ring finger       Family History   Problem Relation Age of Onset   • Cancer Mother    • Heart disease Father    • Hypertension Father    • Heart disease Sister    • Hypertension Sister    • Diabetes Maternal Grandmother    • Cancer Maternal Grandmother    • Stroke Maternal Aunt        Social History     Occupational History   • Not on file   Tobacco Use   • Smoking status: Former   • Smokeless tobacco: Former     Types: Chew   • Tobacco comments:     used chew when in [address] [place] [address] [address] Use   • Vaping Use: Never used   Substance and Sexual Activity   • Alcohol use: Not Currently     Alcohol/week: 5 0 standard drinks     Types: 5 Shots of liquor per week     Comment: recently stopped june 13th 2021   • Drug use: Not Currently     Types: Marijuana, Cocaine, LSD     Comment: stopped in high school - nothing since   • Sexual activity: Not on file         Current Outpatient Medications:   •  B Complex-C-Folic Acid (B-Complex/Folic Acid/Vitamin C) TBCR, , Disp: , Rfl:   •  Cholecalciferol (D-3-5) 125 MCG (5000 UT) capsule, , Disp: , Rfl:   •  cyanocobalamin (VITAMIN B-12) 1,000 mcg tablet, Vitamin B-12 1,000 mcg tablet  Take by oral route , Disp: , Rfl:   •  cyclobenzaprine (FLEXERIL) 10 mg tablet, Take 1 tablet (10 mg total) by mouth daily at bedtime, Disp: 30 tablet, Rfl: 6  •  DULoxetine (CYMBALTA) 20 mg capsule, Take 1 capsule (20 mg total) by mouth daily, Disp: 30 capsule, Rfl: 1  •  Echinacea 400 MG CAPS, , Disp: , Rfl:   •  ezetimibe (ZETIA) 10 mg tablet, Take 1 tablet (10 mg total) by mouth daily, Disp: 90 tablet, Rfl: 0  •  Fiber Adult Gummies 2 g CHEW, , Disp: , Rfl:   •  ibuprofen (MOTRIN) 600 mg tablet, Take 1 tablet (600 mg total) by mouth 2 (two) times a day as needed for mild pain or moderate pain, Disp: 60 tablet, Rfl: 6  •  latanoprost (XALATAN) 0 005 % ophthalmic solution, , Disp: , Rfl:   •  levothyroxine 175 mcg tablet, Take 1 tablet (175 mcg total) by mouth daily, Disp: 90 tablet, Rfl: 0  •  lisinopril (ZESTRIL) 10 mg tablet, Take 1 tablet (10 mg total) by mouth daily, Disp: 90 tablet, Rfl: 0  •  Riboflavin 400 MG CAPS, , Disp: , Rfl:   •  SUMAtriptan (IMITREX) 50 mg tablet, Take 1 tablet (50 mg total) by mouth once as needed for migraine for up to 1 dose, Disp: 9 tablet, Rfl: 0  •  Zinc 50 MG CAPS, Take 1 capsule by mouth in the morning, Disp: , Rfl:   •  albuterol (ProAir HFA) 90 mcg/act inhaler, Inhale 2 puffs every 6 (six) hours as needed for wheezing or shortness of breath, Disp: 8 5 g, Rfl: 0  •  bisacodyl (DULCOLAX) 5 mg EC tablet, Use as directed by office staff, Disp: 3 tablet, Rfl: 0  •  Flowflex COVID-19 Ag Home Test KIT, , Disp: , Rfl:   •  polyethylene glycol (GOLYTELY) 4000 mL solution, Use as directed by office staff, Disp: 4000 mL, Rfl: 0  •  predniSONE 10 mg tablet, 30 mg by mouth daily for 3 days, then 20 mg by mouth daily for 3 days, then 10 mg by mouth daily for 3 days, then stop (Patient not taking: Reported on 10/25/2022), Disp: 18 tablet, Rfl: 0  •  Probiotic Product (FORTIFY 30 BILLION PROBIOT 50+ PO), , Disp: , Rfl:     Allergies   Allergen Reactions   • Levofloxacin Rash     Tolerated Cipro and Avelox (moxifloxasin)  Physical Exam:    /75   Pulse (!) 54   Ht 5' 8" (1 727 m)   Wt 98 9 kg (218 lb)   BMI 33 15 kg/m²     Constitutional:normal, well developed, well nourished, alert, in no distress and non-toxic and no overt pain behavior    Eyes:anicteric  HEENT:grossly intact  Neck:supple, symmetric, trachea midline and no masses   Pulmonary:even and unlabored  Cardiovascular:No edema or pitting edema present  Skin:Normal without rashes or lesions and well hydrated  Psychiatric:Mood and affect appropriate  Neurologic:Cranial Nerves II-XII grossly intact  Musculoskeletal:normal    Imaging  No orders to display       No orders of the defined types were placed in this encounter

## 2022-12-06 ENCOUNTER — TELEPHONE (OUTPATIENT)
Dept: NEUROLOGY | Facility: CLINIC | Age: 67
End: 2022-12-06

## 2022-12-13 ENCOUNTER — CONSULT (OUTPATIENT)
Dept: NEUROLOGY | Facility: CLINIC | Age: 67
End: 2022-12-13

## 2022-12-13 VITALS
DIASTOLIC BLOOD PRESSURE: 82 MMHG | WEIGHT: 220.2 LBS | SYSTOLIC BLOOD PRESSURE: 144 MMHG | BODY MASS INDEX: 33.48 KG/M2 | HEART RATE: 60 BPM

## 2022-12-13 DIAGNOSIS — G43.109 MIGRAINE WITH AURA AND WITHOUT STATUS MIGRAINOSUS, NOT INTRACTABLE: Primary | ICD-10-CM

## 2022-12-13 DIAGNOSIS — G47.33 OBSTRUCTIVE SLEEP APNEA (ADULT) (PEDIATRIC): ICD-10-CM

## 2022-12-13 DIAGNOSIS — M51.36 DEGENERATIVE LUMBAR DISC: ICD-10-CM

## 2022-12-13 RX ORDER — DULOXETIN HYDROCHLORIDE 30 MG/1
30 CAPSULE, DELAYED RELEASE ORAL
Qty: 30 CAPSULE | Refills: 4 | Status: SHIPPED | OUTPATIENT
Start: 2022-12-13 | End: 2023-01-12

## 2022-12-13 NOTE — PROGRESS NOTES
[facility] Consult  PATIENT:  Jr López  MRN:  [id number]  :  1955  DATE OF SERVICE:  2022  REFERRED BY: EZEKIEL Christianson  PMD: [id number], KRISTEN    Assessment/Plan:     Jr López is a delightful 74 y o  male with a past medical history that includes IBS, hypothyroidism, hypertension, sciatica, obesity, kidney stones, glaucoma referred here for evaluation of headache  Initial evaluation 2022     Ms Pablo Freeman reports new onset headaches as of this year  Denies any prior history of headaches as a child or growing up  When he was initially referred to the neurology clinic he endorsed a daily headache  He follows with pain management for low back pain and was started on Cymbalta in the interim, which he reports has helped with both his back pain and his headaches  He was on a very low dose at today's visit so I have recommended increasing it slightly to 30 mg at bedtime  Hopefully this will further decrease his current headache frequency  From an abortive standpoint, sumatriptan works really well for him, but makes him drowsy  He was not interested in changing this at this time  On a separate note, he reports a prior history of obstructive sleep apnea, but was never treated for it  I have recommended a repeat sleep study for further evaluation  I have asked him to keep me updated in about 4 to 5 weeks with the increased dose of Cymbalta so we can decide how to proceed  Migraine with aura and without status migrainosus, not intractable  Degenerative lumbar disc  -     DULoxetine (CYMBALTA) 30 mg delayed release capsule; Take 1 capsule (30 mg total) by mouth daily at bedtime    Obstructive sleep apnea (adult) (pediatric)  -     Home Study; Future    Workup:  - MRI brain without contrast 2022: No acute intracranial findings    T2 and flair hyperintensities that are small and scattered suggestive of chronic microangiopathy  -Sleep study pending    Preventative:  - we discussed headache hygiene and lifestyle factors that may improve headaches  - Cymbalta 30mg  - Currently on through other providers:  Lisinopril (HTN), Cymbalta (back pain)  - Past/ failed/contraindicated: Avoid Topamax due to history of kidney stone/glaucoma  - future options: CGRP med, botox    Acute:  - discussed not taking over-the-counter or prescription pain medications more than 3 days per week to prevent medication overuse/rebound headache  - Sumatriptan 50mg  - Currently on through other providers: none  - Past/ failed/contraindicated: None  - future options:  Triptan, prochlorperazine, Toradol IM or p o , could consider trial of 5 days of Depakote 500 mg nightly or dexamethasone 2 mg daily for prolonged migraine, celina Blandon nurtec  Patient instructions   Additional Testing:   Home study    Headache Calendar  Please maintain a headache calendar  Consider using phone applications such as Migraine [employer] or [employer] Migraine Tracker    Headache/migraine treatment:   Acute medications (for immediate treatment of a headache): It is ok to take ibuprofen, acetaminophen or naproxen (Advil, Tylenol,  Aleve, Excedrin) if they help your headaches you should limit these to No more than 2-3 times a week to avoid medication overuse/rebound headaches  For your more moderate to severe migraines take this medication early  Imitrex (Sumatriptan) 50mg tabs - take one at the onset of headache  May repeat one time after 2 hours if pain has not resolved     (Max 2 a day)     Prescription preventive medications for headaches/migraines   (to take every day to help prevent headaches - not to take at the time of headache):  [x] Cymbalta 30mg at bedtime    *Typically these types of medications take time until you see the benefit, although some may see improvement in days, often it may take weeks, especially if the medication is being titrated up to a beneficial level  Please contact us if there are any concerns or questions regarding the medication  Lifestyle Recommendations:  [x] SLEEP - Maintain a regular sleep schedule: Adults need at least 7-8 hours of uninterrupted a night  Maintain good sleep hygiene:  Going to bed and waking up at consistent times, avoiding excessive daytime naps, avoiding caffeinated beverages in the evening, avoid excessive stimulation in the evening and generally using bed primarily for sleeping  One hour before bedtime would recommend turning lights down lower, decreasing your activity (may read quietly, listen to music at a low volume)  When you get into bed, should eliminate all technology (no texting, emailing, playing with your phone, iPad or tablet in bed)  [x] HYDRATION - Maintain good hydration  Drink  2L of fluid a day (4 typical small water bottles)  [x] DIET - Maintain good nutrition  In particular don't skip meals and try and eat healthy balanced meals regularly  [x] TRIGGERS - Look for other triggers and avoid them: Limit caffeine to 1-2 cups a day or less  Avoid dietary triggers that you have noticed bring on your headaches (this could include aged cheese, peanuts, MSG, aspartame and nitrates)  [x] EXERCISE - physical exercise as we all know is good for you in many ways, and not only is good for your heart, but also is beneficial for your mental health, cognitive health and  chronic pain/headaches  I would encourage at the least 5 days of physical exercise weekly for at least 30 minutes  Education and Follow-up  [x] Please call with any questions or concerns  Of course if any new concerning symptoms go to the emergency department  [x] Follow up in 3 months  CC: We had the pleasure of evaluating Tomy Grubbs in neurological consultation today  Tomy Grubbs is a  right handed male who presents today for evaluation of headaches       History obtained from patient as well as available medical record review  History of Present Illness:   Current medical illnesses  or past medical history include IBS, hypothyroidism, hypertension, sciatica, obesity, kidney stones, glaucoma    Headaches started at what age? 74years old  How often do the headaches occur?   - as of 12/13/2022: 12/30 (Severe: none)   What time of the day do the headaches start? No particular time of day  How long do the headaches last? 4-6 hours if severe enough  Are you ever headache free? Yes    Aura? with aura- describes scintillating scotoma      Where is your headache located and pain quality? Occipital with radiation forward; pressure  What is the intensity of pain? Worst 10/10, Average: 4-5/10  Associated symptoms:   [x] Nausea     [x] Diarrhea (unclear if related)  [x] Stiff or sore neck (chronic)   [x] Photophobia  [x] Prefer dark room  [x] Light-headed or dizzy     [x] Tinnitus (chronic)    Things that make the headache worse? No specific movements    Headache triggers: None    Have you seen someone else for headaches or pain? Yes, pain management for low back pain  Have you had trigger point injection performed and how often? No  Have you had Botox injection performed and how often? No   Have you had epidural injections or transforaminal injections performed? Yes  Have you ever had any Brain imaging? yes MRI    Last eye exam: December 2022 - history of glaucoma    What medications do you take or have you taken for your headaches?    ABORTIVE:    OTC medications: Motrin  Prescription: Imitrex (100% relief, drowsy)    Past/ failed/contraindicated:  OTC medications: Tylenol  Prescription: None    PREVENTIVE:   Cymbalta HS, Lisinopril (HTN)    Past/ failed/contraindicated:  None    LIFESTYLE  Sleep   - averages: about 6-7 hours per night  Problems falling asleep?:   No  Problems staying asleep?:  Yes - due to pain  - No snoring  - Reported prior history of RORO  - Lyons sleepiness scale total: 9    Physical activity: nothing scheduled    Water: about 32oz per day  Caffeine: 1-2 cups of coffee per day    Mood:  Denies history of anxiety or depression or other diagnosed mood disorder    The following portions of the patient's history were reviewed and updated as appropriate: allergies, current medications, past family history, past medical history, past social history, past surgical history and problem list     Pertinent family history:  Family history of headaches:  no known family members with significant headaches  Any family history of aneurysms - No    Pertinent social history:  Work: Retired - worked for [employer]  Education: [employer] with wife    Illicit Drugs: denies  Alcohol/tobacco: Denies tobacco use, alcohol intake: social drinker    Past Medical History:     Past Medical History:   Diagnosis Date   • Back pain    • Colon polyp    • ED (erectile dysfunction)    • HL (hearing loss) 2000    Both ears   • Hypertension    • Hypothyroidism    • IBS (irritable bowel syndrome)    • Inflammatory bowel disease    • Kidney stones    • Migraine July 2022    Started when I stopped drinking   • Overactive bladder    • Vision loss july 2022    Occures with Migrain       Patient Active Problem List   Diagnosis   • Erectile dysfunction   • History of kidney stones   • OAB (overactive bladder)   • Hypercholesterolemia   • Hypertension   • Hypothyroidism   • Localized, primary osteoarthritis of shoulder region   • Osteoarthritis of hip   • Coronal hypospadias   • Irritable bowel syndrome with diarrhea   • Obesity (BMI 35 0-39 9 without comorbidity)   • Vitamin D deficiency   • Glaucoma of both eyes   • Nontraumatic incomplete tear of right rotator cuff   • Chronic right-sided low back pain with right-sided sciatica   • Chronic sinusitis   • Allergic rhinitis   • Migraine   • Sciatica of right side   • Chronic pain syndrome   • Lumbar radiculopathy   • Lumbar spondylosis       Medications:      Current Outpatient Medications   Medication Sig Dispense Refill   • B Complex-C-Folic Acid (B-Complex/Folic Acid/Vitamin C) TBCR      • Cholecalciferol (D-3-5) 125 MCG (5000 UT) capsule      • cyanocobalamin (VITAMIN B-12) 1,000 mcg tablet Vitamin B-12 1,000 mcg tablet   Take by oral route       • cyclobenzaprine (FLEXERIL) 10 mg tablet Take 1 tablet (10 mg total) by mouth daily at bedtime 30 tablet 6   • DULoxetine (CYMBALTA) 20 mg capsule Take 1 capsule (20 mg total) by mouth daily 30 capsule 1   • Echinacea 400 MG CAPS      • ezetimibe (ZETIA) 10 mg tablet Take 1 tablet (10 mg total) by mouth daily 90 tablet 0   • Fiber Adult Gummies 2 g CHEW      • ibuprofen (MOTRIN) 600 mg tablet Take 1 tablet (600 mg total) by mouth 2 (two) times a day as needed for mild pain or moderate pain (Patient taking differently: Take 600 mg by mouth daily) 60 tablet 6   • latanoprost (XALATAN) 0 005 % ophthalmic solution      • levothyroxine 175 mcg tablet Take 1 tablet (175 mcg total) by mouth daily 90 tablet 0   • lisinopril (ZESTRIL) 10 mg tablet Take 1 tablet (10 mg total) by mouth daily 90 tablet 0   • Riboflavin 400 MG CAPS      • Zinc 50 MG CAPS Take 1 capsule by mouth in the morning     • albuterol (ProAir HFA) 90 mcg/act inhaler Inhale 2 puffs every 6 (six) hours as needed for wheezing or shortness of breath (Patient not taking: Reported on 12/13/2022) 8 5 g 0   • bisacodyl (DULCOLAX) 5 mg EC tablet Use as directed by office staff (Patient not taking: Reported on 12/13/2022) 3 tablet 0   • Flowflex COVID-19 Ag Home Test KIT  (Patient not taking: Reported on 10/13/2022)     • polyethylene glycol (GOLYTELY) 4000 mL solution Use as directed by office staff (Patient not taking: Reported on 12/13/2022) 4000 mL 0   • predniSONE 10 mg tablet 30 mg by mouth daily for 3 days, then 20 mg by mouth daily for 3 days, then 10 mg by mouth daily for 3 days, then stop (Patient not taking: Reported on 12/13/2022) 18 tablet 0   • Probiotic Product (FORTIFY 30 BILLION PROBIOT 50+ PO) (Patient not taking: Reported on 2022)     • SUMAtriptan (IMITREX) 50 mg tablet Take 1 tablet (50 mg total) by mouth once as needed for migraine for up to 1 dose (Patient not taking: Reported on 2022) 9 tablet 0     No current facility-administered medications for this visit  Allergies: Allergies   Allergen Reactions   • Levofloxacin Rash     Tolerated Cipro and Avelox (moxifloxasin)           Family History:     Family History   Problem Relation Age of Onset   • Cancer Mother    • Heart disease Father    • Hypertension Father    • Heart disease Sister    • Hypertension Sister    • Diabetes Maternal Grandmother    • Cancer Maternal Grandmother    • Stroke Maternal Aunt    • Dementia Maternal Aunt        Social History:       Social History     Socioeconomic History   • Marital status: /Civil Union     Spouse name: Not on file   • Number of children: Not on file   • Years of education: Not on file   • Highest education level: Not on file   Occupational History   • Not on file   Tobacco Use   • Smoking status: Former     Packs/day: 1 50     Years: 50 00     Pack years: 75 00     Types: Cigarettes, Pipe, Cigars     Start date: 1967     Quit date: 2017     Years since quittin 9   • Smokeless tobacco: Never   • Tobacco comments:     Tobacco free   Vaping Use   • Vaping Use: Never used   Substance and Sexual Activity   • Alcohol use: Not Currently     Alcohol/week: 25 0 standard drinks     Types: 25 Shots of liquor per week     Comment: Stopped 2022   • Drug use: Not Currently     Frequency: 1 0 times per week     Types: Cocaine, Hashish, LSD, Marijuana     Comment: Not since High School   • Sexual activity: Not Currently     Partners: Female     Birth control/protection: Male Sterilization   Other Topics Concern   • Not on file   Social History Narrative   • Not on file     Social Determinants of Health     Financial Resource Strain: Not on file   Food Insecurity: Not on file   Transportation Needs: Not on file   Physical Activity: Not on file   Stress: Not on file   Social Connections: Not on file   Intimate Partner Violence: Not on file   Housing Stability: Not on file         Objective:   Physical Exam:                                                                 Vitals:            Constitutional:    /82 (BP Location: Left arm, Patient Position: Sitting, Cuff Size: Adult)   Pulse 60   Wt 99 9 kg (220 lb 3 2 oz)   BMI 33 48 kg/m²   BP Readings from Last 3 Encounters:   12/13/22 144/82   11/28/22 127/75   10/25/22 134/70     Pulse Readings from Last 3 Encounters:   12/13/22 60   11/28/22 (!) 54   10/25/22 65         Well developed, well nourished, well groomed  No dysmorphic features  HEENT:  Normocephalic atraumatic  Oropharynx is clear and moist  No oral mucosal lesions  Chest:  Respirations regular and unlabored  Cardiovascular:  Distal extremities warm without palpable edema or tenderness, no observed significant swelling  Musculoskeletal:  (see below under neurologic exam for evaluation of motor function and gait)   Skin:  warm and dry, not diaphoretic  No apparent birthmarks or stigmata of neurocutaneous disease  Psychiatric:  Normal behavior and appropriate affect       Neurological Examination:     Mental status/cognitive function:   Orientated to time, place and person  Recent and remote memory intact  Attention span and concentration as well as fund of knowledge are appropriate for age  Normal language and spontaneous speech  Cranial Nerves:  II-visual fields full  Fundi poorly visualized due to pupillary constriction  III, IV, VI-Pupils were equal, round, and reactive to light and accomodation  Extraocular movements were full and conjugate without nystagmus  Conjugate gaze, normal smooth pursuits, normal saccades   V-facial sensation symmetric      VII-facial expression symmetric, intact forehead wrinkle, strong eye closure, symmetric smile    VIII-hearing grossly intact bilaterally   IX, X-palate elevation symmetric, no dysarthria  XI-shoulder shrug strength intact    XII-tongue protrusion midline  Motor Exam: symmetric bulk and tone throughout, no pronator drift  Power/strength 5/5 bilateral upper and lower extremities, no atrophy, fasciculations or abnormal movements noted  Sensory: grossly intact light touch in all extremities  Reflexes: brachioradialis 2+, biceps 2+, knee 2+, ankle 2+ bilaterally  No ankle clonus  Coordination: Finger nose finger intact bilaterally, no apparent dysmetria, ataxia or tremor noted  Gait: steady casual and tandem gait  Pertinent lab results: None     Pertinent Imaging:   MRI brain without contrast 9/13/2022: No acute intracranial findings  T2 and flair hyperintensities that are small and scattered suggestive of chronic microangiopathy    I have personally reviewed imaging and radiology read  Review of Systems:   Constitutional: Positive for fatigue  Negative for appetite change and fever  HENT: Positive for tinnitus  Negative for hearing loss, trouble swallowing and voice change  Eyes: Positive for photophobia and visual disturbance (floaters)  Respiratory: Negative  Negative for shortness of breath  Cardiovascular: Negative  Negative for palpitations  Gastrointestinal: Positive for nausea  Negative for vomiting  Endocrine: Negative  Negative for cold intolerance  Genitourinary: Negative  Negative for dysuria, frequency and urgency  Musculoskeletal: Positive for gait problem (balance), neck pain and neck stiffness  Negative for myalgias  Skin: Negative  Negative for rash  Allergic/Immunologic: Negative  Neurological: Positive for dizziness and light-headedness  Negative for tremors, seizures, syncope, facial asymmetry, speech difficulty, weakness, numbness and headaches  Hematological: Negative  Does not bruise/bleed easily     Psychiatric/Behavioral: Positive for sleep disturbance  Negative for confusion and hallucinations  All other systems reviewed and are negative  I have spent 30 minutes with the patient today in which greater than 50% of this time was spent in counseling/coordination of care regarding Diagnostic results, Prognosis, Risks and benefits of tx options and Impressions  I also spent 15 minutes non face to face for this patient the same day       Activity Minutes   Precharting/reviewing 10   Patient care/counseling 30   Postcharting/care coordination 5       Author:  Idris Garcia DO 12/13/2022 9:06 AM

## 2022-12-13 NOTE — PROGRESS NOTES
Review of Systems   Constitutional: Positive for fatigue  Negative for appetite change and fever  HENT: Positive for tinnitus  Negative for hearing loss, trouble swallowing and voice change  Eyes: Positive for photophobia and visual disturbance (floaters)  Respiratory: Negative  Negative for shortness of breath  Cardiovascular: Negative  Negative for palpitations  Gastrointestinal: Positive for nausea  Negative for vomiting  Endocrine: Negative  Negative for cold intolerance  Genitourinary: Negative  Negative for dysuria, frequency and urgency  Musculoskeletal: Positive for gait problem (balance), neck pain and neck stiffness  Negative for myalgias  Skin: Negative  Negative for rash  Allergic/Immunologic: Negative  Neurological: Positive for dizziness and light-headedness  Negative for tremors, seizures, syncope, facial asymmetry, speech difficulty, weakness, numbness and headaches  Hematological: Negative  Does not bruise/bleed easily  Psychiatric/Behavioral: Positive for sleep disturbance  Negative for confusion and hallucinations  All other systems reviewed and are negative

## 2022-12-13 NOTE — PATIENT INSTRUCTIONS
Additional Testing:   Home study    Headache Calendar  Please maintain a headache calendar  Consider using phone applications such as Migraine [employer] or [employer] Migraine Tracker    Headache/migraine treatment:   Acute medications (for immediate treatment of a headache): It is ok to take ibuprofen, acetaminophen or naproxen (Advil, Tylenol,  Aleve, Excedrin) if they help your headaches you should limit these to No more than 2-3 times a week to avoid medication overuse/rebound headaches  For your more moderate to severe migraines take this medication early  Imitrex (Sumatriptan) 50mg tabs - take one at the onset of headache  May repeat one time after 2 hours if pain has not resolved  (Max 2 a day)     Prescription preventive medications for headaches/migraines   (to take every day to help prevent headaches - not to take at the time of headache):  [x] Cymbalta 30mg at bedtime    *Typically these types of medications take time until you see the benefit, although some may see improvement in days, often it may take weeks, especially if the medication is being titrated up to a beneficial level  Please contact us if there are any concerns or questions regarding the medication  Lifestyle Recommendations:  [x] SLEEP - Maintain a regular sleep schedule: Adults need at least 7-8 hours of uninterrupted a night  Maintain good sleep hygiene:  Going to bed and waking up at consistent times, avoiding excessive daytime naps, avoiding caffeinated beverages in the evening, avoid excessive stimulation in the evening and generally using bed primarily for sleeping  One hour before bedtime would recommend turning lights down lower, decreasing your activity (may read quietly, listen to music at a low volume)  When you get into bed, should eliminate all technology (no texting, emailing, playing with your phone, iPad or tablet in bed)  [x] HYDRATION - Maintain good hydration    Drink  2L of fluid a day (4 typical small water bottles)  [x] DIET - Maintain good nutrition  In particular don't skip meals and try and eat healthy balanced meals regularly  [x] TRIGGERS - Look for other triggers and avoid them: Limit caffeine to 1-2 cups a day or less  Avoid dietary triggers that you have noticed bring on your headaches (this could include aged cheese, peanuts, MSG, aspartame and nitrates)  [x] EXERCISE - physical exercise as we all know is good for you in many ways, and not only is good for your heart, but also is beneficial for your mental health, cognitive health and  chronic pain/headaches  I would encourage at the least 5 days of physical exercise weekly for at least 30 minutes  Education and Follow-up  [x] Please call with any questions or concerns  Of course if any new concerning symptoms go to the emergency department    [x] Follow up in 3 months

## 2022-12-19 ENCOUNTER — TELEPHONE (OUTPATIENT)
Dept: SLEEP CENTER | Facility: CLINIC | Age: 67
End: 2022-12-19

## 2022-12-19 NOTE — TELEPHONE ENCOUNTER
----- Message from Emre Eisenberg MD sent at 12/16/2022  2:14 PM EST -----  Approved    ----- Message -----  From: Cheri Porras  Sent: 70/28/7925  11:12 AM EST  To: Sleep Medicine [address] Provider    This Home sleep study needs approval      If approved please sign and return to clerical pool  If denied please include reasons why  Also provide alternative testing if warranted  Please sign and return to clerical pool

## 2022-12-31 DIAGNOSIS — I10 ESSENTIAL HYPERTENSION: ICD-10-CM

## 2022-12-31 DIAGNOSIS — E78.00 HYPERCHOLESTEROLEMIA: ICD-10-CM

## 2023-01-03 RX ORDER — LISINOPRIL 10 MG/1
10 TABLET ORAL DAILY
Qty: 90 TABLET | Refills: 0 | Status: SHIPPED | OUTPATIENT
Start: 2023-01-03

## 2023-01-03 RX ORDER — EZETIMIBE 10 MG/1
10 TABLET ORAL DAILY
Qty: 90 TABLET | Refills: 0 | Status: SHIPPED | OUTPATIENT
Start: 2023-01-03

## 2023-01-05 ENCOUNTER — HOSPITAL ENCOUNTER (OUTPATIENT)
Dept: SLEEP CENTER | Facility: HOSPITAL | Age: 68
Discharge: HOME | End: 2023-01-05
Attending: STUDENT IN AN ORGANIZED HEALTH CARE EDUCATION/TRAINING PROGRAM

## 2023-01-05 DIAGNOSIS — G47.33 OBSTRUCTIVE SLEEP APNEA (ADULT) (PEDIATRIC): ICD-10-CM

## 2023-01-07 NOTE — PROGRESS NOTES
Home Sleep Study Documentation    HOME STUDY DEVICE: Noxturnal no                                           Jaimie G3 yes      Pre-Sleep Home Study:    Set-up and instructions performed by: Safia Dixon performed demonstration for Patient: yes    Return demonstration performed by Patient: yes    Written instructions provided to Patient: yes    Patient signed consent form: yes        Post-Sleep Home Study:    Additional comments by Patient: none    Home Sleep Study Failed:no:    Failure reason: N/A    Reported or Detected: N/A    Scored by:  DAMARIS Munson

## 2023-01-13 ENCOUNTER — TELEPHONE (OUTPATIENT)
Dept: SLEEP CENTER | Facility: CLINIC | Age: 68
End: 2023-01-13

## 2023-01-13 NOTE — TELEPHONE ENCOUNTER
Call placed to patient  Advised home sleep study is resulted and shows moderate to severe RORO (SB-23  8) with significant hypoxia  Patient to call the office to schedule consultation with Dr Milo Bucio

## 2023-01-22 DIAGNOSIS — E03.9 HYPOTHYROIDISM, UNSPECIFIED TYPE: ICD-10-CM

## 2023-01-23 RX ORDER — LEVOTHYROXINE SODIUM 175 UG/1
175 TABLET ORAL DAILY
Qty: 90 TABLET | Refills: 0 | Status: SHIPPED | OUTPATIENT
Start: 2023-01-23

## 2023-01-30 ENCOUNTER — OFFICE VISIT (OUTPATIENT)
Dept: PAIN MEDICINE | Facility: CLINIC | Age: 68
End: 2023-01-30

## 2023-01-30 VITALS
HEIGHT: 68 IN | DIASTOLIC BLOOD PRESSURE: 65 MMHG | HEART RATE: 64 BPM | WEIGHT: 220 LBS | SYSTOLIC BLOOD PRESSURE: 144 MMHG | BODY MASS INDEX: 33.34 KG/M2

## 2023-01-30 DIAGNOSIS — G89.4 CHRONIC PAIN SYNDROME: Primary | ICD-10-CM

## 2023-01-30 DIAGNOSIS — M47.816 LUMBAR SPONDYLOSIS: ICD-10-CM

## 2023-01-30 DIAGNOSIS — G89.29 CHRONIC RIGHT-SIDED LOW BACK PAIN WITH RIGHT-SIDED SCIATICA: ICD-10-CM

## 2023-01-30 DIAGNOSIS — M54.16 LUMBAR RADICULOPATHY: ICD-10-CM

## 2023-01-30 DIAGNOSIS — M54.41 CHRONIC RIGHT-SIDED LOW BACK PAIN WITH RIGHT-SIDED SCIATICA: ICD-10-CM

## 2023-01-30 NOTE — PROGRESS NOTES
Assessment:  1  Chronic pain syndrome    2  Chronic right-sided low back pain with right-sided sciatica    3  Lumbar radiculopathy    4  Lumbar spondylosis        Plan:  While the patient was in the office today, I did have a thorough conversation regarding their chronic pain syndrome, medication management, and treatment plan options  Patient is being seen for a follow-up visit  Overall, his pain is very minimal   His neurologist recently increased the Cymbalta to 30 mg at bedtime  Overall, his back and thigh pain is about 90% improved  His migraine headaches have almost completely resolved with the increased dosage of Cymbalta  We discussed the importance of a lifelong commitment to daily exercises geared toward lumbar core stretching and strengthening  The patient was agreeable and verbalized an understanding  Continue with home exercise program     I discussed with the patient that at this point time he can follow up with our office on an as-needed basis  I did review the patient that if his pain symptoms should change, worsen, and/or if he would experience any new symptoms he would like to be evaluated for, he should give our office a call  The patient was agreeable and verbalized an understanding  My impressions and treatment recommendations were discussed in detail with the patient who verbalized understanding and had no further questions  Discharge instructions were provided  I personally saw and examined the patient and I agree with the above discussed plan of care  No orders of the defined types were placed in this encounter  No orders of the defined types were placed in this encounter  History of Present Illness:  Mando Valentin is a 74 y o  male who presents for a follow up office visit in regards to Follow-up, Back Pain, and Leg Pain  The patient’s current symptoms include complaints of low back and right leg pain  Current pain levels a 1/10    Quality pain is described as dull and aching  Current medications for pain include Cymbalta 30 mg daily  Rheumatology prescribes Flexeril 10 mg which he takes at bedtime if needed  Medications reduce his pain by up to 90%  He denies side effects from his medications  I have personally reviewed and/or updated the patient's past medical history, past surgical history, family history, social history, current medications, allergies, and vital signs today  Review of Systems   Constitutional: Negative  HENT: Negative  Eyes: Negative  Respiratory: Negative  Cardiovascular: Negative  Gastrointestinal: Negative  Endocrine: Negative  Genitourinary: Negative  Musculoskeletal: Negative  Skin: Negative  Allergic/Immunologic: Negative  Neurological: Negative  Hematological: Negative  Psychiatric/Behavioral: Negative          Patient Active Problem List   Diagnosis   • Erectile dysfunction   • History of kidney stones   • OAB (overactive bladder)   • Hypercholesterolemia   • Hypertension   • Hypothyroidism   • Localized, primary osteoarthritis of shoulder region   • Osteoarthritis of hip   • Coronal hypospadias   • Irritable bowel syndrome with diarrhea   • Obesity (BMI 35 0-39 9 without comorbidity)   • Vitamin D deficiency   • Glaucoma of both eyes   • Nontraumatic incomplete tear of right rotator cuff   • Chronic right-sided low back pain with right-sided sciatica   • Chronic sinusitis   • Allergic rhinitis   • Migraine   • Sciatica of right side   • Chronic pain syndrome   • Lumbar radiculopathy   • Lumbar spondylosis   • Obstructive sleep apnea (adult) (pediatric)       Past Medical History:   Diagnosis Date   • Arthritis 2000   • Back pain    • Colon polyp    • ED (erectile dysfunction)    • Headache(784 0)    • HL (hearing loss) 2000    Both ears   • Hypertension    • Hypothyroidism    • IBS (irritable bowel syndrome)    • Inflammatory bowel disease    • Kidney stones    • Migraine July 2022    Started when I stopped drinking   • Osteoarthritis    • Overactive bladder    • Vision loss 2022    Occures with Migrain       Past Surgical History:   Procedure Laterality Date   • APPENDECTOMY     • COLONOSCOPY     • HERNIA REPAIR     • JOINT REPLACEMENT     • ORTHOPEDIC SURGERY     • PARTIAL HIP ARTHROPLASTY Right    • ROTATOR CUFF REPAIR     • TRIGGER FINGER RELEASE  2021    Left ring finger   • TRIGGER POINT INJECTION         Family History   Problem Relation Age of Onset   • Cancer Mother    • Alcohol abuse Mother         Dead   • Heart disease Father    • Hypertension Father    • Early death Father         Heart Attack @ 39   • Heart disease Sister    • Hypertension Sister    • Diabetes Maternal Grandmother    • Cancer Maternal Grandmother    • Stroke Maternal Aunt    • Dementia Maternal Aunt        Social History     Occupational History   • Not on file   Tobacco Use   • Smoking status: Former     Packs/day: 1 50     Years: 50 00     Pack years: 75 00     Types: Cigarettes, Pipe, Cigars     Start date: 1967     Quit date: 2017     Years since quittin 0   • Smokeless tobacco: Never   • Tobacco comments:     Tobacco free   Vaping Use   • Vaping Use: Never used   Substance and Sexual Activity   • Alcohol use: Not Currently     Alcohol/week: 25 0 standard drinks     Types: 25 Shots of liquor per week     Comment: Stopped 2022   • Drug use: Not Currently     Frequency: 1 0 times per week     Types: Cocaine, Hashish, LSD, Marijuana     Comment: Not since High School   • Sexual activity: Not Currently     Partners: Female     Birth control/protection: Male Sterilization       Current Outpatient Medications on File Prior to Visit   Medication Sig   • B Complex-C-Folic Acid (B-Complex/Folic Acid/Vitamin C) TBCR    • Cholecalciferol (D-3-5) 125 MCG (5000 UT) capsule    • cyanocobalamin (VITAMIN B-12) 1,000 mcg tablet Vitamin B-12 1,000 mcg tablet   Take by oral route     • cyclobenzaprine (FLEXERIL) 10 mg tablet Take 1 tablet (10 mg total) by mouth daily at bedtime   • DULoxetine (CYMBALTA) 30 mg delayed release capsule Take 1 capsule (30 mg total) by mouth daily at bedtime   • Echinacea 400 MG CAPS    • ezetimibe (ZETIA) 10 mg tablet Take 1 tablet (10 mg total) by mouth daily   • Fiber Adult Gummies 2 g CHEW    • ibuprofen (MOTRIN) 600 mg tablet Take 1 tablet (600 mg total) by mouth 2 (two) times a day as needed for mild pain or moderate pain (Patient taking differently: Take 600 mg by mouth daily)   • latanoprost (XALATAN) 0 005 % ophthalmic solution    • levothyroxine 175 mcg tablet Take 1 tablet (175 mcg total) by mouth daily   • lisinopril (ZESTRIL) 10 mg tablet Take 1 tablet (10 mg total) by mouth daily   • Riboflavin 400 MG CAPS    • SUMAtriptan (IMITREX) 50 mg tablet Take 1 tablet (50 mg total) by mouth once as needed for migraine for up to 1 dose   • Zinc 50 MG CAPS Take 1 capsule by mouth in the morning     No current facility-administered medications on file prior to visit  Allergies   Allergen Reactions   • Levofloxacin Rash     Tolerated Cipro and Avelox (moxifloxasin)  Physical Exam:    /65 (BP Location: Left arm, Patient Position: Sitting, Cuff Size: Adult)   Pulse 64   Ht 5' 8" (1 727 m)   Wt 99 8 kg (220 lb)   BMI 33 45 kg/m²     Constitutional:normal, well developed, well nourished, alert, in no distress and non-toxic and no overt pain behavior    Eyes:anicteric  HEENT:grossly intact  Neck:supple, symmetric, trachea midline and no masses   Pulmonary:even and unlabored  Cardiovascular:No edema or pitting edema present  Skin:Normal without rashes or lesions and well hydrated  Psychiatric:Mood and affect appropriate  Neurologic:Cranial Nerves II-XII grossly intact  Musculoskeletal:normal    Imaging

## 2023-02-08 ENCOUNTER — OFFICE VISIT (OUTPATIENT)
Dept: SLEEP CENTER | Facility: CLINIC | Age: 68
End: 2023-02-08

## 2023-02-08 VITALS
WEIGHT: 220.5 LBS | BODY MASS INDEX: 33.42 KG/M2 | HEART RATE: 82 BPM | SYSTOLIC BLOOD PRESSURE: 126 MMHG | DIASTOLIC BLOOD PRESSURE: 72 MMHG | HEIGHT: 68 IN

## 2023-02-08 DIAGNOSIS — H40.9 GLAUCOMA OF BOTH EYES, UNSPECIFIED GLAUCOMA TYPE: ICD-10-CM

## 2023-02-08 DIAGNOSIS — I10 PRIMARY HYPERTENSION: ICD-10-CM

## 2023-02-08 DIAGNOSIS — E66.9 OBESITY (BMI 30-39.9): ICD-10-CM

## 2023-02-08 DIAGNOSIS — G47.33 OBSTRUCTIVE SLEEP APNEA (ADULT) (PEDIATRIC): Primary | ICD-10-CM

## 2023-02-08 DIAGNOSIS — J32.9 CHRONIC SINUSITIS, UNSPECIFIED LOCATION: ICD-10-CM

## 2023-02-08 DIAGNOSIS — G89.29 CHRONIC RIGHT-SIDED LOW BACK PAIN WITH RIGHT-SIDED SCIATICA: ICD-10-CM

## 2023-02-08 DIAGNOSIS — G89.4 CHRONIC PAIN SYNDROME: ICD-10-CM

## 2023-02-08 DIAGNOSIS — M54.41 CHRONIC RIGHT-SIDED LOW BACK PAIN WITH RIGHT-SIDED SCIATICA: ICD-10-CM

## 2023-02-08 NOTE — PROGRESS NOTES
Consultation - Lesley HU  66   74 y o  male  MTW:[birth year]  AAY:[id number]  DOS:2/8/2023    Physician Requesting Consult: [id number], [address]             Reason for Consult : At your kind request I saw Pollo Diaz for initial sleep evaluation today  Home sleep testing was undertaken to evaluate for sleep disordered breathing and patient is here to review results and further options  The study demonstrated: (SB) of 23 8  The lowest SpO2 recorded is 83% and 29 8 minutes during the study was spent with saturations below 90%  The snore index was 9 3%  PFSH, Problem List, Medications & Allergies were reviewed in EMR  Neri Rainey  has a past medical history of Arthritis (2000), Back pain, Colon polyp, ED (erectile dysfunction), Headache(784 0), HL (hearing loss) (2000), Hypertension, Hypothyroidism, IBS (irritable bowel syndrome), Inflammatory bowel disease, Kidney stones, Migraine (July 2022), Osteoarthritis, Overactive bladder, and Vision loss (july 2022)  He has a current medication list which includes the following prescription(s): b-complex/folic acid/vitamin c, J-6-7, vitamin b-12, cyclobenzaprine, echinacea, ezetimibe, fiber adult gummies, ibuprofen, latanoprost, levothyroxine, lisinopril, riboflavin, sumatriptan, zinc, and duloxetine  HPI: He is study was undertaken because of frequent migraine headaches that was occurring almost daily but had virtually resolved since he was started on Cymbalta for radicular symptoms  However, he also notes his wife has witnessed breathing pauses during sleep of several years duration  He is not aware of snoring or breathing difficulties during sleep  Other Complaints: Tired irrespective of sleep  Restless Leg Syndrome: reports no suggestive symptoms; was experiencing back pain and radicular symptoms that have improved on Cymbalta      Parasomnia: reports teeth grinding during sleep;   Sleep Routine (on average): Typical Bedtime: 8 to 9 PM gets OOB: 5 AM TIB:7-8 hrs  Sleep latency:< 15 minutes Sleep Interruptions:3-4/nite [not always sure of the cause and at times may struggle to fall back asleep]  Awakens: by disturbance from pets   Upon awakening: never feels rested  Yahir reports excessive daytime sleepiness [takes planned naps] for up to an hour  He rated [himself] at Total score: 13 /24 on the [place] Sleepiness Scale  Habits:   reports that he quit smoking about 6 years ago  His smoking use included cigarettes, pipe, and cigars  He started smoking about 56 years ago  He has a 75 00 pack-year smoking history  He has never used smokeless tobacco ;  reports that he does not currently use alcohol after a past usage of about 25 0 standard drinks per week ;[ reports that he does not currently use drugs after having used the following drugs: Cocaine, Hashish, LSD, and Marijuana  Frequency: 1 00 time per week  ];[  E-Cigarette/Vaping   • E-Cigarette Use Never User    ]; Caffeine use:limited; Exercise routine: "not enough"  Family History: Negative for sleep disturbance  ROS: Significant for weight has been stable  Nonnie Senters EXAM:  /72 (BP Location: Right arm, Patient Position: Sitting, Cuff Size: Large)   Pulse 82   Ht 5' 8" (1 727 m)   Wt 100 kg (220 lb 8 oz)   BMI 33 53 kg/m²    General: Well groomed male, well appearing, in no apparent distress  Neurological: Alert and orientated; cooperative; Cranial nerves intact;    Psychiatric: Speech: Clear and coherent; normal mood, affect & thought   Skin: Warm and dry; Color& Hydration good; no facial rashes or lesions   HEENT:  Craniofacial anatomy: normal Sinuses: Non-tender  TMJ: Normal   Eyes: EOM's intact; conjunctiva/corneas clear   Ears: Externally appear normal     Nasal Airway: is patent Septum: Intact; Mucosa: Normal; Turbinates: Normal; Rhinorrhea: None  Mouth: Lips: Normal posture; Dentition: missing several, worn down and irregular   Mucosa: Moist; Hard Palate:normal Oropharryx: crowded and AP narrowing Tongue: Mallampati:Class IV and MobileSoft Palate:  redundant  Tonsils: absent  Neck:[is thick and excess fatty tissue;] [  ";] Supple; no abnormal masses; Thyroid: Normal  Trachea: Central     Lymph: No cervical or submandibular Lymhadenopathy  Heart: S1,S2 normal; RRR; no gallop; no murmur s  Lungs: Respiratory Effort: Normal  Air entry good bilaterally  No wheezes  No rales  Abdomen: Obese, soft & non-tender    Extremities: No pedal edema  No clubbing or cyanosis  Musculoskeletal:  Motor normal; Gait: Normal        IMPRESSION: Primary/Secondary Sleep Diagnoses (to Medical or Psych conditions) & Comorbidities   1  Obstructive sleep apnea (adult) (pediatric)  CPAP Study      2  Chronic sinusitis, unspecified location        3  Primary hypertension        4  Chronic right-sided low back pain with right-sided sciatica        5  Chronic pain syndrome        6  Glaucoma of both eyes, unspecified glaucoma type        7  Obesity (BMI 30-39  9)             PLAN:  1  I reviewed results of the Sleep study with the patient  2  With respect to above conditions, I counseled on pathophysiology, diagnosis, treatment options, risks and benefits; inter-relationship and effects on symptoms and comorbidities; risks of no treatment; costs and insurance aspects  3  Patient elected positive airway pressure therapy and is to be scheduled for a titration study  4  I also advised on weight reduction  5  Follow-up to be scheduled after the study to review results and to initiate therapy  Sincerely,      Authenticated electronically on 64/72/21   Board Certified Specialist     Portions of the record may have been created with voice recognition software  Occasional wrong word or "sound a like" substitutions may have occurred due to the inherent limitations of voice recognition software  There may also be notations and random deletions of words or characters from malfunctioning software  Read the chart carefully and recognize, using context, where substitutions/deletions have occurred

## 2023-02-08 NOTE — PATIENT INSTRUCTIONS
What is RORO? Obstructive sleep apnea is a common and serious sleep disorder that causes you to stop breathing during sleep  The airway repeatedly becomes blocked, limiting the amount of air that reaches your lungs  When this happens, you may snore loudly or making choking noises as you try to breathe  Your brain and body becomes oxygen deprived and you may wake up  This may happen a few times a night, or in more severe cases, several hundred times a night  Sleep apnea can make you wake up in the morning feeling tired or unrefreshed even though you have had a full night of sleep  During the day, you may feel fatigued, have difficulty concentrating or you may even unintentionally fall asleep  This is because your body is waking up numerous times throughout the night, even though you might not be conscious of each awakening  The lack of oxygen your body receives can have negative long-term consequences for your health  This includes:  High blood pressure  Heart disease  Irregular heart rhythms  Stroke  Pre-diabetes and diabetes  Depression  Testing  An objective evaluation of your sleep may be needed before your board certified sleep physician can make a diagnosis  Options include:   In-lab overnight sleep study  This type of sleep study requires you to stay overnight at a sleep center, in a bed that may resemble a hotel room  You will sleep with sensors hooked up to various parts of your body  These sensors record your brain waves, heartbeat, breathing and movement  An overnight sleep study also provides your doctor with the most complete information about your sleep  Learn more about an overnight sleep study  Home sleep apnea test  Some patients with high risk factors for obstructive sleep apnea and no other medical disorders may be candidates for a home sleep apnea test  The testing equipment differs in that it is less complicated than what is used in an overnight sleep study   As such, does not give all the data an in-lab will and if negative, may not mean you do not have the problem  Treatment for sleep apnea includes using a continuous positive airway pressure (CPAP) machine to keep your airway open during sleep  A mask is placed over your nose and mouth, or just your nose  The mask is hooked to the CPAP machine that blows a gentle stream of air into the mask when you breathe  This helps keep your airway open so you can breathe more regularly  Extra oxygen may be given to you through the machine  You may be given a mouth device  It looks like a mouth guard or dental retainer and stops your tongue and mouth tissues from blocking your throat while you sleep  Surgery may be needed to remove extra tissues that block your mouth, throat, or nose  Manage sleep apnea:   Do not smoke  Nicotine and other chemicals in cigarettes and cigars can cause lung damage  Ask your healthcare provider for information if you currently smoke and need help to quit  E-cigarettes or smokeless tobacco still contain nicotine  Talk to your healthcare provider before you use these products  Do not drink alcohol or take sedative medicine before you go to sleep  Alcohol and sedatives can relax the muscles and tissues around your throat  This can block the airflow to your lungs  Maintain a healthy weight  Excess tissue around your throat may restrict your breathing  Ask your healthcare provider for information if you need to lose weight  Sleep on your side or use pillows designed to prevent sleep apnea  This prevents your tongue or other tissues from blocking your throat  You can also raise the head of your bed  Driving Safety  Refrain from driving when drowsy  Follow up with your healthcare provider as directed: Write down your questions so you remember to ask them during your visits  Go to AASM website for more information: Sleepeducation  org  What is RORO?    Obstructive sleep apnea is a common and serious sleep disorder that causes you to stop breathing during sleep  The airway repeatedly becomes blocked, limiting the amount of air that reaches your lungs  When this happens, you may snore loudly or making choking noises as you try to breathe  Your brain and body becomes oxygen deprived and you may wake up  This may happen a few times a night, or in more severe cases, several hundred times a night  Sleep apnea can make you wake up in the morning feeling tired or unrefreshed even though you have had a full night of sleep  During the day, you may feel fatigued, have difficulty concentrating or you may even unintentionally fall asleep  This is because your body is waking up numerous times throughout the night, even though you might not be conscious of each awakening  The lack of oxygen your body receives can have negative long-term consequences for your health  This includes:  High blood pressure  Heart disease  Irregular heart rhythms  Stroke  Pre-diabetes and diabetes  Depression  Testing  An objective evaluation of your sleep may be needed before your board certified sleep physician can make a diagnosis  Options include:   In-lab overnight sleep study  This type of sleep study requires you to stay overnight at a sleep center, in a bed that may resemble a hotel room  You will sleep with sensors hooked up to various parts of your body  These sensors record your brain waves, heartbeat, breathing and movement  An overnight sleep study also provides your doctor with the most complete information about your sleep  Learn more about an overnight sleep study  Home sleep apnea test  Some patients with high risk factors for obstructive sleep apnea and no other medical disorders may be candidates for a home sleep apnea test  The testing equipment differs in that it is less complicated than what is used in an overnight sleep study  As such, does not give all the data an in-lab will and if negative, may not mean you do not have the problem    Treatment for sleep apnea includes using a continuous positive airway pressure (CPAP) machine to keep your airway open during sleep  A mask is placed over your nose and mouth, or just your nose  The mask is hooked to the CPAP machine that blows a gentle stream of air into the mask when you breathe  This helps keep your airway open so you can breathe more regularly  Extra oxygen may be given to you through the machine  You may be given a mouth device  It looks like a mouth guard or dental retainer and stops your tongue and mouth tissues from blocking your throat while you sleep  Surgery may be needed to remove extra tissues that block your mouth, throat, or nose  Manage sleep apnea:   Do not smoke  Nicotine and other chemicals in cigarettes and cigars can cause lung damage  Ask your healthcare provider for information if you currently smoke and need help to quit  E-cigarettes or smokeless tobacco still contain nicotine  Talk to your healthcare provider before you use these products  Do not drink alcohol or take sedative medicine before you go to sleep  Alcohol and sedatives can relax the muscles and tissues around your throat  This can block the airflow to your lungs  Maintain a healthy weight  Excess tissue around your throat may restrict your breathing  Ask your healthcare provider for information if you need to lose weight  Sleep on your side or use pillows designed to prevent sleep apnea  This prevents your tongue or other tissues from blocking your throat  You can also raise the head of your bed  Driving Safety  Refrain from driving when drowsy  Follow up with your healthcare provider as directed: Write down your questions so you remember to ask them during your visits  Go to AAS website for more information: Sleepeducation  org  Nursing Support:  When: Monday through Friday 7A-5PM except holidays  Where: Our direct line is [phone]  If you are having a true emergency please call 911    In the event that the line is busy or it is after hours please leave a voice message and we will return your call  Please speak clearly, leaving your full name, birth date, best number to reach you and the reason for your call  Medication refills: We will need the name of the medication, the dosage, the ordering provider, whether you get a 30 or 90 day refill, and the pharmacy name and address  Medications will be ordered by the provider only  Nurses cannot call in prescriptions  Please allow 7 days for medication refills  Physician requested updates: If your provider requested that you call with an update after starting medication, please be ready to provide us the medication and dosage, what time you take your medication, the time you attempt to fall asleep, time you fall asleep, when you wake up, and what time you get out of bed  Sleep Study Results: We will contact you with sleep study results and/or next steps after the physician has reviewed your testing

## 2023-02-08 NOTE — Clinical Note
This patient needs a titration study and is available at short notice    His preference would be to do the study in [facility] but is also willing to come to [facility]

## 2023-03-13 ENCOUNTER — HOSPITAL ENCOUNTER (OUTPATIENT)
Dept: SLEEP CENTER | Facility: HOSPITAL | Age: 68
Discharge: HOME | End: 2023-03-13
Attending: INTERNAL MEDICINE

## 2023-03-13 DIAGNOSIS — G47.33 OBSTRUCTIVE SLEEP APNEA (ADULT) (PEDIATRIC): ICD-10-CM

## 2023-03-14 DIAGNOSIS — G47.33 OSA (OBSTRUCTIVE SLEEP APNEA): Primary | ICD-10-CM

## 2023-03-14 NOTE — PROGRESS NOTES
Sleep Study Documentation    Pre-Sleep Study       Sleep testing procedure explained to patient:YES    Patient napped prior to study:YES- less than 30 minutes  Napped after 2PM: no    Caffeine:Dayshift worker after 12PM   Caffeine use:NO    Alcohol:Dayshift workers after 5PM: Alcohol use:NO    Typical day for patient:YES       Study Documentation    Sleep Study Indications: The patient is here for a CPAP titration  He had a homestudy done that resulted in an SB of 23 8 consisting of hypopneas and obstructive apneas  Sleep Study: Treatment   Optimal PAP pressure: 14cm  Leak:Small  Snore:Eliminated  REM Obtained:yes  Supplemental O2: no    Minimum SaO2 90  Baseline SaO2 94  PAP mask tried (list all)ResMed AirFit F20, ResMed AirFit N20  PAP mask choice (final)ResMed AirFit F20 size medium  PAP mask type:full face  PAP pressure at which snoring was eliminated 5cm  Mode of Therapy:CPAP  ETCO2:No  CPAP changed to BiPAP:No    Mode of Therapy:CPAP    EKG abnormalities: no     EEG abnormalities: no    Sleep Study Recorded < 2 hours: N/A    Sleep Study Recorded > 2 hours but incomplete study: N/A    Sleep Study Recorded 6 hours but no sleep obtained: NO    Patient classification: employed       Post-Sleep Study    Medication used at bedtime or during sleep study:NO    Patient reports time it took to fall asleep:20 to 30 minutes    Patient reports waking up during study:1 to 2 times  Patient reports returning to sleep in 10 to 30 minutes  Patient reports sleeping 6 to 8 hours with dreaming  Patient reports sleep during study:better than usual    Patient rated sleepiness: Somewhat sleepy or tired    PAP treatment:yes: Post PAP treatment patient reports feeling better and  would wear PAP mask at home

## 2023-03-21 ENCOUNTER — TELEPHONE (OUTPATIENT)
Dept: SLEEP CENTER | Facility: CLINIC | Age: 68
End: 2023-03-21

## 2023-03-21 NOTE — TELEPHONE ENCOUNTER
Call placed to patient  Advised CPAP titration study is resulted and Dr Nicho Hare has ordered APAP  DME setup scheduled 4/6/2023 in [place]    Compliance follow-up scheduled 7/24/2023 with Dr Nicho Hare in [place]  Patient added to the cancellation list for a sooner appointment date within the compliance window (5/8/23-7/5/23)  Patient would prefer to see Dr Nicho Hare in the [place] office  Rx for CPAP and clinicals sent to [facility] via [place]

## 2023-03-22 LAB

## 2023-03-30 LAB
DME PARACHUTE DELIVERY DATE EXPECTED: NORMAL
DME PARACHUTE DELIVERY DATE REQUESTED: NORMAL
DME PARACHUTE DELIVERY NOTE: NORMAL
DME PARACHUTE ITEM DESCRIPTION: NORMAL
DME PARACHUTE ORDER STATUS: NORMAL
DME PARACHUTE SUPPLIER NAME: NORMAL
DME PARACHUTE SUPPLIER PHONE: NORMAL

## 2023-04-06 LAB

## 2023-05-15 DIAGNOSIS — G43.109 MIGRAINE WITH AURA AND WITHOUT STATUS MIGRAINOSUS, NOT INTRACTABLE: ICD-10-CM

## 2023-05-15 DIAGNOSIS — M51.36 DEGENERATIVE LUMBAR DISC: ICD-10-CM

## 2023-05-15 RX ORDER — DULOXETIN HYDROCHLORIDE 30 MG/1
30 CAPSULE, DELAYED RELEASE ORAL
Qty: 30 CAPSULE | Refills: 6 | Status: SHIPPED | OUTPATIENT
Start: 2023-05-15 | End: 2023-06-14

## 2023-05-24 ENCOUNTER — TELEPHONE (OUTPATIENT)
Dept: NEUROLOGY | Facility: CLINIC | Age: 68
End: 2023-05-24

## 2023-05-30 ENCOUNTER — OFFICE VISIT (OUTPATIENT)
Dept: NEUROLOGY | Facility: CLINIC | Age: 68
End: 2023-05-30

## 2023-05-30 VITALS
HEIGHT: 68 IN | HEART RATE: 79 BPM | DIASTOLIC BLOOD PRESSURE: 68 MMHG | BODY MASS INDEX: 34.28 KG/M2 | SYSTOLIC BLOOD PRESSURE: 130 MMHG | TEMPERATURE: 98 F | WEIGHT: 226.2 LBS | OXYGEN SATURATION: 97 %

## 2023-05-30 DIAGNOSIS — G47.33 OBSTRUCTIVE SLEEP APNEA (ADULT) (PEDIATRIC): ICD-10-CM

## 2023-05-30 DIAGNOSIS — G43.109 MIGRAINE WITH AURA AND WITHOUT STATUS MIGRAINOSUS, NOT INTRACTABLE: Primary | ICD-10-CM

## 2023-05-30 DIAGNOSIS — M51.36 DEGENERATIVE LUMBAR DISC: ICD-10-CM

## 2023-05-30 NOTE — PROGRESS NOTES
Review of Systems   Constitutional: Negative  Negative for appetite change and fever  HENT: Negative  Negative for hearing loss, tinnitus, trouble swallowing and voice change  Eyes: Negative  Negative for photophobia, pain and visual disturbance  Respiratory: Negative  Negative for shortness of breath  Cardiovascular: Negative  Negative for palpitations  Gastrointestinal: Negative  Negative for nausea and vomiting  Endocrine: Negative  Negative for cold intolerance  Genitourinary: Negative  Negative for dysuria, frequency and urgency  Musculoskeletal: Negative  Negative for gait problem, myalgias and neck pain  Skin: Negative  Negative for rash  Allergic/Immunologic: Negative  Neurological: Negative  Negative for dizziness, tremors, seizures, syncope, facial asymmetry, speech difficulty, weakness, light-headedness, numbness and headaches  Hematological: Negative  Does not bruise/bleed easily  Psychiatric/Behavioral: Negative  Negative for confusion, hallucinations and sleep disturbance  All other systems reviewed and are negative  Since your last visit are your headaches improved    Any change to the headache type? no    What is your current headache frequency: none since starting 30 mg Cymbalta     Are you taking your current medications as prescribed? yes    If no, why not? Do you have any side effects?  drowsiness    How may days per week do you take an abortive medicine? 0

## 2023-05-30 NOTE — PROGRESS NOTES
[facility] Consult - Follow up   PATIENT:  Maurilio Sosa  MRN:  [id number]  :  1955  DATE OF SERVICE:  2023  REFERRED BY: No ref  provider found  PMD: KRISTEN Correa    Assessment/Plan:   Maurilio Sosa is a delightful 74 y o  male with a past medical history that includes IBS, hypothyroidism, hypertension, sciatica, obesity, kidney stones, glaucoma here for f/u evaluation of headache  Since his last visit, he has been doing exceptionally well with the increase in Cymbalta  He is currently taking 30 mg daily without any side effects and reports no headaches since the increase to this dosage  He also underwent a sleep study which showed evidence of moderate to severe obstructive sleep apnea and has been using his CPAP nightly with improvements in daytime fatigue  I have encouraged him to continue following up with the sleep medicine department for treatment of this  Workup:  - MRI brain without contrast 2022: No acute intracranial findings  T2 and flair hyperintensities that are small and scattered suggestive of chronic microangiopathy  - Sleep study 2023:  Moderate to severe obstructive sleep apnea with significant hypoxic burden     Preventative:  - we discussed headache hygiene and lifestyle factors that may improve headaches  - Cymbalta 30mg  - Currently on through other providers:  Lisinopril (HTN), Cymbalta (back pain)  - Past/ failed/contraindicated: Avoid Topamax due to history of kidney stone/glaucoma  - future options: CGRP med, botox     Acute:  - discussed not taking over-the-counter or prescription pain medications more than 2-3 days per week to prevent medication overuse/rebound headache  - Sumatriptan 50mg  - Currently on through other providers: none  - Past/ failed/contraindicated: None  - future options:  Triptan, prochlorperazine, Toradol IM or p o , could consider trial of 5 days of Depakote 500 mg nightly or dexamethasone 2 mg daily for prolonged migraine, celina Enriquez nurtec  Patient instructions   Headache Calendar  Please maintain a headache calendar  Consider using phone applications such as Migraine Mayur or [place] Migraine Tracker     Headache/migraine treatment:   Acute medications (for immediate treatment of a headache): It is ok to take ibuprofen, acetaminophen or naproxen (Advil, Tylenol,  Aleve, Excedrin) if they help your headaches you should limit these to No more than 2-3 times a week to avoid medication overuse/rebound headaches       For your more moderate to severe migraines take this medication early  Imitrex (Sumatriptan) 50mg tabs - take one at the onset of headache  May repeat one time after 2 hours if pain has not resolved  (Max 2 a day)      Prescription preventive medications for headaches/migraines   (to take every day to help prevent headaches - not to take at the time of headache):  [x]? Cymbalta 30mg daily      Lifestyle Recommendations:  [x]? SLEEP - Maintain a regular sleep schedule: Adults need at least 7-8 hours of uninterrupted a night  Maintain good sleep hygiene:  Going to bed and waking up at consistent times, avoiding excessive daytime naps, avoiding caffeinated beverages in the evening, avoid excessive stimulation in the evening and generally using bed primarily for sleeping  One hour before bedtime would recommend turning lights down lower, decreasing your activity (may read quietly, listen to music at a low volume)  When you get into bed, should eliminate all technology (no texting, emailing, playing with your phone, iPad or tablet in bed)  [x]? HYDRATION - Maintain good hydration  Drink  2L of fluid a day (4 typical small water bottles)  [x]? DIET - Maintain good nutrition  In particular don't skip meals and try and eat healthy balanced meals regularly  [x]? TRIGGERS - Look for other triggers and avoid them: Limit caffeine to 1-2 cups a day or less   Avoid dietary triggers that you have noticed bring on your headaches (this could include aged cheese, peanuts, MSG, aspartame and nitrates)  [x]? EXERCISE - physical exercise as we all know is good for you in many ways, and not only is good for your heart, but also is beneficial for your mental health, cognitive health and  chronic pain/headaches  I would encourage at the least 5 days of physical exercise weekly for at least 30 minutes       Education and Follow-up  [x]? Please call with any questions or concerns  Of course if any new concerning symptoms go to the emergency department  [x]? Follow up in 6 months  Subjective:   5/30/23: Since his last visit, he underwent a sleep study which showed moderate to severe obstructive sleep apnea and was evaluated by sleep medicine for CPAP treatment  At todays visit, he reports that his headaches have improved since we increased the Cymbalta to 30mg daily  Denies any side effects  He reports that he is using his CPAP nightly and is less tired during the day  He has not yet had to use Sumatriptan since the increase in Cymbalta  Previous History:  12/13/22: Ms Tashia Chavez reports new onset headaches as of this year  Denies any prior history of headaches as a child or growing up  When he was initially referred to the neurology clinic he endorsed a daily headache  He follows with pain management for low back pain and was started on Cymbalta in the interim, which he reports has helped with both his back pain and his headaches  He was on a very low dose at today's visit so I have recommended increasing it slightly to 30 mg at bedtime  Hopefully this will further decrease his current headache frequency  From an abortive standpoint, sumatriptan works really well for him, but makes him drowsy  He was not interested in changing this at this time  On a separate note, he reports a prior history of obstructive sleep apnea, but was never treated for it    I have recommended a repeat sleep study for further evaluation  I have asked him to keep me updated in about 4 to 5 weeks with the increased dose of Cymbalta so we can decide how to proceed  Past Medical History:     Past Medical History:   Diagnosis Date   • Arthritis 2000   • Back pain    • Colon polyp    • ED (erectile dysfunction)    • Headache(784 0)    • HL (hearing loss) 2000    Both ears   • Hypertension    • Hypothyroidism    • IBS (irritable bowel syndrome)    • Inflammatory bowel disease    • Kidney stones    • Migraine July 2022    Started when I stopped drinking   • Osteoarthritis    • Overactive bladder    • Vision loss july 2022    Occures with Migrain       Patient Active Problem List   Diagnosis   • Erectile dysfunction   • History of kidney stones   • OAB (overactive bladder)   • Hypercholesterolemia   • Hypertension   • Hypothyroidism   • Localized, primary osteoarthritis of shoulder region   • Osteoarthritis of hip   • Coronal hypospadias   • Irritable bowel syndrome with diarrhea   • Obesity (BMI 35 0-39 9 without comorbidity)   • Vitamin D deficiency   • Glaucoma of both eyes   • Nontraumatic incomplete tear of right rotator cuff   • Chronic right-sided low back pain with right-sided sciatica   • Chronic sinusitis   • Allergic rhinitis   • Migraine   • Sciatica of right side   • Chronic pain syndrome   • Lumbar radiculopathy   • Lumbar spondylosis   • Obstructive sleep apnea (adult) (pediatric)   • RORO (obstructive sleep apnea)       Medications:      Current Outpatient Medications   Medication Sig Dispense Refill   • B Complex-C-Folic Acid (B-Complex/Folic Acid/Vitamin C) TBCR      • Cholecalciferol (D-3-5) 125 MCG (5000 UT) capsule      • cyanocobalamin (VITAMIN B-12) 1,000 mcg tablet Vitamin B-12 1,000 mcg tablet   Take by oral route       • cyclobenzaprine (FLEXERIL) 10 mg tablet Take 1 tablet (10 mg total) by mouth daily at bedtime (Patient taking differently: Take 10 mg by mouth if needed) 30 tablet 6   • DULoxetine (CYMBALTA) 30 mg delayed release capsule Take 1 capsule (30 mg total) by mouth daily at bedtime 30 capsule 6   • Echinacea 400 MG CAPS      • ezetimibe (ZETIA) 10 mg tablet Take 1 tablet (10 mg total) by mouth daily 90 tablet 0   • ibuprofen (MOTRIN) 600 mg tablet Take 1 tablet (600 mg total) by mouth 2 (two) times a day as needed for mild pain or moderate pain (Patient taking differently: Take 600 mg by mouth daily) 60 tablet 6   • latanoprost (XALATAN) 0 005 % ophthalmic solution Administer 1 drop to both eyes daily     • levothyroxine 175 mcg tablet Take 1 tablet (175 mcg total) by mouth daily 90 tablet 0   • lisinopril (ZESTRIL) 10 mg tablet Take 1 tablet (10 mg total) by mouth daily 90 tablet 0   • SUMAtriptan (IMITREX) 50 mg tablet Take 1 tablet (50 mg total) by mouth once as needed for migraine for up to 1 dose 9 tablet 0   • Zinc 50 MG CAPS Take 1 capsule by mouth in the morning     • Fiber Adult Gummies 2 g CHEW  (Patient not taking: Reported on 5/30/2023)     • Riboflavin 400 MG CAPS  (Patient not taking: Reported on 5/30/2023)       No current facility-administered medications for this visit  Allergies: Allergies   Allergen Reactions   • Levofloxacin Rash     Tolerated Cipro and Avelox (moxifloxasin)           Family History:     Family History   Problem Relation Age of Onset   • Cancer Mother    • Alcohol abuse Mother         Dead   • Heart disease Father    • Hypertension Father    • Early death Father         Heart Attack @ 39   • Heart disease Sister    • Hypertension Sister    • Diabetes Maternal Grandmother    • Cancer Maternal Grandmother    • Stroke Maternal Aunt    • Dementia Maternal Aunt        Social History:     Social History     Socioeconomic History   • Marital status: /Civil Union     Spouse name: Not on file   • Number of children: Not on file   • Years of education: Not on file   • Highest education level: Not on file   Occupational History   • Not on file   Tobacco "Use   • Smoking status: Former     Packs/day: 1 50     Years: 50 00     Total pack years: 75 00     Types: Cigarettes, Pipe, Cigars     Start date: 1967     Quit date: 2017     Years since quittin 4   • Smokeless tobacco: Never   • Tobacco comments:     Tobacco free   Vaping Use   • Vaping Use: Never used   Substance and Sexual Activity   • Alcohol use: Not Currently     Alcohol/week: 25 0 standard drinks of alcohol     Types: 25 Shots of liquor per week     Comment: Stopped 2022   • Drug use: Not Currently     Frequency: 1 0 times per week     Types: Cocaine, Hashish, LSD, Marijuana     Comment: Not since High School   • Sexual activity: Not Currently     Partners: Female     Birth control/protection: Male Sterilization   Other Topics Concern   • Not on file   Social History Narrative   • Not on file     Social Determinants of Health     Financial Resource Strain: Not on file   Food Insecurity: Not on file   Transportation Needs: Not on file   Physical Activity: Not on file   Stress: Not on file   Social Connections: Not on file   Intimate Partner Violence: Not on file   Housing Stability: Not on file         Objective:   Physical Exam:                                                               Vitals:            Constitutional:  /68 (BP Location: Left arm, Patient Position: Sitting, Cuff Size: Large)   Pulse 79   Temp 98 °F (36 7 °C) (Temporal)   Ht 5' 8\" (1 727 m)   Wt 103 kg (226 lb 3 2 oz)   SpO2 97%   BMI 34 39 kg/m²   BP Readings from Last 3 Encounters:   23 130/68   23 126/72   23 144/65     Pulse Readings from Last 3 Encounters:   23 79   23 82   23 64         Well developed, well nourished, well groomed  No dysmorphic features  HEENT:  Normocephalic atraumatic  See neuro exam   Chest:  Respirations appear regular and unlabored  Cardiovascular:  no observed significant swelling      Musculoskeletal:  (see below under neurologic exam " for evaluation of motor function and gait)   Skin:  warm and dry, not diaphoretic  Psychiatric:  Normal behavior and appropriate affect       Neurological Examination:     Mental status/cognitive function:   Recent and remote memory intact  Attention span and concentration as well as fund of knowledge are appropriate for age  Normal language and spontaneous speech  Cranial Nerves:  III, IV, VI-Pupils were equal, round  Extraocular movements were full and conjugate   VII-facial expression symmetric  VIII-hearing grossly intact bilaterally   Motor Exam: symmetric bulk throughout  no atrophy, fasciculations or abnormal movements noted  Coordination:  no apparent dysmetria, ataxia or tremor noted  Gait: steady casual gait  Review of Systems:   Constitutional: Negative  Negative for appetite change and fever  HENT: Negative  Negative for hearing loss, tinnitus, trouble swallowing and voice change  Eyes: Negative  Negative for photophobia, pain and visual disturbance  Respiratory: Negative  Negative for shortness of breath  Cardiovascular: Negative  Negative for palpitations  Gastrointestinal: Negative  Negative for nausea and vomiting  Endocrine: Negative  Negative for cold intolerance  Genitourinary: Negative  Negative for dysuria, frequency and urgency  Musculoskeletal: Negative  Negative for gait problem, myalgias and neck pain  Skin: Negative  Negative for rash  Allergic/Immunologic: Negative  Neurological: Negative  Negative for dizziness, tremors, seizures, syncope, facial asymmetry, speech difficulty, weakness, light-headedness, numbness and headaches  Hematological: Negative  Does not bruise/bleed easily  Psychiatric/Behavioral: Negative  Negative for confusion, hallucinations and sleep disturbance  All other systems reviewed and are negative      I have spent 10 minutes with Patient  today in which greater than 50% of this time was spent in counseling/coordination of care regarding Diagnostic results, Prognosis, Risks and benefits of tx options, Importance of tx compliance, Impressions, Documenting in the medical record, Reviewing / ordering tests, medicine, procedures   and Obtaining or reviewing history    I also spent 10 minutes non face to face for this patient the same day       Activity Minutes   Precharting/reviewing 5   Patient care/counseling 10   Postcharting/care coordination 5       Author:  Praveen Ching DO 5/30/2023 11:01 AM

## 2023-05-30 NOTE — PATIENT INSTRUCTIONS
Headache Calendar  Please maintain a headache calendar  Consider using phone applications such as Migraine Mayur or [place] Migraine Tracker     Headache/migraine treatment:   Acute medications (for immediate treatment of a headache): It is ok to take ibuprofen, acetaminophen or naproxen (Advil, Tylenol,  Aleve, Excedrin) if they help your headaches you should limit these to No more than 2-3 times a week to avoid medication overuse/rebound headaches  For your more moderate to severe migraines take this medication early  Imitrex (Sumatriptan) 50mg tabs - take one at the onset of headache  May repeat one time after 2 hours if pain has not resolved  (Max 2 a day)      Prescription preventive medications for headaches/migraines   (to take every day to help prevent headaches - not to take at the time of headache):  [x] Cymbalta 30mg daily     *Typically these types of medications take time until you see the benefit, although some may see improvement in days, often it may take weeks, especially if the medication is being titrated up to a beneficial level  Please contact us if there are any concerns or questions regarding the medication  Lifestyle Recommendations:  [x] SLEEP - Maintain a regular sleep schedule: Adults need at least 7-8 hours of uninterrupted a night  Maintain good sleep hygiene:  Going to bed and waking up at consistent times, avoiding excessive daytime naps, avoiding caffeinated beverages in the evening, avoid excessive stimulation in the evening and generally using bed primarily for sleeping  One hour before bedtime would recommend turning lights down lower, decreasing your activity (may read quietly, listen to music at a low volume)  When you get into bed, should eliminate all technology (no texting, emailing, playing with your phone, iPad or tablet in bed)  [x] HYDRATION - Maintain good hydration    Drink  2L of fluid a day (4 typical small water bottles)  [x] DIET - Maintain good nutrition  In particular don't skip meals and try and eat healthy balanced meals regularly  [x] TRIGGERS - Look for other triggers and avoid them: Limit caffeine to 1-2 cups a day or less  Avoid dietary triggers that you have noticed bring on your headaches (this could include aged cheese, peanuts, MSG, aspartame and nitrates)  [x] EXERCISE - physical exercise as we all know is good for you in many ways, and not only is good for your heart, but also is beneficial for your mental health, cognitive health and  chronic pain/headaches  I would encourage at the least 5 days of physical exercise weekly for at least 30 minutes  Education and Follow-up  [x] Please call with any questions or concerns  Of course if any new concerning symptoms go to the emergency department    [x] Follow up in 6 months

## 2023-06-08 ENCOUNTER — OFFICE VISIT (OUTPATIENT)
Dept: SLEEP CENTER | Facility: CLINIC | Age: 68
End: 2023-06-08
Payer: MEDICARE

## 2023-06-08 VITALS
BODY MASS INDEX: 34.19 KG/M2 | HEIGHT: 68 IN | OXYGEN SATURATION: 97 % | HEART RATE: 57 BPM | WEIGHT: 225.6 LBS | DIASTOLIC BLOOD PRESSURE: 60 MMHG | TEMPERATURE: 97.6 F | SYSTOLIC BLOOD PRESSURE: 120 MMHG

## 2023-06-08 DIAGNOSIS — E66.9 OBESITY (BMI 30-39.9): ICD-10-CM

## 2023-06-08 DIAGNOSIS — I10 PRIMARY HYPERTENSION: ICD-10-CM

## 2023-06-08 DIAGNOSIS — H40.9 GLAUCOMA OF BOTH EYES, UNSPECIFIED GLAUCOMA TYPE: ICD-10-CM

## 2023-06-08 DIAGNOSIS — G89.4 CHRONIC PAIN SYNDROME: ICD-10-CM

## 2023-06-08 DIAGNOSIS — G47.33 OSA (OBSTRUCTIVE SLEEP APNEA): Primary | ICD-10-CM

## 2023-06-08 DIAGNOSIS — J32.9 CHRONIC SINUSITIS, UNSPECIFIED LOCATION: ICD-10-CM

## 2023-06-08 PROCEDURE — 99214 OFFICE O/P EST MOD 30 MIN: CPT | Performed by: INTERNAL MEDICINE

## 2023-06-08 NOTE — PATIENT INSTRUCTIONS

## 2023-06-08 NOTE — PROGRESS NOTES
Follow-Up Note - Lesley HU  68   79 y o  male  WKR:0/0/3449  PRANAV:[id number]  DOS:6/8/2023    CC: I saw this patient for follow-up in clinic today for Sleep disordered breathing, Coexisting Sleep and Medical Problems  Interval changes: Treatment was initiated using a ResMed machine in auto titrating mode  A sleep study to titrate Positive airway pressure (PAP) therapy was undertaken  Patient is here to review results and to initiate therapy  The study demonstrated sleep disordered breathing was adequately remediated with PAP at 14 cm H2O     HST in January 2023 demonstrated SB of 23 8  The lowest SpO2 recorded is 83% and 29 8 minutes during the study was spent with saturations below 90%  The snore index was 9 3%  PFSH, Problem List, Medications & Allergies were reviewed in EMR  He  has a past medical history of Arthritis (2000), Back pain, Colon polyp, ED (erectile dysfunction), Headache(784 0), HL (hearing loss) (2000), Hypertension, Hypothyroidism, IBS (irritable bowel syndrome), Inflammatory bowel disease, Kidney stones, Migraine (July 2022), Osteoarthritis, Overactive bladder, and Vision loss (july 2022)  He has a current medication list which includes the following prescription(s): b-complex/folic acid/vitamin c, Y-4-5, cyclobenzaprine, duloxetine, echinacea, ezetimibe, fiber adult gummies, ibuprofen, latanoprost, levothyroxine, lisinopril, sumatriptan, zinc, vitamin b-12, and riboflavin  PHYSIOLOGICAL DATA REVIEW : Using PAP > 4 hours/night 97%  Estimated SB 2 2/hour with pressure of 16 8cm [employer] percentile; patient has not been using non FDA approved devices to sanitize the machine  INTERPRETATION: Compliance is excellent; Pressure setting is:optimal; ;   SUBJECTIVE: With respect to use of PAP, Yahir  is experiencing some adverse effects:dry mouth/throat and pressure insufficient  He derives benefit  Is satisfied with sleep and daytime function     Sleep Routine: "Daija Leong reports getting 8-9 hrs sleep; he has no difficulty initiating or maintaining sleep   He arises spontaneously and feels more refreshed since on Rx  Yahir reports significantly improved  Sleepiness, and is no longer needing daytime naps  He rated [himself] at Total score: 4 /24 on the [place] Sleepiness Scale  Other issues: None reported  Habits:[ reports that he quit smoking about 6 years ago  His smoking use included cigarettes, pipe, and cigars  He started smoking about 56 years ago  He has a 75 00 pack-year smoking history  He has never used smokeless tobacco ], [ reports that he does not currently use alcohol after a past usage of about 25 0 standard drinks of alcohol per week ], [ reports that he does not currently use drugs after having used the following drugs: Cocaine, Hashish, LSD, and Marijuana  Frequency: 1 00 time per week ], Caffeine use:limited; Exercise routine: regular  ROS: Significant for few pounds weight gain  Some nasal symptoms due to environmental allergies and episodic wheezing related to ambient pollution  Pain is controlled  EXAM: /60 (BP Location: Right arm, Cuff Size: Large)   Pulse 57   Temp 97 6 °F (36 4 °C) (Temporal)   Ht 5' 8\" (1 727 m)   Wt 102 kg (225 lb 9 6 oz)   SpO2 97%   BMI 34 30 kg/m²     Wt Readings from Last 3 Encounters:   06/08/23 102 kg (225 lb 9 6 oz)   05/30/23 103 kg (226 lb 3 2 oz)   02/08/23 100 kg (220 lb 8 oz)      Patient is well groomed; well appearing  CNS: Alert, orientated, clear & coherent speech  Psych: cooperative and in no distress  Mental state: Appears normal   H&N: EOMI; NC/AT: No facial pressure marks, no rashes  Skin/Extrem: col & hydration normal; no edema  Resp: Respiratory effort is normal  Physical findings otherwise essentially unchanged from previous  IMPRESSION: Problem List Items & Comorbidities Addressed this Visit    1   RORO (obstructive sleep apnea)  PAP DME Pressure Change    PAP DME Resupply/Reorder " "     2  Chronic sinusitis, unspecified location        3  Primary hypertension        4  Chronic pain syndrome        5  Glaucoma of both eyes, unspecified glaucoma type        6  Obesity (BMI 30-39  9)            PLAN:  1  I reviewed results of prior studies and physiologic data with the patient  2  I discussed treatment options with risks and benefits  3  Treatment with  PAP is medically necessary and Darilyn Power is agreable to continue use  4  Care of equipment, methods to improve comfort using PAP and importance of compliance with therapy were discussed  5  Pressure setting: change 15-18 cmH2O     6  Rx provided to replace supplies and Care coordinated with DME provider  7  Discussed strategies for weight reduction  8  Follow-up is advised in 1 year or sooner if needed to monitor progress, compliance and to adjust therapy  Thank you for allowing me to participate in the care of this patient  Sincerely,     Authenticated electronically on 28/96/94   Board Certified Specialist     Portions of the record may have been created with voice recognition software  Occasional wrong word or \"sound a like\" substitutions may have occurred due to the inherent limitations of voice recognition software  There may also be notations and random deletions of words or characters from malfunctioning software  Read the chart carefully and recognize, using context, where substitutions/deletions have occurred      "

## 2023-06-09 ENCOUNTER — TELEPHONE (OUTPATIENT)
Dept: SLEEP CENTER | Facility: CLINIC | Age: 68
End: 2023-06-09

## 2023-06-09 LAB
DME PARACHUTE DELIVERY DATE REQUESTED: NORMAL
DME PARACHUTE ITEM DESCRIPTION: NORMAL
DME PARACHUTE ORDER STATUS: NORMAL
DME PARACHUTE SUPPLIER NAME: NORMAL
DME PARACHUTE SUPPLIER PHONE: NORMAL

## 2023-06-12 LAB

## 2023-07-10 DIAGNOSIS — I10 ESSENTIAL HYPERTENSION: ICD-10-CM

## 2023-07-10 DIAGNOSIS — G43.109 MIGRAINE WITH AURA AND WITHOUT STATUS MIGRAINOSUS, NOT INTRACTABLE: ICD-10-CM

## 2023-07-10 DIAGNOSIS — M51.36 DEGENERATIVE LUMBAR DISC: ICD-10-CM

## 2023-07-10 RX ORDER — LISINOPRIL 10 MG/1
10 TABLET ORAL DAILY
Qty: 90 TABLET | Refills: 0 | OUTPATIENT
Start: 2023-07-10

## 2023-07-10 RX ORDER — DULOXETIN HYDROCHLORIDE 30 MG/1
30 CAPSULE, DELAYED RELEASE ORAL
Qty: 30 CAPSULE | Refills: 6 | Status: SHIPPED | OUTPATIENT
Start: 2023-07-10 | End: 2023-09-20

## 2023-07-20 ENCOUNTER — RA CDI HCC (OUTPATIENT)
Dept: OTHER | Facility: HOSPITAL | Age: 68
End: 2023-07-20

## 2023-07-20 NOTE — PROGRESS NOTES
[address] [facility] coding opportunities       Chart reviewed, no opportunity found: CHART REVIEWED, NO OPPORTUNITY FOUND        Patients Insurance     Medicare Insurance: Medicare

## 2023-07-31 DIAGNOSIS — E03.9 HYPOTHYROIDISM, UNSPECIFIED TYPE: ICD-10-CM

## 2023-07-31 RX ORDER — LEVOTHYROXINE SODIUM 175 UG/1
175 TABLET ORAL DAILY
Qty: 90 TABLET | Refills: 0 | Status: SHIPPED | OUTPATIENT
Start: 2023-07-31

## 2023-08-01 ENCOUNTER — OFFICE VISIT (OUTPATIENT)
Dept: INTERNAL MEDICINE CLINIC | Facility: CLINIC | Age: 68
End: 2023-08-01
Payer: MEDICARE

## 2023-08-01 VITALS
HEIGHT: 68 IN | OXYGEN SATURATION: 95 % | DIASTOLIC BLOOD PRESSURE: 60 MMHG | SYSTOLIC BLOOD PRESSURE: 120 MMHG | TEMPERATURE: 98.2 F | WEIGHT: 219.3 LBS | BODY MASS INDEX: 33.24 KG/M2 | HEART RATE: 78 BPM

## 2023-08-01 DIAGNOSIS — M54.16 LUMBAR RADICULOPATHY: ICD-10-CM

## 2023-08-01 DIAGNOSIS — I10 PRIMARY HYPERTENSION: ICD-10-CM

## 2023-08-01 DIAGNOSIS — E78.00 HYPERCHOLESTEROLEMIA: ICD-10-CM

## 2023-08-01 DIAGNOSIS — Z12.5 SCREENING FOR PROSTATE CANCER: ICD-10-CM

## 2023-08-01 DIAGNOSIS — E03.9 HYPOTHYROIDISM, UNSPECIFIED TYPE: ICD-10-CM

## 2023-08-01 DIAGNOSIS — G47.33 OBSTRUCTIVE SLEEP APNEA (ADULT) (PEDIATRIC): ICD-10-CM

## 2023-08-01 DIAGNOSIS — Z00.00 MEDICARE ANNUAL WELLNESS VISIT, SUBSEQUENT: Primary | ICD-10-CM

## 2023-08-01 DIAGNOSIS — J30.9 ALLERGIC RHINITIS, UNSPECIFIED SEASONALITY, UNSPECIFIED TRIGGER: ICD-10-CM

## 2023-08-01 PROBLEM — N32.81 OAB (OVERACTIVE BLADDER): Status: RESOLVED | Noted: 2019-05-01 | Resolved: 2023-08-01

## 2023-08-01 PROCEDURE — 99214 OFFICE O/P EST MOD 30 MIN: CPT | Performed by: NURSE PRACTITIONER

## 2023-08-01 PROCEDURE — G0439 PPPS, SUBSEQ VISIT: HCPCS | Performed by: NURSE PRACTITIONER

## 2023-08-01 NOTE — PROGRESS NOTES
Assessment and Plan: Will repeat fasting labs. Up to date on screenings. Deferring CT lung screening at this time. Bp stable. Will follow back up in one year or sooner if need be. Problem List Items Addressed This Visit        Endocrine    Hypothyroidism    Relevant Orders    Comprehensive metabolic panel    CBC and differential    TSH, 3rd generation with Free T4 reflex       Respiratory    Allergic rhinitis    Relevant Orders    Comprehensive metabolic panel    CBC and differential    TSH, 3rd generation with Free T4 reflex    Obstructive sleep apnea (adult) (pediatric)    Relevant Orders    Comprehensive metabolic panel    CBC and differential    TSH, 3rd generation with Free T4 reflex       Cardiovascular and Mediastinum    Hypertension    Relevant Orders    Comprehensive metabolic panel    CBC and differential    TSH, 3rd generation with Free T4 reflex       Nervous and Auditory    Lumbar radiculopathy    Relevant Orders    Comprehensive metabolic panel    CBC and differential    TSH, 3rd generation with Free T4 reflex       Other    Hypercholesterolemia    Relevant Orders    Lipid panel    Screening for prostate cancer    Relevant Orders    PSA, Total Screen    BMI 33.0-33.9,adult    Medicare annual wellness visit, subsequent - Primary        Preventive health issues were discussed with patient, and age appropriate screening tests were ordered as noted in patient's After Visit Summary. Personalized health advice and appropriate referrals for health education or preventive services given if needed, as noted in patient's After Visit Summary. History of Present Illness:     Patient presents for a Medicare Wellness Visit    Lm Marin is for a medicare wellness and follow up. He is doing well and is not having any issues. He denies any chest pain, SOB, or palpitations. He denies any depression or anxiety. He is up to date on his screenings and is deferring a CT lung screening.  He did need refills which he did send on his my chart. He offers no other issues. Patient Care Team:  Danie Esquivel as PCP - General (Family Medicine)  Hermilo Johnson PA-C as Physician Assistant (Gastroenterology)     Review of Systems:     Review of Systems   All other systems reviewed and are negative.        Problem List:     Patient Active Problem List   Diagnosis   • Erectile dysfunction   • History of kidney stones   • Hypercholesterolemia   • Hypertension   • Hypothyroidism   • Localized, primary osteoarthritis of shoulder region   • Osteoarthritis of hip   • Coronal hypospadias   • Irritable bowel syndrome with diarrhea   • Obesity (BMI 35.0-39.9 without comorbidity)   • Vitamin D deficiency   • Glaucoma of both eyes   • Nontraumatic incomplete tear of right rotator cuff   • Chronic right-sided low back pain with right-sided sciatica   • Chronic sinusitis   • Allergic rhinitis   • Migraine   • Sciatica of right side   • Chronic pain syndrome   • Lumbar radiculopathy   • Lumbar spondylosis   • Obstructive sleep apnea (adult) (pediatric)   • RORO (obstructive sleep apnea)   • Screening for prostate cancer   • BMI 33.0-33.9,adult   • Medicare annual wellness visit, subsequent      Past Medical and Surgical History:     Past Medical History:   Diagnosis Date   • Arthritis 2000   • Back pain    • Colon polyp    • ED (erectile dysfunction)    • Headache(784.0)    • HL (hearing loss) 2000    Both ears   • Hypertension    • Hypothyroidism    • IBS (irritable bowel syndrome)    • Inflammatory bowel disease    • Kidney stones    • Migraine July 2022    Started when I stopped drinking   • Osteoarthritis    • Overactive bladder    • Vision loss july 2022    Occures with Migrain     Past Surgical History:   Procedure Laterality Date   • APPENDECTOMY     • COLONOSCOPY     • HERNIA REPAIR     • JOINT REPLACEMENT     • ORTHOPEDIC SURGERY     • PARTIAL HIP ARTHROPLASTY Right    • ROTATOR CUFF REPAIR     • TRIGGER FINGER RELEASE  02/2021 Left ring finger   • TRIGGER POINT INJECTION        Family History:     Family History   Problem Relation Age of Onset   • Cancer Mother    • Alcohol abuse Mother         Dead   • Heart disease Father    • Hypertension Father    • Early death Father         Heart Attack @ 39   • Heart disease Sister    • Hypertension Sister    • Diabetes Maternal Grandmother    • Cancer Maternal Grandmother    • Stroke Maternal Aunt    • Dementia Maternal Aunt       Social History:     Social History     Socioeconomic History   • Marital status: /Civil Union     Spouse name: None   • Number of children: None   • Years of education: None   • Highest education level: None   Occupational History   • None   Tobacco Use   • Smoking status: Former     Packs/day: 1.50     Years: 50.00     Total pack years: 75.00     Types: Cigarettes, Pipe, Cigars     Start date: 1967     Quit date: 2017     Years since quittin.5   • Smokeless tobacco: Never   • Tobacco comments:     Tobacco free   Vaping Use   • Vaping Use: Never used   Substance and Sexual Activity   • Alcohol use: Not Currently     Alcohol/week: 25.0 standard drinks of alcohol     Types: 25 Shots of liquor per week     Comment: Stopped 2022   • Drug use: Not Currently     Frequency: 1.0 times per week     Types: Cocaine, Hashish, LSD, Marijuana     Comment: Not since High School   • Sexual activity: Not Currently     Partners: Female     Birth control/protection: Male Sterilization   Other Topics Concern   • None   Social History Narrative   • None     Social Determinants of Health     Financial Resource Strain: Low Risk  (2023)    Overall Financial Resource Strain (CARDIA)    • Difficulty of Paying Living Expenses: Not very hard   Food Insecurity: Not on file   Transportation Needs: No Transportation Needs (2023)    PRAPARE - Transportation    • Lack of Transportation (Medical): No    • Lack of Transportation (Non-Medical):  No   Physical Activity: Not on file   Stress: Not on file   Social Connections: Not on file   Intimate Partner Violence: Not on file   Housing Stability: Not on file      Medications and Allergies:     Current Outpatient Medications   Medication Sig Dispense Refill   • B Complex-C-Folic Acid (B-Complex/Folic Acid/Vitamin C) TBCR      • Cholecalciferol (D-3-5) 125 MCG (5000 UT) capsule      • cyclobenzaprine (FLEXERIL) 10 mg tablet Take 1 tablet (10 mg total) by mouth daily at bedtime (Patient taking differently: Take 10 mg by mouth if needed) 30 tablet 6   • DULoxetine (CYMBALTA) 30 mg delayed release capsule Take 1 capsule (30 mg total) by mouth daily at bedtime 30 capsule 6   • Echinacea 400 MG CAPS      • ezetimibe (ZETIA) 10 mg tablet Take 1 tablet (10 mg total) by mouth daily 90 tablet 1   • Fiber Adult Gummies 2 g CHEW      • ibuprofen (MOTRIN) 600 mg tablet Take 1 tablet (600 mg total) by mouth 2 (two) times a day as needed for mild pain or moderate pain (Patient taking differently: Take 600 mg by mouth daily) 60 tablet 6   • latanoprost (XALATAN) 0.005 % ophthalmic solution Administer 1 drop to both eyes daily     • levothyroxine 175 mcg tablet Take 1 tablet (175 mcg total) by mouth daily 90 tablet 0   • lisinopril (ZESTRIL) 10 mg tablet Take 1 tablet (10 mg total) by mouth daily 90 tablet 1   • Riboflavin 400 MG CAPS      • SUMAtriptan (IMITREX) 50 mg tablet Take 1 tablet (50 mg total) by mouth once as needed for migraine for up to 1 dose 9 tablet 0   • Zinc 50 MG CAPS Take 1 capsule by mouth in the morning     • cyanocobalamin (VITAMIN B-12) 1,000 mcg tablet Vitamin B-12 1,000 mcg tablet   Take by oral route. (Patient not taking: Reported on 6/8/2023)       No current facility-administered medications for this visit. Allergies   Allergen Reactions   • Levofloxacin Rash     Tolerated Cipro and Avelox (moxifloxasin).         Immunizations:     Immunization History   Administered Date(s) Administered   • COVID-19 MODERNA VACC 0.25 ML IM BOOSTER 11/29/2021   • COVID-19 MODERNA VACC 0.5 ML IM 03/26/2021, 04/23/2021, 11/29/2021, 07/07/2022   • INFLUENZA 09/30/2011, 10/11/2013, 09/23/2016, 09/28/2017, 10/26/2018, 09/13/2021, 01/19/2023   • Influenza Split 01/28/2013   • Influenza, injectable, quadrivalent, preservative free 0.5 mL 10/26/2018   • Influenza, recombinant, quadrivalent,injectable, preservative free 10/11/2019, 11/13/2020, 09/13/2021   • Pneumococcal Conjugate 13-Valent 11/13/2020   • Pneumococcal Polysaccharide PPV23 10/11/2019   • Tdap 05/25/2021      Health Maintenance:         Topic Date Due   • Lung Cancer Screening  Never done   • Colorectal Cancer Screening  08/02/2027   • Hepatitis C Screening  Completed         Topic Date Due   • COVID-19 Vaccine (6 - Moderna series) 09/01/2022   • Influenza Vaccine (1) 09/01/2023      Medicare Screening Tests and Risk Assessments:     Kathryn Aguirre is here for his Subsequent Wellness visit. Health Risk Assessment:   Patient rates overall health as fair. Patient feels that their physical health rating is same. Patient is very satisfied with their life. Eyesight was rated as same. Hearing was rated as slightly worse. Patient feels that their emotional and mental health rating is same. Patients states they are never, rarely angry. Patient states they are sometimes unusually tired/fatigued. Pain experienced in the last 7 days has been some. Patient's pain rating has been 5/10. Patient states that he has experienced no weight loss or gain in last 6 months. Depression Screening:   PHQ-2 Score: 0      Fall Risk Screening: In the past year, patient has experienced: no history of falling in past year      Home Safety:  Patient does not have trouble with stairs inside or outside of their home. Patient has working smoke alarms and has working carbon monoxide detector. Home safety hazards include: none. Nutrition:   Current diet is Regular.      Medications:   Patient is currently taking over-the-counter supplements. OTC medications include: Various vitamins and fiber. Patient is able to manage medications. Activities of Daily Living (ADLs)/Instrumental Activities of Daily Living (IADLs):   Walk and transfer into and out of bed and chair?: Yes  Dress and groom yourself?: Yes    Bathe or shower yourself?: Yes    Feed yourself? Yes  Do your laundry/housekeeping?: Yes  Manage your money, pay your bills and track your expenses?: Yes  Make your own meals?: Yes    Do your own shopping?: Yes    Durable Medical Equipment Suppliers  CPAP    Previous Hospitalizations:   Any hospitalizations or ED visits within the last 12 months?: No      Advance Care Planning:   Living will: Yes    Durable POA for healthcare: Yes    Advanced directive: Yes    Advanced directive counseling given: No    Five wishes given: No    Patient declined ACP directive: No    End of Life Decisions reviewed with patient: No    Provider agrees with end of life decisions: Yes      PREVENTIVE SCREENINGS      Cardiovascular Screening:    General: Screening Not Indicated and History Lipid Disorder      Diabetes Screening:     General: Screening Current      Colorectal Cancer Screening:     General: Screening Current      Abdominal Aortic Aneurysm (AAA) Screening:    Risk factors include: age between 70-75 yo and tobacco use        Hepatitis C Screening:    General: Screening Current    Screening, Brief Intervention, and Referral to Treatment (SBIRT)    Screening  Typical number of drinks in a day: 1  Typical number of drinks in a week: 7  Interpretation: Low risk drinking behavior. AUDIT-C Screenin) How often did you have a drink containing alcohol in the past year? 4 or more times a week  2) How many drinks did you have on a typical day when you were drinking in the past year?  1 to 2  3) How often did you have 6 or more drinks on one occasion in the past year? less than monthly    AUDIT-C Score: 5  Interpretation: Score 4-12 (male): POSITIVE screen for alcohol misuse    AUDIT Screenin) How often during the last year have you found that you were not able to stop drinking once you had started? 0 - never  5) How often during the last year have you failed to do what was normally expected from you because of drinking? 0 - never  6) How often during the last year have you needed a first drink in the morning to get yourself going after a heavy drinking session? 0 - never  7) How often during the last year have you had a feeling of guilt or remorse after drinking? 0 - never  8) How often during the last year have you been unable to remember what happened the night before because you had been drinking? 0 - never  9) Have you or someone else been injured as a result of your drinking? 0 - no  10) Has a relative or friend or a doctor or another health worker been concerned about your drinking or suggested you cut down? 0 - no    AUDIT Score: 5  Interpretation: Low risk alcohol consumption    Single Item Drug Screening:  How often have you used an illegal drug (including marijuana) or a prescription medication for non-medical reasons in the past year? never    Single Item Drug Screen Score: 0  Interpretation: Negative screen for possible drug use disorder    No results found. Physical Exam:     /60   Pulse 78   Temp 98.2 °F (36.8 °C) (Temporal)   Ht 5' 8" (1.727 m)   Wt 99.5 kg (219 lb 4.8 oz)   SpO2 95%   BMI 33.34 kg/m²     Physical Exam  Vitals reviewed. Constitutional:       Appearance: Normal appearance. He is normal weight. HENT:      Head: Normocephalic and atraumatic. Right Ear: Tympanic membrane, ear canal and external ear normal.      Left Ear: Tympanic membrane, ear canal and external ear normal.      Nose: Nose normal.      Mouth/Throat:      Mouth: Mucous membranes are moist.      Pharynx: Oropharynx is clear. Eyes:      Extraocular Movements: Extraocular movements intact.       Conjunctiva/sclera: Conjunctivae normal.      Pupils: Pupils are equal, round, and reactive to light. Cardiovascular:      Rate and Rhythm: Normal rate and regular rhythm. Pulses: Normal pulses. Heart sounds: Normal heart sounds. Pulmonary:      Effort: Pulmonary effort is normal.      Breath sounds: Normal breath sounds. Abdominal:      General: Abdomen is flat. Bowel sounds are normal.      Palpations: Abdomen is soft. Musculoskeletal:         General: Normal range of motion. Cervical back: Normal range of motion and neck supple. Skin:     General: Skin is warm and dry. Capillary Refill: Capillary refill takes less than 2 seconds. Neurological:      General: No focal deficit present. Mental Status: He is alert and oriented to person, place, and time. Mental status is at baseline. Psychiatric:         Mood and Affect: Mood normal.         Behavior: Behavior normal.         Thought Content: Thought content normal.         Judgment: Judgment normal.          KRISTEN Diaz    BMI Counseling: Body mass index is 33.34 kg/m². The BMI is above normal. Nutrition recommendations include reducing portion sizes, decreasing overall calorie intake, 3-5 servings of fruits/vegetables daily, reducing fast food intake, consuming healthier snacks, decreasing soda and/or juice intake, moderation in carbohydrate intake, increasing intake of lean protein, reducing intake of saturated fat and trans fat and reducing intake of cholesterol.

## 2023-08-01 NOTE — PATIENT INSTRUCTIONS
Medicare Preventive Visit Patient Instructions  Thank you for completing your Welcome to Medicare Visit or Medicare Annual Wellness Visit today. Your next wellness visit will be due in one year (8/1/2024). The screening/preventive services that you may require over the next 5-10 years are detailed below. Some tests may not apply to you based off risk factors and/or age. Screening tests ordered at today's visit but not completed yet may show as past due. Also, please note that scanned in results may not display below. Preventive Screenings:  Service Recommendations Previous Testing/Comments   Colorectal Cancer Screening  · Colonoscopy    · Fecal Occult Blood Test (FOBT)/Fecal Immunochemical Test (FIT)  · Fecal DNA/Cologuard Test  · Flexible Sigmoidoscopy Age: 43-73 years old   Colonoscopy: every 10 years (May be performed more frequently if at higher risk)  OR  FOBT/FIT: every 1 year  OR  Cologuard: every 3 years  OR  Sigmoidoscopy: every 5 years  Screening may be recommended earlier than age 39 if at higher risk for colorectal cancer. Also, an individualized decision between you and your healthcare provider will decide whether screening between the ages of 77-80 would be appropriate.  Colonoscopy: 08/03/2022  FOBT/FIT: Not on file  Cologuard: Not on file  Sigmoidoscopy: Not on file    Screening Current     Prostate Cancer Screening Individualized decision between patient and health care provider in men between ages of 53-66   Medicare will cover every 12 months beginning on the day after your 50th birthday PSA: 2.2 ng/mL           Hepatitis C Screening Once for adults born between 1945 and 1965  More frequently in patients at high risk for Hepatitis C Hep C Antibody: 07/12/2019    Screening Current   Diabetes Screening 1-2 times per year if you're at risk for diabetes or have pre-diabetes Fasting glucose: 93 mg/dL (8/1/2022)  A1C: No results in last 5 years (No results in last 5 years)  Screening Current Cholesterol Screening Once every 5 years if you don't have a lipid disorder. May order more often based on risk factors. Lipid panel: 08/01/2022  Screening Not Indicated  History Lipid Disorder      Other Preventive Screenings Covered by Medicare:  1. Abdominal Aortic Aneurysm (AAA) Screening: covered once if your at risk. You're considered to be at risk if you have a family history of AAA or a male between the age of 70-76 who smoking at least 100 cigarettes in your lifetime. 2. Lung Cancer Screening: covers low dose CT scan once per year if you meet all of the following conditions: (1) Age 48-67; (2) No signs or symptoms of lung cancer; (3) Current smoker or have quit smoking within the last 15 years; (4) You have a tobacco smoking history of at least 20 pack years (packs per day x number of years you smoked); (5) You get a written order from a healthcare provider. 3. Glaucoma Screening: covered annually if you're considered high risk: (1) You have diabetes OR (2) Family history of glaucoma OR (3)  aged 48 and older OR (3)  American aged 72 and older  3. Osteoporosis Screening: covered every 2 years if you meet one of the following conditions: (1) Have a vertebral abnormality; (2) On glucocorticoid therapy for more than 3 months; (3) Have primary hyperparathyroidism; (4) On osteoporosis medications and need to assess response to drug therapy. 5. HIV Screening: covered annually if you're between the age of 14-79. Also covered annually if you are younger than 13 and older than 72 with risk factors for HIV infection. For pregnant patients, it is covered up to 3 times per pregnancy.     Immunizations:  Immunization Recommendations   Influenza Vaccine Annual influenza vaccination during flu season is recommended for all persons aged >= 6 months who do not have contraindications   Pneumococcal Vaccine   * Pneumococcal conjugate vaccine = PCV13 (Prevnar 13), PCV15 (Vaxneuvance), PCV20 (Prevnar 20)  * Pneumococcal polysaccharide vaccine = PPSV23 (Pneumovax) Adults 2364 years old: 1-3 doses may be recommended based on certain risk factors  Adults 72 years old: 1-2 doses may be recommended based off what pneumonia vaccine you previously received   Hepatitis B Vaccine 3 dose series if at intermediate or high risk (ex: diabetes, end stage renal disease, liver disease)   Tetanus (Td) Vaccine - COST NOT COVERED BY MEDICARE PART B Following completion of primary series, a booster dose should be given every 10 years to maintain immunity against tetanus. Td may also be given as tetanus wound prophylaxis. Tdap Vaccine - COST NOT COVERED BY MEDICARE PART B Recommended at least once for all adults. For pregnant patients, recommended with each pregnancy. Shingles Vaccine (Shingrix) - COST NOT COVERED BY MEDICARE PART B  2 shot series recommended in those aged 48 and above     Health Maintenance Due:      Topic Date Due   • Lung Cancer Screening  Never done   • Colorectal Cancer Screening  08/02/2027   • Hepatitis C Screening  Completed     Immunizations Due:      Topic Date Due   • COVID-19 Vaccine (6 - Moderna series) 09/01/2022   • Influenza Vaccine (1) 09/01/2023     Advance Directives   What are advance directives? Advance directives are legal documents that state your wishes and plans for medical care. These plans are made ahead of time in case you lose your ability to make decisions for yourself. Advance directives can apply to any medical decision, such as the treatments you want, and if you want to donate organs. What are the types of advance directives? There are many types of advance directives, and each state has rules about how to use them. You may choose a combination of any of the following:  · Living will: This is a written record of the treatment you want. You can also choose which treatments you do not want, which to limit, and which to stop at a certain time.  This includes surgery, medicine, IV fluid, and tube feedings. · Durable power of  for healthcare [facility]): This is a written record that states who you want to make healthcare choices for you when you are unable to make them for yourself. This person, called a proxy, is usually a family member or a friend. You may choose more than 1 proxy. · Do not resuscitate (DNR) order:  A DNR order is used in case your heart stops beating or you stop breathing. It is a request not to have certain forms of treatment, such as CPR. A DNR order may be included in other types of advance directives. · Medical directive: This covers the care that you want if you are in a coma, near death, or unable to make decisions for yourself. You can list the treatments you want for each condition. Treatment may include pain medicine, surgery, blood transfusions, dialysis, IV or tube feedings, and a ventilator (breathing machine). · Values history: This document has questions about your views, beliefs, and how you feel and think about life. This information can help others choose the care that you would choose. Why are advance directives important? An advance directive helps you control your care. Although spoken wishes may be used, it is better to have your wishes written down. Spoken wishes can be misunderstood, or not followed. Treatments may be given even if you do not want them. An advance directive may make it easier for your family to make difficult choices about your care. Weight Management   Why it is important to manage your weight:  Being overweight increases your risk of health conditions such as heart disease, high blood pressure, type 2 diabetes, and certain types of cancer. It can also increase your risk for osteoarthritis, sleep apnea, and other respiratory problems. Aim for a slow, steady weight loss. Even a small amount of weight loss can lower your risk of health problems.   How to lose weight safely:  A safe and healthy way to lose weight is to eat fewer calories and get regular exercise. You can lose up about 1 pound a week by decreasing the number of calories you eat by 500 calories each day. Healthy meal plan for weight management:  A healthy meal plan includes a variety of foods, contains fewer calories, and helps you stay healthy. A healthy meal plan includes the following:  · Eat whole-grain foods more often. A healthy meal plan should contain fiber. Fiber is the part of grains, fruits, and vegetables that is not broken down by your body. Whole-grain foods are healthy and provide extra fiber in your diet. Some examples of whole-grain foods are whole-wheat breads and pastas, oatmeal, brown rice, and bulgur. · Eat a variety of vegetables every day. Include dark, leafy greens such as spinach, kale, tim greens, and mustard greens. Eat yellow and orange vegetables such as carrots, sweet potatoes, and winter squash. · Eat a variety of fruits every day. Choose fresh or canned fruit (canned in its own juice or light syrup) instead of juice. Fruit juice has very little or no fiber. · Eat low-fat dairy foods. Drink fat-free (skim) milk or 1% milk. Eat fat-free yogurt and low-fat cottage cheese. Try low-fat cheeses such as mozzarella and other reduced-fat cheeses. · Choose meat and other protein foods that are low in fat. Choose beans or other legumes such as split peas or lentils. Choose fish, skinless poultry (chicken or turkey), or lean cuts of red meat (beef or pork). Before you cook meat or poultry, cut off any visible fat. · Use less fat and oil. Try baking foods instead of frying them. Add less fat, such as margarine, sour cream, regular salad dressing and mayonnaise to foods. Eat fewer high-fat foods. Some examples of high-fat foods include french fries, doughnuts, ice cream, and cakes. · Eat fewer sweets. Limit foods and drinks that are high in sugar.  This includes candy, cookies, regular soda, and sweetened drinks. Exercise:  Exercise at least 30 minutes per day on most days of the week. Some examples of exercise include walking, biking, dancing, and swimming. You can also fit in more physical activity by taking the stairs instead of the elevator or parking farther away from stores. Ask your healthcare provider about the best exercise plan for you. Alcohol Use and Your Health    Drinking too much can harm your health. Excessive alcohol use leads to about 88,000 death in the [facility] each year, and shortens the life of those who diet by almost 30 years. Further, excessive drinking cost the economy $249 billion in 2010. Most excessive drinkers are not alcohol dependent. Excessive alcohol use has immediate effects that increase the risk of many harmful health conditions. These are most often the result of binge drinking. Over time, excessive alcohol use can lead to the development of chronic diseases and other series health problems. What is considered a "drink"? Excessive alcohol use includes:  · Binge Drinking: For women, 4 or more drinks consumed on one occasion. For men, 5 or more drinks consumed on one occasion. · Heavy Drinking: For women, 8 or more drinks per week. For men, 15 or more drinks per week  · Any alcohol used by pregnant women  · Any alcohol used by those under the age of 21 years    If you choose to drink, do so in moderation:  · Do not drink at all if you are under the age of 24, or if you are or may be pregnant, or have health problems that could be made worse by drinking.   · For women, up to 1 drink per day  · For men, up to 2 drinks a day    No one should begin drinking or drink more frequently based on potential health benefits    Short-Term Health Risks:  · Injuries: motor vehicle crashes, falls, drownings, burns  · Violence: homicide, suicide, sexual assault, intimate partner violence  · Alcohol poisoning  · Reproductive health: risky sexual behaviors, unintended prengnacy, sexually transmitted diseases, miscarriage, stillbirth, fetal alcohol syndrome    Long-Term Health Risks:  · Chronic diseases: high blood pressure, heart disease, stroke, liver disease, digestive problems  · Cancers: breast, mouth and throat, liver, colon  · Learning and memory problems: dementia, poor school performance  · Mental health: depression, anxiety, insomnia  · Social problems: lost productivity, family problems, unemployment  · Alcohol dependence    For support and more information:  · Substance Abuse and [address]  Web Address: https://[employer]/    · Alcoholics Anonymous        Web Address: http://www.[employer].info/    https://www.cdc.gov/alcohol/fact-sheets/alcohol-use.htm     © [employer] 2018 Information is for End User's use only and may not be sold, redistributed or otherwise used for commercial purposes. All illustrations and images included in CareNotes® are the copyrighted property of [employer] or [address] [place] [address]  Low Fat Diet   AMBULATORY CARE:   A low-fat diet  is an eating plan that is low in total fat, unhealthy fat, and cholesterol. You may need to follow a low-fat diet if you have trouble digesting or absorbing fat. You may also need to follow this diet if you have high cholesterol. You can also lower your cholesterol by increasing the amount of fiber in your diet. Soluble fiber is a type of fiber that helps to decrease cholesterol levels. Different types of fat in food:   • Limit unhealthy fats. A diet that is high in cholesterol, saturated fat, and trans fat may cause unhealthy cholesterol levels. Unhealthy cholesterol levels increase your risk of heart disease. ? Cholesterol:  Limit intake of cholesterol to less than 200 mg per day. Cholesterol is found in meat, eggs, and dairy. ? Saturated fat:  Limit saturated fat to less than 7% of your total daily calories.  Ask your dietitian how many calories you need each day. Saturated fat is found in butter, cheese, ice cream, whole milk, and palm oil. Saturated fat is also found in meat, such as beef, pork, chicken skin, and processed meats. Processed meats include sausage, hot dogs, and bologna. ? Trans fat:  Avoid trans fat as much as possible. Trans fat is used in fried and baked foods. Foods that say trans fat free on the label may still have up to 0.5 grams of trans fat per serving. • Include healthy fats. Replace foods that are high in saturated and trans fat with foods high in healthy fats. This may help to decrease high cholesterol levels. ? Monounsaturated fats: These are found in avocados, nuts, and vegetable oils, such as olive, canola, and sunflower oil. ? Polyunsaturated fats: These can be found in vegetable oils, such as soybean or corn oil. Omega-3 fats can help to decrease the risk of heart disease. Omega-3 fats are found in fish, such as salmon, herring, trout, and tuna. Omega-3 fats can also be found in plant foods, such as walnuts, flaxseed, soybeans, and canola oil. Foods to limit or avoid:   • Grains:      ? Snacks that are made with partially hydrogenated oils, such as chips, regular crackers, and butter-flavored popcorn    ? High-fat baked goods, such as biscuits, croissants, doughnuts, pies, cookies, and pastries    • Dairy:      ? Whole milk, 2% milk, and yogurt and ice cream made with whole milk    ? Half and half creamer, heavy cream, and whipping cream    ? Cheese, cream cheese, and sour cream    • Meats and proteins:      ? High-fat cuts of meat (T-bone steak, regular hamburger, and ribs)    ? Fried meat, poultry (turkey and chicken), and fish    ? Poultry (chicken and turkey) with skin    ? Cold cuts (salami or bologna), hot dogs, bentley, and sausage    ?  Whole eggs and egg yolks    • Vegetables and fruits with added fat:      ? Fried vegetables or vegetables in butter or high-fat sauces, such as cream or cheese sauces    ? Fried fruit or fruit served with butter or cream    • Fats:      ? Butter, stick margarine, and shortening    ? Coconut, palm oil, and palm kernel oil    Foods to include:       • Grains:      ? Whole-grain breads, cereals, pasta, and brown rice    ? Low-fat crackers and pretzels    • Vegetables and fruits:      ? Fresh, frozen, or canned vegetables (no salt or low-sodium)    ? Fresh, frozen, dried, or canned fruit (canned in light syrup or fruit juice)    ? Avocado    • Low-fat dairy products:      ? Nonfat (skim) or 1% milk    ? Nonfat or low-fat cheese, yogurt, and cottage cheese    • Meats and proteins:      ? Chicken or turkey with no skin    ? Baked or broiled fish    ? Lean beef and pork (loin, round, extra lean hamburger)    ? Beans and peas, unsalted nuts, soy products    ? Egg whites and substitutes    ? Seeds and nuts    • Fats:      ? Unsaturated oil, such as canola, olive, peanut, soybean, or sunflower oil    ? Soft or liquid margarine and vegetable oil spread    ? Low-fat salad dressing  Other ways to decrease fat:   • Read food labels before you buy foods. Choose foods that have less than 30% of calories from fat. Choose low-fat or fat-free dairy products. Remember that fat free does not mean calorie free. These foods still contain calories, and too many calories can lead to weight gain. • Trim fat from meat and avoid fried food. Trim all visible fat from meat before you cook it. Remove the skin from poultry. Do not rogers meat, fish, or poultry. Bake, roast, boil, or broil these foods instead. Avoid fried foods. Eat a baked potato instead of [place] fries. Steam vegetables instead of sautéing them in butter. • Add less fat to foods. Use imitation bentley bits on salads and baked potatoes instead of regular bentley bits. Use fat-free or low-fat salad dressings instead of regular dressings.  Use low-fat or nonfat butter-flavored topping instead of regular butter or margarine on popcorn and other foods. Ways to decrease fat in recipes:  Replace high-fat ingredients with low-fat or nonfat ones. This may cause baked goods to be drier than usual. You may need to use nonfat cooking spray on pans to prevent food from sticking. You also may need to change the amount of other ingredients, such as water, in the recipe. Try the following:  • Use low-fat or light margarine instead of regular margarine or shortening. • Use lean ground turkey breast or chicken, or lean ground beef (less than 5% fat) instead of hamburger. • Add 1 teaspoon of canola oil to 8 ounces of skim milk instead of using cream or half and half. • Use grated zucchini, carrots, or apples in breads instead of coconut. • Use blenderized, low-fat cottage cheese, plain tofu, or low-fat ricotta cheese instead of cream cheese. • Use 1 egg white and 1 teaspoon of canola oil, or use ¼ cup (2 ounces) of fat-free egg substitute instead of a whole egg. • Replace half of the oil that is called for in a recipe with applesauce when you bake. Use 3 tablespoons of cocoa powder and 1 tablespoon of canola oil instead of a square of baking chocolate. How to increase fiber:  Eat enough high-fiber foods to get 20 to 30 grams of fiber every day. Slowly increase your fiber intake to avoid stomach cramps, gas, and other problems. • Eat 3 ounces of whole-grain foods each day. An ounce is about 1 slice of bread. Eat whole-grain breads, such as whole-wheat bread. Whole wheat, whole-wheat flour, or other whole grains should be listed as the first ingredient on the food label. Replace white flour with whole-grain flour or use half of each in recipes. Whole-grain flour is heavier than white flour, so you may have to add more yeast or baking powder. • Eat a high-fiber cereal for breakfast.  Oatmeal is a good source of soluble fiber. Look for cereals that have bran or fiber in the name.  Choose whole-grain products, such as [employer] rice, barley, and whole-wheat pasta. • Eat more beans, peas, and lentils. For example, add beans to soups or salads. Eat at least 5 cups of fruits and vegetables each day. Eat fruits and vegetables with the peel because the peel is high in fiber. © Copyright [facility] 2022 Information is for End User's use only and may not be sold, redistributed or otherwise used for commercial purposes. The above information is an  only. It is not intended as medical advice for individual conditions or treatments. Talk to your doctor, nurse or pharmacist before following any medical regimen to see if it is safe and effective for you. Heart Healthy Diet   AMBULATORY CARE:   A heart healthy diet  is an eating plan low in unhealthy fats and sodium (salt). The plan is high in healthy fats and fiber. A heart healthy diet helps improve your cholesterol levels and lowers your risk for heart disease and stroke. A dietitian will teach you how to read and understand food labels. Heart healthy diet guidelines to follow:   • Choose foods that contain healthy fats:      ? Unsaturated fats  include monounsaturated and polyunsaturated fats. Unsaturated fat is found in foods such as soybean, canola, olive, corn, and safflower oils. It is also found in soft tub margarine that is made with liquid vegetable oil. ? Omega-3 fat  is found in certain fish, such as salmon, tuna, and trout, and in walnuts and flaxseed. Eat fish high in omega-3 fats at least 2 times a week. • Limit or do not have unhealthy fats:      ? Cholesterol  is found in animal foods, such as eggs and lobster, and in dairy products made from whole milk. Limit cholesterol to less than 200 mg each day. ? Saturated fat  is found in meats, such as bentley and hamburger. It is also found in chicken or turkey skin, whole milk, and butter. Limit saturated fat to less than 7% of your total daily calories.     ? Trans fat  is found in packaged foods, such as potato chips and cookies. It is also in hard margarine, some fried foods, and shortening. Do not eat foods that contain trans fats. • Get 20 to 30 grams of fiber each day. Fruits, vegetables, whole-grain foods, and legumes (cooked beans) are good sources of fiber. • Limit sodium as directed. You may be told to limit sodium, such as to 2,000 mg or less each day. Choose low-sodium or no-salt-added foods. Add little or no salt to food you prepare. Use herbs and spices in place of salt. Include the following in your heart healthy plan:  Ask your dietitian or healthcare provider how many servings to have each day from the following food groups:  • Grains:      ? Whole-wheat breads, cereals, and pastas, and brown rice    ? Low-fat, low-sodium crackers and chips    • Vegetables:      ? Broccoli, green beans, green peas, and spinach    ? Collards, kale, and lima beans    ? Carrots, sweet potatoes, tomatoes, and peppers    ? Canned vegetables with no salt added    • Fruits:      ? Bananas, peaches, pears, and pineapple    ? Grapes, raisins, and dates    ? Oranges, tangerines, grapefruit, orange juice, and grapefruit juice    ? Apricots, mangoes, melons, and papaya    ? Raspberries and strawberries    ? Canned fruit with no added sugar    • Low-fat dairy:      ? Nonfat (skim) milk, 1% milk, and low-fat almond, cashew, or soy milks fortified with calcium    ? Low-fat cheese, regular or frozen yogurt, and cottage cheese    • Meats and proteins:      ? Lean cuts of beef and pork (loin, leg, round), skinless chicken and turkey    ? Legumes, soy products, egg whites, or nuts    Limit or do not include the following in your heart healthy plan:   • Foods and liquids that contain unhealthy fats and oils:      ? Whole or 2% milk, cream cheese, sour cream, or cheese    ? High-fat cuts of beef (T-bone steaks, ribs), chicken or turkey with skin, and organ meats such as liver    ?  Butter, stick margarine, shortening, and cooking oils such as coconut or palm oil    • Foods and liquids high in sodium:      ? Packaged foods, such as frozen dinners, cookies, macaroni and cheese, and cereals with more than 300 mg of sodium per serving    ? Vegetables with added sodium, such as instant potatoes, vegetables with added sauces, or regular canned vegetables    ? Cured or smoked meats, such as hot dogs, bentley, and sausage    ? High-sodium ketchup, barbecue sauce, salad dressing, pickles, olives, soy sauce, or miso    • Foods and liquids high in sugar:      ? Candy, cake, cookies, pies, or doughnuts    ? Soft drinks (soda), sports drinks, or sweetened tea    ? Canned or dry mixes for cakes, soups, sauces, or gravies    Other healthy heart guidelines:   • Do not smoke. Nicotine and other chemicals in cigarettes and cigars can cause lung and heart damage. Ask your healthcare provider for information if you currently smoke and need help to quit. E-cigarettes or smokeless tobacco still contain nicotine. Talk to your provider before you use these products. • Limit or do not drink alcohol as directed. Alcohol can damage your heart and raise your blood pressure. Your healthcare provider may give you specific daily and weekly limits. The general recommended limit is 1 drink a day for women 21 or older and for men 72 or older. Do not have more than 3 drinks within 24 hours or 7 within a week. The recommended limit is 2 drinks a day for men 24to 59years of age. Do not have more than 4 drinks within 24 hours or 14 within a week. A drink of alcohol is 12 ounces of beer, 5 ounces of wine, or 1½ ounces of liquor. • Maintain a healthy weight. Extra body weight makes your heart work harder. Ask your provider what a healthy weight is for you. He or she can help you create a safe weight loss plan, if needed. • Exercise regularly. Exercise can help you maintain a healthy weight and improve your blood pressure and cholesterol levels.  Regular exercise can also decrease your risk for heart problems. Ask your provider about the best exercise plan for you. Do not start an exercise program without asking your provider. Follow up with your doctor or cardiologist as directed:  Write down your questions so you remember to ask them during your visits. © Copyright [employer] 2022 Information is for End User's use only and may not be sold, redistributed or otherwise used for commercial purposes. The above information is an  only. It is not intended as medical advice for individual conditions or treatments. Talk to your doctor, nurse or pharmacist before following any medical regimen to see if it is safe and effective for you.

## 2023-08-02 ENCOUNTER — APPOINTMENT (OUTPATIENT)
Age: 68
End: 2023-08-02
Payer: MEDICARE

## 2023-08-02 DIAGNOSIS — I10 PRIMARY HYPERTENSION: ICD-10-CM

## 2023-08-02 DIAGNOSIS — Z12.5 SCREENING FOR PROSTATE CANCER: ICD-10-CM

## 2023-08-02 DIAGNOSIS — E03.9 HYPOTHYROIDISM, UNSPECIFIED TYPE: ICD-10-CM

## 2023-08-02 DIAGNOSIS — G47.33 OBSTRUCTIVE SLEEP APNEA (ADULT) (PEDIATRIC): ICD-10-CM

## 2023-08-02 DIAGNOSIS — E78.00 HYPERCHOLESTEROLEMIA: ICD-10-CM

## 2023-08-02 DIAGNOSIS — J30.9 ALLERGIC RHINITIS, UNSPECIFIED SEASONALITY, UNSPECIFIED TRIGGER: ICD-10-CM

## 2023-08-02 DIAGNOSIS — M54.16 LUMBAR RADICULOPATHY: ICD-10-CM

## 2023-08-02 LAB
ALBUMIN SERPL BCP-MCNC: 3.8 G/DL (ref 3.5–5)
ALP SERPL-CCNC: 82 U/L (ref 46–116)
ALT SERPL W P-5'-P-CCNC: 35 U/L (ref 12–78)
ANION GAP SERPL CALCULATED.3IONS-SCNC: 4 MMOL/L
AST SERPL W P-5'-P-CCNC: 28 U/L (ref 5–45)
BASOPHILS # BLD AUTO: 0.02 THOUSANDS/ÂΜL (ref 0–0.1)
BASOPHILS NFR BLD AUTO: 0 % (ref 0–1)
BILIRUB SERPL-MCNC: 0.64 MG/DL (ref 0.2–1)
BUN SERPL-MCNC: 20 MG/DL (ref 5–25)
CALCIUM SERPL-MCNC: 9.2 MG/DL (ref 8.3–10.1)
CHLORIDE SERPL-SCNC: 107 MMOL/L (ref 96–108)
CHOLEST SERPL-MCNC: 201 MG/DL
CO2 SERPL-SCNC: 29 MMOL/L (ref 21–32)
CREAT SERPL-MCNC: 1.3 MG/DL (ref 0.6–1.3)
EOSINOPHIL # BLD AUTO: 0.24 THOUSAND/ÂΜL (ref 0–0.61)
EOSINOPHIL NFR BLD AUTO: 4 % (ref 0–6)
ERYTHROCYTE [DISTWIDTH] IN BLOOD BY AUTOMATED COUNT: 12.3 % (ref 11.6–15.1)
GFR SERPL CREATININE-BSD FRML MDRD: 56 ML/MIN/1.73SQ M
GLUCOSE P FAST SERPL-MCNC: 112 MG/DL (ref 65–99)
HCT VFR BLD AUTO: 48 % (ref 36.5–49.3)
HDLC SERPL-MCNC: 49 MG/DL
HGB BLD-MCNC: 15.8 G/DL (ref 12–17)
IMM GRANULOCYTES # BLD AUTO: 0.01 THOUSAND/UL (ref 0–0.2)
IMM GRANULOCYTES NFR BLD AUTO: 0 % (ref 0–2)
LDLC SERPL CALC-MCNC: 119 MG/DL (ref 0–100)
LYMPHOCYTES # BLD AUTO: 1.24 THOUSANDS/ÂΜL (ref 0.6–4.47)
LYMPHOCYTES NFR BLD AUTO: 21 % (ref 14–44)
MCH RBC QN AUTO: 30.9 PG (ref 26.8–34.3)
MCHC RBC AUTO-ENTMCNC: 32.9 G/DL (ref 31.4–37.4)
MCV RBC AUTO: 94 FL (ref 82–98)
MONOCYTES # BLD AUTO: 0.64 THOUSAND/ÂΜL (ref 0.17–1.22)
MONOCYTES NFR BLD AUTO: 11 % (ref 4–12)
NEUTROPHILS # BLD AUTO: 3.65 THOUSANDS/ÂΜL (ref 1.85–7.62)
NEUTS SEG NFR BLD AUTO: 64 % (ref 43–75)
NONHDLC SERPL-MCNC: 152 MG/DL
NRBC BLD AUTO-RTO: 0 /100 WBCS
PLATELET # BLD AUTO: 229 THOUSANDS/UL (ref 149–390)
PMV BLD AUTO: 11.4 FL (ref 8.9–12.7)
POTASSIUM SERPL-SCNC: 4.3 MMOL/L (ref 3.5–5.3)
PROT SERPL-MCNC: 7.7 G/DL (ref 6.4–8.4)
PSA SERPL-MCNC: 5.67 NG/ML (ref 0–4)
RBC # BLD AUTO: 5.11 MILLION/UL (ref 3.88–5.62)
SODIUM SERPL-SCNC: 140 MMOL/L (ref 135–147)
TRIGL SERPL-MCNC: 163 MG/DL
TSH SERPL DL<=0.05 MIU/L-ACNC: 0.61 UIU/ML (ref 0.45–4.5)
WBC # BLD AUTO: 5.8 THOUSAND/UL (ref 4.31–10.16)

## 2023-08-02 PROCEDURE — 36415 COLL VENOUS BLD VENIPUNCTURE: CPT

## 2023-08-02 PROCEDURE — 80061 LIPID PANEL: CPT

## 2023-08-02 PROCEDURE — 80053 COMPREHEN METABOLIC PANEL: CPT

## 2023-08-02 PROCEDURE — 85025 COMPLETE CBC W/AUTO DIFF WBC: CPT

## 2023-08-02 PROCEDURE — 84443 ASSAY THYROID STIM HORMONE: CPT

## 2023-08-02 PROCEDURE — G0103 PSA SCREENING: HCPCS

## 2023-08-03 ENCOUNTER — TELEPHONE (OUTPATIENT)
Dept: NEUROLOGY | Facility: CLINIC | Age: 68
End: 2023-08-03

## 2023-08-03 NOTE — TELEPHONE ENCOUNTER
Patient is scheduled with Janae Moore on 11/30. Provider will be out of office on this date so this appointment needs to be rescheduled. Called and left vm for patient. MyChart sent.

## 2023-08-04 DIAGNOSIS — R97.20 ELEVATED PSA: Primary | ICD-10-CM

## 2023-08-14 ENCOUNTER — OFFICE VISIT (OUTPATIENT)
Dept: INTERNAL MEDICINE CLINIC | Facility: CLINIC | Age: 68
End: 2023-08-14
Payer: MEDICARE

## 2023-08-14 VITALS — HEART RATE: 72 BPM | OXYGEN SATURATION: 94 %

## 2023-08-14 DIAGNOSIS — Z20.822 EXPOSURE TO COVID-19 VIRUS: Primary | ICD-10-CM

## 2023-08-14 LAB
SARS-COV-2 AG UPPER RESP QL IA: NEGATIVE
VALID CONTROL: NORMAL

## 2023-08-14 PROCEDURE — 87811 SARS-COV-2 COVID19 W/OPTIC: CPT | Performed by: NURSE PRACTITIONER

## 2023-08-14 PROCEDURE — 99214 OFFICE O/P EST MOD 30 MIN: CPT | Performed by: NURSE PRACTITIONER

## 2023-08-14 NOTE — PROGRESS NOTES
Name: Shay Hodge      : 1955      MRN: [id number]  Encounter Provider: KRISTEN Ott  Encounter Date: 2023   Encounter department: [facility] Rapid covid is negative. Did advise to recheck in around two days with a home test if does come back positive will start on Paxlovid. 1. Exposure to COVID-19 virus  -     POCT Rapid Covid Ag           Subjective      Pako Maddox is for an acute visit. He was exposed to covid currently not having any symptoms. Was not feeling well last week but is feeling ok today. Offers no other issues. Review of Systems   All other systems reviewed and are negative. Current Outpatient Medications on File Prior to Visit   Medication Sig   • B Complex-C-Folic Acid (B-Complex/Folic Acid/Vitamin C) TBCR    • Cholecalciferol (D-3-5) 125 MCG (5000 UT) capsule    • cyanocobalamin (VITAMIN B-12) 1,000 mcg tablet Vitamin B-12 1,000 mcg tablet   Take by oral route.  (Patient not taking: Reported on 2023)   • cyclobenzaprine (FLEXERIL) 10 mg tablet Take 1 tablet (10 mg total) by mouth daily at bedtime (Patient taking differently: Take 10 mg by mouth if needed)   • DULoxetine (CYMBALTA) 30 mg delayed release capsule Take 1 capsule (30 mg total) by mouth daily at bedtime   • Echinacea 400 MG CAPS    • ezetimibe (ZETIA) 10 mg tablet Take 1 tablet (10 mg total) by mouth daily   • Fiber Adult Gummies 2 g CHEW    • ibuprofen (MOTRIN) 600 mg tablet Take 1 tablet (600 mg total) by mouth 2 (two) times a day as needed for mild pain or moderate pain (Patient taking differently: Take 600 mg by mouth daily)   • latanoprost (XALATAN) 0.005 % ophthalmic solution Administer 1 drop to both eyes daily   • levothyroxine 175 mcg tablet Take 1 tablet (175 mcg total) by mouth daily   • lisinopril (ZESTRIL) 10 mg tablet Take 1 tablet (10 mg total) by mouth daily   • Riboflavin 400 MG CAPS    • SUMAtriptan (IMITREX) 50 mg tablet Take 1 tablet (50 mg total) by mouth once as needed for migraine for up to 1 dose   • Zinc 50 MG CAPS Take 1 capsule by mouth in the morning       Objective     Pulse 72   SpO2 94%     Physical Exam  Constitutional:       Appearance: Normal appearance. He is normal weight. Neurological:      General: No focal deficit present. Mental Status: He is alert and oriented to person, place, and time. Mental status is at baseline. Psychiatric:         Mood and Affect: Mood normal.         Behavior: Behavior normal.         Thought Content:  Thought content normal.         Judgment: Judgment normal.       KRISTEN Singh

## 2023-08-16 ENCOUNTER — OFFICE VISIT (OUTPATIENT)
Dept: UROLOGY | Facility: CLINIC | Age: 68
End: 2023-08-16
Payer: MEDICARE

## 2023-08-16 VITALS
WEIGHT: 223 LBS | DIASTOLIC BLOOD PRESSURE: 86 MMHG | SYSTOLIC BLOOD PRESSURE: 132 MMHG | HEART RATE: 68 BPM | BODY MASS INDEX: 33.91 KG/M2

## 2023-08-16 DIAGNOSIS — R97.20 ELEVATED PSA: Primary | ICD-10-CM

## 2023-08-16 PROCEDURE — 99203 OFFICE O/P NEW LOW 30 MIN: CPT

## 2023-08-16 NOTE — PROGRESS NOTES
8/16/2023    No chief complaint on file. Assessment and Plan    79 y.o. male new patient to office    1. Elevated PSA  · PSA trend  · 5.67 (8/2/2023)  · 2.2 (5/26/2021)  · 1.8 (4/9/2019)  · DICKSON reveals smooth symmetric prostate, no palpable nodule or masses. Estimated gland size between 30-35 g  · Given his acute elevated PSA with normal rectal exam I am recommending a repeat PSA in 4 weeks. I will call him with those results. We did discuss that should this remain elevated I would recommend either a multiparametric MRI of the prostate versus a prostate biopsy both of these options were discussed in detail today. · Follow-up 6 weeks      History of Present Illness  Viri Perry is a 79 y.o. male new patient to office. Here for evaluation of elevated PSA. Patient previously seen by Dr. Mario Elise in April 2019 for urinary frequency, urgency, and nocturia x3. Patient reported history of uric acid stones but nothing recently at the time. It was thought that his symptoms were likely due to acute onset overactive bladder and CT imaging was negative for stones with normal PVRs and a small prostate on exam.  Patient was trialed on oxybutynin and recommended cystoscopy evaluation which revealed meatal stenosis with coronal hypospadias, overactive bladder. At baseline he denies any bothersome lower urinary tract symptoms. He does experience and intermittent weak urinary stream. His previous issues with OAB resolved years ago and he no longer takes oxybutynin. History of kidney stone with no recent stone episodes. No gross hematuria. No history of UTIs or prostatitis. PSA 5.67 as of 8/2/2023 previously 2.2 on 5/26/2021 and 1.8 on 4/9/2019. Negative family history of prostate cancer. His older brother is also being followed for an elevated PSA but no prostate cancer diagnosis. Family history of colon and lung cancer in his mother. Review of Systems   Constitutional: Negative for chills and fever. HENT: Negative for congestion and sore throat. Respiratory: Negative for cough and shortness of breath. Cardiovascular: Negative for chest pain and leg swelling. Gastrointestinal: Negative for abdominal pain, constipation and diarrhea. Genitourinary: Negative for difficulty urinating, dysuria, frequency, hematuria and urgency. Intermittent weak urinary stream   Musculoskeletal: Negative for back pain and gait problem. Skin: Negative for wound. Allergic/Immunologic: Negative for immunocompromised state. Neurological: Negative for dizziness, weakness and numbness. Hematological: Does not bruise/bleed easily. AUA SYMPTOM SCORE    Flowsheet Row Most Recent Value   AUA SYMPTOM SCORE    How often have you had a sensation of not emptying your bladder completely after you finished urinating? 0 (P)     How often have you had to urinate again less than two hours after you finished urinating? 1 (P)     How often have you found you stopped and started again several times when you urinate? 1 (P)     How often have you found it difficult to postpone urination? 0 (P)     How often have you had a weak urinary stream? 4 (P)     How often have you had to push or strain to begin urination? 1 (P)     How many times did you most typically get up to urinate from the time you went to bed at night until the time you got up in the morning? 5 (P)     Quality of Life: If you were to spend the rest of your life with your urinary condition just the way it is now, how would you feel about that? 2 (P)     AUA SYMPTOM SCORE 12 (P)           Vitals  There were no vitals filed for this visit. Physical Exam  Vitals reviewed. Constitutional:       General: He is not in acute distress. Appearance: Normal appearance. He is not ill-appearing or toxic-appearing. HENT:      Head: Normocephalic and atraumatic. Eyes:      General: No scleral icterus.      Conjunctiva/sclera: Conjunctivae normal. Cardiovascular:      Rate and Rhythm: Normal rate. Pulmonary:      Effort: Pulmonary effort is normal. No respiratory distress. Abdominal:      General: Abdomen is flat. Palpations: Abdomen is soft. Tenderness: There is no abdominal tenderness. There is no right CVA tenderness or left CVA tenderness. Hernia: No hernia is present. Genitourinary:     Comments: DICKSON reveals a smooth symmetric prostate, no palpable nodules or masses. Musculoskeletal:      Cervical back: Normal range of motion. Right lower leg: No edema. Left lower leg: No edema. Skin:     General: Skin is warm and dry. Coloration: Skin is not jaundiced or pale. Neurological:      General: No focal deficit present. Mental Status: He is alert and oriented to person, place, and time. Mental status is at baseline. Gait: Gait normal.   Psychiatric:         Mood and Affect: Mood normal.         Behavior: Behavior normal.         Thought Content:  Thought content normal.         Judgment: Judgment normal.         Past History  Past Medical History:   Diagnosis Date   • Arthritis 2000   • Back pain    • Colon polyp    • ED (erectile dysfunction)    • Headache(784.0)    • HL (hearing loss) 2000    Both ears   • Hypertension    • Hypothyroidism    • IBS (irritable bowel syndrome)    • Inflammatory bowel disease    • Kidney stones    • Migraine July 2022    Started when I stopped drinking   • Osteoarthritis    • Overactive bladder    • Vision loss july 2022    Occures with Migrain     Social History     Socioeconomic History   • Marital status: /Civil Union     Spouse name: Not on file   • Number of children: Not on file   • Years of education: Not on file   • Highest education level: Not on file   Occupational History   • Not on file   Tobacco Use   • Smoking status: Former     Packs/day: 1.50     Years: 50.00     Total pack years: 75.00     Types: Cigarettes, Pipe, Cigars     Start date: 1/1/1967     Quit date: 2017     Years since quittin.6   • Smokeless tobacco: Never   • Tobacco comments:     Tobacco free   Vaping Use   • Vaping Use: Never used   Substance and Sexual Activity   • Alcohol use: Not Currently     Alcohol/week: 25.0 standard drinks of alcohol     Types: 25 Shots of liquor per week     Comment: Stopped 2022   • Drug use: Not Currently     Frequency: 1.0 times per week     Types: Cocaine, Hashish, LSD, Marijuana     Comment: Not since High School   • Sexual activity: Not Currently     Partners: Female     Birth control/protection: Male Sterilization   Other Topics Concern   • Not on file   Social History Narrative   • Not on file     Social Determinants of Health     Financial Resource Strain: Low Risk  (2023)    Overall Financial Resource Strain (CARDIA)    • Difficulty of Paying Living Expenses: Not very hard   Food Insecurity: Not on file   Transportation Needs: No Transportation Needs (2023)    PRAPARE - Transportation    • Lack of Transportation (Medical): No    • Lack of Transportation (Non-Medical):  No   Physical Activity: Not on file   Stress: Not on file   Social Connections: Not on file   Intimate Partner Violence: Not on file   Housing Stability: Not on file     Social History     Tobacco Use   Smoking Status Former   • Packs/day: 1.50   • Years: 50.00   • Total pack years: 75.00   • Types: Cigarettes, Pipe, Cigars   • Start date: 1967   • Quit date: 2017   • Years since quittin.6   Smokeless Tobacco Never   Tobacco Comments    Tobacco free     Family History   Problem Relation Age of Onset   • Cancer Mother    • Alcohol abuse Mother         Dead   • Heart disease Father    • Hypertension Father    • Early death Father         Heart Attack @ 39   • Heart disease Sister    • Hypertension Sister    • Diabetes Maternal Grandmother    • Cancer Maternal Grandmother    • Stroke Maternal Aunt    • Dementia Maternal Aunt        The following portions of the patient's history were reviewed and updated as appropriate allergies, current medications, past medical history, past social history, past surgical history and problem list    Imaging:    Results  No results found for this or any previous visit (from the past 1 hour(s)).]  Lab Results   Component Value Date    PSA 5.67 (H) 08/02/2023    PSA 2.2 05/26/2021    PSA 1.8 04/09/2019     Lab Results   Component Value Date    CALCIUM 9.2 08/02/2023    K 4.3 08/02/2023    CO2 29 08/02/2023     08/02/2023    BUN 20 08/02/2023    CREATININE 1.30 08/02/2023     Lab Results   Component Value Date    WBC 5.80 08/02/2023    HGB 15.8 08/02/2023    HCT 48.0 08/02/2023    MCV 94 08/02/2023     08/02/2023       Please Note:  Voice dictation software has been used to create this document. There may be inadvertent transcriptions errors.      KRISTEN Grimes 08/16/23

## 2023-08-17 ENCOUNTER — TELEPHONE (OUTPATIENT)
Dept: INTERNAL MEDICINE CLINIC | Facility: CLINIC | Age: 68
End: 2023-08-17

## 2023-08-17 NOTE — TELEPHONE ENCOUNTER
In speaking to patient regarding his wife, I asked him how he is feeling since [facility] Patient states that he is feeling fine.

## 2023-09-13 ENCOUNTER — APPOINTMENT (OUTPATIENT)
Age: 68
End: 2023-09-13
Payer: MEDICARE

## 2023-09-13 DIAGNOSIS — R97.20 ELEVATED PSA: ICD-10-CM

## 2023-09-13 PROCEDURE — 36415 COLL VENOUS BLD VENIPUNCTURE: CPT

## 2023-09-13 PROCEDURE — 84153 ASSAY OF PSA TOTAL: CPT

## 2023-09-13 PROCEDURE — 84154 ASSAY OF PSA FREE: CPT

## 2023-09-14 LAB
PSA FREE MFR SERPL: 15.3 %
PSA FREE SERPL-MCNC: 0.46 NG/ML
PSA SERPL-MCNC: 3 NG/ML (ref 0–4)

## 2023-09-20 ENCOUNTER — OFFICE VISIT (OUTPATIENT)
Dept: UROLOGY | Facility: CLINIC | Age: 68
End: 2023-09-20
Payer: MEDICARE

## 2023-09-20 VITALS
BODY MASS INDEX: 33.95 KG/M2 | SYSTOLIC BLOOD PRESSURE: 140 MMHG | DIASTOLIC BLOOD PRESSURE: 76 MMHG | WEIGHT: 224 LBS | HEIGHT: 68 IN | HEART RATE: 56 BPM

## 2023-09-20 DIAGNOSIS — R97.20 ELEVATED PSA: Primary | ICD-10-CM

## 2023-09-20 PROCEDURE — 99212 OFFICE O/P EST SF 10 MIN: CPT

## 2023-09-20 NOTE — PROGRESS NOTES
9/20/2023    No chief complaint on file. Assessment and Plan    79 y.o. male     1. Elevated PSA  · PSA trend  · 3.0 (9/13/2023)  · 5.67 (8/2/2023)  · 2.2 (5/26/2021)  · 1.8 (4/9/2019)  · DICKSON benign during initial office exam.   · PSA improved and within normal range. We will continue to monitor this closely and recheck a PSA in 6 months with follow-up. If he continues with elevations in the future we did discuss possible need for mpMRI versus prostate biopsy again today. Subjective:    History of Present Illness  Ines Reynoso is a 79 y.o. male here for follow-up evaluation of elevated PSA. Patient recently establish care and was seen by me on 8/16/2023 for evaluation of elevated PSA. previously seen by Dr. Domingo Lobato in April 2019 for urinary frequency, urgency, and nocturia x3. Patient reported history of uric acid stones but nothing recently at the time. It was thought that his symptoms were likely due to acute onset overactive bladder and CT imaging was negative for stones with normal PVRs and a small prostate on exam.  Patient was trialed on oxybutynin and recommended cystoscopy evaluation which revealed meatal stenosis with coronal hypospadias, overactive bladder.     At baseline he denies any bothersome lower urinary tract symptoms. He does experience and intermittent weak urinary stream. His previous issues with OAB resolved years ago and he no longer takes oxybutynin. History of kidney stone with no recent stone episodes. No gross hematuria. No history of UTIs or prostatitis.      Repeat PSA 3.0 as of 9/13/2023 previously  5.67 on 8/2/2023 previously 2.2 on 5/26/2021 and 1.8 on 4/9/2019. Negative family history of prostate cancer. His older brother is also being followed for an elevated PSA but no prostate cancer diagnosis. Family history of colon and lung cancer in his mother. Review of Systems   Constitutional: Negative for chills and fever.    HENT: Negative for congestion and sore throat. Respiratory: Negative for cough and shortness of breath. Cardiovascular: Negative for chest pain and leg swelling. Gastrointestinal: Negative for abdominal pain, constipation and diarrhea. Genitourinary: Negative for difficulty urinating, dysuria, frequency, hematuria and urgency. Musculoskeletal: Negative for back pain and gait problem. Skin: Negative for wound. Allergic/Immunologic: Negative for immunocompromised state. Hematological: Does not bruise/bleed easily. Vitals  Vitals:    09/20/23 0835   BP: 140/76   Pulse: 56   Weight: 102 kg (224 lb)   Height: 5' 8" (1.727 m)       Physical Exam  Vitals reviewed. Constitutional:       General: He is not in acute distress. Appearance: Normal appearance. He is not ill-appearing or toxic-appearing. HENT:      Head: Normocephalic and atraumatic. Eyes:      General: No scleral icterus. Conjunctiva/sclera: Conjunctivae normal.   Cardiovascular:      Rate and Rhythm: Normal rate. Pulmonary:      Effort: Pulmonary effort is normal. No respiratory distress. Abdominal:      Tenderness: There is no right CVA tenderness or left CVA tenderness. Hernia: No hernia is present. Musculoskeletal:      Cervical back: Normal range of motion. Right lower leg: No edema. Left lower leg: No edema. Skin:     General: Skin is warm and dry. Coloration: Skin is not jaundiced or pale. Neurological:      General: No focal deficit present. Mental Status: He is alert and oriented to person, place, and time. Mental status is at baseline. Gait: Gait normal.   Psychiatric:         Mood and Affect: Mood normal.         Behavior: Behavior normal.         Thought Content:  Thought content normal.         Judgment: Judgment normal.         Past History  Past Medical History:   Diagnosis Date   • Arthritis 2000   • Back pain    • Colon polyp    • ED (erectile dysfunction)    • Headache(784.0)    • HL (hearing loss) 2000    Both ears   • Hypertension    • Hypothyroidism    • IBS (irritable bowel syndrome)    • Inflammatory bowel disease    • Kidney stones    • Migraine 2022    Started when I stopped drinking   • Osteoarthritis    • Overactive bladder    • Vision loss 2022    Occures with Migrain     Social History     Socioeconomic History   • Marital status: /Civil Union     Spouse name: None   • Number of children: None   • Years of education: None   • Highest education level: None   Occupational History   • None   Tobacco Use   • Smoking status: Former     Packs/day: 1.50     Years: 50.00     Total pack years: 75.00     Types: Cigarettes, Pipe, Cigars     Start date: 1967     Quit date: 2017     Years since quittin.7   • Smokeless tobacco: Never   • Tobacco comments:     Tobacco free   Vaping Use   • Vaping Use: Never used   Substance and Sexual Activity   • Alcohol use: Not Currently     Alcohol/week: 25.0 standard drinks of alcohol     Types: 25 Shots of liquor per week     Comment: Stopped 2022   • Drug use: Not Currently     Frequency: 1.0 times per week     Types: Cocaine, Hashish, LSD, Marijuana     Comment: Not since High School   • Sexual activity: Not Currently     Partners: Female     Birth control/protection: Male Sterilization   Other Topics Concern   • None   Social History Narrative   • None     Social Determinants of Health     Financial Resource Strain: Low Risk  (2023)    Overall Financial Resource Strain (CARDIA)    • Difficulty of Paying Living Expenses: Not very hard   Food Insecurity: Not on file   Transportation Needs: No Transportation Needs (2023)    PRAPARE - Transportation    • Lack of Transportation (Medical): No    • Lack of Transportation (Non-Medical):  No   Physical Activity: Not on file   Stress: Not on file   Social Connections: Not on file   Intimate Partner Violence: Not on file   Housing Stability: Not on file     Social History Tobacco Use   Smoking Status Former   • Packs/day: 1.50   • Years: 50.00   • Total pack years: 75.00   • Types: Cigarettes, Pipe, Cigars   • Start date: 1967   • Quit date: 2017   • Years since quittin.7   Smokeless Tobacco Never   Tobacco Comments    Tobacco free     Family History   Problem Relation Age of Onset   • Heart disease Father    • Hypertension Father    • Early death Father         Heart Attack @ 39   • Cancer Mother    • Alcohol abuse Mother         Dead   • Stroke Maternal Aunt    • Dementia Maternal Aunt    • Diabetes Maternal Grandmother    • Cancer Maternal Grandmother    • Heart disease Sister    • Hypertension Sister        The following portions of the patient's history were reviewed and updated as appropriate allergies, current medications, past medical history, past social history, past surgical history and problem list    Imaging:    Results  No results found for this or any previous visit (from the past 1 hour(s)).]  Lab Results   Component Value Date    PSA 3.0 2023    PSA 5.67 (H) 2023    PSA 2.2 2021    PSA 1.8 2019     Lab Results   Component Value Date    CALCIUM 9.2 2023    K 4.3 2023    CO2 29 2023     2023    BUN 20 2023    CREATININE 1.30 2023     Lab Results   Component Value Date    WBC 5.80 2023    HGB 15.8 2023    HCT 48.0 2023    MCV 94 2023     2023       Please Note:  Voice dictation software has been used to create this document. There may be inadvertent transcriptions errors.      KRISTEN Car 23

## 2023-09-30 PROBLEM — Z12.5 SCREENING FOR PROSTATE CANCER: Status: RESOLVED | Noted: 2023-08-01 | Resolved: 2023-09-30

## 2023-09-30 PROBLEM — Z00.00 MEDICARE ANNUAL WELLNESS VISIT, SUBSEQUENT: Status: RESOLVED | Noted: 2023-08-01 | Resolved: 2023-09-30

## 2023-10-07 DIAGNOSIS — E03.9 HYPOTHYROIDISM, UNSPECIFIED TYPE: ICD-10-CM

## 2023-10-09 ENCOUNTER — TELEPHONE (OUTPATIENT)
Dept: NEUROLOGY | Facility: CLINIC | Age: 68
End: 2023-10-09

## 2023-10-09 RX ORDER — LEVOTHYROXINE SODIUM 175 UG/1
175 TABLET ORAL DAILY
Qty: 90 TABLET | Refills: 0 | Status: SHIPPED | OUTPATIENT
Start: 2023-10-09

## 2023-11-15 DIAGNOSIS — M19.90 ARTHRITIS: ICD-10-CM

## 2023-11-20 RX ORDER — IBUPROFEN 600 MG/1
600 TABLET ORAL 2 TIMES DAILY PRN
Qty: 180 TABLET | Refills: 0 | Status: SHIPPED | OUTPATIENT
Start: 2023-11-20

## 2023-12-20 DIAGNOSIS — M62.838 MUSCLE SPASM: ICD-10-CM

## 2023-12-20 RX ORDER — CYCLOBENZAPRINE HCL 10 MG
10 TABLET ORAL
Qty: 30 TABLET | Refills: 0 | Status: SHIPPED | OUTPATIENT
Start: 2023-12-20

## 2023-12-29 ENCOUNTER — OFFICE VISIT (OUTPATIENT)
Dept: INTERNAL MEDICINE CLINIC | Facility: CLINIC | Age: 68
End: 2023-12-29
Payer: MEDICARE

## 2023-12-29 DIAGNOSIS — J01.10 ACUTE NON-RECURRENT FRONTAL SINUSITIS: Primary | ICD-10-CM

## 2023-12-29 LAB
SARS-COV-2 AG UPPER RESP QL IA: NEGATIVE
SL AMB POCT RAPID FLU A: NEGATIVE
SL AMB POCT RAPID FLU B: NEGATIVE
VALID CONTROL: NORMAL

## 2023-12-29 PROCEDURE — 99214 OFFICE O/P EST MOD 30 MIN: CPT | Performed by: NURSE PRACTITIONER

## 2023-12-29 PROCEDURE — 87804 INFLUENZA ASSAY W/OPTIC: CPT | Performed by: NURSE PRACTITIONER

## 2023-12-29 PROCEDURE — 87811 SARS-COV-2 COVID19 W/OPTIC: CPT | Performed by: NURSE PRACTITIONER

## 2023-12-29 RX ORDER — DOXYCYCLINE 100 MG/1
100 CAPSULE ORAL 2 TIMES DAILY
Qty: 20 CAPSULE | Refills: 0 | Status: SHIPPED | OUTPATIENT
Start: 2023-12-29 | End: 2024-01-08

## 2023-12-29 RX ORDER — BENZONATATE 200 MG/1
200 CAPSULE ORAL 3 TIMES DAILY PRN
Qty: 20 CAPSULE | Refills: 0 | Status: SHIPPED | OUTPATIENT
Start: 2023-12-29

## 2023-12-29 NOTE — PROGRESS NOTES
Name: Yahir Orta      : 1955      MRN: [id number]  Encounter Provider: KRISTEN Leach  Encounter Date: 2023   Encounter department: [facility]    Assessment & Plan Rapid flu and covid are negative. Will start on Doxy and tessalon take as directed. Continue supportive care. If any worsening of symptoms did advise to call the office.     1. Acute non-recurrent frontal sinusitis  -     doxycycline monohydrate (MONODOX) 100 mg capsule; Take 1 capsule (100 mg total) by mouth 2 (two) times a day for 10 days  -     benzonatate (TESSALON) 200 MG capsule; Take 1 capsule (200 mg total) by mouth 3 (three) times a day as needed for cough  -     POCT Rapid Covid Ag  -     POCT rapid flu A and B           Subjective      HPI  Review of Systems    Current Outpatient Medications on File Prior to Visit   Medication Sig    B Complex-C-Folic Acid (B-Complex/Folic Acid/Vitamin C) TBCR     Cholecalciferol (D-3-5) 125 MCG (5000 UT) capsule     cyanocobalamin (VITAMIN B-12) 1,000 mcg tablet Vitamin B-12 1,000 mcg tablet   Take by oral route. (Patient not taking: Reported on 2023)    cyclobenzaprine (FLEXERIL) 10 mg tablet Take 1 tablet (10 mg total) by mouth daily at bedtime    DULoxetine (CYMBALTA) 30 mg delayed release capsule Take 1 capsule (30 mg total) by mouth daily at bedtime    Echinacea 400 MG CAPS     ezetimibe (ZETIA) 10 mg tablet Take 1 tablet (10 mg total) by mouth daily    Fiber Adult Gummies 2 g CHEW     ibuprofen (MOTRIN) 600 mg tablet Take 1 tablet (600 mg total) by mouth 2 (two) times a day as needed for mild pain or moderate pain    latanoprost (XALATAN) 0.005 % ophthalmic solution Administer 1 drop to both eyes daily    levothyroxine 175 mcg tablet Take 1 tablet (175 mcg total) by mouth daily    lisinopril (ZESTRIL) 10 mg tablet Take 1 tablet (10 mg total) by mouth daily    Riboflavin 400 MG CAPS  (Patient not taking: Reported on 2023)    SUMAtriptan  (IMITREX) 50 mg tablet Take 1 tablet (50 mg total) by mouth once as needed for migraine for up to 1 dose    Zinc 50 MG CAPS Take 1 capsule by mouth in the morning       Objective     There were no vitals taken for this visit.    Physical Exam  KRISTEN Leach

## 2024-01-01 DIAGNOSIS — E03.9 HYPOTHYROIDISM, UNSPECIFIED TYPE: ICD-10-CM

## 2024-01-01 DIAGNOSIS — E78.00 HYPERCHOLESTEROLEMIA: ICD-10-CM

## 2024-01-02 DIAGNOSIS — I10 ESSENTIAL HYPERTENSION: ICD-10-CM

## 2024-01-02 RX ORDER — LISINOPRIL 10 MG/1
10 TABLET ORAL DAILY
Qty: 90 TABLET | Refills: 1 | Status: SHIPPED | OUTPATIENT
Start: 2024-01-02

## 2024-01-02 RX ORDER — EZETIMIBE 10 MG/1
10 TABLET ORAL DAILY
Qty: 90 TABLET | Refills: 0 | Status: SHIPPED | OUTPATIENT
Start: 2024-01-02

## 2024-01-02 RX ORDER — LEVOTHYROXINE SODIUM 175 UG/1
175 TABLET ORAL DAILY
Qty: 90 TABLET | Refills: 0 | Status: SHIPPED | OUTPATIENT
Start: 2024-01-02

## 2024-01-10 ENCOUNTER — OFFICE VISIT (OUTPATIENT)
Dept: NEUROLOGY | Facility: CLINIC | Age: 69
End: 2024-01-10
Payer: MEDICARE

## 2024-01-10 VITALS
SYSTOLIC BLOOD PRESSURE: 132 MMHG | HEART RATE: 70 BPM | BODY MASS INDEX: 34.82 KG/M2 | OXYGEN SATURATION: 98 % | DIASTOLIC BLOOD PRESSURE: 74 MMHG | WEIGHT: 229 LBS | TEMPERATURE: 98.6 F

## 2024-01-10 DIAGNOSIS — M51.36 DEGENERATIVE LUMBAR DISC: ICD-10-CM

## 2024-01-10 DIAGNOSIS — G43.109 MIGRAINE WITH AURA AND WITHOUT STATUS MIGRAINOSUS, NOT INTRACTABLE: Primary | ICD-10-CM

## 2024-01-10 DIAGNOSIS — G47.33 OBSTRUCTIVE SLEEP APNEA (ADULT) (PEDIATRIC): ICD-10-CM

## 2024-01-10 PROCEDURE — 99213 OFFICE O/P EST LOW 20 MIN: CPT

## 2024-01-10 RX ORDER — DULOXETIN HYDROCHLORIDE 30 MG/1
30 CAPSULE, DELAYED RELEASE ORAL
Qty: 30 CAPSULE | Refills: 6 | Status: SHIPPED | OUTPATIENT
Start: 2024-01-10 | End: 2024-02-09

## 2024-01-10 NOTE — PATIENT INSTRUCTIONS
Patient Instructions:    - Continue with CPAP usage for RORO and follow up with sleep medicine.    Headache Calendar  Please maintain a headache calendar  Consider using phone applications such as Migraine Mayur or Migraine Diary    Headache/migraine treatment:     Rescue medications (for immediate treatment of a headache):   It is ok to take ibuprofen, acetaminophen or naproxen (Advil, Tylenol,  Aleve, Excedrin) if they help your headaches you should limit these to No more than 3 times a week to avoid medication overuse/rebound headaches.     For your more moderate to severe migraines take this medication early   - Continue Sumatriptan 50 mg tabs - take one at the onset of headache. May repeat one time after 2 hours if pain has not resolved.   (Max 2 a day and 9 a month)     Prescription preventive medications for headaches/migraines   (to take every day to help prevent headaches - not to take at the time of headache):  - Continue Cymbalta 30 mg delayed release capsule, take 1 capsule by mouth once daily at bedtime.     *Typically these types of medications take time until you see the benefit, although some may see improvement in days, often it may take weeks, especially if the medication is being titrated up to a beneficial level. Please contact us if there are any concerns or questions regarding the medication.     Over the counter preventive supplements for headaches/migraines (if you try, try for 3 months straight)  (to take every day to help prevent headaches - not to take at the time of headache):  There are combo pills online of these - none of which regulated by FDA and double check dosing - take appropriate dose only once a day- prevent a migraine, migravent, mind ease, migrelief   [] Magnesium 400mg daily (If any diarrhea or upset stomach, decrease dose  as tolerated)  [] Riboflavin (Vitamin B2) 400mg daily (may make your urine bright/neon yellow)      Lifestyle Recommendations:  [x] SLEEP - Maintain a  regular sleep schedule: Adults need at least 7-8 hours of uninterrupted a night. Maintain good sleep hygiene:  Going to bed and waking up at consistent times, avoiding excessive daytime naps, avoiding caffeinated beverages in the evening, avoid excessive stimulation in the evening and generally using bed primarily for sleeping.  One hour before bedtime would recommend turning lights down lower, decreasing your activity (may read quietly, listen to music at a low volume). When you get into bed, should eliminate all technology (no texting, emailing, playing with your phone, iPad or tablet in bed).  [x] HYDRATION - Maintain good hydration.  Drink  2L of fluid a day (4 typical small water bottles)  [x] DIET - Maintain good nutrition. In particular don't skip meals and try and eat healthy balanced meals regularly.  [x] TRIGGERS - Look for other triggers and avoid them: Limit caffeine to 1-2 cups a day or less. Avoid dietary triggers that you have noticed bring on your headaches (this could include aged cheese, peanuts, MSG, aspartame and nitrates).  [x] EXERCISE - physical exercise as we all know is good for you in many ways, and not only is good for your heart, but also is beneficial for your mental health, cognitive health and  chronic pain/headaches. I would encourage at the least 5 days of physical exercise weekly for at least 30 minutes.     Education and Follow-up  [x] Please call with any questions or concerns. Of course if any new concerning symptoms go to the emergency department.  [x] Follow up in 9 months with Dr. Mullen

## 2024-01-10 NOTE — PROGRESS NOTES
[facility]  PATIENT:  Yahir Orta  MRN:  [id number]  :  1955  DATE OF SERVICE:  1/10/2024      Assessment/Plan:     Migraine with aura and without status migrainosus:    I had the pleasure of seeing Yahir today in the office at [facility] in [place].  He is presenting today for an office visit follow-up appointment in regard to his history of migraine headaches.  At this point in time, patient states that he has not had any significant migraine headaches since the last appointment.  He notes that there was an incidence a few weeks ago where he felt like he was going to get a migraine and he took his Imitrex in the potential migraine was aborted before the event started.  He still takes the Cymbalta 30 mg daily at bedtime for both his migraines and his lower back pain.  The patient does report getting occasional, regular tension headaches which he states he will usually take an ibuprofen to relieve them.  It was recommended that the patient continue his current preventive medication plan with Cymbalta and also continuing to take sumatriptan 50 mg for abortive therapy.  He was recommended to continue his CPAP usage for RORO and follow-up with sleep medicine.  Would like for the patient to follow-up in about 9 months time with Dr. Mullen.      Patient Instructions:    - Continue with CPAP usage for RORO and follow up with sleep medicine.    Headache Calendar  Please maintain a headache calendar  Consider using phone applications such as Migraine Mayur or Migraine Diary    Headache/migraine treatment:     Rescue medications (for immediate treatment of a headache):   It is ok to take ibuprofen, acetaminophen or naproxen (Advil, Tylenol,  Aleve, Excedrin) if they help your headaches you should limit these to No more than 3 times a week to avoid medication overuse/rebound headaches.     For your more moderate to severe migraines take this medication early   - Continue  Sumatriptan 50 mg tabs - take one at the onset of headache. May repeat one time after 2 hours if pain has not resolved.   (Max 2 a day and 9 a month)     Prescription preventive medications for headaches/migraines   (to take every day to help prevent headaches - not to take at the time of headache):  - Continue Cymbalta 30 mg delayed release capsule, take 1 capsule by mouth once daily at bedtime.     *Typically these types of medications take time until you see the benefit, although some may see improvement in days, often it may take weeks, especially if the medication is being titrated up to a beneficial level. Please contact us if there are any concerns or questions regarding the medication.     Over the counter preventive supplements for headaches/migraines (if you try, try for 3 months straight)  (to take every day to help prevent headaches - not to take at the time of headache):  There are combo pills online of these - none of which regulated by FDA and double check dosing - take appropriate dose only once a day- prevent a migraine, migravent, mind ease, migrelief   [] Magnesium 400mg daily (If any diarrhea or upset stomach, decrease dose  as tolerated)  [] Riboflavin (Vitamin B2) 400mg daily (may make your urine bright/neon yellow)      Lifestyle Recommendations:  [x] SLEEP - Maintain a regular sleep schedule: Adults need at least 7-8 hours of uninterrupted a night. Maintain good sleep hygiene:  Going to bed and waking up at consistent times, avoiding excessive daytime naps, avoiding caffeinated beverages in the evening, avoid excessive stimulation in the evening and generally using bed primarily for sleeping.  One hour before bedtime would recommend turning lights down lower, decreasing your activity (may read quietly, listen to music at a low volume). When you get into bed, should eliminate all technology (no texting, emailing, playing with your phone, iPad or tablet in bed).  [x] HYDRATION - Maintain good  hydration.  Drink  2L of fluid a day (4 typical small water bottles)  [x] DIET - Maintain good nutrition. In particular don't skip meals and try and eat healthy balanced meals regularly.  [x] TRIGGERS - Look for other triggers and avoid them: Limit caffeine to 1-2 cups a day or less. Avoid dietary triggers that you have noticed bring on your headaches (this could include aged cheese, peanuts, MSG, aspartame and nitrates).  [x] EXERCISE - physical exercise as we all know is good for you in many ways, and not only is good for your heart, but also is beneficial for your mental health, cognitive health and  chronic pain/headaches. I would encourage at the least 5 days of physical exercise weekly for at least 30 minutes.     Education and Follow-up  [x] Please call with any questions or concerns. Of course if any new concerning symptoms go to the emergency department.  [x] Follow up in 9 months with Dr. Mullen         History of Present Illness:     For Review:    We had the pleasure of evaluating Yahir Orta in neurological follow up  today for headaches.  As you know,  he is a 68 y.o.   male with a past history of migraine headaches. Patient was last seen in the office at [facility] on 05/30/2023 by Dr. Mullen.  At the patient's last visit in in the office, reported that the patient was doing exceptionally well with his increase to Cymbalta.  It was noted that the patient was taking 30 mg daily at bedtime and was not having any side effects and did not report any headaches since increasing his dosage.  Is noted that the patient did undergo a sleep study which showed moderate to severe obstructive sleep apnea, patient has been using his CPAP nightly and had noted improvements in regards to daytime fatigue.  Encouraged that the patient continues to follow with sleep medicine for the treatment of this.  Patient was continued on his Cymbalta 30 mg daily at bedtime for migraine preventative therapy  and also was recommended to continue sumatriptan 50 mg as needed for abortive therapy.        Current medical illnesses: IBS, hypothyroidism, hypertension, sciatica, obesity, kidney stones, glaucoma, RORO      What medications do you take or have you taken for your headaches?   Current Preventive:   Cymbalta 30 mg daily, Lisinopril (hypertension)  Current Abortive:   Sumatriptan 50 mg      Prior Preventive:   Avoid Topamax due to history of kidney stones/glaucoma  Prior Abortive:   None     Interval updates as of 1/10/2024:    Occasional regular headaches, has not really been having migraines. Had a migraine started a few weeks ago, took Imitrex and it went away at that time. Still on Cymbalta 30 mg daily at bedtime, was working for his back pain and had been increased by Dr. Mullen which has seemed to still continue to improve headaches. Usually taking ibuprofen for the regular, tension type headaches he is having. Still following with sleep medicine for RORO and using his CPAP almost every night at this time.      Reviewed old notes from physician seen in the past- see above HPI for summary of previous encounters.       Past Medical History:   Diagnosis Date    Arthritis 2000    Back pain     Colon polyp     ED (erectile dysfunction)     Headache(784.0)     HL (hearing loss) 2000    Both ears    Hypertension     Hypothyroidism     IBS (irritable bowel syndrome)     Inflammatory bowel disease     Kidney stones     Migraine July 2022    Started when I stopped drinking    Osteoarthritis     Overactive bladder     Vision loss july 2022    Occures with Migrain       Patient Active Problem List   Diagnosis    Erectile dysfunction    History of kidney stones    Hypercholesterolemia    Hypertension    Hypothyroidism    Localized, primary osteoarthritis of shoulder region    Osteoarthritis of hip    Coronal hypospadias    Irritable bowel syndrome with diarrhea    Obesity (BMI 35.0-39.9 without comorbidity)    Vitamin D  deficiency    Glaucoma of both eyes    Nontraumatic incomplete tear of right rotator cuff    Chronic right-sided low back pain with right-sided sciatica    Chronic sinusitis    Allergic rhinitis    Migraine    Sciatica of right side    Chronic pain syndrome    Lumbar radiculopathy    Lumbar spondylosis    Obstructive sleep apnea (adult) (pediatric)    RORO (obstructive sleep apnea)    BMI 33.0-33.9,adult    Acute non-recurrent frontal sinusitis       Medications:      Current Outpatient Medications   Medication Sig Dispense Refill    B Complex-C-Folic Acid (B-Complex/Folic Acid/Vitamin C) TBCR       Cholecalciferol (D-3-5) 125 MCG (5000 UT) capsule       cyclobenzaprine (FLEXERIL) 10 mg tablet Take 1 tablet (10 mg total) by mouth daily at bedtime 30 tablet 0    DULoxetine (CYMBALTA) 30 mg delayed release capsule Take 1 capsule (30 mg total) by mouth daily at bedtime 30 capsule 6    Echinacea 400 MG CAPS       ezetimibe (ZETIA) 10 mg tablet Take 1 tablet (10 mg total) by mouth daily 90 tablet 0    Fiber Adult Gummies 2 g CHEW       ibuprofen (MOTRIN) 600 mg tablet Take 1 tablet (600 mg total) by mouth 2 (two) times a day as needed for mild pain or moderate pain 180 tablet 0    latanoprost (XALATAN) 0.005 % ophthalmic solution Administer 1 drop to both eyes daily      levothyroxine 175 mcg tablet Take 1 tablet (175 mcg total) by mouth daily 90 tablet 0    lisinopril (ZESTRIL) 10 mg tablet Take 1 tablet (10 mg total) by mouth daily 90 tablet 1    SUMAtriptan (IMITREX) 50 mg tablet Take 1 tablet (50 mg total) by mouth once as needed for migraine for up to 1 dose 9 tablet 0    Zinc 50 MG CAPS Take 1 capsule by mouth in the morning      benzonatate (TESSALON) 200 MG capsule Take 1 capsule (200 mg total) by mouth 3 (three) times a day as needed for cough (Patient not taking: Reported on 1/10/2024) 20 capsule 0    cyanocobalamin (VITAMIN B-12) 1,000 mcg tablet Vitamin B-12 1,000 mcg tablet   Take by oral route. (Patient not  taking: Reported on 2023)      Riboflavin 400 MG CAPS  (Patient not taking: Reported on 2023)       No current facility-administered medications for this visit.        Allergies:      Allergies   Allergen Reactions    Levofloxacin Rash     Tolerated Cipro and Avelox (moxifloxasin).         Family History:     Family History   Problem Relation Age of Onset    Heart disease Father     Hypertension Father     Early death Father         Heart Attack @ 36    Cancer Mother     Alcohol abuse Mother         Dead    Stroke Maternal Aunt     Dementia Maternal Aunt     Diabetes Maternal Grandmother     Cancer Maternal Grandmother     Heart disease Sister     Hypertension Sister        Social History:     Social History     Socioeconomic History    Marital status: /Civil Union     Spouse name: Not on file    Number of children: Not on file    Years of education: Not on file    Highest education level: Not on file   Occupational History    Not on file   Tobacco Use    Smoking status: Former     Current packs/day: 0.00     Average packs/day: 1.5 packs/day for 50.0 years (75.0 ttl pk-yrs)     Types: Cigarettes, Pipe, Cigars     Start date: 1967     Quit date: 2017     Years since quittin.0    Smokeless tobacco: Never    Tobacco comments:     Tobacco free   Vaping Use    Vaping status: Never Used   Substance and Sexual Activity    Alcohol use: Not Currently     Alcohol/week: 25.0 standard drinks of alcohol     Types: 25 Shots of liquor per week     Comment: Stopped 2022    Drug use: Not Currently     Frequency: 1.0 times per week     Types: Cocaine, Hashish, LSD, Marijuana     Comment: Not since High School    Sexual activity: Not Currently     Partners: Female     Birth control/protection: Male Sterilization   Other Topics Concern    Not on file   Social History Narrative    Not on file     Social Determinants of Health     Financial Resource Strain: Low Risk  (2023)    Overall Financial  Resource Strain (CARDIA)     Difficulty of Paying Living Expenses: Not very hard   Food Insecurity: Not on file   Transportation Needs: No Transportation Needs (7/31/2023)    PRAPARE - Transportation     Lack of Transportation (Medical): No     Lack of Transportation (Non-Medical): No   Physical Activity: Not on file   Stress: Not on file   Social Connections: Not on file   Intimate Partner Violence: Not on file   Housing Stability: Not on file         Objective:     Physical Exam:                                                                 Vitals:            Constitutional:    /74 (BP Location: Right arm, Patient Position: Sitting, Cuff Size: Adult)   Pulse 70   Temp 98.6 °F (37 °C) (Temporal)   Wt 104 kg (229 lb)   SpO2 98%   BMI 34.82 kg/m²   BP Readings from Last 3 Encounters:   01/10/24 132/74   09/20/23 140/76   08/16/23 132/86     Pulse Readings from Last 3 Encounters:   01/10/24 70   09/20/23 56   08/16/23 68         Well developed, well nourished, well groomed. No dysmorphic features.       Psychiatric:  Normal behavior and appropriate affect        Neurological Examination:     Mental status/cognitive function:   Orientated to time, place and person. Recent and remote memory intact. Attention span and concentration as well as fund of knowledge are appropriate for age. Normal language and spontaneous speech.     Cranial Nerves:  II-visual fields full.   III, IV, VI-Pupils were equal, round, and reactive to light and accomodation. Extraocular movements were full and conjugate without nystagmus. Conjugate gaze, normal smooth pursuits, normal saccades   V-facial sensation symmetric.    VII-facial expression symmetric, intact forehead wrinkle, strong eye closure, symmetric smile    VIII-hearing grossly intact bilaterally   IX, X-palate elevation symmetric, no dysarthria.   XI-shoulder shrug strength intact    XII-tongue protrusion midline.    Motor Exam: symmetric bulk and tone throughout, no  pronator drift. Power/strength 5/5 bilateral upper and lower extremities, no atrophy, fasciculations or abnormal movements noted.   Sensory: grossly intact light touch in all extremities.   Reflexes: brachioradialis 2+, biceps 2+, knee 2+ bilaterally  Coordination: Finger nose finger intact bilaterally, no apparent dysmetria, ataxia or tremor noted  Gait: steady casual and tandem gait.       Review of Systems:     Review of Systems   Constitutional:  Negative for appetite change, fatigue and fever.   HENT: Negative.  Negative for hearing loss, tinnitus, trouble swallowing and voice change.    Eyes: Negative.  Negative for photophobia, pain and visual disturbance.   Respiratory: Negative.  Negative for shortness of breath.    Cardiovascular: Negative.  Negative for palpitations.   Gastrointestinal: Negative.  Negative for nausea and vomiting.   Endocrine: Negative.  Negative for cold intolerance.   Genitourinary: Negative.  Negative for dysuria, frequency and urgency.   Musculoskeletal:  Negative for back pain, gait problem, myalgias, neck pain and neck stiffness.   Skin: Negative.  Negative for rash.   Allergic/Immunologic: Negative.    Neurological: Negative.  Negative for dizziness, tremors, seizures, syncope, facial asymmetry, speech difficulty, weakness, light-headedness, numbness and headaches.   Hematological: Negative.  Does not bruise/bleed easily.   Psychiatric/Behavioral: Negative.  Negative for confusion, hallucinations and sleep disturbance.    All other systems reviewed and are negative.      I personally reviewed the ROS entered by the MA    I spent 20 minutes in total time for this visit.    Author:  Lópze Hartley PA-C 1/10/2024 7:40 AM

## 2024-02-21 PROBLEM — J01.10 ACUTE NON-RECURRENT FRONTAL SINUSITIS: Status: RESOLVED | Noted: 2023-12-29 | Resolved: 2024-02-21

## 2024-03-10 DIAGNOSIS — M19.90 ARTHRITIS: ICD-10-CM

## 2024-03-11 ENCOUNTER — LAB (OUTPATIENT)
Age: 69
End: 2024-03-11
Payer: MEDICARE

## 2024-03-11 DIAGNOSIS — R97.20 ELEVATED PSA: ICD-10-CM

## 2024-03-11 LAB — PSA SERPL-MCNC: 4.32 NG/ML (ref 0–4)

## 2024-03-11 PROCEDURE — 84153 ASSAY OF PSA TOTAL: CPT

## 2024-03-11 PROCEDURE — 36415 COLL VENOUS BLD VENIPUNCTURE: CPT

## 2024-03-11 RX ORDER — IBUPROFEN 600 MG/1
600 TABLET ORAL 2 TIMES DAILY PRN
Qty: 180 TABLET | Refills: 0 | Status: SHIPPED | OUTPATIENT
Start: 2024-03-11

## 2024-03-11 NOTE — TELEPHONE ENCOUNTER
"Refill request came to Rheumatology pod for refill. Note in chart state \"no longer follows dr baca\" (see below). Routing to PCP office for review    "

## 2024-03-12 ENCOUNTER — TELEPHONE (OUTPATIENT)
Dept: UROLOGY | Facility: CLINIC | Age: 69
End: 2024-03-12

## 2024-03-12 DIAGNOSIS — R97.20 ELEVATED PSA: Primary | ICD-10-CM

## 2024-03-12 NOTE — TELEPHONE ENCOUNTER
----- Message from KRISTEN Tovar sent at 3/12/2024  8:20 AM EDT -----  Please let patient know his PSA is again slightly elevated at 4.32. Due to the labile nature of his PSA I would recommend proceeding with a multiparametric MRI of the prostate which I have placed orders for. Hopefully we will be able to review the results at his follow-up visit with me on 4/02.

## 2024-03-18 ENCOUNTER — RA CDI HCC (OUTPATIENT)
Dept: OTHER | Facility: HOSPITAL | Age: 69
End: 2024-03-18

## 2024-03-22 ENCOUNTER — OFFICE VISIT (OUTPATIENT)
Dept: INTERNAL MEDICINE CLINIC | Facility: CLINIC | Age: 69
End: 2024-03-22
Payer: MEDICARE

## 2024-03-22 VITALS
HEIGHT: 68 IN | OXYGEN SATURATION: 98 % | WEIGHT: 218 LBS | DIASTOLIC BLOOD PRESSURE: 72 MMHG | SYSTOLIC BLOOD PRESSURE: 112 MMHG | HEART RATE: 61 BPM | BODY MASS INDEX: 33.04 KG/M2 | TEMPERATURE: 97.6 F

## 2024-03-22 DIAGNOSIS — R79.9 ABNORMAL FINDING OF BLOOD CHEMISTRY, UNSPECIFIED: ICD-10-CM

## 2024-03-22 DIAGNOSIS — G47.33 OSA (OBSTRUCTIVE SLEEP APNEA): ICD-10-CM

## 2024-03-22 DIAGNOSIS — Z13.1 SCREENING FOR DIABETES MELLITUS (DM): ICD-10-CM

## 2024-03-22 DIAGNOSIS — Z13.0 SCREENING FOR DEFICIENCY ANEMIA: ICD-10-CM

## 2024-03-22 DIAGNOSIS — M62.838 MUSCLE SPASM: ICD-10-CM

## 2024-03-22 DIAGNOSIS — I10 PRIMARY HYPERTENSION: ICD-10-CM

## 2024-03-22 DIAGNOSIS — E55.9 VITAMIN D DEFICIENCY: ICD-10-CM

## 2024-03-22 DIAGNOSIS — G31.84 MCI (MILD COGNITIVE IMPAIRMENT): ICD-10-CM

## 2024-03-22 DIAGNOSIS — M51.36 DEGENERATIVE LUMBAR DISC: ICD-10-CM

## 2024-03-22 DIAGNOSIS — F17.211 NICOTINE DEPENDENCE, CIGARETTES, IN REMISSION: Primary | ICD-10-CM

## 2024-03-22 DIAGNOSIS — Z86.19 HISTORY OF GONORRHEA: ICD-10-CM

## 2024-03-22 DIAGNOSIS — Z13.220 SCREENING FOR LIPID DISORDERS: ICD-10-CM

## 2024-03-22 DIAGNOSIS — F10.11 HISTORY OF ETOH ABUSE: ICD-10-CM

## 2024-03-22 DIAGNOSIS — G43.109 MIGRAINE WITH AURA AND WITHOUT STATUS MIGRAINOSUS, NOT INTRACTABLE: ICD-10-CM

## 2024-03-22 DIAGNOSIS — K58.0 IRRITABLE BOWEL SYNDROME WITH DIARRHEA: ICD-10-CM

## 2024-03-22 DIAGNOSIS — D52.0 DIETARY FOLATE DEFICIENCY ANEMIA: ICD-10-CM

## 2024-03-22 DIAGNOSIS — R97.20 ELEVATED PSA: ICD-10-CM

## 2024-03-22 DIAGNOSIS — I67.89 CEREBRAL MICROVASCULAR DISEASE: ICD-10-CM

## 2024-03-22 DIAGNOSIS — E66.9 OBESITY (BMI 35.0-39.9 WITHOUT COMORBIDITY): ICD-10-CM

## 2024-03-22 DIAGNOSIS — E78.00 HYPERCHOLESTEROLEMIA: ICD-10-CM

## 2024-03-22 DIAGNOSIS — Z13.29 SCREENING FOR THYROID DISORDER: ICD-10-CM

## 2024-03-22 PROBLEM — M75.111 NONTRAUMATIC INCOMPLETE TEAR OF RIGHT ROTATOR CUFF: Status: RESOLVED | Noted: 2021-04-29 | Resolved: 2024-03-22

## 2024-03-22 PROBLEM — Z87.891 FORMER SMOKER: Status: ACTIVE | Noted: 2024-03-22

## 2024-03-22 PROCEDURE — 99204 OFFICE O/P NEW MOD 45 MIN: CPT | Performed by: INTERNAL MEDICINE

## 2024-03-22 RX ORDER — CYCLOBENZAPRINE HCL 10 MG
10 TABLET ORAL AS NEEDED
Qty: 90 TABLET | Refills: 1 | Status: SHIPPED | OUTPATIENT
Start: 2024-03-22

## 2024-03-22 RX ORDER — DULOXETIN HYDROCHLORIDE 30 MG/1
30 CAPSULE, DELAYED RELEASE ORAL
Qty: 90 CAPSULE | Refills: 1 | Status: SHIPPED | OUTPATIENT
Start: 2024-03-22 | End: 2024-09-18

## 2024-03-22 NOTE — PATIENT INSTRUCTIONS
Chronic Hypertension   WHAT YOU NEED TO KNOW:   Hypertension is considered chronic when it continues for 3 months or longer. Hypertension that continues causes your heart to work much harder than normal, which may lead to heart damage. Even if you have hypertension for years, lifestyle changes, medicines, or both may help lower your blood pressure.  DISCHARGE INSTRUCTIONS:   Call your local emergency number (911 in the [place]) or have someone call if:   You have chest pain.    You have any of the following signs of a heart attack:      Squeezing, pressure, or pain in your chest    You may  also have any of the following:     Discomfort or pain in your back, neck, jaw, stomach, or arm    Shortness of breath    Nausea or vomiting    Lightheadedness or a sudden cold sweat    You become confused or have difficulty speaking.    You suddenly feel lightheaded or have trouble breathing.    Return to the emergency department if:   You have a severe headache or vision loss.    You have weakness in an arm or leg.    Call your doctor or cardiologist if:   You feel faint, dizzy, confused, or drowsy.    You have been taking your blood pressure medicine but your pressure is higher than your provider says it should be.    You have questions or concerns about your condition or care.    Medicines:  You may need any of the following:  Antihypertensives  may be used to help lower your blood pressure. Several kinds of medicines are available. Your healthcare provider may change the medicine or medicines you currently take. This may be needed if your blood pressure is often high when you check it at home or you are having other problems with blood pressure control.    Diuretics  help decrease extra fluid that collects in your body. This will help lower your BP. You may urinate more often while you take this medicine.    Cholesterol medicine  helps lower your cholesterol level. A low cholesterol level helps prevent heart disease and makes it  easier to control your blood pressure.    Take your medicine as directed.  Contact your healthcare provider if you think your medicine is not helping or if you have side effects. Tell your provider if you are allergic to any medicine. Keep a list of the medicines, vitamins, and herbs you take. Include the amounts, and when and why you take them. Bring the list or the pill bottles to follow-up visits. Carry your medicine list with you in case of an emergency.    Stages of hypertension:  Your healthcare provider will give you a blood pressure goal based on your age, health, and risk for cardiovascular disease. The following are general guidelines on the stages of hypertension:     Normal blood pressure is 119/79 or lower . Your provider may only check your blood pressure each year if it stays at a normal level.    Elevated blood pressure is 120/79 to 129/79 . This is sometimes called prehypertension. Your provider may suggest lifestyle changes to help lower your blood pressure to a normal level. He or she may then check it again in 3 to 6 months.    Stage 1 hypertension is 130/80  to 139/89 . Your provider may recommend lifestyle changes, medication, and checks every 3 to 6 months until your blood pressure is controlled.    Stage 2 hypertension is 140/90 or higher . Your provider will recommend lifestyle changes and have you take 2 kinds of hypertension medicines. You will also need to have your blood pressure checked monthly until it is controlled.       Manage chronic hypertension:   Check your blood pressure at home.  Do not smoke, have caffeine, or exercise for at least 30 minutes before you check your blood pressure. Sit and rest for 5 minutes before you check your blood pressure. Extend your arm and support it on a flat surface. Your arm should be at the same level as your heart. Follow the directions that came with your blood pressure monitor. Check your blood pressure 2 times, 1 minute apart, before you take  your medicine in the morning. Also check your blood pressure before your evening meal. Keep a record of your readings and bring it to your follow-up visits. Your healthcare provider may use the readings to make changes to your treatment plan.         Manage any other health conditions you have.  Health conditions such as diabetes can increase your risk for hypertension. Follow your provider's instructions and take all your medicines as directed. Talk to your provider about any new health conditions you have recently developed.    Ask about all medicines.  Certain medicines can increase your blood pressure. Examples include oral birth control pills, decongestants, herbal supplements, and NSAIDs, such as ibuprofen. Your provider can tell you which medicines are safe for you to take. This includes prescription and over-the-counter medicines.    Lifestyle changes you can make to lower your blood pressure:  Your healthcare provider may want you to make more lifestyle changes if you are having trouble controlling your blood pressure. This may feel difficult over time, especially if you think you are making good changes but your pressure is still high. It might help to focus on one new change at a time. For example, try to add 1 more day of exercise, or exercise for an extra 10 minutes on 2 days. Small changes can make a big difference. Your provider can also refer you to specialists such as a dietitian who can help you make small changes. Your family members may be included in helping you learn to create lifestyle changes, such as the following:     Limit sodium (salt) as directed.  Too much sodium can affect your fluid balance. Check labels to find low-sodium or no-salt-added foods. Some low-sodium foods use potassium salts for flavor. Too much potassium can also cause health problems. Your provider will tell you how much sodium and potassium are safe for you to have in a day. He or she may recommend that you limit  sodium to 2,300 mg a day.         Follow the meal plan recommended by your provider.  A dietitian or your provider can give you more information on low-sodium plans or the DASH (Dietary Approaches to Stop Hypertension) eating plan. The DASH plan is low in sodium, processed sugar, unhealthy fats, and total fat. It is high in potassium, calcium, and fiber. These can be found in vegetables, fruit, and whole-grain foods.         Be physically active throughout the day.  Physical activity, such as exercise, can help control your blood pressure and your weight. Be physically active for at least 30 minutes per day, on most days of the week. Include aerobic activity, such as walking or riding a bicycle. Also include strength training at least 2 times each week. Your provider can help you create a physical activity plan.            Decrease stress.  This may help lower your blood pressure. Learn ways to relax, such as deep breathing or listening to music.    Limit alcohol as directed.  Alcohol can increase your blood pressure. A drink of alcohol is 12 ounces of beer, 5 ounces of wine, or 1½ ounces of liquor. Your provider can help you set daily and weekly drink limits. He or she may recommend no alcohol if your blood pressure stays higher than goal even with medicine or other measures. Ask your provider for information if you need help to quit.    Do not smoke.  Nicotine and other chemicals in cigarettes and cigars can increase your blood pressure and also cause lung damage. Ask your provider for information if you currently smoke and need help to quit. E-cigarettes or smokeless tobacco still contain nicotine. Talk to your provider before you use these products.       Follow up with your doctor or cardiologist as directed:  You will need to return to have your blood pressure checked and to have other lab tests done. Write down your questions so you remember to ask them during your visits.  © Copyright [employer] 2023  Information is for End User's use only and may not be sold, redistributed or otherwise used for commercial purposes.  The above information is an  only. It is not intended as medical advice for individual conditions or treatments. Talk to your doctor, nurse or pharmacist before following any medical regimen to see if it is safe and effective for you.    Obesity   AMBULATORY CARE:   Obesity  means your body mass index (BMI) is greater than 30. Your healthcare provider will use your age, height, and weight to measure your BMI.  The risks of obesity include  many health problems, including injuries or physical disability.  Diabetes (high blood sugar level)    High blood pressure or high cholesterol    Heart disease or heart failure    Stroke    Gallbladder or liver disease    Cancer of the colon, breast, prostate, liver, or kidney    Sleep apnea    Arthritis or gout    Screening  is done to check for health conditions before you have signs or symptoms. If you are 35 to 70 years old, your blood sugar level may be checked every 3 years for signs of prediabetes or diabetes. Your healthcare provider will check your blood pressure at each visit. High blood pressure can lead to a stroke or other problems. Your provider may check for signs of heart disease, cancer, or other health problems.  Seek care immediately if:   You have a severe headache, confusion, or difficulty speaking.    You have weakness on one side of your body.    You have chest pain, sweating, or shortness of breath.    Call your doctor if:   You have symptoms of gallbladder or liver disease, such as pain in your upper abdomen.    You have knee or hip pain and discomfort while walking.    You have symptoms of diabetes, such as intense hunger and thirst, and frequent urination.    You have symptoms of sleep apnea, such as snoring or daytime sleepiness.    You have questions or concerns about your condition or care.    Treatment for obesity   focuses on helping you lose weight to improve your health. Even a small decrease in BMI can reduce the risk for many health problems. Your healthcare provider will help you set a weight-loss goal.  Lifestyle changes  are the first step in treating obesity. These include making healthy food choices and getting regular physical activity. Your healthcare provider may suggest a weight-loss program that involves coaching, education, and therapy.    Medicine  may help you lose weight when it is used with a healthy foods and physical activity.    Surgery  can help you lose weight if you have obesity along with other health problems. Several types of weight-loss surgery are available. Ask your healthcare provider for more information.    Tips for safe weight loss:   Set small, realistic goals.  An example of a small goal is to walk for 20 minutes 5 days a week. Anther goal is to lose 5% of your body weight.    Ask for support.  Tell friends, family members, and coworkers about your goals. Ask someone to lose weight with you. You may also want to join a weight-loss support group.    Identify foods or triggers that may cause you to overeat.  Remove tempting high-calorie foods from your home and workplace. Place a bowl of fresh fruit on your kitchen counter. If stress causes you to eat, find other ways to cope with stress. A counselor or therapist may be able to help you.    Track your daily calories and activity.  Write down what you eat and drink. Also write down how many minutes of physical activity you do each day.     Track your weekly weight.  Weigh yourself in the morning, before you eat or drink anything but after you use the bathroom. Use the same scale, in the same place, and in similar clothing each time. Only weigh yourself 1 to 2 times each week, or as directed. You may become discouraged if you weigh yourself every day.    Eating changes:  You will need to eat 500 to 1,000 fewer calories each day than you  currently eat to lose 1 to 2 pounds a week. The following changes will help you cut calories:  Eat smaller portions.  Use small plates, no larger than 9 inches in diameter. Fill your plate half full of fruits and vegetables. Measure your food using measuring cups until you know what a serving size looks like.         Eat 3 meals and 1 or 2 snacks each day.  Plan your meals in advance. Cook and eat at home most of the time. Eat slowly. Do not skip meals. Skipping meals can lead to overeating later in the day. This can make it harder for you to lose weight. Talk with a dietitian to help you make a meal plan and schedule that is right for you.    Eat fruits and vegetables at every meal.  They are low in calories and high in fiber, which makes you feel full. Do not add butter, margarine, or cream sauce to vegetables. Use herbs to season steamed vegetables.    Eat less fat and fewer fried foods.  Eat more baked or grilled chicken and fish. These protein sources are lower in calories and fat than red meat. Limit fast food. Dress your salads with olive oil and vinegar instead of bottled dressing.    Limit the amount of sugar you eat.  Do not drink sugary beverages. Limit alcohol.       Activity changes:  Physical activity is good for your body in many ways. It helps you burn calories and build strong muscles. It decreases stress and depression, and improves your mood. It can also help you sleep better. Talk to your healthcare provider before you begin an exercise program.  Exercise for at least 30 minutes 5 days a week.  Start slowly. Set aside time each day for physical activity that you enjoy and that is convenient for you. It is best to do both weight training and an activity that increases your heart rate, such as walking, bicycling, or swimming.            Find ways to be more active.  Do yard work and housecleaning. Walk up the stairs instead of using elevators. Spend your leisure time going to events that require  walking, such as outdoor festivals or fairs. This extra physical activity can help you lose weight and keep it off.       Follow up with your doctor as directed:  You may need to meet with a dietitian. Write down your questions so you remember to ask them during your visits.  © Copyright [employer] 2023 Information is for End User's use only and may not be sold, redistributed or otherwise used for commercial purposes.  The above information is an  only. It is not intended as medical advice for individual conditions or treatments. Talk to your doctor, nurse or pharmacist before following any medical regimen to see if it is safe and effective for you.    Low Fat Diet   AMBULATORY CARE:   A low-fat diet  is an eating plan that is low in total fat, unhealthy fat, and cholesterol. You may need to follow a low-fat diet if you have trouble digesting or absorbing fat. You may also need to follow this diet if you have high cholesterol. You can also lower your cholesterol by increasing the amount of fiber in your diet. Soluble fiber is a type of fiber that helps to decrease cholesterol levels.   Different types of fat in food:   Limit unhealthy fats.  A diet that is high in cholesterol, saturated fat, and trans fat may cause unhealthy cholesterol levels. Unhealthy cholesterol levels increase your risk of heart disease.    Cholesterol:  Limit intake of cholesterol to less than 200 mg per day. Cholesterol is found in meat, eggs, and dairy.    Saturated fat:  Limit saturated fat to less than 7% of your total daily calories. Ask your dietitian how many calories you need each day. Saturated fat is found in butter, cheese, ice cream, whole milk, and palm oil. Saturated fat is also found in meat, such as beef, pork, chicken skin, and processed meats. Processed meats include sausage, hot dogs, and bologna.     Trans fat:  Avoid trans fat as much as possible. Trans fat is used in fried and baked foods. Foods that say  trans fat free on the label may still have up to 0.5 grams of trans fat per serving.    Include healthy fats.  Replace foods that are high in saturated and trans fat with foods high in healthy fats. This may help to decrease high cholesterol levels.     Monounsaturated fats:  These are found in avocados, nuts, and vegetable oils, such as olive, canola, and sunflower oil.    Polyunsaturated fats:  These can be found in vegetable oils, such as soybean or corn oil. Omega-3 fats can help to decrease the risk of heart disease. Omega-3 fats are found in fish, such as salmon, herring, trout, and tuna. Omega-3 fats can also be found in plant foods, such as walnuts, flaxseed, soybeans, and canola oil.       Foods to limit or avoid:   Grains:      Snacks that are made with partially hydrogenated oils, such as chips, regular crackers, and butter-flavored popcorn    High-fat baked goods, such as biscuits, croissants, doughnuts, pies, cookies, and pastries    Dairy:      Whole milk, 2% milk, and yogurt and ice cream made with whole milk    Half and half creamer, heavy cream, and whipping cream    Cheese, cream cheese, and sour cream    Meats and proteins:      High-fat cuts of meat (T-bone steak, regular hamburger, and ribs)    Fried meat, poultry (turkey and chicken), and fish    Poultry (chicken and turkey) with skin    Cold cuts (salami or bologna), hot dogs, bentley, and sausage    Whole eggs and egg yolks    Vegetables and fruits with added fat:      Fried vegetables or vegetables in butter or high-fat sauces, such as cream or cheese sauces    Fried fruit or fruit served with butter or cream    Fats:      Butter, stick margarine, and shortening    Coconut, palm oil, and palm kernel oil    Foods to include:       Grains:      Whole-grain breads, cereals, pasta, and brown rice    Low-fat crackers and pretzels    Vegetables and fruits:      Fresh, frozen, or canned vegetables (no salt or low-sodium)    Fresh, frozen, dried, or  canned fruit (canned in light syrup or fruit juice)    Avocado    Low-fat dairy products:      Nonfat (skim) or 1% milk    Nonfat or low-fat cheese, yogurt, and cottage cheese    Meats and proteins:      Chicken or turkey with no skin    Baked or broiled fish    Lean beef and pork (loin, round, extra lean hamburger)    Beans and peas, unsalted nuts, soy products    Egg whites and substitutes    Seeds and nuts    Fats:      Unsaturated oil, such as canola, olive, peanut, soybean, or sunflower oil    Soft or liquid margarine and vegetable oil spread    Low-fat salad dressing  Other ways to decrease fat:   Read food labels before you buy foods.  Choose foods that have less than 30% of calories from fat. Choose low-fat or fat-free dairy products. Remember that fat free does not mean calorie free. These foods still contain calories, and too many calories can lead to weight gain.     Trim fat from meat and avoid fried food.  Trim all visible fat from meat before you cook it. Remove the skin from poultry. Do not rogers meat, fish, or poultry. Bake, roast, boil, or broil these foods instead. Avoid fried foods. Eat a baked potato instead of French fries. Steam vegetables instead of sautéing them in butter.     Add less fat to foods.  Use imitation bentley bits on salads and baked potatoes instead of regular bentley bits. Use fat-free or low-fat salad dressings instead of regular dressings. Use low-fat or nonfat butter-flavored topping instead of regular butter or margarine on popcorn and other foods.    Ways to decrease fat in recipes:  Replace high-fat ingredients with low-fat or nonfat ones. This may cause baked goods to be drier than usual. You may need to use nonfat cooking spray on pans to prevent food from sticking. You also may need to change the amount of other ingredients, such as water, in the recipe. Try the following:  Use low-fat or light margarine instead of regular margarine or shortening.     Use lean ground turkey  breast or chicken, or lean ground beef (less than 5% fat) instead of hamburger.     Add 1 teaspoon of canola oil to 8 ounces of skim milk instead of using cream or half and half.     Use grated zucchini, carrots, or apples in breads instead of coconut.    Use blenderized, low-fat cottage cheese, plain tofu, or low-fat ricotta cheese instead of cream cheese.     Use 1 egg white and 1 teaspoon of canola oil, or use ¼ cup (2 ounces) of fat-free egg substitute instead of a whole egg.     Replace half of the oil that is called for in a recipe with applesauce when you bake. Use 3 tablespoons of cocoa powder and 1 tablespoon of canola oil instead of a square of baking chocolate.    How to increase fiber:  Eat enough high-fiber foods to get 20 to 30 grams of fiber every day. Slowly increase your fiber intake to avoid stomach cramps, gas, and other problems.  Eat 3 ounces of whole-grain foods each day.  An ounce is about 1 slice of bread. Eat whole-grain breads, such as whole-wheat bread. Whole wheat, whole-wheat flour, or other whole grains should be listed as the first ingredient on the food label. Replace white flour with whole-grain flour or use half of each in recipes. Whole-grain flour is heavier than white flour, so you may have to add more yeast or baking powder.     Eat a high-fiber cereal for breakfast.  Oatmeal is a good source of soluble fiber. Look for cereals that have bran or fiber in the name. Choose whole-grain products, such as brown rice, barley, and whole-wheat pasta.     Eat more beans, peas, and lentils.  For example, add beans to soups or salads. Eat at least 5 cups of fruits and vegetables each day. Eat fruits and vegetables with the peel because the peel is high in fiber.       © Copyright [employer] 2023 Information is for End User's use only and may not be sold, redistributed or otherwise used for commercial purposes.  The above information is an  only. It is not intended as medical  advice for individual conditions or treatments. Talk to your doctor, nurse or pharmacist before following any medical regimen to see if it is safe and effective for you.    Heart Healthy Diet   AMBULATORY CARE:   A heart healthy diet  is an eating plan low in unhealthy fats and sodium (salt). The plan is high in healthy fats and fiber. A heart healthy diet helps improve your cholesterol levels and lowers your risk for heart disease and stroke. A dietitian will teach you how to read and understand food labels.  Heart healthy diet guidelines to follow:   Choose foods that contain healthy fats:      Unsaturated fats  include monounsaturated and polyunsaturated fats. Unsaturated fat is found in foods such as soybean, canola, olive, corn, and safflower oils. It is also found in soft tub margarine that is made with liquid vegetable oil.    Omega-3 fat  is found in certain fish, such as salmon, tuna, and trout, and in walnuts and flaxseed. Eat fish high in omega-3 fats at least 2 times a week.       Limit or do not have unhealthy fats:      Cholesterol  is found in animal foods, such as eggs and lobster, and in dairy products made from whole milk. Limit cholesterol to less than 200 mg each day.    Saturated fat  is found in meats, such as bentley and hamburger. It is also found in chicken or turkey skin, whole milk, and butter. Limit saturated fat to less than 7% of your total daily calories.    Trans fat  is found in packaged foods, such as potato chips and cookies. It is also in hard margarine, some fried foods, and shortening. Do not eat foods that contain trans fats.    Get 20 to 30 grams of fiber each day.  Fruits, vegetables, whole-grain foods, and legumes (cooked beans) are good sources of fiber.         Limit sodium as directed.  You may be told to limit sodium, such as to 2,000 mg or less each day. Choose low-sodium or no-salt-added foods. Add little or no salt to food you prepare. Use herbs and spices in place of  salt.       Include the following in your heart healthy plan:  Ask your dietitian or healthcare provider how many servings to have each day from the following food groups:  Grains:      Whole-wheat breads, cereals, and pastas, and brown rice    Low-fat, low-sodium crackers and chips    Vegetables:      Broccoli, green beans, green peas, and spinach    Collards, kale, and lima beans    Carrots, sweet potatoes, tomatoes, and peppers    Canned vegetables with no salt added    Fruits:      Bananas, peaches, pears, and pineapple    Grapes, raisins, and dates    Oranges, tangerines, grapefruit, orange juice, and grapefruit juice    Apricots, mangoes, melons, and papaya    Raspberries and strawberries    Canned fruit with no added sugar    Low-fat dairy:      Nonfat (skim) milk, 1% milk, and low-fat almond, cashew, or soy milks fortified with calcium    Low-fat cheese, regular or frozen yogurt, and cottage cheese    Meats and proteins:      Lean cuts of beef and pork (loin, leg, round), skinless chicken and turkey    Legumes, soy products, egg whites, or nuts    Limit or do not include the following in your heart healthy plan:   Foods and liquids that contain unhealthy fats and oils:      Whole or 2% milk, cream cheese, sour cream, or cheese    High-fat cuts of beef (T-bone steaks, ribs), chicken or turkey with skin, and organ meats such as liver    Butter, stick margarine, shortening, and cooking oils such as coconut or palm oil    Foods and liquids high in sodium:      Packaged foods, such as frozen dinners, cookies, macaroni and cheese, and cereals with more than 300 mg of sodium per serving    Vegetables with added sodium, such as instant potatoes, vegetables with added sauces, or regular canned vegetables    Cured or smoked meats, such as hot dogs, bentley, and sausage    High-sodium ketchup, barbecue sauce, salad dressing, pickles, olives, soy sauce, or miso    Foods and liquids high in sugar:      Candy, cake,  cookies, pies, or doughnuts    Soft drinks (soda), sports drinks, or sweetened tea    Canned or dry mixes for cakes, soups, sauces, or gravies    Other healthy heart guidelines:   Do not smoke.  Nicotine and other chemicals in cigarettes and cigars can cause lung and heart damage. Ask your healthcare provider for information if you currently smoke and need help to quit. E-cigarettes or smokeless tobacco still contain nicotine. Talk to your provider before you use these products.    Limit or do not drink alcohol as directed.  Alcohol can damage your heart and raise your blood pressure. Your healthcare provider may give you specific daily and weekly limits. The general recommended limit is 1 drink a day for women 21 or older and for men 65 or older. Do not have more than 3 drinks within 24 hours or 7 within a week. The recommended limit is 2 drinks a day for men 21 to 64 years of age. Do not have more than 4 drinks within 24 hours or 14 within a week. A drink of alcohol is 12 ounces of beer, 5 ounces of wine, or 1½ ounces of liquor.    Maintain a healthy weight.  Extra body weight makes your heart work harder. Ask your provider what a healthy weight is for you. He or she can help you create a safe weight loss plan, if needed.    Exercise regularly.  Exercise can help you maintain a healthy weight and improve your blood pressure and cholesterol levels. Regular exercise can also decrease your risk for heart problems. Ask your provider about the best exercise plan for you. Do not start an exercise program without asking your provider.          Follow up with your doctor or cardiologist as directed:  Write down your questions so you remember to ask them during your visits.  © Copyright [employer] 2023 Information is for End User's use only and may not be sold, redistributed or otherwise used for commercial purposes.  The above information is an  only. It is not intended as medical advice for individual  conditions or treatments. Talk to your doctor, nurse or pharmacist before following any medical regimen to see if it is safe and effective for you.

## 2024-03-22 NOTE — PROGRESS NOTES
Depression Screening and Follow-up Plan: Patient was screened for depression during today's encounter. They screened negative with a PHQ-2 score of 0.    Assessment/Plan:  Problem List Items Addressed This Visit          Cardiovascular and Mediastinum    Migraine    Relevant Medications    cyclobenzaprine (FLEXERIL) 10 mg tablet    DULoxetine (CYMBALTA) 30 mg delayed release capsule    Other Relevant Orders    Comprehensive metabolic panel    TSH, 3rd generation    Hypertension    Relevant Orders    Comprehensive metabolic panel    Albumin / creatinine urine ratio       Respiratory    RORO (obstructive sleep apnea)       Digestive    Irritable bowel syndrome with diarrhea    Relevant Orders    TSH, 3rd generation       Other    Vitamin D deficiency    Relevant Orders    Vitamin D 25 hydroxy    Obesity (BMI 35.0-39.9 without comorbidity)    Hypercholesterolemia    Relevant Orders    Comprehensive metabolic panel    LDL cholesterol, direct    Triglycerides    TSH, 3rd generation    BMI 33.0-33.9,adult     Other Visit Diagnoses       Nicotine dependence, cigarettes, in remission    -  Primary    Relevant Orders    CBC and differential    CT lung screening program    Muscle spasm        Relevant Medications    cyclobenzaprine (FLEXERIL) 10 mg tablet    Other Relevant Orders    CBC and differential    Degenerative lumbar disc        Relevant Medications    DULoxetine (CYMBALTA) 30 mg delayed release capsule    Screening for lipid disorders        Screening for diabetes mellitus (DM)        Relevant Orders    Hemoglobin A1C    Screening for thyroid disorder        Screening for deficiency anemia        Relevant Orders    CBC and differential    Abnormal finding of blood chemistry, unspecified        Relevant Orders    Hemoglobin A1C             Diagnoses and all orders for this visit:    Nicotine dependence, cigarettes, in remission  -     CBC and differential; Future  -     CT lung screening program; Future    Muscle  spasm  -     CBC and differential; Future  -     cyclobenzaprine (FLEXERIL) 10 mg tablet; Take 1 tablet (10 mg total) by mouth as needed for muscle spasms    Migraine with aura and without status migrainosus, not intractable  -     Comprehensive metabolic panel; Future  -     TSH, 3rd generation; Future  -     DULoxetine (CYMBALTA) 30 mg delayed release capsule; Take 1 capsule (30 mg total) by mouth daily at bedtime    Degenerative lumbar disc  -     DULoxetine (CYMBALTA) 30 mg delayed release capsule; Take 1 capsule (30 mg total) by mouth daily at bedtime    Primary hypertension  -     Comprehensive metabolic panel; Future  -     Albumin / creatinine urine ratio; Future    RORO (obstructive sleep apnea)    Irritable bowel syndrome with diarrhea  -     TSH, 3rd generation; Future    BMI 33.0-33.9,adult    Hypercholesterolemia  -     Comprehensive metabolic panel; Future  -     LDL cholesterol, direct; Future  -     Triglycerides; Future  -     TSH, 3rd generation; Future    Obesity (BMI 35.0-39.9 without comorbidity)    Vitamin D deficiency  -     Vitamin D 25 hydroxy; Future    Screening for lipid disorders    Screening for diabetes mellitus (DM)  -     Hemoglobin A1C; Future    Screening for thyroid disorder    Screening for deficiency anemia  -     CBC and differential; Future    Abnormal finding of blood chemistry, unspecified  -     Hemoglobin A1C; Future        No problem-specific Assessment & Plan notes found for this encounter.    A/P: Stable and will check routine labs and for memory issues. Appreciate  input and await further testing for PSA elevation. Discussed BMI and given info on diet and exercise. BP is ok and EKG already on the chart. Discussed vaccines is up to date. Continue to see  DDS and an eye doc. ?Dementia/MCI starting. ?multi infarct given past microvacular disease, but ETOH use may be contributing. Await labs. May need repeat head imaging. HOld on meds for now. May need to see Neuro.or  neuropsych testing.  . Continue current treatment and RTC one month for f/u labs and MCI. Will check an alpha one.     Subjective:      Patient ID: Yahir Orta is a 68 y.o. male.    WM , former pt of [facility], presents to [facility]. PMH includes HTN, migraines, RORO, IBS, hypothyroidism, DJD, glaucoma, obesityk, ETOH abuse, chronic pain syndrome, HLD, and vit d deficiency. PSH of colonoscopy, hernia, THR, appy, rotator cuff, vascetomy, and trigger finger.. Former Smoker and former heavy  ETOH. Doing ok and no c/o's except for some memory issues. Both short and long term. Pt w/o head trauma, but history of heavy ETOH use and also remote h/o GC in the past. Pt reports past head imaging with microvascular white matter disease. Taking a MVI. FHx of dementia. . Remains active w/o difficulty and no falls. Denies depression. No suicidal or violent ideations. Working assisted as an . Seeing  for elevated PSA and has an appt for an MRI . No recent travel. No fever, chills, or sweats. No unexplained wt change. Denies CP, SOB, palpitations, edema, orthopnea, or PND. No sz or syncope. No changes in bowel or bladder habits. No trouble swallowing. Appetite and sleep are good. Sees both  a DDS and an eye doctor.  Currently due for labs.  .                The following portions of the patient's history were reviewed and updated as appropriate:   He has a past medical history of Allergic, Arthritis (2000), Back pain, Colon polyp, Disease of thyroid gland, ED (erectile dysfunction), Headache(784.0), HL (hearing loss) (2000), Hypertension, Hypothyroidism, IBS (irritable bowel syndrome), Inflammatory bowel disease, Kidney stone, Kidney stones, Migraine (July 2022), Obesity, Osteoarthritis, Overactive bladder, and Vision loss (july 2022).,  does not have any pertinent problems on file.,   has a past surgical history that includes Rotator cuff repair; Partial hip arthroplasty (Right); Appendectomy; Hernia  repair; Trigger finger release (02/2021); Colonoscopy; orthopedic surgery; Trigger point injection; and Joint replacement.,  family history includes Alcohol abuse in his cousin and mother; Cancer in his maternal grandmother and mother; Dementia in his maternal aunt; Diabetes in his maternal grandmother; Early death in his father; Heart disease in his father and sister; Hypertension in his father and sister; Stroke in his cousin and maternal aunt; Suicide Attempts in his brother and mother.,   reports that he quit smoking about 7 years ago. His smoking use included cigarettes, pipe, and cigars. He started smoking about 57 years ago. He has a 75 pack-year smoking history. He has been exposed to tobacco smoke. He has never used smokeless tobacco. He reports that he does not currently use alcohol after a past usage of about 25.0 standard drinks of alcohol per week. He reports that he does not currently use drugs after having used the following drugs: Cocaine, Hashish, LSD, and Marijuana. Frequency: 1.00 time per week.,  is allergic to levofloxacin..  Current Outpatient Medications   Medication Sig Dispense Refill    B Complex-C-Folic Acid (B-Complex/Folic Acid/Vitamin C) TBCR       Cholecalciferol (D-3-5) 125 MCG (5000 UT) capsule       cyclobenzaprine (FLEXERIL) 10 mg tablet Take 1 tablet (10 mg total) by mouth as needed for muscle spasms 90 tablet 1    DULoxetine (CYMBALTA) 30 mg delayed release capsule Take 1 capsule (30 mg total) by mouth daily at bedtime 90 capsule 1    Echinacea 400 MG CAPS       ezetimibe (ZETIA) 10 mg tablet Take 1 tablet (10 mg total) by mouth daily 90 tablet 0    Fiber Adult Gummies 2 g CHEW       ibuprofen (MOTRIN) 600 mg tablet Take 1 tablet (600 mg total) by mouth 2 (two) times a day as needed for mild pain or moderate pain 180 tablet 0    latanoprost (XALATAN) 0.005 % ophthalmic solution Administer 1 drop to both eyes daily      levothyroxine 175 mcg tablet Take 1 tablet (175 mcg total) by  mouth daily 90 tablet 0    lisinopril (ZESTRIL) 10 mg tablet Take 1 tablet (10 mg total) by mouth daily 90 tablet 1    SUMAtriptan (IMITREX) 50 mg tablet Take 1 tablet (50 mg total) by mouth once as needed for migraine for up to 1 dose 9 tablet 0    Zinc 50 MG CAPS Take 1 capsule by mouth in the morning      benzonatate (TESSALON) 200 MG capsule Take 1 capsule (200 mg total) by mouth 3 (three) times a day as needed for cough (Patient not taking: Reported on 1/10/2024) 20 capsule 0    cyanocobalamin (VITAMIN B-12) 1,000 mcg tablet Vitamin B-12 1,000 mcg tablet   Take by oral route. (Patient not taking: Reported on 6/8/2023)       No current facility-administered medications for this visit.       Review of Systems   Constitutional:  Negative for activity change, chills, diaphoresis, fatigue and fever.   HENT: Negative.     Eyes:  Negative for visual disturbance.   Respiratory:  Negative for cough, chest tightness, shortness of breath and wheezing.    Cardiovascular:  Negative for chest pain, palpitations and leg swelling.   Gastrointestinal:  Negative for abdominal pain, constipation, diarrhea, nausea and vomiting.   Endocrine: Negative for cold intolerance and heat intolerance.   Genitourinary:  Negative for difficulty urinating, dysuria and frequency.   Musculoskeletal:  Negative for arthralgias, gait problem and myalgias.   Neurological:  Negative for dizziness, seizures, syncope, weakness, light-headedness and headaches.        Memory issues   Psychiatric/Behavioral:  Negative for confusion, dysphoric mood and sleep disturbance. The patient is not nervous/anxious.        PHQ-2/9 Depression Screening    Little interest or pleasure in doing things: 0 - not at all  Feeling down, depressed, or hopeless: 0 - not at all  PHQ-2 Score: 0  PHQ-2 Interpretation: Negative depression screen        Objective:  Vitals:    03/22/24 0939   BP: 112/72   Pulse: 61   Temp: 97.6 °F (36.4 °C)   SpO2: 98%   Weight: 98.9 kg (218 lb)  "  Height: 5' 8\" (1.727 m)     Body mass index is 33.15 kg/m².     Physical Exam  Vitals and nursing note reviewed.   Constitutional:       General: He is not in acute distress.     Appearance: Normal appearance. He is not ill-appearing.   HENT:      Head: Normocephalic and atraumatic.      Mouth/Throat:      Mouth: Mucous membranes are moist.   Eyes:      Extraocular Movements: Extraocular movements intact.      Conjunctiva/sclera: Conjunctivae normal.      Pupils: Pupils are equal, round, and reactive to light.   Neck:      Vascular: No carotid bruit.   Cardiovascular:      Rate and Rhythm: Normal rate and regular rhythm.      Heart sounds: Normal heart sounds. No murmur heard.  Pulmonary:      Effort: Pulmonary effort is normal. No respiratory distress.      Breath sounds: Normal breath sounds. No wheezing, rhonchi or rales.   Abdominal:      General: Bowel sounds are normal. There is no distension.      Palpations: Abdomen is soft.      Tenderness: There is no abdominal tenderness.   Musculoskeletal:      Cervical back: Neck supple.      Right lower leg: No edema.      Left lower leg: No edema.   Neurological:      General: No focal deficit present.      Mental Status: He is alert and oriented to person, place, and time. Mental status is at baseline.      Cranial Nerves: No cranial nerve deficit.      Motor: No weakness.      Coordination: Coordination normal.      Gait: Gait normal.      Deep Tendon Reflexes: Reflexes normal.   Psychiatric:         Mood and Affect: Mood normal.         Behavior: Behavior normal.         Thought Content: Thought content normal.         Judgment: Judgment normal.       BMI Counseling: Body mass index is 33.15 kg/m². The BMI is above normal. Nutrition recommendations include reducing portion sizes, decreasing overall calorie intake, reducing intake of saturated fat and trans fat, and reducing intake of cholesterol. Exercise recommendations include moderate aerobic physical activity " for 150 minutes/week.

## 2024-03-25 ENCOUNTER — APPOINTMENT (OUTPATIENT)
Dept: LAB | Facility: CLINIC | Age: 69
End: 2024-03-25
Payer: MEDICARE

## 2024-03-25 ENCOUNTER — HOSPITAL ENCOUNTER (OUTPATIENT)
Dept: CT IMAGING | Facility: HOSPITAL | Age: 69
Discharge: HOME | End: 2024-03-25
Attending: INTERNAL MEDICINE
Payer: MEDICARE

## 2024-03-25 DIAGNOSIS — Z13.1 SCREENING FOR DIABETES MELLITUS (DM): ICD-10-CM

## 2024-03-25 DIAGNOSIS — G43.109 MIGRAINE WITH AURA AND WITHOUT STATUS MIGRAINOSUS, NOT INTRACTABLE: ICD-10-CM

## 2024-03-25 DIAGNOSIS — I10 PRIMARY HYPERTENSION: ICD-10-CM

## 2024-03-25 DIAGNOSIS — E78.00 HYPERCHOLESTEROLEMIA: ICD-10-CM

## 2024-03-25 DIAGNOSIS — D52.0 DIETARY FOLATE DEFICIENCY ANEMIA: ICD-10-CM

## 2024-03-25 DIAGNOSIS — G31.84 MCI (MILD COGNITIVE IMPAIRMENT): ICD-10-CM

## 2024-03-25 DIAGNOSIS — F17.211 NICOTINE DEPENDENCE, CIGARETTES, IN REMISSION: ICD-10-CM

## 2024-03-25 DIAGNOSIS — R97.20 ELEVATED PSA: ICD-10-CM

## 2024-03-25 DIAGNOSIS — M62.838 MUSCLE SPASM: ICD-10-CM

## 2024-03-25 DIAGNOSIS — Z13.0 SCREENING FOR DEFICIENCY ANEMIA: ICD-10-CM

## 2024-03-25 DIAGNOSIS — K58.0 IRRITABLE BOWEL SYNDROME WITH DIARRHEA: ICD-10-CM

## 2024-03-25 DIAGNOSIS — E55.9 VITAMIN D DEFICIENCY: ICD-10-CM

## 2024-03-25 DIAGNOSIS — R79.9 ABNORMAL FINDING OF BLOOD CHEMISTRY, UNSPECIFIED: ICD-10-CM

## 2024-03-25 DIAGNOSIS — F10.11 HISTORY OF ETOH ABUSE: ICD-10-CM

## 2024-03-25 LAB
25(OH)D3 SERPL-MCNC: 52.7 NG/ML (ref 30–100)
ALBUMIN SERPL BCP-MCNC: 4.5 G/DL (ref 3.5–5)
ALP SERPL-CCNC: 67 U/L (ref 34–104)
ALT SERPL W P-5'-P-CCNC: 25 U/L (ref 7–52)
ANA SER QL IA: NEGATIVE
ANION GAP SERPL CALCULATED.3IONS-SCNC: 11 MMOL/L (ref 4–13)
AST SERPL W P-5'-P-CCNC: 26 U/L (ref 13–39)
B BURGDOR IGG+IGM SER QL IA: NEGATIVE
BASOPHILS # BLD AUTO: 0.02 THOUSANDS/ÂΜL (ref 0–0.1)
BASOPHILS NFR BLD AUTO: 0 % (ref 0–1)
BILIRUB SERPL-MCNC: 0.65 MG/DL (ref 0.2–1)
BUN SERPL-MCNC: 15 MG/DL (ref 5–25)
CALCIUM SERPL-MCNC: 9.5 MG/DL (ref 8.4–10.2)
CHLORIDE SERPL-SCNC: 102 MMOL/L (ref 96–108)
CO2 SERPL-SCNC: 29 MMOL/L (ref 21–32)
CREAT SERPL-MCNC: 1.11 MG/DL (ref 0.6–1.3)
CREAT UR-MCNC: 295.3 MG/DL
EOSINOPHIL # BLD AUTO: 0.09 THOUSAND/ÂΜL (ref 0–0.61)
EOSINOPHIL NFR BLD AUTO: 1 % (ref 0–6)
ERYTHROCYTE [DISTWIDTH] IN BLOOD BY AUTOMATED COUNT: 12.7 % (ref 11.6–15.1)
EST. AVERAGE GLUCOSE BLD GHB EST-MCNC: 126 MG/DL
FOLATE SERPL-MCNC: >22.3 NG/ML
GFR SERPL CREATININE-BSD FRML MDRD: 68 ML/MIN/1.73SQ M
GLUCOSE P FAST SERPL-MCNC: 94 MG/DL (ref 65–99)
HBA1C MFR BLD: 6 %
HCT VFR BLD AUTO: 50.7 % (ref 36.5–49.3)
HGB BLD-MCNC: 17 G/DL (ref 12–17)
IMM GRANULOCYTES # BLD AUTO: 0.02 THOUSAND/UL (ref 0–0.2)
IMM GRANULOCYTES NFR BLD AUTO: 0 % (ref 0–2)
LDLC SERPL DIRECT ASSAY-MCNC: 127 MG/DL (ref 0–100)
LYMPHOCYTES # BLD AUTO: 1.35 THOUSANDS/ÂΜL (ref 0.6–4.47)
LYMPHOCYTES NFR BLD AUTO: 18 % (ref 14–44)
MCH RBC QN AUTO: 30.2 PG (ref 26.8–34.3)
MCHC RBC AUTO-ENTMCNC: 33.5 G/DL (ref 31.4–37.4)
MCV RBC AUTO: 90 FL (ref 82–98)
MICROALBUMIN UR-MCNC: 18.6 MG/L
MICROALBUMIN/CREAT 24H UR: 6 MG/G CREATININE (ref 0–30)
MONOCYTES # BLD AUTO: 0.64 THOUSAND/ÂΜL (ref 0.17–1.22)
MONOCYTES NFR BLD AUTO: 9 % (ref 4–12)
NEUTROPHILS # BLD AUTO: 5.31 THOUSANDS/ÂΜL (ref 1.85–7.62)
NEUTS SEG NFR BLD AUTO: 72 % (ref 43–75)
NRBC BLD AUTO-RTO: 0 /100 WBCS
PLATELET # BLD AUTO: 238 THOUSANDS/UL (ref 149–390)
PMV BLD AUTO: 10.6 FL (ref 8.9–12.7)
POTASSIUM SERPL-SCNC: 4.2 MMOL/L (ref 3.5–5.3)
PROT SERPL-MCNC: 7.4 G/DL (ref 6.4–8.4)
RBC # BLD AUTO: 5.62 MILLION/UL (ref 3.88–5.62)
RHEUMATOID FACT SER QL LA: NEGATIVE
SODIUM SERPL-SCNC: 142 MMOL/L (ref 135–147)
TREPONEMA PALLIDUM IGG+IGM AB [PRESENCE] IN SERUM OR PLASMA BY IMMUNOASSAY: NORMAL
TRIGL SERPL-MCNC: 165 MG/DL
TSH SERPL DL<=0.05 MIU/L-ACNC: 0.49 UIU/ML (ref 0.45–4.5)
VIT B12 SERPL-MCNC: 296 PG/ML (ref 180–914)
WBC # BLD AUTO: 7.43 THOUSAND/UL (ref 4.31–10.16)

## 2024-03-25 PROCEDURE — 80053 COMPREHEN METABOLIC PANEL: CPT

## 2024-03-25 PROCEDURE — 86618 LYME DISEASE ANTIBODY: CPT

## 2024-03-25 PROCEDURE — 36415 COLL VENOUS BLD VENIPUNCTURE: CPT

## 2024-03-25 PROCEDURE — 82306 VITAMIN D 25 HYDROXY: CPT

## 2024-03-25 PROCEDURE — 84443 ASSAY THYROID STIM HORMONE: CPT

## 2024-03-25 PROCEDURE — 82043 UR ALBUMIN QUANTITATIVE: CPT

## 2024-03-25 PROCEDURE — 85025 COMPLETE CBC W/AUTO DIFF WBC: CPT

## 2024-03-25 PROCEDURE — 71271 CT THORAX LUNG CANCER SCR C-: CPT

## 2024-03-25 PROCEDURE — 86780 TREPONEMA PALLIDUM: CPT

## 2024-03-25 PROCEDURE — 86038 ANTINUCLEAR ANTIBODIES: CPT

## 2024-03-25 PROCEDURE — 82570 ASSAY OF URINE CREATININE: CPT

## 2024-03-25 PROCEDURE — 83721 ASSAY OF BLOOD LIPOPROTEIN: CPT

## 2024-03-25 PROCEDURE — 82607 VITAMIN B-12: CPT

## 2024-03-25 PROCEDURE — 84478 ASSAY OF TRIGLYCERIDES: CPT

## 2024-03-25 PROCEDURE — 82746 ASSAY OF FOLIC ACID SERUM: CPT

## 2024-03-25 PROCEDURE — 83036 HEMOGLOBIN GLYCOSYLATED A1C: CPT

## 2024-03-25 PROCEDURE — 86430 RHEUMATOID FACTOR TEST QUAL: CPT

## 2024-03-26 ENCOUNTER — HOSPITAL ENCOUNTER (OUTPATIENT)
Facility: MEDICAL CENTER | Age: 69
Discharge: HOME | End: 2024-03-26
Payer: MEDICARE

## 2024-03-26 DIAGNOSIS — R97.20 ELEVATED PSA: ICD-10-CM

## 2024-03-26 PROCEDURE — 76377 3D RENDER W/INTRP POSTPROCES: CPT

## 2024-03-26 PROCEDURE — 72197 MRI PELVIS W/O & W/DYE: CPT

## 2024-03-26 PROCEDURE — A9585 GADOBUTROL INJECTION: HCPCS

## 2024-03-26 RX ORDER — GADOBUTROL 604.72 MG/ML
9 INJECTION INTRAVENOUS
Status: COMPLETED | OUTPATIENT
Start: 2024-03-26 | End: 2024-03-26

## 2024-03-26 RX ADMIN — GADOBUTROL 9 ML: 604.72 INJECTION INTRAVENOUS at 10:28

## 2024-03-29 NOTE — RESULT ENCOUNTER NOTE
MRI is negative with PI-RADS category 2 scoring.  He has scheduled follow-up with me on 4/02 to review these in more detail.

## 2024-04-02 ENCOUNTER — OFFICE VISIT (OUTPATIENT)
Dept: UROLOGY | Facility: CLINIC | Age: 69
End: 2024-04-02
Payer: MEDICARE

## 2024-04-02 VITALS
BODY MASS INDEX: 33.77 KG/M2 | HEART RATE: 61 BPM | WEIGHT: 222.8 LBS | OXYGEN SATURATION: 95 % | HEIGHT: 68 IN | DIASTOLIC BLOOD PRESSURE: 66 MMHG | SYSTOLIC BLOOD PRESSURE: 112 MMHG

## 2024-04-02 DIAGNOSIS — N40.1 BENIGN PROSTATIC HYPERPLASIA WITH WEAK URINARY STREAM: ICD-10-CM

## 2024-04-02 DIAGNOSIS — R39.12 BENIGN PROSTATIC HYPERPLASIA WITH WEAK URINARY STREAM: ICD-10-CM

## 2024-04-02 DIAGNOSIS — R97.20 ELEVATED PSA: Primary | ICD-10-CM

## 2024-04-02 PROCEDURE — 99213 OFFICE O/P EST LOW 20 MIN: CPT

## 2024-04-02 RX ORDER — CALCIUM CARBONATE 300MG(750)
1 TABLET,CHEWABLE ORAL DAILY
COMMUNITY

## 2024-04-02 RX ORDER — TAMSULOSIN HYDROCHLORIDE 0.4 MG/1
0.4 CAPSULE ORAL
Qty: 30 CAPSULE | Refills: 6 | Status: SHIPPED | OUTPATIENT
Start: 2024-04-02

## 2024-04-02 NOTE — PROGRESS NOTES
4/2/2024    Chief Complaint   Patient presents with    Follow-up     6 month follow up elevated PSA. Review MRI.       Assessment and Plan    68 y.o. male     1.  Elevated PSA  PSA Trend  4.32 (3/11/2024)  3.0 (9/13/2023)  5.67 (8/2/2023)  2.2 (5/26/2021)  1.8 (4/9/2019)  Negative multiparametric MRI of the prostate 3/26/2024  Calculated prostate volume of 36 cc.  Check PSA in 6 months with follow-up    2. BPH  Recommend trial of Flomax 0.4 mg HS.  Follow-up in 6 months. He will call if needed to be seen sooner.       Subjective:    He presents today reporting doing well. He does have baseline lower urinary symptoms which we have not previously addressed. He reports weak urinary stream sensation of incomplete bladder emptying, as well as nocturia 1-2 times per night.           History of Present Illness  Yahir Orta is a 68 y.o. male here for follow-up evaluation of elevated PSA.    Patient was last seen by me on 9/20/2023 for follow-up evaluation of elevated PSA.  Previously seen by Dr. Grant in April 2019 for urinary frequency, urgency, and nocturia x3.  Patient reported history of uric acid stones but nothing recently at the time.  It was thought that his symptoms were likely due to acute onset overactive bladder and CT imaging was negative for stones with normal PVRs and a small prostate on exam.  Patient was trialed on oxybutynin and recommended cystoscopy evaluation which revealed meatal stenosis with coronal hypospadias, overactive bladder.     At baseline he denies any bothersome lower urinary tract symptoms. He does experience and intermittent weak urinary stream. His previous issues with OAB resolved years ago and he no longer takes oxybutynin. History of kidney stone with no recent stone episodes. No gross hematuria. No history of UTIs or prostatitis.    He had a elevated PSA of 5.67 in August 2023 previously 2.2 in May 2021 and 1.8 in April 2019.  Repeat PSA improved to 3.0 as of September  2023. His older brother is also being followed for an elevated PSA but no prostate cancer diagnosis. Family history of colon and lung cancer in his mother.  His current PSA is 4.32 as of 3/11/2024.  Due to instability of his PSA I recommended a multiparametric MRI of the prostate.  This was completed on 3/26/2024 and was negative with PI-RADS category 2 scoring.  He has a calculated prostate volume of 36 cc.         Review of Systems   Constitutional:  Negative for chills and fever.   HENT:  Negative for congestion and sore throat.    Respiratory:  Negative for cough and shortness of breath.    Cardiovascular:  Negative for chest pain and leg swelling.   Gastrointestinal:  Negative for abdominal pain, constipation and diarrhea.   Genitourinary:  Positive for frequency. Negative for difficulty urinating, dysuria, hematuria and urgency.        Weak urinary stream, nocturia, sensation of incomplete emptying   Musculoskeletal:  Negative for back pain and gait problem.   Skin:  Negative for wound.   Allergic/Immunologic: Negative for immunocompromised state.   Hematological:  Does not bruise/bleed easily.           AUA SYMPTOM SCORE      Flowsheet Row Most Recent Value   AUA SYMPTOM SCORE    How often have you had a sensation of not emptying your bladder completely after you finished urinating? 4 (P)    How often have you had to urinate again less than two hours after you finished urinating? 3 (P)    How often have you found you stopped and started again several times when you urinate? 5 (P)    How often have you found it difficult to postpone urination? 4 (P)    How often have you had a weak urinary stream? 5 (P)    How often have you had to push or strain to begin urination? 5 (P)    How many times did you most typically get up to urinate from the time you went to bed at night until the time you got up in the morning? 3 (P)    Quality of Life: If you were to spend the rest of your life with your urinary condition just  "the way it is now, how would you feel about that? 4 (P)    AUA SYMPTOM SCORE 29 (P)             Vitals  Vitals:    04/02/24 0737   BP: 112/66   Pulse: 61   SpO2: 95%   Weight: 101 kg (222 lb 12.8 oz)   Height: 5' 8\" (1.727 m)       Physical Exam  Vitals reviewed.   Constitutional:       General: He is not in acute distress.     Appearance: Normal appearance. He is not ill-appearing or toxic-appearing.   HENT:      Head: Normocephalic and atraumatic.   Eyes:      General: No scleral icterus.     Conjunctiva/sclera: Conjunctivae normal.   Cardiovascular:      Rate and Rhythm: Normal rate.   Pulmonary:      Effort: Pulmonary effort is normal. No respiratory distress.   Abdominal:      Tenderness: There is no right CVA tenderness or left CVA tenderness.      Hernia: No hernia is present.   Musculoskeletal:      Cervical back: Normal range of motion.      Right lower leg: No edema.      Left lower leg: No edema.   Skin:     General: Skin is warm and dry.      Coloration: Skin is not jaundiced or pale.   Neurological:      General: No focal deficit present.      Mental Status: He is alert and oriented to person, place, and time. Mental status is at baseline.      Gait: Gait normal.   Psychiatric:         Mood and Affect: Mood normal.         Behavior: Behavior normal.         Thought Content: Thought content normal.         Judgment: Judgment normal.         Past History  Past Medical History:   Diagnosis Date    Allergic     seasonal    Arthritis 2000    Back pain     Colon polyp     Disease of thyroid gland     ED (erectile dysfunction)     Headache(784.0)     HL (hearing loss) 2000    Both ears    Hypertension     Hypothyroidism     IBS (irritable bowel syndrome)     Inflammatory bowel disease     Kidney stone     Kidney stones     Migraine July 2022    Started when I stopped drinking    Obesity     Osteoarthritis     Overactive bladder     Vision loss july 2022    Occures with Migrain     Social History "     Socioeconomic History    Marital status: /Civil Union     Spouse name: None    Number of children: None    Years of education: None    Highest education level: None   Occupational History    None   Tobacco Use    Smoking status: Former     Current packs/day: 0.00     Average packs/day: 1.5 packs/day for 50.0 years (75.0 ttl pk-yrs)     Types: Cigarettes, Pipe, Cigars     Start date: 1967     Quit date: 2017     Years since quittin.2     Passive exposure: Past    Smokeless tobacco: Never    Tobacco comments:     Tobacco free   Vaping Use    Vaping status: Never Used   Substance and Sexual Activity    Alcohol use: Not Currently     Alcohol/week: 25.0 standard drinks of alcohol     Types: 25 Shots of liquor per week     Comment: Stopped 2022    Drug use: Not Currently     Frequency: 1.0 times per week     Types: Cocaine, Hashish, LSD, Marijuana     Comment: Not since High School    Sexual activity: Not Currently     Partners: Female     Birth control/protection: Male Sterilization   Other Topics Concern    None   Social History Narrative    Retired  and govt employee     x 2    Lives wife    Three children.     Social Determinants of Health     Financial Resource Strain: Low Risk  (2023)    Overall Financial Resource Strain (CARDIA)     Difficulty of Paying Living Expenses: Not very hard   Food Insecurity: Not on file   Transportation Needs: No Transportation Needs (2023)    PRAPARE - Transportation     Lack of Transportation (Medical): No     Lack of Transportation (Non-Medical): No   Physical Activity: Not on file   Stress: Not on file   Social Connections: Not on file   Intimate Partner Violence: Not on file   Housing Stability: Not on file     Social History     Tobacco Use   Smoking Status Former    Current packs/day: 0.00    Average packs/day: 1.5 packs/day for 50.0 years (75.0 ttl pk-yrs)    Types: Cigarettes, Pipe, Cigars    Start date: 1967     Quit date: 2017    Years since quittin.2    Passive exposure: Past   Smokeless Tobacco Never   Tobacco Comments    Tobacco free     Family History   Problem Relation Age of Onset    Heart disease Father     Hypertension Father     Early death Father         Heart Attack @ 36    Cancer Mother     Alcohol abuse Mother         Dead    Suicide Attempts Mother     Stroke Maternal Aunt     Dementia Maternal Aunt     Diabetes Maternal Grandmother     Cancer Maternal Grandmother     Heart disease Sister     Hypertension Sister     Alcohol abuse Cousin         Dead    Stroke Cousin     Suicide Attempts Brother        The following portions of the patient's history were reviewed and updated as appropriate allergies, current medications, past medical history, past social history, past surgical history and problem list    Imaging:    3/26/2024  MULTIPARAMETRIC MRI OF THE PROSTATE WITH AND WITHOUT CONTRAST-WITH 3-D POSTPROCESSING     INDICATION: R97.20: Elevated prostate specific antigen (PSA).     COMPARISON: None.     PSA LEVEL: 4.32 ng/mL on 3/11/2024.  PRIOR BIOPSY: Unknown.     TECHNIQUE: The following pulse sequences were obtained: Small field-of-view axial T1-weighted and multiplanar T2-weighted images; DWI axial and ADC map; large field of view axial T2 weighted images; T1w in-phase and opposed-phase axials of entire pelvis   and dynamic 3D T1w axial before and during IV contrast injection. Imaging performed on 3T MRI.     CONTRAST: Gadobutrol (Gadavist) 9 mL of Gadobutrol injection (SINGLE-DOSE)     TECHNICAL LIMITATIONS: Nondiagnostic DWI/ADC images due to image degradation by susceptibility artifacts from right hip arthroplasty.     FINDINGS:     PROSTATE:  Size: 5.1 x 3.6 x 4.0 cm = 36 cc.  Post-biopsy hemorrhage: None.  Central gland enlargement (BPH): Mild.     Focal lesions - No dominant lesion on evaluation of the T2 weighted and DCE images. The DWI/ADC sequences are degraded by susceptibility artifacts.  Heterogeneous peripheral zone. PI-RADSv2.1 Category 2 - Low (clinically significant cancer unlikely).     SEMINAL VESICLES: Unremarkable     Note: Clinically significant cancer is defined on pathology/histology as [place] score greater than or equal to 7, and/or volume of greater than or equal to 0.5 mL, and/or extraprostatic extension.     URINARY BLADDER: Unremarkable.     LYMPH NODES: No pelvic lymphadenopathy.     BONES: No suspicious osseous lesion. Spinal degenerative changes. Right hip arthroplasty with susceptibility artifacts.     IMPRESSION:     1. PI-RADSv2.1 Category 2 - Low (clinically significant cancer is unlikely to be present).     2. Calculated prostate volume of   36  cc.          Results  No results found for this or any previous visit (from the past 1 hour(s)).]  Lab Results   Component Value Date    PSA 4.32 (H) 03/11/2024    PSA 3.0 09/13/2023    PSA 5.67 (H) 08/02/2023    PSA 2.2 05/26/2021     Lab Results   Component Value Date    CALCIUM 9.5 03/25/2024    K 4.2 03/25/2024    CO2 29 03/25/2024     03/25/2024    BUN 15 03/25/2024    CREATININE 1.11 03/25/2024     Lab Results   Component Value Date    WBC 7.43 03/25/2024    HGB 17.0 03/25/2024    HCT 50.7 (H) 03/25/2024    MCV 90 03/25/2024     03/25/2024       Please Note:  Voice dictation software has been used to create this document. There may be inadvertent transcriptions errors.     KRISTEN Tovar 04/02/24

## 2024-04-05 ENCOUNTER — OFFICE VISIT (OUTPATIENT)
Dept: URGENT CARE | Facility: CLINIC | Age: 69
End: 2024-04-05
Payer: MEDICARE

## 2024-04-05 ENCOUNTER — APPOINTMENT (OUTPATIENT)
Dept: RADIOLOGY | Facility: CLINIC | Age: 69
End: 2024-04-05
Payer: MEDICARE

## 2024-04-05 VITALS
RESPIRATION RATE: 20 BRPM | HEART RATE: 60 BPM | TEMPERATURE: 97.7 F | DIASTOLIC BLOOD PRESSURE: 82 MMHG | OXYGEN SATURATION: 97 % | BODY MASS INDEX: 33.65 KG/M2 | SYSTOLIC BLOOD PRESSURE: 136 MMHG | HEIGHT: 68 IN | WEIGHT: 222 LBS

## 2024-04-05 DIAGNOSIS — E78.00 HYPERCHOLESTEROLEMIA: ICD-10-CM

## 2024-04-05 DIAGNOSIS — E03.9 HYPOTHYROIDISM, UNSPECIFIED TYPE: ICD-10-CM

## 2024-04-05 DIAGNOSIS — R05.1 ACUTE COUGH: ICD-10-CM

## 2024-04-05 DIAGNOSIS — J22 ACUTE RESPIRATORY INFECTION: Primary | ICD-10-CM

## 2024-04-05 PROCEDURE — 71046 X-RAY EXAM CHEST 2 VIEWS: CPT

## 2024-04-05 PROCEDURE — 87635 SARS-COV-2 COVID-19 AMP PRB: CPT | Performed by: NURSE PRACTITIONER

## 2024-04-05 PROCEDURE — G0463 HOSPITAL OUTPT CLINIC VISIT: HCPCS | Performed by: NURSE PRACTITIONER

## 2024-04-05 PROCEDURE — 99214 OFFICE O/P EST MOD 30 MIN: CPT | Performed by: NURSE PRACTITIONER

## 2024-04-05 RX ORDER — LEVOTHYROXINE SODIUM 175 UG/1
175 TABLET ORAL DAILY
Qty: 90 TABLET | Refills: 1 | Status: SHIPPED | OUTPATIENT
Start: 2024-04-05

## 2024-04-05 RX ORDER — EZETIMIBE 10 MG/1
10 TABLET ORAL DAILY
Qty: 90 TABLET | Refills: 1 | Status: SHIPPED | OUTPATIENT
Start: 2024-04-05

## 2024-04-05 RX ORDER — PREDNISONE 20 MG/1
60 TABLET ORAL DAILY
Qty: 15 TABLET | Refills: 0 | Status: SHIPPED | OUTPATIENT
Start: 2024-04-05 | End: 2024-04-10

## 2024-04-05 RX ORDER — ALBUTEROL SULFATE 90 UG/1
2 AEROSOL, METERED RESPIRATORY (INHALATION) EVERY 6 HOURS PRN
Qty: 8.5 G | Refills: 0 | Status: SHIPPED | OUTPATIENT
Start: 2024-04-05

## 2024-04-05 RX ORDER — DOXYCYCLINE 100 MG/1
100 CAPSULE ORAL 2 TIMES DAILY
Qty: 14 CAPSULE | Refills: 0 | Status: SHIPPED | OUTPATIENT
Start: 2024-04-05 | End: 2024-04-12

## 2024-04-05 NOTE — PROGRESS NOTES
[facility]        NAME: Yahir Orta is a 69 y.o. male  : 1955    MRN: [id number]  DATE: 2024  TIME: 10:01 AM    Assessment and Plan   Acute respiratory infection [J22]  1. Acute respiratory infection  doxycycline monohydrate (MONODOX) 100 mg capsule    predniSONE 20 mg tablet    albuterol (ProAir HFA) 90 mcg/act inhaler      2. Acute cough  XR chest pa & lateral    COVID Only- Office Collect    doxycycline monohydrate (MONODOX) 100 mg capsule    predniSONE 20 mg tablet    albuterol (ProAir HFA) 90 mcg/act inhaler            Patient Instructions       Follow up with PCP in 3-5 days.  Proceed to  ER if symptoms worsen.    If tests have been performed at [facility], our office will contact you with results if changes need to be made to the care plan discussed with you at the visit.  You can review your full results on [facility].      Your xray was preliminarily read by your provider.  A radiologist will read the xray and you will be notified if it is abnormal.    You  have been prescribed doxycycline for respiratory infection.  Steroids for chest tightness and ear pain and an albuterol inhaler for SOB or wheezing.  You are to drink water.  Do not take the magnesium and doxycycline with in 2 hours of each other     You are to avoid nyquil - this can raise your blood pressure.  Take corcidin HBP for your symptoms.     You appear to have covid/symptoms.  You have a covid test pending.  You are to download  [employer] for the results in 24-48 hours.   You will be notified if results are +.    You are to take vitamin C, D3,  plain robitussin for cough.  Do not take cough suppressants; you want to take an expectorant.  Sleep on your stomach.   You are to quarantine as required per CDC guidelines of 24 hours.  Mask as long as you are ill, feverish or coughing.     See your PCP for follow up in 2-3 days.    Go to the ED if symptoms worsen or are severe.   You are to call your PCP if your  results are + to discuss Paxlovid treatment  - this is because you are on cholesterol medication - I will not stop medications that you are currently taking.     If tests have been performed at [facility], our office will contact you with results if changes need to be made to the care plan discussed with you at the visit.  You can review your full results on [facility].        Chief Complaint     Chief Complaint   Patient presents with    Cough         History of Present Illness       This is a 69 year old male who states has been ill for a few days that started this week with chest burning, slightly productive cough, chills, fever, ear pain, nasals congestion, SOB, sorethroat, runny nose.  He has not taken anything for symptoms until last night which he took some nyquil.  He denies n/v/d.  He states smoked years ago.  Denies hx of lung disease.  He states he does feel better today than he did yesterday.      Cough  This is a new problem. The current episode started in the past 7 days. The problem has been gradually worsening. The problem occurs constantly. The cough is Productive of sputum. Associated symptoms include chest pain, chills, ear pain, a fever, headaches, heartburn, nasal congestion, postnasal drip, rhinorrhea, a sore throat and shortness of breath. Pertinent negatives include no ear congestion, hemoptysis, myalgias, rash, sweats, weight loss or wheezing. The symptoms are aggravated by lying down.       Review of Systems   Review of Systems   Constitutional:  Positive for chills and fever. Negative for weight loss.   HENT:  Positive for ear pain, postnasal drip, rhinorrhea and sore throat.    Eyes: Negative.    Respiratory:  Positive for cough and shortness of breath. Negative for hemoptysis and wheezing.    Cardiovascular:  Positive for chest pain.   Gastrointestinal:  Positive for heartburn.   Endocrine: Negative.    Genitourinary: Negative.    Musculoskeletal: Negative.  Negative for myalgias.    Skin:  Negative for rash.   Allergic/Immunologic: Negative.    Neurological:  Positive for headaches.   Hematological: Negative.    Psychiatric/Behavioral: Negative.           Current Medications       Current Outpatient Medications:     albuterol (ProAir HFA) 90 mcg/act inhaler, Inhale 2 puffs every 6 (six) hours as needed for wheezing or shortness of breath, Disp: 8.5 g, Rfl: 0    doxycycline monohydrate (MONODOX) 100 mg capsule, Take 1 capsule (100 mg total) by mouth 2 (two) times a day for 7 days, Disp: 14 capsule, Rfl: 0    predniSONE 20 mg tablet, Take 3 tablets (60 mg total) by mouth daily for 5 days, Disp: 15 tablet, Rfl: 0    B Complex-C-Folic Acid (B-Complex/Folic Acid/Vitamin C) TBCR, , Disp: , Rfl:     Cholecalciferol (D-3-5) 125 MCG (5000 UT) capsule, , Disp: , Rfl:     cyclobenzaprine (FLEXERIL) 10 mg tablet, Take 1 tablet (10 mg total) by mouth as needed for muscle spasms, Disp: 90 tablet, Rfl: 1    DULoxetine (CYMBALTA) 30 mg delayed release capsule, Take 1 capsule (30 mg total) by mouth daily at bedtime, Disp: 90 capsule, Rfl: 1    Echinacea 400 MG CAPS, , Disp: , Rfl:     ezetimibe (ZETIA) 10 mg tablet, Take 1 tablet (10 mg total) by mouth daily, Disp: 90 tablet, Rfl: 1    Fiber Adult Gummies 2 g CHEW, , Disp: , Rfl:     ibuprofen (MOTRIN) 600 mg tablet, Take 1 tablet (600 mg total) by mouth 2 (two) times a day as needed for mild pain or moderate pain, Disp: 180 tablet, Rfl: 0    latanoprost (XALATAN) 0.005 % ophthalmic solution, Administer 1 drop to both eyes daily, Disp: , Rfl:     levothyroxine 175 mcg tablet, Take 1 tablet (175 mcg total) by mouth daily, Disp: 90 tablet, Rfl: 1    lisinopril (ZESTRIL) 10 mg tablet, Take 1 tablet (10 mg total) by mouth daily, Disp: 90 tablet, Rfl: 1    Magnesium 400 MG TABS, Take 1 tablet by mouth in the morning, Disp: , Rfl:     SUMAtriptan (IMITREX) 50 mg tablet, Take 1 tablet (50 mg total) by mouth once as needed for migraine for up to 1 dose, Disp: 9  tablet, Rfl: 0    tamsulosin (FLOMAX) 0.4 mg, Take 1 capsule (0.4 mg total) by mouth daily with dinner, Disp: 30 capsule, Rfl: 6    Zinc 50 MG CAPS, Take 1 capsule by mouth in the morning, Disp: , Rfl:     Current Allergies     Allergies as of 04/05/2024 - Reviewed 04/05/2024   Allergen Reaction Noted    Levofloxacin Rash 04/15/2019            The following portions of the patient's history were reviewed and updated as appropriate: allergies, current medications, past family history, past medical history, past social history, past surgical history and problem list.     Past Medical History:   Diagnosis Date    Allergic     seasonal    Arthritis 2000    Back pain     Colon polyp     Disease of thyroid gland     ED (erectile dysfunction)     Headache(784.0)     HL (hearing loss) 2000    Both ears    Hypertension     Hypothyroidism     IBS (irritable bowel syndrome)     Inflammatory bowel disease     Kidney stone     Kidney stones     Migraine July 2022    Started when I stopped drinking    Obesity     Osteoarthritis     Overactive bladder     Vision loss july 2022    Occures with Migrain       Past Surgical History:   Procedure Laterality Date    APPENDECTOMY      COLONOSCOPY      HERNIA REPAIR      JOINT REPLACEMENT      ORTHOPEDIC SURGERY      PARTIAL HIP ARTHROPLASTY Right     ROTATOR CUFF REPAIR      TRIGGER FINGER RELEASE  02/2021    Left ring finger    TRIGGER POINT INJECTION         Family History   Problem Relation Age of Onset    Heart disease Father     Hypertension Father     Early death Father         Heart Attack @ 36    Cancer Mother     Alcohol abuse Mother         Dead    Suicide Attempts Mother     Stroke Maternal Aunt     Dementia Maternal Aunt     Diabetes Maternal Grandmother     Cancer Maternal Grandmother     Heart disease Sister     Hypertension Sister     Alcohol abuse Cousin         Dead    Stroke Cousin     Suicide Attempts Brother          Medications have been verified.        Objective  "  /82   Pulse 60   Temp 97.7 °F (36.5 °C)   Resp 20   Ht 5' 8\" (1.727 m)   Wt 101 kg (222 lb)   SpO2 97%   BMI 33.75 kg/m²   No LMP for male patient.       Physical Exam     Physical Exam  Vitals and nursing note reviewed.   Constitutional:       General: He is not in acute distress.     Appearance: Normal appearance. He is obese. He is not ill-appearing, toxic-appearing or diaphoretic.   HENT:      Head: Normocephalic and atraumatic.      Right Ear: Tympanic membrane and ear canal normal.      Left Ear: Tympanic membrane and ear canal normal.      Ears:      Comments: Wears bilateral hearing aids      Nose: Congestion present. No rhinorrhea.      Mouth/Throat:      Mouth: Mucous membranes are moist.      Pharynx: Oropharynx is clear. No oropharyngeal exudate or posterior oropharyngeal erythema.   Eyes:      Extraocular Movements: Extraocular movements intact.   Cardiovascular:      Rate and Rhythm: Normal rate and regular rhythm.      Pulses: Normal pulses.      Heart sounds: Normal heart sounds. No murmur heard.  Pulmonary:      Effort: Pulmonary effort is normal. No respiratory distress.      Breath sounds: Decreased air movement present. No stridor. No wheezing, rhonchi or rales.   Chest:      Chest wall: No tenderness.   Musculoskeletal:         General: Normal range of motion.      Cervical back: Normal range of motion and neck supple.   Skin:     General: Skin is warm and dry.      Capillary Refill: Capillary refill takes less than 2 seconds.   Neurological:      General: No focal deficit present.      Mental Status: He is alert and oriented to person, place, and time.   Psychiatric:         Mood and Affect: Mood normal.         Behavior: Behavior normal.         Thought Content: Thought content normal.         Judgment: Judgment normal.         Preliminary reading CXR  Compared to 1/24 xray no acute changes  No acute process  Waiting on rad read               "

## 2024-04-05 NOTE — PATIENT INSTRUCTIONS
Your xray was preliminarily read by your provider.  A radiologist will read the xray and you will be notified if it is abnormal.    You  have been prescribed doxycycline for respiratory infection.  Steroids for chest tightness and ear pain and an albuterol inhaler for SOB or wheezing.  You are to drink water.  Do not take the magnesium and doxycycline with in 2 hours of each other     You are to avoid nyquil - this can raise your blood pressure.  Take corcidin HBP for your symptoms.     You appear to have covid/symptoms.  You have a covid test pending.  You are to download [employer] for the results in 24-48 hours.   You will be notified if results are +.    You are to take vitamin C, D3,  plain robitussin for cough.  Do not take cough suppressants; you want to take an expectorant.  Sleep on your stomach.   You are to quarantine as required per CDC guidelines of 24 hours.  Mask as long as you are ill, feverish or coughing.     See your PCP for follow up in 2-3 days.    Go to the ED if symptoms worsen or are severe.   You are to call your PCP if your results are + to discuss Paxlovid treatment  - this is because you are on cholesterol medication - I will not stop medications that you are currently taking.     If tests have been performed at [facility], our office will contact you with results if changes need to be made to the care plan discussed with you at the visit.  You can review your full results on [facility].

## 2024-04-06 LAB — SARS-COV-2 RNA RESP QL NAA+PROBE: NEGATIVE

## 2024-04-26 ENCOUNTER — OFFICE VISIT (OUTPATIENT)
Dept: INTERNAL MEDICINE CLINIC | Facility: CLINIC | Age: 69
End: 2024-04-26
Payer: MEDICARE

## 2024-04-26 VITALS
DIASTOLIC BLOOD PRESSURE: 78 MMHG | BODY MASS INDEX: 32.91 KG/M2 | HEART RATE: 62 BPM | HEIGHT: 68 IN | OXYGEN SATURATION: 98 % | SYSTOLIC BLOOD PRESSURE: 122 MMHG | WEIGHT: 217.13 LBS | TEMPERATURE: 98.6 F

## 2024-04-26 DIAGNOSIS — G31.84 MCI (MILD COGNITIVE IMPAIRMENT): Primary | ICD-10-CM

## 2024-04-26 DIAGNOSIS — R73.03 PRE-DIABETES: ICD-10-CM

## 2024-04-26 DIAGNOSIS — I10 PRIMARY HYPERTENSION: ICD-10-CM

## 2024-04-26 DIAGNOSIS — E78.00 HYPERCHOLESTEROLEMIA: ICD-10-CM

## 2024-04-26 DIAGNOSIS — F10.11 HISTORY OF ETOH ABUSE: ICD-10-CM

## 2024-04-26 DIAGNOSIS — I67.89 CEREBRAL MICROVASCULAR DISEASE: ICD-10-CM

## 2024-04-26 PROCEDURE — 99214 OFFICE O/P EST MOD 30 MIN: CPT | Performed by: INTERNAL MEDICINE

## 2024-04-26 PROCEDURE — G2211 COMPLEX E/M VISIT ADD ON: HCPCS | Performed by: INTERNAL MEDICINE

## 2024-04-26 RX ORDER — DONEPEZIL HYDROCHLORIDE 5 MG/1
5 TABLET, FILM COATED ORAL
Status: CANCELLED | OUTPATIENT
Start: 2024-04-26

## 2024-04-26 NOTE — PROGRESS NOTES
Assessment/Plan:  Problem List Items Addressed This Visit          Cardiovascular and Mediastinum    Hypertension    Cerebral microvascular disease       Behavioral Health    History of ETOH abuse       Neurology/Sleep    MCI (mild cognitive impairment) - Primary       Other    Hypercholesterolemia     Other Visit Diagnoses       Pre-diabetes                 Diagnoses and all orders for this visit:    MCI (mild cognitive impairment)    History of ETOH abuse    Cerebral microvascular disease    Primary hypertension    Hypercholesterolemia    Pre-diabetes        No problem-specific Assessment & Plan notes found for this encounter.    A/P:Stable and discussed labs. SUspect MCI due to ETOH, aging, and microvascular disease. Recommend trying aricept and pt wife wants to discuss trial and do some research on the med. . HOld on imaging since down a year ago. Appreciate neuro input. Needs to modify risks by avoiding ETOH, controlling sugars, BP, avoiding tobacco, and keeping LDL low. Discussed statins had elevated LFT's in the past. Remains on zetia. Niacin not an option given past LFT's, but ?if this was when he was consuming ETOH. ?trial of the newer non statin meds. > Continue current treatment and RTC three months for routine. Keep f/u with neuro.    Subjective:      Patient ID: Yahir Orta is a 69 y.o. male.    WM RTC for f/u MCI  and labs. Labs ok except for borderline LDL and pre diabetic range HgA1c. Continues with memory and cognitive issues, but no worse. Sees a neurologist for headaches already and on SNRI. .         The following portions of the patient's history were reviewed and updated as appropriate:   He has a past medical history of Allergic, Arthritis (2000), Back pain, Colon polyp, Disease of thyroid gland, ED (erectile dysfunction), Headache(784.0), HL (hearing loss) (2000), Hypertension, Hypothyroidism, IBS (irritable bowel syndrome), Inflammatory bowel disease, Kidney stone, Kidney stones,  Migraine (July 2022), Obesity, Osteoarthritis, Overactive bladder, and Vision loss (july 2022).,  does not have any pertinent problems on file.,   has a past surgical history that includes Rotator cuff repair; Partial hip arthroplasty (Right); Appendectomy; Hernia repair; Trigger finger release (02/2021); Colonoscopy; orthopedic surgery; Trigger point injection; and Joint replacement.,  family history includes Alcohol abuse in his cousin and mother; Cancer in his maternal grandmother and mother; Dementia in his maternal aunt; Diabetes in his maternal grandmother; Early death in his father; Heart disease in his father and sister; Hypertension in his father and sister; Stroke in his cousin and maternal aunt; Suicide Attempts in his brother and mother.,   reports that he quit smoking about 7 years ago. His smoking use included cigarettes, pipe, and cigars. He started smoking about 57 years ago. He has a 75 pack-year smoking history. He has been exposed to tobacco smoke. He has never used smokeless tobacco. He reports that he does not currently use alcohol after a past usage of about 25.0 standard drinks of alcohol per week. He reports that he does not currently use drugs after having used the following drugs: Cocaine, Hashish, LSD, and Marijuana. Frequency: 1.00 time per week.,  is allergic to levofloxacin..  Current Outpatient Medications   Medication Sig Dispense Refill    B Complex-C-Folic Acid (B-Complex/Folic Acid/Vitamin C) TBCR       Cholecalciferol (D-3-5) 125 MCG (5000 UT) capsule       cyclobenzaprine (FLEXERIL) 10 mg tablet Take 1 tablet (10 mg total) by mouth as needed for muscle spasms 90 tablet 1    DULoxetine (CYMBALTA) 30 mg delayed release capsule Take 1 capsule (30 mg total) by mouth daily at bedtime 90 capsule 1    Echinacea 400 MG CAPS       ezetimibe (ZETIA) 10 mg tablet Take 1 tablet (10 mg total) by mouth daily 90 tablet 1    Fiber Adult Gummies 2 g CHEW       ibuprofen (MOTRIN) 600 mg tablet  "Take 1 tablet (600 mg total) by mouth 2 (two) times a day as needed for mild pain or moderate pain 180 tablet 0    latanoprost (XALATAN) 0.005 % ophthalmic solution Administer 1 drop to both eyes daily      levothyroxine 175 mcg tablet Take 1 tablet (175 mcg total) by mouth daily 90 tablet 1    lisinopril (ZESTRIL) 10 mg tablet Take 1 tablet (10 mg total) by mouth daily 90 tablet 1    Magnesium 400 MG TABS Take 1 tablet by mouth in the morning      SUMAtriptan (IMITREX) 50 mg tablet Take 1 tablet (50 mg total) by mouth once as needed for migraine for up to 1 dose 9 tablet 0    tamsulosin (FLOMAX) 0.4 mg Take 1 capsule (0.4 mg total) by mouth daily with dinner 30 capsule 6    Zinc 50 MG CAPS Take 1 capsule by mouth in the morning       No current facility-administered medications for this visit.       Review of Systems   Constitutional:  Negative for activity change, chills, diaphoresis, fatigue and fever.   Respiratory:  Negative for cough, chest tightness, shortness of breath and wheezing.    Cardiovascular:  Negative for chest pain, palpitations and leg swelling.   Gastrointestinal:  Negative for abdominal pain, constipation, diarrhea, nausea and vomiting.   Genitourinary:  Negative for difficulty urinating, dysuria and frequency.   Musculoskeletal:  Negative for arthralgias, gait problem and myalgias.   Neurological:  Negative for dizziness, seizures, syncope, weakness, light-headedness and headaches.        Memory issues   Psychiatric/Behavioral:  Negative for confusion and dysphoric mood. The patient is not nervous/anxious.        PHQ-2/9 Depression Screening            Objective:  Vitals:    04/26/24 1031   BP: 122/78   Pulse: 62   Temp: 98.6 °F (37 °C)   SpO2: 98%   Weight: 98.5 kg (217 lb 2 oz)   Height: 5' 8\" (1.727 m)     Body mass index is 33.01 kg/m².     Physical Exam  Vitals and nursing note reviewed.   Constitutional:       General: He is not in acute distress.     Appearance: Normal appearance. He is " not ill-appearing.   HENT:      Head: Normocephalic and atraumatic.      Mouth/Throat:      Mouth: Mucous membranes are moist.   Eyes:      Extraocular Movements: Extraocular movements intact.      Conjunctiva/sclera: Conjunctivae normal.      Pupils: Pupils are equal, round, and reactive to light.   Cardiovascular:      Rate and Rhythm: Normal rate and regular rhythm.      Heart sounds: Normal heart sounds. No murmur heard.  Pulmonary:      Effort: Pulmonary effort is normal. No respiratory distress.      Breath sounds: Normal breath sounds. No wheezing, rhonchi or rales.   Abdominal:      General: Bowel sounds are normal. There is no distension.      Palpations: Abdomen is soft.      Tenderness: There is no abdominal tenderness.   Neurological:      General: No focal deficit present.      Mental Status: He is alert and oriented to person, place, and time. Mental status is at baseline.   Psychiatric:         Mood and Affect: Mood normal.         Behavior: Behavior normal.         Thought Content: Thought content normal.         Judgment: Judgment normal.

## 2024-05-14 DIAGNOSIS — G43.809 OTHER MIGRAINE WITHOUT STATUS MIGRAINOSUS, NOT INTRACTABLE: ICD-10-CM

## 2024-05-14 RX ORDER — SUMATRIPTAN 50 MG/1
50 TABLET, FILM COATED ORAL ONCE AS NEEDED
Qty: 12 TABLET | Refills: 0 | Status: SHIPPED | OUTPATIENT
Start: 2024-05-14

## 2024-05-14 NOTE — TELEPHONE ENCOUNTER
Medication: Sumatriptan (Imitrex)    Dose/Frequency: 50 mg, take 1 tablet by mouth once as needed for migraine for up to 1 dose    Quantity: 9    Pharmacy: [facility]    Office:   [x] PCP/Provider -   [] Speciality/Provider -     Does the patient have enough for 3 days?   [] Yes   [x] No - Send as HP to POD

## 2024-07-03 DIAGNOSIS — E03.9 HYPOTHYROIDISM, UNSPECIFIED TYPE: ICD-10-CM

## 2024-07-03 DIAGNOSIS — I10 ESSENTIAL HYPERTENSION: ICD-10-CM

## 2024-07-03 DIAGNOSIS — E78.00 HYPERCHOLESTEROLEMIA: ICD-10-CM

## 2024-07-03 DIAGNOSIS — M19.90 ARTHRITIS: ICD-10-CM

## 2024-07-03 RX ORDER — EZETIMIBE 10 MG/1
10 TABLET ORAL DAILY
Qty: 90 TABLET | Refills: 1 | Status: SHIPPED | OUTPATIENT
Start: 2024-07-03

## 2024-07-03 RX ORDER — LEVOTHYROXINE SODIUM 175 UG/1
175 TABLET ORAL DAILY
Qty: 90 TABLET | Refills: 1 | Status: SHIPPED | OUTPATIENT
Start: 2024-07-03

## 2024-07-03 RX ORDER — LISINOPRIL 10 MG/1
10 TABLET ORAL DAILY
Qty: 100 TABLET | Refills: 1 | Status: SHIPPED | OUTPATIENT
Start: 2024-07-03

## 2024-07-03 RX ORDER — IBUPROFEN 600 MG/1
600 TABLET ORAL 2 TIMES DAILY PRN
Qty: 180 TABLET | Refills: 0 | Status: SHIPPED | OUTPATIENT
Start: 2024-07-03

## 2024-07-05 ENCOUNTER — OFFICE VISIT (OUTPATIENT)
Dept: INTERNAL MEDICINE CLINIC | Facility: CLINIC | Age: 69
End: 2024-07-05
Payer: MEDICARE

## 2024-07-05 VITALS
DIASTOLIC BLOOD PRESSURE: 80 MMHG | BODY MASS INDEX: 32.02 KG/M2 | SYSTOLIC BLOOD PRESSURE: 116 MMHG | HEIGHT: 68 IN | HEART RATE: 72 BPM | OXYGEN SATURATION: 98 % | WEIGHT: 211.25 LBS | TEMPERATURE: 98.8 F

## 2024-07-05 DIAGNOSIS — J32.9 SINOBRONCHITIS: Primary | ICD-10-CM

## 2024-07-05 DIAGNOSIS — J40 SINOBRONCHITIS: Primary | ICD-10-CM

## 2024-07-05 PROCEDURE — 99213 OFFICE O/P EST LOW 20 MIN: CPT | Performed by: INTERNAL MEDICINE

## 2024-07-05 PROCEDURE — G2211 COMPLEX E/M VISIT ADD ON: HCPCS | Performed by: INTERNAL MEDICINE

## 2024-07-05 RX ORDER — CODEINE PHOSPHATE/GUAIFENESIN 10-100MG/5
5 LIQUID (ML) ORAL 3 TIMES DAILY PRN
Qty: 120 ML | Refills: 0 | Status: SHIPPED | OUTPATIENT
Start: 2024-07-05

## 2024-07-05 RX ORDER — FLUTICASONE PROPIONATE 50 MCG
1 SPRAY, SUSPENSION (ML) NASAL DAILY
Qty: 16 G | Refills: 0 | Status: SHIPPED | OUTPATIENT
Start: 2024-07-05

## 2024-07-05 RX ORDER — GUAIFENESIN 600 MG/1
600 TABLET, EXTENDED RELEASE ORAL EVERY 12 HOURS SCHEDULED
Qty: 20 TABLET | Refills: 0 | Status: SHIPPED | OUTPATIENT
Start: 2024-07-05

## 2024-07-05 RX ORDER — AMOXICILLIN AND CLAVULANATE POTASSIUM 875; 125 MG/1; MG/1
1 TABLET, FILM COATED ORAL EVERY 12 HOURS SCHEDULED
Qty: 20 TABLET | Refills: 0 | Status: SHIPPED | OUTPATIENT
Start: 2024-07-05 | End: 2024-07-12

## 2024-07-05 NOTE — PROGRESS NOTES
Ambulatory Visit  Name: Yahir Orta      : 1955      MRN: [id number]  Encounter Provider: Estevan Catalan DO  Encounter Date: 2024   Encounter department: [facility]    Assessment & Plan   1. Sinobronchitis  -     amoxicillin-clavulanate (AUGMENTIN) 875-125 mg per tablet; Take 1 tablet by mouth every 12 (twelve) hours for 7 days  -     guaiFENesin (Mucinex) 600 mg 12 hr tablet; Take 1 tablet (600 mg total) by mouth every 12 (twelve) hours  -     fluticasone (FLONASE) 50 mcg/act nasal spray; 1 spray into each nostril daily  -     guaifenesin-codeine (GUAIFENESIN AC) 100-10 MG/5ML liquid; Take 5 mL by mouth 3 (three) times a day as needed for cough  A/P: Stable. Rest and increase po fluids. Warm salt water gargles. OTC PRN motrin/tylenol. May boost immunity with vit c, vit d, and zinc. Will start  abx, mucinex, INS, and cough med. No new steroid wean. No  need for imaging. Continue current treatment and RTC as scheduled.       History of Present Illness     Non smoking WM, presents for several day  history of URI s/s. Vaccinated against covid in the past, but not flu recently. No travel and no one else ill. Reports slight fever and chills, headaches with nasal congestion with PND and slight  sore throat. Productive cough with slight SOB, but no wheezing.           Review of Systems   Constitutional:  Positive for activity change, chills, fatigue and fever. Negative for diaphoresis.   HENT:  Positive for congestion, postnasal drip, rhinorrhea, sinus pressure and sore throat. Negative for ear discharge, ear pain, facial swelling and sinus pain.    Respiratory:  Positive for cough, chest tightness and shortness of breath. Negative for wheezing.    Cardiovascular:  Negative for chest pain, palpitations and leg swelling.   Gastrointestinal:  Negative for abdominal pain, constipation, diarrhea, nausea and vomiting.   Genitourinary:  Negative for difficulty urinating, dysuria and frequency.  "  Musculoskeletal:  Negative for arthralgias, gait problem and myalgias.   Neurological:  Positive for headaches. Negative for light-headedness.   Psychiatric/Behavioral:  Negative for confusion. The patient is not nervous/anxious.        Objective     /80 (BP Location: Left arm, Patient Position: Sitting)   Pulse 72   Temp 98.8 °F (37.1 °C) (Tympanic)   Ht 5' 8\" (1.727 m)   Wt 95.8 kg (211 lb 4 oz)   SpO2 98%   BMI 32.12 kg/m²     Physical Exam  Vitals and nursing note reviewed.   Constitutional:       General: He is not in acute distress.     Appearance: Normal appearance. He is not ill-appearing.   HENT:      Head: Normocephalic and atraumatic.      Comments: Sinus tenderness with turbinates red and inflamed. Cobblestoning noted.      Right Ear: Tympanic membrane, ear canal and external ear normal. There is no impacted cerumen.      Left Ear: Tympanic membrane, ear canal and external ear normal. There is no impacted cerumen.      Nose: Congestion and rhinorrhea present.      Mouth/Throat:      Mouth: Mucous membranes are moist.      Pharynx: Posterior oropharyngeal erythema present. No oropharyngeal exudate.   Eyes:      Extraocular Movements: Extraocular movements intact.      Conjunctiva/sclera: Conjunctivae normal.      Pupils: Pupils are equal, round, and reactive to light.   Cardiovascular:      Rate and Rhythm: Normal rate and regular rhythm.      Heart sounds: Normal heart sounds. No murmur heard.  Pulmonary:      Effort: Pulmonary effort is normal. No respiratory distress.      Breath sounds: Normal breath sounds. No wheezing, rhonchi or rales.   Musculoskeletal:      Cervical back: Neck supple. No tenderness.   Lymphadenopathy:      Cervical: No cervical adenopathy.   Neurological:      General: No focal deficit present.      Mental Status: He is alert and oriented to person, place, and time. Mental status is at baseline.   Psychiatric:         Mood and Affect: Mood normal.         Behavior: " Behavior normal.         Thought Content: Thought content normal.         Judgment: Judgment normal.       Administrative Statements

## 2024-07-29 ENCOUNTER — RA CDI HCC (OUTPATIENT)
Dept: OTHER | Facility: HOSPITAL | Age: 69
End: 2024-07-29

## 2024-08-02 ENCOUNTER — OFFICE VISIT (OUTPATIENT)
Dept: INTERNAL MEDICINE CLINIC | Facility: CLINIC | Age: 69
End: 2024-08-02
Payer: MEDICARE

## 2024-08-02 VITALS
TEMPERATURE: 98.4 F | HEIGHT: 68 IN | BODY MASS INDEX: 32.28 KG/M2 | SYSTOLIC BLOOD PRESSURE: 130 MMHG | WEIGHT: 213 LBS | HEART RATE: 58 BPM | OXYGEN SATURATION: 96 % | DIASTOLIC BLOOD PRESSURE: 80 MMHG

## 2024-08-02 DIAGNOSIS — E78.00 HYPERCHOLESTEROLEMIA: ICD-10-CM

## 2024-08-02 DIAGNOSIS — G47.33 OSA (OBSTRUCTIVE SLEEP APNEA): ICD-10-CM

## 2024-08-02 DIAGNOSIS — J30.2 SEASONAL ALLERGIES: ICD-10-CM

## 2024-08-02 DIAGNOSIS — Z00.00 MEDICARE ANNUAL WELLNESS VISIT, SUBSEQUENT: ICD-10-CM

## 2024-08-02 DIAGNOSIS — E55.9 VITAMIN D DEFICIENCY: ICD-10-CM

## 2024-08-02 DIAGNOSIS — K58.0 IRRITABLE BOWEL SYNDROME WITH DIARRHEA: ICD-10-CM

## 2024-08-02 DIAGNOSIS — I10 PRIMARY HYPERTENSION: Primary | ICD-10-CM

## 2024-08-02 DIAGNOSIS — E66.9 OBESITY (BMI 35.0-39.9 WITHOUT COMORBIDITY): ICD-10-CM

## 2024-08-02 DIAGNOSIS — G31.84 MCI (MILD COGNITIVE IMPAIRMENT): ICD-10-CM

## 2024-08-02 DIAGNOSIS — Z13.1 SCREENING FOR DIABETES MELLITUS (DM): ICD-10-CM

## 2024-08-02 DIAGNOSIS — G89.4 CHRONIC PAIN SYNDROME: ICD-10-CM

## 2024-08-02 PROCEDURE — G0439 PPPS, SUBSEQ VISIT: HCPCS | Performed by: INTERNAL MEDICINE

## 2024-08-02 PROCEDURE — 99214 OFFICE O/P EST MOD 30 MIN: CPT | Performed by: INTERNAL MEDICINE

## 2024-08-02 RX ORDER — MONTELUKAST SODIUM 10 MG/1
10 TABLET ORAL
Qty: 90 TABLET | Refills: 1 | Status: SHIPPED | OUTPATIENT
Start: 2024-08-02

## 2024-08-02 RX ORDER — CHLORAL HYDRATE 500 MG
1 CAPSULE ORAL
COMMUNITY
Start: 2024-07-01

## 2024-08-02 NOTE — PROGRESS NOTES
Ambulatory Visit  Name: Yahir Orta      : 1955      MRN: [id number]  Encounter Provider: Estevan Catalan DO  Encounter Date: 2024   Encounter department: [facility]    Assessment & Plan   1. Primary hypertension  -     Hemoglobin A1C; Future  -     TSH, 3rd generation; Future  2. RORO (obstructive sleep apnea)  3. Irritable bowel syndrome with diarrhea  -     TSH, 3rd generation; Future  4. Chronic pain syndrome  5. MCI (mild cognitive impairment)  6. Vitamin D deficiency  7. Obesity (BMI 35.0-39.9 without comorbidity)  -     Hemoglobin A1C; Future  8. Hypercholesterolemia  -     TSH, 3rd generation; Future  9. Medicare annual wellness visit, subsequent  10. Screening for diabetes mellitus (DM)  -     Hemoglobin A1C; Future       Preventive health issues were discussed with patient, and age appropriate screening tests were ordered as noted in patient's After Visit Summary. Personalized health advice and appropriate referrals for health education or preventive services given if needed, as noted in patient's After Visit Summary.    History of Present Illness     HPI   Patient Care Team:  Estevan Catalan DO as PCP - General (Internal Medicine)  Glo Clarke PA-C as Physician Assistant (Gastroenterology)  Estevan Catalan DO (Internal Medicine)    Review of Systems  Medical History Reviewed by provider this encounter:       Annual Wellness Visit Questionnaire   Yahir is here for his Subsequent Wellness visit. Last Medicare Wellness visit information reviewed, patient interviewed and updates made to the record today.      Health Risk Assessment:   Patient rates overall health as good. Patient feels that their physical health rating is same. Patient is very satisfied with their life. Eyesight was rated as slightly worse. Hearing was rated as slightly worse. Patient feels that their emotional and mental health rating is same. Patients states they are never, rarely angry. Patient states  they are often unusually tired/fatigued. Pain experienced in the last 7 days has been a lot. Patient's pain rating has been 4/10. Patient states that he has experienced no weight loss or gain in last 6 months.     Depression Screening:   PHQ-2 Score: 0      Fall Risk Screening:   In the past year, patient has experienced: no history of falling in past year      Home Safety:  Patient does not have trouble with stairs inside or outside of their home. Patient has working smoke alarms and has working carbon monoxide detector. Home safety hazards include: none.     Nutrition:   Current diet is Low Cholesterol, Low Saturated Fat and Limited junk food.     Medications:   Patient is currently taking over-the-counter supplements. OTC medications include: see medication list. Patient is able to manage medications.     Activities of Daily Living (ADLs)/Instrumental Activities of Daily Living (IADLs):   Walk and transfer into and out of bed and chair?: Yes  Dress and groom yourself?: Yes    Bathe or shower yourself?: Yes    Feed yourself? Yes  Do your laundry/housekeeping?: Yes  Manage your money, pay your bills and track your expenses?: Yes  Make your own meals?: Yes    Do your own shopping?: Yes    Previous Hospitalizations:   Any hospitalizations or ED visits within the last 12 months?: No      Advance Care Planning:   Living will: Yes    Durable POA for healthcare: Yes    Advanced directive: Yes    Advanced directive counseling given: Yes    Five wishes given: No    Patient declined ACP directive: No    End of Life Decisions reviewed with patient: Yes    Provider agrees with end of life decisions: Yes      Cognitive Screening:   Provider or family/friend/caregiver concerned regarding cognition?: No    PREVENTIVE SCREENINGS      Cardiovascular Screening:    General: Screening Not Indicated, History Lipid Disorder and Screening Current      Diabetes Screening:     General: Screening Current    Due for: Blood Glucose       Colorectal Cancer Screening:     General: Screening Current      Prostate Cancer Screening:    General: Screening Current      Osteoporosis Screening:    General: Screening Not Indicated      Abdominal Aortic Aneurysm (AAA) Screening:    Risk factors include: age between 65-76 yo and tobacco use        Lung Cancer Screening:     General: Screening Current      Hepatitis C Screening:    General: Screening Current    Screening, Brief Intervention, and Referral to Treatment (SBIRT)    Screening  Typical number of drinks in a day: 0  Typical number of drinks in a week: 0  Interpretation: Low risk drinking behavior.    AUDIT-C Screenin) How often did you have a drink containing alcohol in the past year? 4 or more times a week  2) How many drinks did you have on a typical day when you were drinking in the past year? 3 to 4  3) How often did you have 6 or more drinks on one occasion in the past year? less than monthly    AUDIT-C Score: 5  Interpretation: Score 4-12 (male): POSITIVE screen for alcohol misuse    AUDIT Screenin) How often during the last year have you found that you were not able to stop drinking once you had started? 1 - less than monthly  5) How often during the last year have you failed to do what was normally expected from you because of drinking? 0 - never  6) How often during the last year have you needed a first drink in the morning to get yourself going after a heavy drinking session? 0 - never  7) How often during the last year have you had a feeling of guilt or remorse after drinking? 1 - less than monthly  8) How often during the last year have you been unable to remember what happened the night before because you had been drinking? 1 - less than monthly  9) Have you or someone else been injured as a result of your drinking? 0 - no  10) Has a relative or friend or a doctor or another health worker been concerned about your drinking or suggested you cut down? 4 - yes, during the last  "year    AUDIT Score: 12  Interpretation: Harmful or hazardous alcohol consumption    Single Item Drug Screening:  How often have you used an illegal drug (including marijuana) or a prescription medication for non-medical reasons in the past year? never    Single Item Drug Screen Score: 0  Interpretation: Negative screen for possible drug use disorder    Other Counseling Topics:   Car/seat belt/driving safety, sunscreen and calcium and vitamin D intake and regular weightbearing exercise.     Social Determinants of Health     Financial Resource Strain: Low Risk  (7/31/2023)    Overall Financial Resource Strain (CARDIA)     Difficulty of Paying Living Expenses: Not very hard   Food Insecurity: No Food Insecurity (8/2/2024)    Hunger Vital Sign     Worried About Running Out of Food in the Last Year: Never true     Ran Out of Food in the Last Year: Never true   Transportation Needs: No Transportation Needs (8/2/2024)    PRAPARE - Transportation     Lack of Transportation (Medical): No     Lack of Transportation (Non-Medical): No   Housing Stability: Low Risk  (8/2/2024)    Housing Stability Vital Sign     Unable to Pay for Housing in the Last Year: No     Number of Times Moved in the Last Year: 0     Homeless in the Last Year: No   Utilities: Not At Risk (8/2/2024)    [employer]     Threatened with loss of utilities: No     No results found.    Objective     /80 (BP Location: Left arm, Patient Position: Sitting)   Pulse 58   Temp 98.4 °F (36.9 °C) (Tympanic)   Ht 5' 7.72\" (1.72 m)   Wt 96.6 kg (213 lb)   SpO2 96%   BMI 32.66 kg/m²     Physical Exam    A/P: Doing well and no falls reported. Denies depression and feels safe at home. Diverse diet. No problems operating a MV and uses seat belts. Has a living will and POA. No DME or referrals needed today. RTC one year for medicare wellness.     "

## 2024-08-02 NOTE — PATIENT INSTRUCTIONS
Medicare Preventive Visit Patient Instructions  Thank you for completing your Welcome to Medicare Visit or Medicare Annual Wellness Visit today. Your next wellness visit will be due in one year (8/3/2025).  The screening/preventive services that you may require over the next 5-10 years are detailed below. Some tests may not apply to you based off risk factors and/or age. Screening tests ordered at today's visit but not completed yet may show as past due. Also, please note that scanned in results may not display below.  Preventive Screenings:  Service Recommendations Previous Testing/Comments   Colorectal Cancer Screening  Colonoscopy    Fecal Occult Blood Test (FOBT)/Fecal Immunochemical Test (FIT)  Fecal DNA/Cologuard Test  Flexible Sigmoidoscopy Age: 45-75 years old   Colonoscopy: every 10 years (May be performed more frequently if at higher risk)  OR  FOBT/FIT: every 1 year  OR  Cologuard: every 3 years  OR  Sigmoidoscopy: every 5 years  Screening may be recommended earlier than age 45 if at higher risk for colorectal cancer. Also, an individualized decision between you and your healthcare provider will decide whether screening between the ages of 76-85 would be appropriate. Colonoscopy: 08/03/2022  FOBT/FIT: Not on file  Cologuard: Not on file  Sigmoidoscopy: Not on file    Screening Current     Prostate Cancer Screening Individualized decision between patient and health care provider in men between ages of 55-69   Medicare will cover every 12 months beginning on the day after your 50th birthday PSA: 4.32 ng/mL     Screening Current     Hepatitis C Screening Once for adults born between 1945 and 1965  More frequently in patients at high risk for Hepatitis C Hep C Antibody: 07/12/2019    Screening Current   Diabetes Screening 1-2 times per year if you're at risk for diabetes or have pre-diabetes Fasting glucose: 94 mg/dL (3/25/2024)  A1C: 6.0 % (3/25/2024)  Screening Current   Cholesterol Screening Once every 5  years if you don't have a lipid disorder. May order more often based on risk factors. Lipid panel: 08/02/2023  Screening Not Indicated  History Lipid Disorder      Other Preventive Screenings Covered by Medicare:  Abdominal Aortic Aneurysm (AAA) Screening: covered once if your at risk. You're considered to be at risk if you have a family history of AAA or a male between the age of 65-75 who smoking at least 100 cigarettes in your lifetime.  Lung Cancer Screening: covers low dose CT scan once per year if you meet all of the following conditions: (1) Age 55-77; (2) No signs or symptoms of lung cancer; (3) Current smoker or have quit smoking within the last 15 years; (4) You have a tobacco smoking history of at least 20 pack years (packs per day x number of years you smoked); (5) You get a written order from a healthcare provider.  Glaucoma Screening: covered annually if you're considered high risk: (1) You have diabetes OR (2) Family history of glaucoma OR (3)  aged 50 and older OR (4)  American aged 65 and older  Osteoporosis Screening: covered every 2 years if you meet one of the following conditions: (1) Have a vertebral abnormality; (2) On glucocorticoid therapy for more than 3 months; (3) Have primary hyperparathyroidism; (4) On osteoporosis medications and need to assess response to drug therapy.  HIV Screening: covered annually if you're between the age of 15-65. Also covered annually if you are younger than 15 and older than 65 with risk factors for HIV infection. For pregnant patients, it is covered up to 3 times per pregnancy.    Immunizations:  Immunization Recommendations   Influenza Vaccine Annual influenza vaccination during flu season is recommended for all persons aged >= 6 months who do not have contraindications   Pneumococcal Vaccine   * Pneumococcal conjugate vaccine = PCV13 (Prevnar 13), PCV15 (Vaxneuvance), PCV20 (Prevnar 20)  * Pneumococcal polysaccharide vaccine = PPSV23  (Pneumovax) Adults 19-65 yo with certain risk factors or if 65+ yo  If never received any pneumonia vaccine: recommend Prevnar 20 (PCV20)  Give PCV20 if previously received 1 dose of PCV13 or PPSV23   Hepatitis B Vaccine 3 dose series if at intermediate or high risk (ex: diabetes, end stage renal disease, liver disease)   Respiratory syncytial virus (RSV) Vaccine - COVERED BY MEDICARE PART D  * RSVPreF3 (Arexvy) CDC recommends that adults 60 years of age and older may receive a single dose of RSV vaccine using shared clinical decision-making (SCDM)   Tetanus (Td) Vaccine - COST NOT COVERED BY MEDICARE PART B Following completion of primary series, a booster dose should be given every 10 years to maintain immunity against tetanus. Td may also be given as tetanus wound prophylaxis.   Tdap Vaccine - COST NOT COVERED BY MEDICARE PART B Recommended at least once for all adults. For pregnant patients, recommended with each pregnancy.   Shingles Vaccine (Shingrix) - COST NOT COVERED BY MEDICARE PART B  2 shot series recommended in those 19 years and older who have or will have weakened immune systems or those 50 years and older     Health Maintenance Due:      Topic Date Due   • Lung Cancer Screening  03/25/2025   • Colorectal Cancer Screening  08/02/2027   • Hepatitis C Screening  Completed     Immunizations Due:      Topic Date Due   • Hepatitis A Vaccine (1 of 2 - Risk 2-dose series) Never done   • COVID-19 Vaccine (6 - 2023-24 season) 09/01/2023   • Influenza Vaccine (1) 09/01/2024     Advance Directives   What are advance directives?  Advance directives are legal documents that state your wishes and plans for medical care. These plans are made ahead of time in case you lose your ability to make decisions for yourself. Advance directives can apply to any medical decision, such as the treatments you want, and if you want to donate organs.   What are the types of advance directives?  There are many types of advance  directives, and each state has rules about how to use them. You may choose a combination of any of the following:  Living will:  This is a written record of the treatment you want. You can also choose which treatments you do not want, which to limit, and which to stop at a certain time. This includes surgery, medicine, IV fluid, and tube feedings.   Durable power of  for healthcare (DPAHC):  This is a written record that states who you want to make healthcare choices for you when you are unable to make them for yourself. This person, called a proxy, is usually a family member or a friend. You may choose more than 1 proxy.  Do not resuscitate (DNR) order:  A DNR order is used in case your heart stops beating or you stop breathing. It is a request not to have certain forms of treatment, such as CPR. A DNR order may be included in other types of advance directives.  Medical directive:  This covers the care that you want if you are in a coma, near death, or unable to make decisions for yourself. You can list the treatments you want for each condition. Treatment may include pain medicine, surgery, blood transfusions, dialysis, IV or tube feedings, and a ventilator (breathing machine).  Values history:  This document has questions about your views, beliefs, and how you feel and think about life. This information can help others choose the care that you would choose.  Why are advance directives important?  An advance directive helps you control your care. Although spoken wishes may be used, it is better to have your wishes written down. Spoken wishes can be misunderstood, or not followed. Treatments may be given even if you do not want them. An advance directive may make it easier for your family to make difficult choices about your care.   Weight Management   Why it is important to manage your weight:  Being overweight increases your risk of health conditions such as heart disease, high blood pressure, type 2  diabetes, and certain types of cancer. It can also increase your risk for osteoarthritis, sleep apnea, and other respiratory problems. Aim for a slow, steady weight loss. Even a small amount of weight loss can lower your risk of health problems.  How to lose weight safely:  A safe and healthy way to lose weight is to eat fewer calories and get regular exercise. You can lose up about 1 pound a week by decreasing the number of calories you eat by 500 calories each day.   Healthy meal plan for weight management:  A healthy meal plan includes a variety of foods, contains fewer calories, and helps you stay healthy. A healthy meal plan includes the following:  Eat whole-grain foods more often.  A healthy meal plan should contain fiber. Fiber is the part of grains, fruits, and vegetables that is not broken down by your body. Whole-grain foods are healthy and provide extra fiber in your diet. Some examples of whole-grain foods are whole-wheat breads and pastas, oatmeal, brown rice, and bulgur.  Eat a variety of vegetables every day.  Include dark, leafy greens such as spinach, kale, tim greens, and mustard greens. Eat yellow and orange vegetables such as carrots, sweet potatoes, and winter squash.   Eat a variety of fruits every day.  Choose fresh or canned fruit (canned in its own juice or light syrup) instead of juice. Fruit juice has very little or no fiber.  Eat low-fat dairy foods.  Drink fat-free (skim) milk or 1% milk. Eat fat-free yogurt and low-fat cottage cheese. Try low-fat cheeses such as mozzarella and other reduced-fat cheeses.  Choose meat and other protein foods that are low in fat.  Choose beans or other legumes such as split peas or lentils. Choose fish, skinless poultry (chicken or turkey), or lean cuts of red meat (beef or pork). Before you cook meat or poultry, cut off any visible fat.   Use less fat and oil.  Try baking foods instead of frying them. Add less fat, such as margarine, sour cream,  "regular salad dressing and mayonnaise to foods. Eat fewer high-fat foods. Some examples of high-fat foods include french fries, doughnuts, ice cream, and cakes.  Eat fewer sweets.  Limit foods and drinks that are high in sugar. This includes candy, cookies, regular soda, and sweetened drinks.  Exercise:  Exercise at least 30 minutes per day on most days of the week. Some examples of exercise include walking, biking, dancing, and swimming. You can also fit in more physical activity by taking the stairs instead of the elevator or parking farther away from stores. Ask your healthcare provider about the best exercise plan for you.   Alcohol Use and Your Health    Drinking too much can harm your health.  Excessive alcohol use leads to about 88,000 death in the [place] each year, and shortens the life of those who diet by almost 30 years.  Further, excessive drinking cost the economy $249 billion in 2010.  Most excessive drinkers are not alcohol dependent.    Excessive alcohol use has immediate effects that increase the risk of many harmful health conditions.  These are most often the result of binge drinking.  Over time, excessive alcohol use can lead to the development of chronic diseases and other series health problems.    What is considered a \"drink\"?        Excessive alcohol use includes:  Binge Drinking: For women, 4 or more drinks consumed on one occasion. For men, 5 or more drinks consumed on one occasion.  Heavy Drinking: For women, 8 or more drinks per week. For men, 15 or more drinks per week  Any alcohol used by pregnant women  Any alcohol used by those under the age of 21 years    If you choose to drink, do so in moderation:  Do not drink at all if you are under the age of 21, or if you are or may be pregnant, or have health problems that could be made worse by drinking.  For women, up to 1 drink per day  For men, up to 2 drinks a day    No one should begin drinking or drink more frequently based on " potential health benefits    Short-Term Health Risks:  Injuries: motor vehicle crashes, falls, drownings, burns  Violence: homicide, suicide, sexual assault, intimate partner violence  Alcohol poisoning  Reproductive health: risky sexual behaviors, unintended prengnacy, sexually transmitted diseases, miscarriage, stillbirth, fetal alcohol syndrome    Long-Term Health Risks:  Chronic diseases: high blood pressure, heart disease, stroke, liver disease, digestive problems  Cancers: breast, mouth and throat, liver, colon  Learning and memory problems: dementia, poor school performance  Mental health: depression, anxiety, insomnia  Social problems: lost productivity, family problems, unemployment  Alcohol dependence    For support and more information:  Substance Abuse and Mental Health Services Administration  [address]  [place] [address]  Web Address: http://www.samhsa.gov    Alcoholics Anonymous        Web Address: http://www.aa.org    https://www.cdc.gov/alcohol/fact-sheets/alcohol-use.htm     © Copyright [employer] 2018 Information is for End User's use only and may not be sold, redistributed or otherwise used for commercial purposes. All illustrations and images included in CareNotes® are the copyrighted property of [employer], [employer] or [employer]    Medicare Preventive Visit Patient Instructions  Thank you for completing your Welcome to Medicare Visit or Medicare Annual Wellness Visit today. Your next wellness visit will be due in one year (8/3/2025).  The screening/preventive services that you may require over the next 5-10 years are detailed below. Some tests may not apply to you based off risk factors and/or age. Screening tests ordered at today's visit but not completed yet may show as past due. Also, please note that scanned in results may not display below.  Preventive Screenings:  Service Recommendations Previous Testing/Comments   Colorectal Cancer Screening  Colonoscopy    Fecal Occult  Blood Test (FOBT)/Fecal Immunochemical Test (FIT)  Fecal DNA/Cologuard Test  Flexible Sigmoidoscopy Age: 45-75 years old   Colonoscopy: every 10 years (May be performed more frequently if at higher risk)  OR  FOBT/FIT: every 1 year  OR  Cologuard: every 3 years  OR  Sigmoidoscopy: every 5 years  Screening may be recommended earlier than age 45 if at higher risk for colorectal cancer. Also, an individualized decision between you and your healthcare provider will decide whether screening between the ages of 76-85 would be appropriate. Colonoscopy: 08/03/2022  FOBT/FIT: Not on file  Cologuard: Not on file  Sigmoidoscopy: Not on file    Screening Current     Prostate Cancer Screening Individualized decision between patient and health care provider in men between ages of 55-69   Medicare will cover every 12 months beginning on the day after your 50th birthday PSA: 4.32 ng/mL     Screening Current     Hepatitis C Screening Once for adults born between 1945 and 1965  More frequently in patients at high risk for Hepatitis C Hep C Antibody: 07/12/2019    Screening Current   Diabetes Screening 1-2 times per year if you're at risk for diabetes or have pre-diabetes Fasting glucose: 94 mg/dL (3/25/2024)  A1C: 6.0 % (3/25/2024)  Screening Current   Cholesterol Screening Once every 5 years if you don't have a lipid disorder. May order more often based on risk factors. Lipid panel: 08/02/2023  Screening Not Indicated  History Lipid Disorder      Other Preventive Screenings Covered by Medicare:  Abdominal Aortic Aneurysm (AAA) Screening: covered once if your at risk. You're considered to be at risk if you have a family history of AAA or a male between the age of 65-75 who smoking at least 100 cigarettes in your lifetime.  Lung Cancer Screening: covers low dose CT scan once per year if you meet all of the following conditions: (1) Age 55-77; (2) No signs or symptoms of lung cancer; (3) Current smoker or have quit smoking within the  last 15 years; (4) You have a tobacco smoking history of at least 20 pack years (packs per day x number of years you smoked); (5) You get a written order from a healthcare provider.  Glaucoma Screening: covered annually if you're considered high risk: (1) You have diabetes OR (2) Family history of glaucoma OR (3)  aged 50 and older OR (4)  American aged 65 and older  Osteoporosis Screening: covered every 2 years if you meet one of the following conditions: (1) Have a vertebral abnormality; (2) On glucocorticoid therapy for more than 3 months; (3) Have primary hyperparathyroidism; (4) On osteoporosis medications and need to assess response to drug therapy.  HIV Screening: covered annually if you're between the age of 15-65. Also covered annually if you are younger than 15 and older than 65 with risk factors for HIV infection. For pregnant patients, it is covered up to 3 times per pregnancy.    Immunizations:  Immunization Recommendations   Influenza Vaccine Annual influenza vaccination during flu season is recommended for all persons aged >= 6 months who do not have contraindications   Pneumococcal Vaccine   * Pneumococcal conjugate vaccine = PCV13 (Prevnar 13), PCV15 (Vaxneuvance), PCV20 (Prevnar 20)  * Pneumococcal polysaccharide vaccine = PPSV23 (Pneumovax) Adults 19-63 yo with certain risk factors or if 65+ yo  If never received any pneumonia vaccine: recommend Prevnar 20 (PCV20)  Give PCV20 if previously received 1 dose of PCV13 or PPSV23   Hepatitis B Vaccine 3 dose series if at intermediate or high risk (ex: diabetes, end stage renal disease, liver disease)   Respiratory syncytial virus (RSV) Vaccine - COVERED BY MEDICARE PART D  * RSVPreF3 (Arexvy) CDC recommends that adults 60 years of age and older may receive a single dose of RSV vaccine using shared clinical decision-making (SCDM)   Tetanus (Td) Vaccine - COST NOT COVERED BY MEDICARE PART B Following completion of primary series, a  booster dose should be given every 10 years to maintain immunity against tetanus. Td may also be given as tetanus wound prophylaxis.   Tdap Vaccine - COST NOT COVERED BY MEDICARE PART B Recommended at least once for all adults. For pregnant patients, recommended with each pregnancy.   Shingles Vaccine (Shingrix) - COST NOT COVERED BY MEDICARE PART B  2 shot series recommended in those 19 years and older who have or will have weakened immune systems or those 50 years and older     Health Maintenance Due:      Topic Date Due   • Lung Cancer Screening  03/25/2025   • Colorectal Cancer Screening  08/02/2027   • Hepatitis C Screening  Completed     Immunizations Due:      Topic Date Due   • Hepatitis A Vaccine (1 of 2 - Risk 2-dose series) Never done   • COVID-19 Vaccine (6 - 2023-24 season) 09/01/2023   • Influenza Vaccine (1) 09/01/2024     Advance Directives   What are advance directives?  Advance directives are legal documents that state your wishes and plans for medical care. These plans are made ahead of time in case you lose your ability to make decisions for yourself. Advance directives can apply to any medical decision, such as the treatments you want, and if you want to donate organs.   What are the types of advance directives?  There are many types of advance directives, and each state has rules about how to use them. You may choose a combination of any of the following:  Living will:  This is a written record of the treatment you want. You can also choose which treatments you do not want, which to limit, and which to stop at a certain time. This includes surgery, medicine, IV fluid, and tube feedings.   Durable power of  for healthcare (DPAHC):  This is a written record that states who you want to make healthcare choices for you when you are unable to make them for yourself. This person, called a proxy, is usually a family member or a friend. You may choose more than 1 proxy.  Do not resuscitate (DNR)  order:  A DNR order is used in case your heart stops beating or you stop breathing. It is a request not to have certain forms of treatment, such as CPR. A DNR order may be included in other types of advance directives.  Medical directive:  This covers the care that you want if you are in a coma, near death, or unable to make decisions for yourself. You can list the treatments you want for each condition. Treatment may include pain medicine, surgery, blood transfusions, dialysis, IV or tube feedings, and a ventilator (breathing machine).  Values history:  This document has questions about your views, beliefs, and how you feel and think about life. This information can help others choose the care that you would choose.  Why are advance directives important?  An advance directive helps you control your care. Although spoken wishes may be used, it is better to have your wishes written down. Spoken wishes can be misunderstood, or not followed. Treatments may be given even if you do not want them. An advance directive may make it easier for your family to make difficult choices about your care.   Weight Management   Why it is important to manage your weight:  Being overweight increases your risk of health conditions such as heart disease, high blood pressure, type 2 diabetes, and certain types of cancer. It can also increase your risk for osteoarthritis, sleep apnea, and other respiratory problems. Aim for a slow, steady weight loss. Even a small amount of weight loss can lower your risk of health problems.  How to lose weight safely:  A safe and healthy way to lose weight is to eat fewer calories and get regular exercise. You can lose up about 1 pound a week by decreasing the number of calories you eat by 500 calories each day.   Healthy meal plan for weight management:  A healthy meal plan includes a variety of foods, contains fewer calories, and helps you stay healthy. A healthy meal plan includes the following:  Eat  whole-grain foods more often.  A healthy meal plan should contain fiber. Fiber is the part of grains, fruits, and vegetables that is not broken down by your body. Whole-grain foods are healthy and provide extra fiber in your diet. Some examples of whole-grain foods are whole-wheat breads and pastas, oatmeal, brown rice, and bulgur.  Eat a variety of vegetables every day.  Include dark, leafy greens such as spinach, kale, tim greens, and mustard greens. Eat yellow and orange vegetables such as carrots, sweet potatoes, and winter squash.   Eat a variety of fruits every day.  Choose fresh or canned fruit (canned in its own juice or light syrup) instead of juice. Fruit juice has very little or no fiber.  Eat low-fat dairy foods.  Drink fat-free (skim) milk or 1% milk. Eat fat-free yogurt and low-fat cottage cheese. Try low-fat cheeses such as mozzarella and other reduced-fat cheeses.  Choose meat and other protein foods that are low in fat.  Choose beans or other legumes such as split peas or lentils. Choose fish, skinless poultry (chicken or turkey), or lean cuts of red meat (beef or pork). Before you cook meat or poultry, cut off any visible fat.   Use less fat and oil.  Try baking foods instead of frying them. Add less fat, such as margarine, sour cream, regular salad dressing and mayonnaise to foods. Eat fewer high-fat foods. Some examples of high-fat foods include french fries, doughnuts, ice cream, and cakes.  Eat fewer sweets.  Limit foods and drinks that are high in sugar. This includes candy, cookies, regular soda, and sweetened drinks.  Exercise:  Exercise at least 30 minutes per day on most days of the week. Some examples of exercise include walking, biking, dancing, and swimming. You can also fit in more physical activity by taking the stairs instead of the elevator or parking farther away from stores. Ask your healthcare provider about the best exercise plan for you.   Alcohol Use and Your  "Health    Drinking too much can harm your health.  Excessive alcohol use leads to about 88,000 death in the [place] each year, and shortens the life of those who diet by almost 30 years.  Further, excessive drinking cost the economy $249 billion in 2010.  Most excessive drinkers are not alcohol dependent.    Excessive alcohol use has immediate effects that increase the risk of many harmful health conditions.  These are most often the result of binge drinking.  Over time, excessive alcohol use can lead to the development of chronic diseases and other series health problems.    What is considered a \"drink\"?        Excessive alcohol use includes:  Binge Drinking: For women, 4 or more drinks consumed on one occasion. For men, 5 or more drinks consumed on one occasion.  Heavy Drinking: For women, 8 or more drinks per week. For men, 15 or more drinks per week  Any alcohol used by pregnant women  Any alcohol used by those under the age of 21 years    If you choose to drink, do so in moderation:  Do not drink at all if you are under the age of 21, or if you are or may be pregnant, or have health problems that could be made worse by drinking.  For women, up to 1 drink per day  For men, up to 2 drinks a day    No one should begin drinking or drink more frequently based on potential health benefits    Short-Term Health Risks:  Injuries: motor vehicle crashes, falls, drownings, burns  Violence: homicide, suicide, sexual assault, intimate partner violence  Alcohol poisoning  Reproductive health: risky sexual behaviors, unintended prengnacy, sexually transmitted diseases, miscarriage, stillbirth, fetal alcohol syndrome    Long-Term Health Risks:  Chronic diseases: high blood pressure, heart disease, stroke, liver disease, digestive problems  Cancers: breast, mouth and throat, liver, colon  Learning and memory problems: dementia, poor school performance  Mental health: depression, anxiety, insomnia  Social problems: lost " productivity, family problems, unemployment  Alcohol dependence    For support and more information:  Substance Abuse and Mental Health Services Administration  [address]  [place] [address]  Web Address: http://www.[facility].gov    Alcoholics Anonymous        Web Address: http://www.aa.org    https://www.cdc.gov/alcohol/fact-sheets/alcohol-use.htm     © Copyright [employer] 2018 Information is for End User's use only and may not be sold, redistributed or otherwise used for commercial purposes. All illustrations and images included in CareNotes® are the copyrighted property of [employer]M., Inc. or [employer]

## 2024-08-02 NOTE — PROGRESS NOTES
Ambulatory Visit  Name: Yahir Orta      : 1955      MRN: [id number]  Encounter Provider: Estevan Catalan DO  Encounter Date: 2024   Encounter department: [facility]    Assessment & Plan   1. Primary hypertension  -     Hemoglobin A1C; Future  -     TSH, 3rd generation; Future  2. RORO (obstructive sleep apnea)  3. Irritable bowel syndrome with diarrhea  -     TSH, 3rd generation; Future  4. Chronic pain syndrome  5. MCI (mild cognitive impairment)  6. Vitamin D deficiency  7. Obesity (BMI 35.0-39.9 without comorbidity)  -     Hemoglobin A1C; Future  8. Hypercholesterolemia  -     TSH, 3rd generation; Future  9. Medicare annual wellness visit, subsequent  10. Screening for diabetes mellitus (DM)  -     Hemoglobin A1C; Future  11. Seasonal allergies  -     montelukast (SINGULAIR) 10 mg tablet; Take 1 tablet (10 mg total) by mouth daily at bedtime  A/P: Doing ok and will check labs. Will try singlulair for the allergies. Continue current treatment and RTC four months for routine.     Depression Screening and Follow-up Plan: Patient was screened for depression during today's encounter. They screened negative with a PHQ-2 score of 0.      History of Present Illness     WM RTC for f/u HTN, IBS, etc. Doing ok and no new issues, but reports allergies continue despite OTC INS and anti-histamines.  REmains active w/o difficulty and  no falls. IBS and migraines are manageable. Chronic pain is manageable. Abstaining from ETOH. No stroke like events. Vision no worse, but now with cataracts. Due for labs.         Review of Systems   Constitutional:  Negative for activity change, chills, diaphoresis, fatigue and fever.   HENT:  Positive for congestion, postnasal drip and rhinorrhea. Negative for ear discharge, ear pain, facial swelling, sinus pressure, sinus pain and sore throat.    Eyes:  Positive for visual disturbance.   Respiratory:  Negative for cough, chest tightness, shortness of breath  "and wheezing.    Cardiovascular:  Negative for chest pain, palpitations and leg swelling.   Gastrointestinal:  Negative for abdominal pain, constipation, diarrhea, nausea and vomiting.   Endocrine: Negative for cold intolerance and heat intolerance.   Genitourinary:  Negative for difficulty urinating, dysuria and frequency.   Musculoskeletal:  Negative for arthralgias, gait problem and myalgias.   Neurological:  Negative for dizziness, seizures, syncope, weakness, light-headedness and headaches.   Psychiatric/Behavioral:  Negative for confusion, dysphoric mood and sleep disturbance. The patient is not nervous/anxious.        Objective     /80 (BP Location: Left arm, Patient Position: Sitting)   Pulse 58   Temp 98.4 °F (36.9 °C) (Tympanic)   Ht 5' 7.72\" (1.72 m)   Wt 96.6 kg (213 lb)   SpO2 96%   BMI 32.66 kg/m²     Physical Exam  Vitals and nursing note reviewed.   Constitutional:       General: He is not in acute distress.     Appearance: Normal appearance. He is not ill-appearing.   HENT:      Head: Normocephalic and atraumatic.      Mouth/Throat:      Mouth: Mucous membranes are moist.   Eyes:      Extraocular Movements: Extraocular movements intact.      Conjunctiva/sclera: Conjunctivae normal.      Pupils: Pupils are equal, round, and reactive to light.   Neck:      Vascular: No carotid bruit.   Cardiovascular:      Rate and Rhythm: Normal rate and regular rhythm.      Heart sounds: Normal heart sounds. No murmur heard.  Pulmonary:      Effort: Pulmonary effort is normal. No respiratory distress.      Breath sounds: Normal breath sounds. No wheezing, rhonchi or rales.   Abdominal:      General: Bowel sounds are normal. There is no distension.      Palpations: Abdomen is soft.      Tenderness: There is no abdominal tenderness.   Musculoskeletal:      Cervical back: Neck supple.      Right lower leg: No edema.      Left lower leg: No edema.   Neurological:      General: No focal deficit present.      " Mental Status: He is alert and oriented to person, place, and time. Mental status is at baseline.   Psychiatric:         Mood and Affect: Mood normal.         Behavior: Behavior normal.         Thought Content: Thought content normal.         Judgment: Judgment normal.       Administrative Statements           Answers submitted by the patient for this visit:  AUDIT (Alcohol Use Disorders Identification Test) Screening (Submitted on 8/2/2024)  How often during the last year have you found that you were not able to stop drinking once you had started?: 1 - less than monthly  How often during the last year have you failed to do what was normally expected from you because of drinking?: 0 - never  How often during the last year have you needed a first drink in the morning to get yourself going after a heavy drinking session?: 0 - never  How often during the last year have you had a feeling of guilt or remorse after drinking?: 1 - less than monthly  How often during the last year have you been unable to remember what happened the night before because you had been drinking?: 1 - less than monthly  Have you or someone else been injured as a result of your drinking?: 0 - no  Has a relative or friend or a doctor or another health worker been concerned about your drinking or suggested you cut down?: 4 - yes, during the last year  Medicare Annual Wellness Visit (Submitted on 8/2/2024)  How would you rate your overall health?: good  Compared to last year, how is your physical health?: same  In general, how satisfied are you with your life?: very satisfied  Compared to last year, how is your eyesight?: slightly worse  Compared to last year, how is your hearing?: slightly worse  Compared to last year, how is your emotional/mental health?: same  How often is anger a problem for you?: never, rarely  How often do you feel unusually tired/fatigued?: often  In the past 7 days, how much pain have you experienced?: a lot  If you answered  "\"some\" or \"a lot\", please rate the severity of your pain on a scale of 1 to 10 (1 being the least severe pain and 10 being the most intense pain).: 4/10  In the past 6 months, have you lost or gained 10 pounds without trying?: No  One or more falls in the last year: No  Do you have trouble with the stairs inside or outside your home?: No  Does your home have working smoke alarms?: Yes  Does your home have a carbon monoxide monitor?: Yes  Which safety hazards (if any) have you experienced in your home? Please select all that apply.: none  How would you describe your current diet? Please select all that apply.: Low Cholesterol, Low Saturated Fat, Limited junk food  In addition to prescription medications, are you taking any over-the-counter supplements?: Yes  If yes, what supplements are you taking?: See Medications chart  Can you manage your medications?: Yes  Are you currently taking any opioid medications?: No  Can you walk and transfer into and out of your bed and chair?: Yes  Can you dress and groom yourself?: Yes  Can you bathe or shower yourself?: Yes  Can you feed yourself?: Yes  Can you do your laundry/ housekeeping?: Yes  Can you manage your money, pay your bills, and track your expenses?: Yes  Can you make your own meals?: Yes  Can you do your own shopping?: Yes  Within the last 12 months, have you had any hospitalizations or Emergency Department visits?: No  Do you have a living will?: Yes  Do you have a Durable POA (Power of ) for healthcare decisions?: Yes  Do you have an Advanced Directive for end of life decisions?: Yes  How often have you used an illegal drug (including marijuana) or a prescription medication for non-medical reasons in the past year?: never  What is the typical number of drinks you consume in a day?: 0  What is the typical number of drinks you consume in a week?: 0  How often did you have a drink containing alcohol in the past year?: 4 or more times a week  How many drinks did " you have on a typical day  when you were drinking in the past year?: 3 to 4  How often did you have 6 or more drinks on one occasion in the past year?: less than monthly

## 2024-08-06 ENCOUNTER — LAB (OUTPATIENT)
Age: 69
End: 2024-08-06
Payer: MEDICARE

## 2024-08-06 DIAGNOSIS — R97.20 ELEVATED PSA: ICD-10-CM

## 2024-08-06 DIAGNOSIS — K58.0 IRRITABLE BOWEL SYNDROME WITH DIARRHEA: ICD-10-CM

## 2024-08-06 DIAGNOSIS — I10 PRIMARY HYPERTENSION: ICD-10-CM

## 2024-08-06 DIAGNOSIS — E66.9 OBESITY (BMI 35.0-39.9 WITHOUT COMORBIDITY): ICD-10-CM

## 2024-08-06 DIAGNOSIS — E78.00 HYPERCHOLESTEROLEMIA: ICD-10-CM

## 2024-08-06 DIAGNOSIS — Z13.1 SCREENING FOR DIABETES MELLITUS (DM): ICD-10-CM

## 2024-08-06 LAB
EST. AVERAGE GLUCOSE BLD GHB EST-MCNC: 123 MG/DL
HBA1C MFR BLD: 5.9 %
PSA SERPL-MCNC: 5.04 NG/ML (ref 0–4)
TSH SERPL DL<=0.05 MIU/L-ACNC: 4.83 UIU/ML (ref 0.45–4.5)

## 2024-08-06 PROCEDURE — 84443 ASSAY THYROID STIM HORMONE: CPT

## 2024-08-06 PROCEDURE — 36415 COLL VENOUS BLD VENIPUNCTURE: CPT

## 2024-08-06 PROCEDURE — 84153 ASSAY OF PSA TOTAL: CPT

## 2024-08-06 PROCEDURE — 83036 HEMOGLOBIN GLYCOSYLATED A1C: CPT

## 2024-08-07 ENCOUNTER — TELEPHONE (OUTPATIENT)
Dept: UROLOGY | Facility: CLINIC | Age: 69
End: 2024-08-07

## 2024-08-07 ENCOUNTER — TELEPHONE (OUTPATIENT)
Dept: INTERNAL MEDICINE CLINIC | Facility: CLINIC | Age: 69
End: 2024-08-07

## 2024-08-07 DIAGNOSIS — E03.9 HYPOTHYROIDISM, UNSPECIFIED TYPE: Primary | ICD-10-CM

## 2024-08-07 DIAGNOSIS — R97.20 ELEVATED PSA: Primary | ICD-10-CM

## 2024-08-07 RX ORDER — LEVOTHYROXINE SODIUM 0.2 MG/1
200 TABLET ORAL
Qty: 100 TABLET | Refills: 3 | Status: SHIPPED | OUTPATIENT
Start: 2024-08-07

## 2024-08-07 NOTE — TELEPHONE ENCOUNTER
----- Message from KRISTEN Milian sent at 8/7/2024  9:14 AM EDT -----  Please let patient know that his PSA is again elevated at 5.03.  I would like for him to recheck this in approximately 4 weeks.  If there is persistent elevation I would recommend proceeding with a transperineal prostate biopsy despite his negative MRI in March.

## 2024-08-07 NOTE — TELEPHONE ENCOUNTER
Spoke with patient and is aware of CRNP'S result note: Please let patient know that his PSA is again elevated at 5.03. I would like for him to recheck this in approximately 4 weeks. If there is persistent elevation I would recommend proceeding with a transperineal prostate biopsy despite his negative MRI in March.     Pt was informed that order for PSA was in  EPIC and verbalized understanding.

## 2024-08-07 NOTE — TELEPHONE ENCOUNTER
----- Message from Estevan Catalan DO sent at 8/7/2024  6:11 AM EDT -----  Call pt labs ok except for elevated TSH. New dose sent in and will need a TSH in six weeks.

## 2024-08-07 NOTE — RESULT ENCOUNTER NOTE
Please let patient know that his PSA is again elevated at 5.03.  I would like for him to recheck this in approximately 4 weeks.  If there is persistent elevation I would recommend proceeding with a transperineal prostate biopsy despite his negative MRI in March.

## 2024-08-14 DIAGNOSIS — G43.109 MIGRAINE WITH AURA AND WITHOUT STATUS MIGRAINOSUS, NOT INTRACTABLE: ICD-10-CM

## 2024-08-14 DIAGNOSIS — M51.36 DEGENERATIVE LUMBAR DISC: ICD-10-CM

## 2024-08-15 RX ORDER — DULOXETIN HYDROCHLORIDE 30 MG/1
30 CAPSULE, DELAYED RELEASE ORAL
Qty: 90 CAPSULE | Refills: 1 | Status: SHIPPED | OUTPATIENT
Start: 2024-08-15 | End: 2025-02-11

## 2024-08-19 ENCOUNTER — TELEPHONE (OUTPATIENT)
Age: 69
End: 2024-08-19

## 2024-08-19 NOTE — TELEPHONE ENCOUNTER
Pt called to schedule appt with Ananya Mckeon. Next available is 01/21/25. Recommended calling Dr. Biggs ot back of insurance card to get a list of providers in area.

## 2024-08-26 DIAGNOSIS — G43.809 OTHER MIGRAINE WITHOUT STATUS MIGRAINOSUS, NOT INTRACTABLE: ICD-10-CM

## 2024-08-26 DIAGNOSIS — M62.838 MUSCLE SPASM: ICD-10-CM

## 2024-08-27 RX ORDER — CYCLOBENZAPRINE HCL 10 MG
TABLET ORAL
Qty: 90 TABLET | Refills: 0 | Status: SHIPPED | OUTPATIENT
Start: 2024-08-27

## 2024-08-27 RX ORDER — SUMATRIPTAN 50 MG/1
50 TABLET, FILM COATED ORAL ONCE AS NEEDED
Qty: 12 TABLET | Refills: 1 | Status: SHIPPED | OUTPATIENT
Start: 2024-08-27

## 2024-09-06 ENCOUNTER — APPOINTMENT (OUTPATIENT)
Age: 69
End: 2024-09-06
Payer: MEDICARE

## 2024-09-06 DIAGNOSIS — E03.9 HYPOTHYROIDISM, UNSPECIFIED TYPE: ICD-10-CM

## 2024-09-06 DIAGNOSIS — R97.20 ELEVATED PSA: ICD-10-CM

## 2024-09-06 LAB
PSA SERPL-MCNC: 4.71 NG/ML (ref 0–4)
TSH SERPL DL<=0.05 MIU/L-ACNC: 1.74 UIU/ML (ref 0.45–4.5)

## 2024-09-06 PROCEDURE — 84443 ASSAY THYROID STIM HORMONE: CPT

## 2024-09-06 PROCEDURE — 84153 ASSAY OF PSA TOTAL: CPT

## 2024-09-06 PROCEDURE — 36415 COLL VENOUS BLD VENIPUNCTURE: CPT

## 2024-09-09 ENCOUNTER — OFFICE VISIT (OUTPATIENT)
Dept: SLEEP CENTER | Facility: CLINIC | Age: 69
End: 2024-09-09
Payer: MEDICARE

## 2024-09-09 VITALS
SYSTOLIC BLOOD PRESSURE: 130 MMHG | HEART RATE: 78 BPM | OXYGEN SATURATION: 97 % | RESPIRATION RATE: 16 BRPM | HEIGHT: 68 IN | DIASTOLIC BLOOD PRESSURE: 74 MMHG | WEIGHT: 215.6 LBS | TEMPERATURE: 98.2 F | BODY MASS INDEX: 32.67 KG/M2

## 2024-09-09 DIAGNOSIS — E66.9 OBESITY (BMI 30-39.9): ICD-10-CM

## 2024-09-09 DIAGNOSIS — G47.33 OSA (OBSTRUCTIVE SLEEP APNEA): Primary | ICD-10-CM

## 2024-09-09 DIAGNOSIS — H40.9 GLAUCOMA OF BOTH EYES, UNSPECIFIED GLAUCOMA TYPE: ICD-10-CM

## 2024-09-09 DIAGNOSIS — M54.41 CHRONIC RIGHT-SIDED LOW BACK PAIN WITH RIGHT-SIDED SCIATICA: ICD-10-CM

## 2024-09-09 DIAGNOSIS — I10 PRIMARY HYPERTENSION: ICD-10-CM

## 2024-09-09 DIAGNOSIS — J32.9 CHRONIC SINUSITIS, UNSPECIFIED LOCATION: ICD-10-CM

## 2024-09-09 DIAGNOSIS — G89.4 CHRONIC PAIN SYNDROME: ICD-10-CM

## 2024-09-09 DIAGNOSIS — G89.29 CHRONIC RIGHT-SIDED LOW BACK PAIN WITH RIGHT-SIDED SCIATICA: ICD-10-CM

## 2024-09-09 PROCEDURE — 99214 OFFICE O/P EST MOD 30 MIN: CPT | Performed by: INTERNAL MEDICINE

## 2024-09-09 PROCEDURE — G2211 COMPLEX E/M VISIT ADD ON: HCPCS | Performed by: INTERNAL MEDICINE

## 2024-09-09 NOTE — PROGRESS NOTES
Follow[facility] Note - [facility]   Yahir Orta  69 y.o. male  :1955  MRN:[id number]  DOS:2024    CC: I saw this patient for follow-up in clinic today for Sleep disordered breathing, Coexisting Sleep and Medical Problems. [ ]. Interval changes: He is using a ResMed machine.      HST in 2023 demonstrated SB of 23.8.  The lowest SpO2 recorded is 83% and 29.8 minutes during the study was spent with saturations below 90%.  The snore index was 9.3%. The subsequent therapeutic study demonstrated sleep disordered breathing was adequately remediated with PAP at 14 cm H2O.    PFSH, Problem List, Medications & Allergies were reviewed in EMR.   He  has a past medical history of Allergic, Arthritis (), Back pain, Colon polyp, Disease of thyroid gland, ED (erectile dysfunction), Headache(784.0), HL (hearing loss) (), Hypertension, Hypothyroidism, IBS (irritable bowel syndrome), Inflammatory bowel disease, Kidney stone, Kidney stones, Migraine (2022), Obesity, Osteoarthritis, Overactive bladder, and Vision loss (2022).    He has a current medication list which includes the following prescription(s): azelastine, b-complex/folic acid/vitamin c, d-3-5, cyclobenzaprine, duloxetine, echinacea, ezetimibe, fiber adult gummies, fluticasone, ibuprofen, latanoprost, levothyroxine, lisinopril, magnesium, montelukast, omega-3, sumatriptan, tamsulosin, zinc, guaifenesin, and guaifenesin-codeine.    PHYSIOLOGICAL DATA REVIEW : Device has been used 22/30 days and average on days used is 7 hours and 41 minutes.  He has not been able to use the device on days when he is allergies are worse.  Using PAP > 4 hours/night 70%. Estimated SB 0.6/hour with pressure of 16.7cm H2O [@90th/95th percentile];[ ] Use is limited by  .  INTERPRETATION: Compliance is Within accepted guidelines; Pressure setting is:within target range[ ]; ;   SUBJECTIVE: With respect to use of PAP, Yahir  is experiencing significant adverse  "effects:[ ]nasal congestion[ ].He derives benefit.[ ].  Feels [ ]satisfied with sleep and daytime function.   Sleep Routine: Yahir reports getting 8 hrs sleep; he has no difficulty initiating or maintaining sleep . He arises by disturbance from pets  and feels refreshed.Yahir reports episodic daytime sleepiness, feels like napping & does when has the opportunity.  He rated himself at Total score: (P) 9 /24 on the [place] Sleepiness Scale.   Other issues: Chronic pain that can interrupt sleep but he is able to fall back asleep..     Habits: Reports that he quit smoking about 7 years ago. His smoking use included cigarettes, pipe, and cigars. He started smoking about 57 years ago. He has a 75 pack-year smoking history. He has been exposed to tobacco smoke. He has never used smokeless tobacco.,  Reports that he does not currently use alcohol after a past usage of about 25.0 standard drinks of alcohol per week.,  Reports that he does not currently use drugs after having used the following drugs: Cocaine, Hashish, LSD, and Marijuana. Frequency: 1.00 time per week., Caffeine use:limited until  ; Exercise routine: regular.      ROS: Significant for weight is stable within a few pounds.  He is on several medications for chronic rhinitis.  He reports episodic respiratory symptoms.  He is reporting no cardiac symptoms.  Nocturia is controlled on current medication.    EXAM: /74 (BP Location: Left arm, Patient Position: Sitting, Cuff Size: Large)   Pulse 78   Temp 98.2 °F (36.8 °C) (Temporal)   Resp 16   Ht 5' 8\" (1.727 m)   Wt 97.8 kg (215 lb 9.6 oz)   SpO2 97%   BMI 32.78 kg/m²     Wt Readings from Last 3 Encounters:   09/09/24 97.8 kg (215 lb 9.6 oz)   09/05/24 98.6 kg (217 lb 6.4 oz)   08/02/24 96.6 kg (213 lb)      Patient is well groomed; well appearing.   CNS: Alert, orientated, speech clear & coherent  Psych: cooperative and in no distress. Mental state: Appears normal.  H&N: EOMI; NC/AT: No facial pressure " "marks, no rashes.    Skin/Extrem: col & hydration normal; no edema  Resp: Respiratory effort is normal  Physical findings otherwise essentially unchanged from previous.    IMPRESSION: Problem List Items & Comorbidities Addressed this Visit    1. RORO (obstructive sleep apnea)  PAP DME Resupply/Reorder      2. Chronic sinusitis, unspecified location        3. Primary hypertension        4. Chronic pain syndrome        5. Glaucoma of both eyes, unspecified glaucoma type        6. Obesity (BMI 30-39.9)        7. Chronic right-sided low back pain with right-sided sciatica            PLAN:  I reviewed results of prior studies and physiologic data with the patient.   I discussed treatment options with risks and benefits.  Treatment with  PAP is medically necessary and Yahir is agreable to continue use.   Care of equipment, methods to improve comfort using PAP and importance of compliance with therapy were discussed.  Pressure setting: continue 15-18 cmH2O.  Using a fullface mask.    Rx provided to replace supplies and Care coordinated with DME provider.   Continue regular nasal saline rinse 1-2 times a day (Neilmed or Ayrs Sinus Rinse), followed by topical nasal steroid (e..g. OTC Flonase, Nasacort, Rhinocort) once a day if necessary.  Discussed strategies for weight reduction.    Follow-up is advised in 1 year or sooner if needed to monitor progress, compliance and to adjust therapy.     Thank you for allowing me to participate in the care of this patient.    Sincerely,     Authenticated electronically on 09/09/24   Board Certified Specialist     Portions of the record may have been created with voice recognition software. Occasional wrong word or \"sound a like\" substitutions may have occurred due to the inherent limitations of voice recognition software. There may also be notations and random deletions of words or characters from malfunctioning software. Read the chart carefully and recognize, using context, where " substitutions/deletions have occurred.

## 2024-09-17 ENCOUNTER — TELEPHONE (OUTPATIENT)
Dept: SLEEP CENTER | Facility: CLINIC | Age: 69
End: 2024-09-17

## 2024-09-21 ENCOUNTER — OFFICE VISIT (OUTPATIENT)
Dept: URGENT CARE | Facility: CLINIC | Age: 69
End: 2024-09-21
Payer: MEDICARE

## 2024-09-21 VITALS
TEMPERATURE: 98.1 F | HEIGHT: 68 IN | OXYGEN SATURATION: 97 % | HEART RATE: 83 BPM | SYSTOLIC BLOOD PRESSURE: 130 MMHG | DIASTOLIC BLOOD PRESSURE: 60 MMHG | RESPIRATION RATE: 20 BRPM | WEIGHT: 213.8 LBS | BODY MASS INDEX: 32.4 KG/M2

## 2024-09-21 DIAGNOSIS — J01.40 ACUTE PANSINUSITIS, RECURRENCE NOT SPECIFIED: Primary | ICD-10-CM

## 2024-09-21 PROCEDURE — G0463 HOSPITAL OUTPT CLINIC VISIT: HCPCS | Performed by: EMERGENCY MEDICINE

## 2024-09-21 PROCEDURE — 99214 OFFICE O/P EST MOD 30 MIN: CPT | Performed by: EMERGENCY MEDICINE

## 2024-09-21 RX ORDER — DOXYCYCLINE 100 MG/1
100 TABLET ORAL 2 TIMES DAILY
Qty: 14 TABLET | Refills: 0 | Status: SHIPPED | OUTPATIENT
Start: 2024-09-21 | End: 2024-09-28

## 2024-09-21 RX ORDER — PREDNISONE 20 MG/1
40 TABLET ORAL DAILY
Qty: 10 TABLET | Refills: 0 | Status: SHIPPED | OUTPATIENT
Start: 2024-09-21 | End: 2024-09-26

## 2024-09-21 NOTE — PROGRESS NOTES
[facility]        NAME: Yahir Orta is a 69 y.o. male  : 1955    MRN: [id number]  DATE: 2024  TIME: 12:06 PM    Assessment and Plan   Acute pansinusitis, recurrence not specified [J01.40]  1. Acute pansinusitis, recurrence not specified  doxycycline (ADOXA) 100 MG tablet    fluticasone (VERAMYST) 27.5 MCG/SPRAY nasal spray    predniSONE 20 mg tablet            Patient Instructions       Follow up with PCP in 3-5 days.  Proceed to  ER if symptoms worsen.    If tests have been performed at [facility], our office will contact you with results if changes need to be made to the care plan discussed with you at the visit.  [facility]    Chief Complaint     Chief Complaint   Patient presents with    Earache     Left ear painful and clogged for one week.     Sinusitis     Sinus congestion for one week with headache, no fever.         History of Present Illness       Sinusitis  This is a new problem. The current episode started 1 to 4 weeks ago. The problem has been waxing and waning since onset. There has been no fever. His pain is at a severity of 5/10. The pain is moderate. Associated symptoms include congestion, coughing, ear pain, sinus pressure and sneezing. Pertinent negatives include no chills, diaphoresis, headaches, hoarse voice, neck pain, shortness of breath, sore throat or swollen glands. Past treatments include nasal decongestants. The treatment provided mild relief.       Review of Systems   Review of Systems   Constitutional:  Negative for activity change, appetite change, chills, diaphoresis, fatigue, fever and unexpected weight change.   HENT:  Positive for congestion, ear pain, postnasal drip, rhinorrhea, sinus pressure, sinus pain and sneezing. Negative for dental problem, drooling, ear discharge, facial swelling, hearing loss, hoarse voice, mouth sores, nosebleeds, sore throat, tinnitus, trouble swallowing and voice change.     Eyes:  Negative for pain and visual disturbance.   Respiratory:  Positive for cough. Negative for apnea, choking, chest tightness, shortness of breath, wheezing and stridor.    Cardiovascular:  Negative for chest pain, palpitations and leg swelling.   Gastrointestinal:  Negative for abdominal pain and vomiting.   Genitourinary:  Negative for dysuria and hematuria.   Musculoskeletal:  Negative for arthralgias, back pain and neck pain.   Skin:  Negative for color change and rash.   Neurological:  Negative for dizziness, tremors, seizures, syncope, facial asymmetry, speech difficulty, weakness, light-headedness, numbness and headaches.   All other systems reviewed and are negative.        Current Medications       Current Outpatient Medications:     azelastine (ASTELIN) 0.1 % nasal spray, 2 sprays into each nostril 2 (two) times a day Use in each nostril as directed, Disp: 30 mL, Rfl: 3    B Complex-C-Folic Acid (B-Complex/Folic Acid/Vitamin C) TBCR, , Disp: , Rfl:     Cholecalciferol (D-3-5) 125 MCG (5000 UT) capsule, , Disp: , Rfl:     cyclobenzaprine (FLEXERIL) 10 mg tablet, Take 1 tablet (10 mg total) by mouth as neededfor muscle spasms, Disp: 90 tablet, Rfl: 0    doxycycline (ADOXA) 100 MG tablet, Take 1 tablet (100 mg total) by mouth 2 (two) times a day for 7 days, Disp: 14 tablet, Rfl: 0    DULoxetine (CYMBALTA) 30 mg delayed release capsule, Take 1 capsule (30 mg total) by mouth daily at bedtime, Disp: 90 capsule, Rfl: 1    Echinacea 400 MG CAPS, , Disp: , Rfl:     ezetimibe (ZETIA) 10 mg tablet, Take 1 tablet (10 mg total) by mouth daily, Disp: 90 tablet, Rfl: 1    Fiber Adult Gummies 2 g CHEW, , Disp: , Rfl:     fluticasone (FLONASE) 50 mcg/act nasal spray, 2 sprays into each nostril daily, Disp: 16 g, Rfl: 11    fluticasone (VERAMYST) 27.5 MCG/SPRAY nasal spray, 2 sprays into each nostril daily, Disp: 15.8 mL, Rfl: 3    ibuprofen (MOTRIN) 600 mg tablet, Take 1 tablet (600 mg total) by mouth 2 (two) times a  day as needed for mild pain or moderate pain, Disp: 180 tablet, Rfl: 0    latanoprost (XALATAN) 0.005 % ophthalmic solution, Administer 1 drop to both eyes daily, Disp: , Rfl:     levothyroxine 200 mcg tablet, Take 1 tablet (200 mcg total) by mouth daily in the early morning, Disp: 100 tablet, Rfl: 3    lisinopril (ZESTRIL) 10 mg tablet, Take 1 tablet (10 mg total) by mouth daily, Disp: 100 tablet, Rfl: 1    Magnesium 400 MG TABS, Take 1 tablet by mouth in the morning, Disp: , Rfl:     montelukast (SINGULAIR) 10 mg tablet, Take 1 tablet (10 mg total) by mouth daily at bedtime, Disp: 90 tablet, Rfl: 1    Omega-3 1000 MG CAPS, Take 1 capsule by mouth daily in the early morning, Disp: , Rfl:     predniSONE 20 mg tablet, Take 2 tablets (40 mg total) by mouth daily for 5 days, Disp: 10 tablet, Rfl: 0    SUMAtriptan (IMITREX) 50 mg tablet, Take 1 tablet (50 mg total) by mouth once as needed for migraine for up to 12 doses, Disp: 12 tablet, Rfl: 1    tamsulosin (FLOMAX) 0.4 mg, Take 1 capsule (0.4 mg total) by mouth daily with dinner, Disp: 30 capsule, Rfl: 6    Zinc 50 MG CAPS, Take 1 capsule by mouth in the morning, Disp: , Rfl:     guaiFENesin (Mucinex) 600 mg 12 hr tablet, Take 1 tablet (600 mg total) by mouth every 12 (twelve) hours, Disp: 20 tablet, Rfl: 0    guaifenesin-codeine (GUAIFENESIN AC) 100-10 MG/5ML liquid, Take 5 mL by mouth 3 (three) times a day as needed for cough, Disp: 120 mL, Rfl: 0    Current Allergies     Allergies as of 09/21/2024 - Reviewed 09/21/2024   Allergen Reaction Noted    Levofloxacin Rash 04/15/2019    Amoxicillin-pot clavulanate Abdominal Pain, Diarrhea, Dizziness, and Headache 07/25/2024    Cetirizine Diarrhea, Dizziness, Drowsiness, Headache, Lightheadedness, and Palpitations 09/05/2024            The following portions of the patient's history were reviewed and updated as appropriate: allergies, current medications, past family history, past medical history, past social history, past  "surgical history and problem list.     Past Medical History:   Diagnosis Date    Allergic     seasonal    Arthritis 2000    Back pain     Colon polyp     Disease of thyroid gland     ED (erectile dysfunction)     Headache(784.0)     HL (hearing loss) 2000    Both ears    Hypertension     Hypothyroidism     IBS (irritable bowel syndrome)     Inflammatory bowel disease     Kidney stone     Kidney stones     Migraine July 2022    Started when I stopped drinking    Obesity     Osteoarthritis     Overactive bladder     Vision loss july 2022    Occures with Migrain       Past Surgical History:   Procedure Laterality Date    APPENDECTOMY      COLONOSCOPY      HERNIA REPAIR      JOINT REPLACEMENT      ORTHOPEDIC SURGERY      PARTIAL HIP ARTHROPLASTY Right     ROTATOR CUFF REPAIR      TRIGGER FINGER RELEASE  02/2021    Left ring finger    TRIGGER POINT INJECTION         Family History   Problem Relation Age of Onset    Heart disease Father     Hypertension Father     Early death Father         Heart Attack @ 36    Cancer Mother     Alcohol abuse Mother         Dead    Suicide Attempts Mother     Stroke Maternal Aunt     Dementia Maternal Aunt     Diabetes Maternal Grandmother     Cancer Maternal Grandmother     Heart disease Sister     Hypertension Sister     Alcohol abuse Cousin         Dead    Stroke Cousin     Suicide Attempts Brother          Medications have been verified.        Objective   /60   Pulse 83   Temp 98.1 °F (36.7 °C)   Resp 20   Ht 5' 8\" (1.727 m)   Wt 97 kg (213 lb 12.8 oz)   SpO2 97%   BMI 32.51 kg/m²   No LMP for male patient.       Physical Exam     Physical Exam  Vitals and nursing note reviewed.   Constitutional:       General: He is not in acute distress.     Appearance: Normal appearance. He is normal weight. He is not ill-appearing, toxic-appearing or diaphoretic.   HENT:      Head: Normocephalic and atraumatic.      Right Ear: Tympanic membrane, ear canal and external ear normal. " There is no impacted cerumen.      Left Ear: Tympanic membrane, ear canal and external ear normal. There is no impacted cerumen.      Nose: Nasal tenderness, mucosal edema, congestion and rhinorrhea present. No nasal deformity, septal deviation, signs of injury or laceration.      Right Nostril: No foreign body, epistaxis, septal hematoma or occlusion.      Left Nostril: No foreign body, epistaxis, septal hematoma or occlusion.      Right Turbinates: Enlarged and swollen.      Left Turbinates: Enlarged and swollen.      Right Sinus: Maxillary sinus tenderness and frontal sinus tenderness present.      Left Sinus: Maxillary sinus tenderness and frontal sinus tenderness present.      Mouth/Throat:      Mouth: Mucous membranes are moist.      Pharynx: Oropharynx is clear. Posterior oropharyngeal erythema present. No oropharyngeal exudate.   Eyes:      Extraocular Movements: Extraocular movements intact.      Pupils: Pupils are equal, round, and reactive to light.   Neck:      Vascular: No carotid bruit.   Cardiovascular:      Rate and Rhythm: Normal rate and regular rhythm.      Pulses: Normal pulses.      Heart sounds: Normal heart sounds. No murmur heard.     No friction rub. No gallop.   Pulmonary:      Effort: Pulmonary effort is normal. No respiratory distress.      Breath sounds: Normal breath sounds. No stridor. No wheezing, rhonchi or rales.   Chest:      Chest wall: No tenderness.   Abdominal:      General: Abdomen is flat. There is no distension.      Palpations: There is no mass.      Tenderness: There is no abdominal tenderness. There is no right CVA tenderness, left CVA tenderness, guarding or rebound.      Hernia: No hernia is present.   Musculoskeletal:         General: No swelling, tenderness, deformity or signs of injury.      Cervical back: Normal range of motion and neck supple. No rigidity or tenderness.      Left lower leg: No edema.   Lymphadenopathy:      Cervical: No cervical adenopathy.    Skin:     General: Skin is warm and dry.      Capillary Refill: Capillary refill takes less than 2 seconds.   Neurological:      General: No focal deficit present.      Mental Status: He is alert and oriented to person, place, and time.   Psychiatric:         Mood and Affect: Mood normal.         Behavior: Behavior normal.

## 2024-09-26 DIAGNOSIS — E78.00 HYPERCHOLESTEROLEMIA: ICD-10-CM

## 2024-09-26 RX ORDER — EZETIMIBE 10 MG/1
10 TABLET ORAL DAILY
Qty: 30 TABLET | Refills: 0 | Status: SHIPPED | OUTPATIENT
Start: 2024-09-26

## 2024-09-26 NOTE — TELEPHONE ENCOUNTER
Patient needs updated blood work. Please place orders. A courtesy refill was provided.      Pt needs updated Lipid Panel

## 2024-10-03 ENCOUNTER — OFFICE VISIT (OUTPATIENT)
Dept: UROLOGY | Facility: CLINIC | Age: 69
End: 2024-10-03
Payer: MEDICARE

## 2024-10-03 VITALS
HEART RATE: 73 BPM | DIASTOLIC BLOOD PRESSURE: 66 MMHG | TEMPERATURE: 97.3 F | OXYGEN SATURATION: 95 % | SYSTOLIC BLOOD PRESSURE: 132 MMHG | HEIGHT: 68 IN | WEIGHT: 213.5 LBS | BODY MASS INDEX: 32.36 KG/M2

## 2024-10-03 DIAGNOSIS — R39.12 BENIGN PROSTATIC HYPERPLASIA WITH WEAK URINARY STREAM: ICD-10-CM

## 2024-10-03 DIAGNOSIS — N40.1 BENIGN PROSTATIC HYPERPLASIA WITH WEAK URINARY STREAM: ICD-10-CM

## 2024-10-03 DIAGNOSIS — R97.20 ELEVATED PSA: Primary | ICD-10-CM

## 2024-10-03 PROCEDURE — 99213 OFFICE O/P EST LOW 20 MIN: CPT

## 2024-10-03 NOTE — PROGRESS NOTES
10/3/2024    Chief Complaint   Patient presents with    Elevated PSA     Follow-up       Assessment and Plan    69 y.o. male manage by    1.  Elevated PSA  PSA trend  4.705 (9/6/2024)  5.035 (8/6/2024)  4.32 (3/11/2024)  3.0 (9/13/2023)  5.67 (8/2/2023)  2.2 (5/26/2021)  1.8 (4/9/2019)  Negative MP MRI March 2024.  Calculated prostate volume of 36 cc.  Patient has a relatively stable PSA with negative MRI in March which was reassuring.  However cannot definitively exclude possible malignancy.  After lengthy discussion, patient prefers to proceed with a transperineal ultrasound-guided biopsy of the prostate.  A case request was placed.  Technique, benefits, and risk of the procedure listed above were discussed with them today and informed written consent was obtained.  All questions and concerns regarding surgery and perioperative expectations have been addressed and answered.  No overall changes in their health since last visit.  Denies any prior complications with anesthesia.   Our surgery scheduler will call him to schedule this in the near future.     Interval HPI:    History of Present Illness  Yahir Orta is a 69 y.o. male here for follow-up evaluation of elevated PSA    Established patient was seen by me on 4/2/2024 for follow-up evaluation of elevated PSA.  Patient was originally seen in April 2019 for urinary frequency, urgency, and nocturia x 3.  He reported history of uric acid stones but nothing recently at the time.  It was thought that his symptoms were likely due to acute onset overactive bladder and CT imaging was negative for stones with normal PVRs and a small prostate on exam.  Patient was trialed on oxybutynin and recommended cystoscopy evaluation which revealed meatal stenosis with coronal hypospadias, overactive bladder.     At baseline he denies any bothersome lower urinary tract symptoms. He does experience and intermittent weak urinary stream. His previous issues with OAB resolved years  ago and he no longer takes oxybutynin. History of kidney stone with no recent stone episodes. No gross hematuria. No history of UTIs or prostatitis.     He had a elevated PSA of 5.67 in August 2023 previously 2.2 in May 2021 and 1.8 in April 2019.  Repeat PSA improved to 3.0 as of September 2023. His older brother is also being followed for an elevated PSA but no prostate cancer diagnosis. Family history of colon and lung cancer in his mother. Due to instability of his PSA I recommended a multiparametric MRI of the prostate.  This was completed on 3/26/2024 and was negative with PI-RADS category 2 scoring.  He has a calculated prostate volume of 36 cc.  Current PSA is 4.705 as of 9/6/2024.        PSA trend  4.705 (9/6/2024)  5.035 (8/6/2024)  4.32 (3/11/2024)  3.0 (9/13/2023)  5.67 (8/2/2023)  2.2 (5/26/2021)  1.8 (4/9/2019)          Review of Systems   Constitutional:  Negative for chills and fever.   HENT:  Negative for congestion and sore throat.    Respiratory:  Negative for cough and shortness of breath.    Cardiovascular:  Negative for chest pain and leg swelling.   Gastrointestinal:  Negative for abdominal pain, constipation and diarrhea.   Genitourinary:  Negative for difficulty urinating, dysuria, frequency, hematuria and urgency.   Musculoskeletal:  Negative for back pain and gait problem.   Skin:  Negative for wound.   Allergic/Immunologic: Negative for immunocompromised state.   Hematological:  Does not bruise/bleed easily.           AUA SYMPTOM SCORE      Flowsheet Row Most Recent Value   AUA SYMPTOM SCORE    How often have you had a sensation of not emptying your bladder completely after you finished urinating? 5 (P)     How often have you had to urinate again less than two hours after you finished urinating? 4 (P)     How often have you found you stopped and started again several times when you urinate? 5 (P)     How often have you found it difficult to postpone urination? 4 (P)     How often have you  "had a weak urinary stream? 5 (P)     How often have you had to push or strain to begin urination? 5 (P)     How many times did you most typically get up to urinate from the time you went to bed at night until the time you got up in the morning? 2 (P)     Quality of Life: If you were to spend the rest of your life with your urinary condition just the way it is now, how would you feel about that? 4 (P)     AUA SYMPTOM SCORE 30 (P)              Vitals  Vitals:    10/03/24 0735   BP: 132/66   Pulse: 73   Temp: (!) 97.3 °F (36.3 °C)   SpO2: 95%   Weight: 96.8 kg (213 lb 8 oz)   Height: 5' 8\" (1.727 m)       Physical Exam  Vitals reviewed.   Constitutional:       General: He is not in acute distress.     Appearance: Normal appearance. He is not ill-appearing or toxic-appearing.   HENT:      Head: Normocephalic and atraumatic.      Mouth/Throat:      Mouth: Mucous membranes are moist.      Pharynx: Oropharynx is clear.   Eyes:      General: No scleral icterus.     Conjunctiva/sclera: Conjunctivae normal.   Cardiovascular:      Rate and Rhythm: Normal rate and regular rhythm.      Pulses: Normal pulses.      Heart sounds: Normal heart sounds.   Pulmonary:      Effort: Pulmonary effort is normal. No respiratory distress.      Breath sounds: Normal breath sounds.   Abdominal:      General: Bowel sounds are normal.      Palpations: Abdomen is soft.      Tenderness: There is no abdominal tenderness. There is no right CVA tenderness or left CVA tenderness.      Hernia: No hernia is present.   Musculoskeletal:      Cervical back: Normal range of motion.      Right lower leg: No edema.      Left lower leg: No edema.   Skin:     General: Skin is warm and dry.      Coloration: Skin is not jaundiced or pale.   Neurological:      General: No focal deficit present.      Mental Status: He is alert and oriented to person, place, and time. Mental status is at baseline.      Gait: Gait normal.   Psychiatric:         Mood and Affect: Mood " normal.         Behavior: Behavior normal.         Thought Content: Thought content normal.         Judgment: Judgment normal.         Past History  Past Medical History:   Diagnosis Date    Allergic     seasonal    Arthritis     Back pain     Colon polyp     Disease of thyroid gland     ED (erectile dysfunction)     Headache(784.0)     HL (hearing loss) 2000    Both ears    Hypertension     Hypothyroidism     IBS (irritable bowel syndrome)     Inflammatory bowel disease     Kidney stone     Kidney stones     Migraine 2022    Started when I stopped drinking    Obesity     Osteoarthritis     Overactive bladder     Vision loss 2022    Occures with Migrain     Social History     Socioeconomic History    Marital status: /Civil Union     Spouse name: None    Number of children: None    Years of education: None    Highest education level: None   Occupational History    None   Tobacco Use    Smoking status: Former     Current packs/day: 0.00     Average packs/day: 1.5 packs/day for 50.0 years (75.0 ttl pk-yrs)     Types: Cigarettes, Pipe, Cigars     Start date: 1965     Quit date: 2017     Years since quittin.7     Passive exposure: Past    Smokeless tobacco: Never    Tobacco comments:     Tobacco free   Vaping Use    Vaping status: Never Used   Substance and Sexual Activity    Alcohol use: Not Currently     Alcohol/week: 25.0 standard drinks of alcohol     Types: 25 Shots of liquor per week     Comment: Stopped 2022    Drug use: Not Currently     Frequency: 1.0 times per week     Types: Cocaine, Hashish, LSD, Marijuana     Comment: Not since High School    Sexual activity: Not Currently     Partners: Female     Birth control/protection: Male Sterilization   Other Topics Concern    None   Social History Narrative    Retired  and govt employee     x 2    Lives wife    Three children.            Do you have pets? dog, cat, and fish Is pet allowed in bedroom?Yes     Are you a smoker? Former quit 2019 -  PPD X 50 yrs     Does anyone smoke in your home? No       Do you live with smokers? No    [place] frequently? No   How many times a year? N/A      Social Determinants of Health     Financial Resource Strain: Low Risk  (2023)    Overall Financial Resource Strain (CARDIA)     Difficulty of Paying Living Expenses: Not very hard   Food Insecurity: No Food Insecurity (2024)    Hunger Vital Sign     Worried About Running Out of Food in the Last Year: Never true     Ran Out of Food in the Last Year: Never true   Transportation Needs: No Transportation Needs (2024)    PRAPARE - Transportation     Lack of Transportation (Medical): No     Lack of Transportation (Non-Medical): No   Physical Activity: Not on file   Stress: Not on file   Social Connections: Not on file   Intimate Partner Violence: Not on file   Housing Stability: Low Risk  (2024)    Housing Stability Vital Sign     Unable to Pay for Housing in the Last Year: No     Number of Times Moved in the Last Year: 0     Homeless in the Last Year: No     Social History     Tobacco Use   Smoking Status Former    Current packs/day: 0.00    Average packs/day: 1.5 packs/day for 50.0 years (75.0 ttl pk-yrs)    Types: Cigarettes, Pipe, Cigars    Start date: 1967    Quit date: 2017    Years since quittin.7    Passive exposure: Past   Smokeless Tobacco Never   Tobacco Comments    Tobacco free     Family History   Problem Relation Age of Onset    Heart disease Father     Hypertension Father     Early death Father         Heart Attack @ 36    Cancer Mother     Alcohol abuse Mother         Dead    Suicide Attempts Mother     Stroke Maternal Aunt     Dementia Maternal Aunt     Diabetes Maternal Grandmother     Cancer Maternal Grandmother     Heart disease Sister     Hypertension Sister     Alcohol abuse Cousin         Dead    Stroke Cousin     Suicide Attempts Brother        The following portions of the  patient's history were reviewed and updated as appropriate allergies, current medications, past medical history, past social history, past surgical history and problem list    Imaging:    3/26/2024    MULTIPARAMETRIC MRI OF THE PROSTATE WITH AND WITHOUT CONTRAST-WITH 3-D POSTPROCESSING     INDICATION: R97.20: Elevated prostate specific antigen (PSA).     COMPARISON: None.     PSA LEVEL: 4.32 ng/mL on 3/11/2024.  PRIOR BIOPSY: Unknown.     TECHNIQUE: The following pulse sequences were obtained: Small field-of-view axial T1-weighted and multiplanar T2-weighted images; DWI axial and ADC map; large field of view axial T2 weighted images; T1w in-phase and opposed-phase axials of entire pelvis   and dynamic 3D T1w axial before and during IV contrast injection. Imaging performed on 3T MRI.     CONTRAST: Gadobutrol (Gadavist) 9 mL of Gadobutrol injection (SINGLE-DOSE)     TECHNICAL LIMITATIONS: Nondiagnostic DWI/ADC images due to image degradation by susceptibility artifacts from right hip arthroplasty.     FINDINGS:     PROSTATE:  Size: 5.1 x 3.6 x 4.0 cm = 36 cc.  Post-biopsy hemorrhage: None.  Central gland enlargement (BPH): Mild.     Focal lesions - No dominant lesion on evaluation of the T2 weighted and DCE images. The DWI/ADC sequences are degraded by susceptibility artifacts. Heterogeneous peripheral zone. PI-RADSv2.1 Category 2 - Low (clinically significant cancer unlikely).     SEMINAL VESICLES: Unremarkable     Note: Clinically significant cancer is defined on pathology/histology as Durga score greater than or equal to 7, and/or volume of greater than or equal to 0.5 mL, and/or extraprostatic extension.     URINARY BLADDER: Unremarkable.     LYMPH NODES: No pelvic lymphadenopathy.     BONES: No suspicious osseous lesion. Spinal degenerative changes. Right hip arthroplasty with susceptibility artifacts.     IMPRESSION:     1. PI-RADSv2.1 Category 2 - Low (clinically significant cancer is unlikely to be  present).     2. Calculated prostate volume of   36  cc.    Results  No results found for this or any previous visit (from the past 1 hour(s)).]  Lab Results   Component Value Date    PSA 4.705 (H) 09/06/2024    PSA 5.035 (H) 08/06/2024    PSA 4.32 (H) 03/11/2024    PSA 3.0 09/13/2023     Lab Results   Component Value Date    CALCIUM 9.5 03/25/2024    K 4.2 03/25/2024    CO2 29 03/25/2024     03/25/2024    BUN 15 03/25/2024    CREATININE 1.11 03/25/2024     Lab Results   Component Value Date    WBC 7.43 03/25/2024    HGB 17.0 03/25/2024    HCT 50.7 (H) 03/25/2024    MCV 90 03/25/2024     03/25/2024       Please Note:  Voice dictation software has been used to create this document. There may be inadvertent transcriptions errors.     KRISTEN Tovar 10/03/24

## 2024-10-07 DIAGNOSIS — R39.12 BENIGN PROSTATIC HYPERPLASIA WITH WEAK URINARY STREAM: ICD-10-CM

## 2024-10-07 DIAGNOSIS — N40.1 BENIGN PROSTATIC HYPERPLASIA WITH WEAK URINARY STREAM: ICD-10-CM

## 2024-10-07 RX ORDER — TAMSULOSIN HYDROCHLORIDE 0.4 MG/1
0.4 CAPSULE ORAL
Qty: 30 CAPSULE | Refills: 0 | Status: SHIPPED | OUTPATIENT
Start: 2024-10-07

## 2024-10-10 ENCOUNTER — TELEPHONE (OUTPATIENT)
Dept: UROLOGY | Facility: MEDICAL CENTER | Age: 69
End: 2024-10-10

## 2024-10-10 NOTE — TELEPHONE ENCOUNTER
Contacted patient and scheduled him for a pathology review appointment with Dr. Mcclendon on 11/20/24 in the [place] office.

## 2024-10-10 NOTE — TELEPHONE ENCOUNTER
Spoke with patient and confirmed surgery date of: 11/13/2024  Type of surgery: TransP BX  Operating physician: Dr. Mcclendon  Location of surgery: [facility]     Verbally went over prep with patient on: 10/10/2024  NPO  Bowel prep? Yes, 1 enema 1 hour prior to leaving the house for the procedure  Hospital calls afternoon prior with arrival time -Calls Friday afternoon for Monday surgeries  Patient needs ride to and from surgery (outpatient/inpatient)   Pre-op testing to be done 2 weeks prior to surgery   CBC, CMP, Urine C&S,EKG  Blood thinners:   none  Clearances needed: none     Mailed packet on: 10/10/2024  Copy of packet scanned into Media on: 10/10/2024  Labs in packet  Soap instructions in packet  Pre-op & Post-op in packet  H&P on admit  PO

## 2024-10-10 NOTE — TELEPHONE ENCOUNTER
Clinical, patient requires a 2 week pathology visit with Dr. Mcclendon. None available, please contact the patient to arrange

## 2024-10-25 ENCOUNTER — TELEPHONE (OUTPATIENT)
Age: 69
End: 2024-10-25

## 2024-10-28 ENCOUNTER — OFFICE VISIT (OUTPATIENT)
Dept: LAB | Facility: HOSPITAL | Age: 69
End: 2024-10-28
Payer: MEDICARE

## 2024-10-28 ENCOUNTER — LAB (OUTPATIENT)
Dept: LAB | Facility: HOSPITAL | Age: 69
End: 2024-10-28
Payer: MEDICARE

## 2024-10-28 DIAGNOSIS — R39.12 BENIGN PROSTATIC HYPERPLASIA WITH WEAK URINARY STREAM: ICD-10-CM

## 2024-10-28 DIAGNOSIS — N40.1 BENIGN PROSTATIC HYPERPLASIA WITH WEAK URINARY STREAM: ICD-10-CM

## 2024-10-28 DIAGNOSIS — R97.20 ELEVATED PSA: ICD-10-CM

## 2024-10-28 DIAGNOSIS — R39.89 SUSPECTED UTI: ICD-10-CM

## 2024-10-28 DIAGNOSIS — Z01.812 PRE-OPERATIVE LABORATORY EXAMINATION: ICD-10-CM

## 2024-10-28 DIAGNOSIS — Z01.810 PRE-OPERATIVE CARDIOVASCULAR EXAMINATION: ICD-10-CM

## 2024-10-28 LAB
ALBUMIN SERPL BCG-MCNC: 4.4 G/DL (ref 3.5–5)
ALP SERPL-CCNC: 74 U/L (ref 34–104)
ALT SERPL W P-5'-P-CCNC: 22 U/L (ref 7–52)
ANION GAP SERPL CALCULATED.3IONS-SCNC: 8 MMOL/L (ref 4–13)
AST SERPL W P-5'-P-CCNC: 24 U/L (ref 13–39)
ATRIAL RATE: 60 BPM
BACTERIA UR QL AUTO: NORMAL /HPF
BASOPHILS # BLD AUTO: 0.01 THOUSANDS/ΜL (ref 0–0.1)
BASOPHILS NFR BLD AUTO: 0 % (ref 0–1)
BILIRUB SERPL-MCNC: 0.34 MG/DL (ref 0.2–1)
BILIRUB UR QL STRIP: NEGATIVE
BUN SERPL-MCNC: 18 MG/DL (ref 5–25)
CALCIUM SERPL-MCNC: 9.4 MG/DL (ref 8.4–10.2)
CHLORIDE SERPL-SCNC: 102 MMOL/L (ref 96–108)
CLARITY UR: CLEAR
CO2 SERPL-SCNC: 28 MMOL/L (ref 21–32)
COLOR UR: YELLOW
CREAT SERPL-MCNC: 0.94 MG/DL (ref 0.6–1.3)
EOSINOPHIL # BLD AUTO: 0.13 THOUSAND/ΜL (ref 0–0.61)
EOSINOPHIL NFR BLD AUTO: 3 % (ref 0–6)
ERYTHROCYTE [DISTWIDTH] IN BLOOD BY AUTOMATED COUNT: 13.6 % (ref 11.6–15.1)
GFR SERPL CREATININE-BSD FRML MDRD: 83 ML/MIN/1.73SQ M
GLUCOSE P FAST SERPL-MCNC: 93 MG/DL (ref 65–99)
GLUCOSE UR STRIP-MCNC: NEGATIVE MG/DL
HCT VFR BLD AUTO: 45.4 % (ref 36.5–49.3)
HGB BLD-MCNC: 15.1 G/DL (ref 12–17)
HGB UR QL STRIP.AUTO: NEGATIVE
IMM GRANULOCYTES # BLD AUTO: 0.01 THOUSAND/UL (ref 0–0.2)
IMM GRANULOCYTES NFR BLD AUTO: 0 % (ref 0–2)
KETONES UR STRIP-MCNC: NEGATIVE MG/DL
LEUKOCYTE ESTERASE UR QL STRIP: NEGATIVE
LYMPHOCYTES # BLD AUTO: 0.87 THOUSANDS/ΜL (ref 0.6–4.47)
LYMPHOCYTES NFR BLD AUTO: 20 % (ref 14–44)
MCH RBC QN AUTO: 31.3 PG (ref 26.8–34.3)
MCHC RBC AUTO-ENTMCNC: 33.3 G/DL (ref 31.4–37.4)
MCV RBC AUTO: 94 FL (ref 82–98)
MONOCYTES # BLD AUTO: 0.71 THOUSAND/ΜL (ref 0.17–1.22)
MONOCYTES NFR BLD AUTO: 16 % (ref 4–12)
NEUTROPHILS # BLD AUTO: 2.73 THOUSANDS/ΜL (ref 1.85–7.62)
NEUTS SEG NFR BLD AUTO: 61 % (ref 43–75)
NITRITE UR QL STRIP: NEGATIVE
NON-SQ EPI CELLS URNS QL MICRO: NORMAL /HPF
NRBC BLD AUTO-RTO: 0 /100 WBCS
P AXIS: 40 DEGREES
PH UR STRIP.AUTO: 5 [PH]
PLATELET # BLD AUTO: 249 THOUSANDS/UL (ref 149–390)
PMV BLD AUTO: 10.1 FL (ref 8.9–12.7)
POTASSIUM SERPL-SCNC: 4.2 MMOL/L (ref 3.5–5.3)
PR INTERVAL: 202 MS
PROT SERPL-MCNC: 7.7 G/DL (ref 6.4–8.4)
PROT UR STRIP-MCNC: ABNORMAL MG/DL
QRS AXIS: -31 DEGREES
QRSD INTERVAL: 100 MS
QT INTERVAL: 398 MS
QTC INTERVAL: 398 MS
RBC # BLD AUTO: 4.82 MILLION/UL (ref 3.88–5.62)
RBC #/AREA URNS AUTO: NORMAL /HPF
SODIUM SERPL-SCNC: 138 MMOL/L (ref 135–147)
SP GR UR STRIP.AUTO: 1.02 (ref 1–1.03)
T WAVE AXIS: 43 DEGREES
UROBILINOGEN UR STRIP-ACNC: <2 MG/DL
VENTRICULAR RATE: 60 BPM
WBC # BLD AUTO: 4.46 THOUSAND/UL (ref 4.31–10.16)
WBC #/AREA URNS AUTO: NORMAL /HPF

## 2024-10-28 PROCEDURE — 93005 ELECTROCARDIOGRAM TRACING: CPT

## 2024-10-28 PROCEDURE — 80053 COMPREHEN METABOLIC PANEL: CPT

## 2024-10-28 PROCEDURE — 85025 COMPLETE CBC W/AUTO DIFF WBC: CPT

## 2024-10-28 PROCEDURE — 93010 ELECTROCARDIOGRAM REPORT: CPT | Performed by: INTERNAL MEDICINE

## 2024-10-28 PROCEDURE — 36415 COLL VENOUS BLD VENIPUNCTURE: CPT

## 2024-10-28 PROCEDURE — 81001 URINALYSIS AUTO W/SCOPE: CPT

## 2024-10-28 PROCEDURE — 87086 URINE CULTURE/COLONY COUNT: CPT

## 2024-10-29 LAB — BACTERIA UR CULT: NORMAL

## 2024-10-30 ENCOUNTER — OFFICE VISIT (OUTPATIENT)
Dept: NEUROLOGY | Facility: CLINIC | Age: 69
End: 2024-10-30
Payer: MEDICARE

## 2024-10-30 VITALS
BODY MASS INDEX: 32.84 KG/M2 | TEMPERATURE: 98.2 F | SYSTOLIC BLOOD PRESSURE: 128 MMHG | DIASTOLIC BLOOD PRESSURE: 60 MMHG | HEART RATE: 80 BPM | OXYGEN SATURATION: 98 % | WEIGHT: 216 LBS

## 2024-10-30 DIAGNOSIS — G47.33 OBSTRUCTIVE SLEEP APNEA (ADULT) (PEDIATRIC): ICD-10-CM

## 2024-10-30 DIAGNOSIS — G43.109 MIGRAINE WITH AURA AND WITHOUT STATUS MIGRAINOSUS, NOT INTRACTABLE: Primary | ICD-10-CM

## 2024-10-30 PROCEDURE — 99213 OFFICE O/P EST LOW 20 MIN: CPT

## 2024-10-30 NOTE — ASSESSMENT & PLAN NOTE
I had the pleasure of seeing Yahir today in the office at [facility] in [place].  He is presenting today for an office visit follow-up appointment in regard to his migraine headaches.  The patient states that since his last appointment he has only had one significant migraine headache.  The patient states that since his previous appointment has only had to take sumatriptan one time.  He notes that sumatriptan works effectively at aborting his headaches.  He is taking Cymbalta 30 mg daily for chronic pain and also for migraine prevention as well.  Advised patient to continue Cymbalta for prevention and sumatriptan for abortive therapy.  Patient had no other questions advised him to follow-up in 1 year.    - Continue Cymbalta 30 mg once daily for migraine prevention/chronic pain  - Continue sumatriptan 50 mg as needed for abortive therapy of migraine headaches.  Advised to take 1 tablet by mouth once at the onset of migraine.  May repeat 2 hours later.  Patient can also utilize over-the-counter medication but would limit this to no more than 2 to 3 days out of the week.  - Continue utilization of CPAP for RORO

## 2024-10-30 NOTE — PROGRESS NOTES
Ambulatory Visit  Name: Yahir Orta      : 1955      MRN: [id number]  Encounter Provider: López Hartley PA-C  Encounter Date: 10/30/2024   Encounter department: [facility]    Assessment & Plan  Migraine with aura and without status migrainosus, not intractable  I had the pleasure of seeing Yahir today in the office at [facility] in [place].  He is presenting today for an office visit follow-up appointment in regard to his migraine headaches.  The patient states that since his last appointment he has only had one significant migraine headache.  The patient states that since his previous appointment has only had to take sumatriptan one time.  He notes that sumatriptan works effectively at aborting his headaches.  He is taking Cymbalta 30 mg daily for chronic pain and also for migraine prevention as well.  Advised patient to continue Cymbalta for prevention and sumatriptan for abortive therapy.  Patient had no other questions advised him to follow-up in 1 year.    - Continue Cymbalta 30 mg once daily for migraine prevention/chronic pain  - Continue sumatriptan 50 mg as needed for abortive therapy of migraine headaches.  Advised to take 1 tablet by mouth once at the onset of migraine.  May repeat 2 hours later.  Patient can also utilize over-the-counter medication but would limit this to no more than 2 to 3 days out of the week.  - Continue utilization of CPAP for RORO         Obstructive sleep apnea (adult) (pediatric)  - Continue using CPAP for RORO         Patient Instructions   Headache Calendar  Please maintain a headache calendar  Consider using phone applications such as Migraine Buddy or Migraine Diary    Headache/migraine treatment:     Rescue medications (for immediate treatment of a headache):   It is ok to take ibuprofen, acetaminophen or naproxen (Advil, Tylenol,  Aleve, Excedrin) if they help your headaches you should limit these to No  more than 3 times a week to avoid medication overuse/rebound headaches.     For your more moderate to severe migraines take this medication early   - Continue sumatriptan 50 mg tabs - take one at the onset of headache. May repeat one time after 2 hours if pain has not resolved.   (Max 2 a day and 10 a month)     Prescription preventive medications for headaches/migraines   (to take every day to help prevent headaches - not to take at the time of headache):  - Continue Cymbalta 30 mg daily for chronic pain and also for migraine prevention as well    *Typically these types of medications take time until you see the benefit, although some may see improvement in days, often it may take weeks, especially if the medication is being titrated up to a beneficial level. Please contact us if there are any concerns or questions regarding the medication.     Over the counter preventive supplements for headaches/migraines (if you try, try for 3 months straight)  (to take every day to help prevent headaches - not to take at the time of headache):  There are combo pills online of these - none of which regulated by FDA and double check dosing - take appropriate dose only once a day- prevent a migraine, migravent, mind ease, migrelief   [] Magnesium 400mg daily (If any diarrhea or upset stomach, decrease dose  as tolerated)  [] Riboflavin (Vitamin B2) 400mg daily (may make your urine bright/neon yellow)  - All supplements can be purchased online    Lifestyle Recommendations:  [x] SLEEP - Maintain a regular sleep schedule: Adults need at least 7-8 hours of uninterrupted a night. Maintain good sleep hygiene:  Going to bed and waking up at consistent times, avoiding excessive daytime naps, avoiding caffeinated beverages in the evening, avoid excessive stimulation in the evening and generally using bed primarily for sleeping.  One hour before bedtime would recommend turning lights down lower, decreasing your activity (may read quietly,  listen to music at a low volume). When you get into bed, should eliminate all technology (no texting, emailing, playing with your phone, iPad or tablet in bed).  [x] HYDRATION - Maintain good hydration.  Drink  2L of fluid a day (4 typical small water bottles)  [x] DIET - Maintain good nutrition. In particular don't skip meals and try and eat healthy balanced meals regularly.  [x] TRIGGERS - Look for other triggers and avoid them: Limit caffeine to 1-2 cups a day or less. Avoid dietary triggers that you have noticed bring on your headaches (this could include aged cheese, peanuts, MSG, aspartame and nitrates).  [x] EXERCISE - physical exercise as we all know is good for you in many ways, and not only is good for your heart, but also is beneficial for your mental health, cognitive health and  chronic pain/headaches. I would encourage at the least 5 days of physical exercise weekly for at least 30 minutes.     Education and Follow-up  [x] Please call with any questions or concerns. Of course if any new concerning symptoms go to the emergency department.  [x] Follow up in 1 year with Vasu MACDONALD    History of Present Illness   HPI    For Review:    The patient was last seen in the office on 01/10/2024 by myself.started that the patient had not had any significant migraine headaches since his last appointment.  Was noted that there was an incident a few weeks ago   The last appointment the patient was presenting for a follow-up appointment in regard to migraine headaches. where he was going to get a migraine and took the Imitrex which aborted the event.  Patient was taking Cymbalta 30 mg daily at bedtime at the time of his previous appointment.  He noted that getting occasional regular tension headaches which he uses ibuprofen to relieve them.  He is still taking Cymbalta daily for low back pain and for migraine prevention and continuing to take sumatriptan still is compliant with his CPAP for RORO.     Current medical  illnesses: IBS, hypothyroidism, hypertension, sciatica, obesity, kidney stones, glaucoma, RORO        What medications do you take or have you taken for your headaches?   Current Preventive:   Cymbalta 30 mg daily, Lisinopril (hypertension)  Current Abortive:   Sumatriptan 50 mg       Prior Preventive:   Avoid Topamax due to history of kidney stones/glaucoma  Prior Abortive:   None     Interval updates as of 10/30/2024:    The patient reports 1 significant migraine headache since last appointment.  The patient had only taken sumatriptan once since his last appointment which seemed to completely abort the headache.  He still continues to take Cymbalta 30 mg daily for migraine prevention and low back pain.  Having no other concerning symptoms or issues at this point in time.      Review of Systems   Constitutional:  Negative for appetite change, fatigue and fever.   HENT: Negative.  Negative for hearing loss, tinnitus, trouble swallowing and voice change.    Eyes: Negative.  Negative for photophobia, pain and visual disturbance.   Respiratory: Negative.  Negative for shortness of breath.    Cardiovascular: Negative.  Negative for palpitations.   Gastrointestinal: Negative.  Negative for nausea and vomiting.   Endocrine: Negative.  Negative for cold intolerance.   Genitourinary: Negative.  Negative for dysuria, frequency and urgency.   Musculoskeletal:  Negative for back pain, gait problem, myalgias, neck pain and neck stiffness.   Skin: Negative.  Negative for rash.   Allergic/Immunologic: Negative.    Neurological:  Positive for headaches. Negative for dizziness, tremors, seizures, syncope, facial asymmetry, speech difficulty, weakness, light-headedness and numbness.   Hematological: Negative.  Does not bruise/bleed easily.   Psychiatric/Behavioral: Negative.  Negative for confusion, hallucinations and sleep disturbance.    All other systems reviewed and are negative.    I have personally reviewed the MA's review of  systems and made changes as necessary.    Medical History Reviewed by provider this encounter:  Tobacco  Allergies  Meds  Problems  Med Hx  Surg Hx  Fam Hx       Past Medical History   Past Medical History:   Diagnosis Date    Allergic     seasonal    Arthritis 2000    Back pain     Colon polyp     Disease of thyroid gland     ED (erectile dysfunction)     Headache(784.0)     HL (hearing loss) 2000    Both ears    Hypertension     Hypothyroidism     IBS (irritable bowel syndrome)     Inflammatory bowel disease     Kidney stone     Kidney stones     Migraine July 2022    Started when I stopped drinking    Obesity     Osteoarthritis     Overactive bladder     Vision loss july 2022    Occures with Migrain     Past Surgical History:   Procedure Laterality Date    APPENDECTOMY      COLONOSCOPY      HERNIA REPAIR      JOINT REPLACEMENT      ORTHOPEDIC SURGERY      PARTIAL HIP ARTHROPLASTY Right     ROTATOR CUFF REPAIR      TRIGGER FINGER RELEASE  02/2021    Left ring finger    TRIGGER POINT INJECTION       Family History   Problem Relation Age of Onset    Heart disease Father     Hypertension Father     Early death Father         Heart Attack @ 36    Cancer Mother     Alcohol abuse Mother         Dead    Suicide Attempts Mother     Stroke Maternal Aunt     Dementia Maternal Aunt     Diabetes Maternal Grandmother     Cancer Maternal Grandmother     Heart disease Sister     Hypertension Sister     Alcohol abuse Cousin         Dead    Stroke Cousin     Suicide Attempts Brother      Current Outpatient Medications on File Prior to Visit   Medication Sig Dispense Refill    azelastine (ASTELIN) 0.1 % nasal spray 2 sprays into each nostril 2 (two) times a day Use in each nostril as directed 30 mL 3    B Complex-C-Folic Acid (B-Complex/Folic Acid/Vitamin C) TBCR       Cholecalciferol (D-3-5) 125 MCG (5000 UT) capsule       cyclobenzaprine (FLEXERIL) 10 mg tablet Take 1 tablet (10 mg total) by mouth as neededfor muscle  spasms 90 tablet 0    DULoxetine (CYMBALTA) 30 mg delayed release capsule Take 1 capsule (30 mg total) by mouth daily at bedtime 90 capsule 1    Echinacea 400 MG CAPS       ezetimibe (ZETIA) 10 mg tablet Take 1 tablet (10 mg total) by mouth daily 30 tablet 0    Fiber Adult Gummies 2 g CHEW       fluticasone (FLONASE) 50 mcg/act nasal spray 2 sprays into each nostril daily 16 g 11    fluticasone (VERAMYST) 27.5 MCG/SPRAY nasal spray 2 sprays into each nostril daily 15.8 mL 3    ibuprofen (MOTRIN) 600 mg tablet Take 1 tablet (600 mg total) by mouth 2 (two) times a day as needed for mild pain or moderate pain 180 tablet 0    latanoprost (XALATAN) 0.005 % ophthalmic solution Administer 1 drop to both eyes daily      levothyroxine 200 mcg tablet Take 1 tablet (200 mcg total) by mouth daily in the early morning 100 tablet 3    lisinopril (ZESTRIL) 10 mg tablet Take 1 tablet (10 mg total) by mouth daily 100 tablet 1    Magnesium 400 MG TABS Take 1 tablet by mouth in the morning      Omega-3 1000 MG CAPS Take 1 capsule by mouth daily in the early morning      SUMAtriptan (IMITREX) 50 mg tablet Take 1 tablet (50 mg total) by mouth once as needed for migraine for up to 12 doses 12 tablet 1    tamsulosin (FLOMAX) 0.4 mg Take 1 capsule (0.4 mg total) by mouth daily with dinner 30 capsule 0    Zinc 50 MG CAPS Take 1 capsule by mouth in the morning      montelukast (SINGULAIR) 10 mg tablet Take 1 tablet (10 mg total) by mouth daily at bedtime 90 tablet 1     No current facility-administered medications on file prior to visit.     Allergies   Allergen Reactions    Levofloxacin Rash     Tolerated Cipro and Avelox (moxifloxasin).      Amoxicillin-Pot Clavulanate Abdominal Pain, Diarrhea, Dizziness and Headache    Cetirizine Diarrhea, Dizziness, Drowsiness, Headache, Lightheadedness and Palpitations      Current Outpatient Medications on File Prior to Visit   Medication Sig Dispense Refill    azelastine (ASTELIN) 0.1 % nasal spray 2  sprays into each nostril 2 (two) times a day Use in each nostril as directed 30 mL 3    B Complex-C-Folic Acid (B-Complex/Folic Acid/Vitamin C) TBCR       Cholecalciferol (D-3-5) 125 MCG (5000 UT) capsule       cyclobenzaprine (FLEXERIL) 10 mg tablet Take 1 tablet (10 mg total) by mouth as neededfor muscle spasms 90 tablet 0    DULoxetine (CYMBALTA) 30 mg delayed release capsule Take 1 capsule (30 mg total) by mouth daily at bedtime 90 capsule 1    Echinacea 400 MG CAPS       ezetimibe (ZETIA) 10 mg tablet Take 1 tablet (10 mg total) by mouth daily 30 tablet 0    Fiber Adult Gummies 2 g CHEW       fluticasone (FLONASE) 50 mcg/act nasal spray 2 sprays into each nostril daily 16 g 11    fluticasone (VERAMYST) 27.5 MCG/SPRAY nasal spray 2 sprays into each nostril daily 15.8 mL 3    ibuprofen (MOTRIN) 600 mg tablet Take 1 tablet (600 mg total) by mouth 2 (two) times a day as needed for mild pain or moderate pain 180 tablet 0    latanoprost (XALATAN) 0.005 % ophthalmic solution Administer 1 drop to both eyes daily      levothyroxine 200 mcg tablet Take 1 tablet (200 mcg total) by mouth daily in the early morning 100 tablet 3    lisinopril (ZESTRIL) 10 mg tablet Take 1 tablet (10 mg total) by mouth daily 100 tablet 1    Magnesium 400 MG TABS Take 1 tablet by mouth in the morning      Omega-3 1000 MG CAPS Take 1 capsule by mouth daily in the early morning      SUMAtriptan (IMITREX) 50 mg tablet Take 1 tablet (50 mg total) by mouth once as needed for migraine for up to 12 doses 12 tablet 1    tamsulosin (FLOMAX) 0.4 mg Take 1 capsule (0.4 mg total) by mouth daily with dinner 30 capsule 0    Zinc 50 MG CAPS Take 1 capsule by mouth in the morning      montelukast (SINGULAIR) 10 mg tablet Take 1 tablet (10 mg total) by mouth daily at bedtime 90 tablet 1     No current facility-administered medications on file prior to visit.      Social History     Tobacco Use    Smoking status: Former     Current packs/day: 0.00     Average  packs/day: 1.5 packs/day for 50.0 years (75.0 ttl pk-yrs)     Types: Cigarettes, Pipe, Cigars     Start date: 1967     Quit date: 2017     Years since quittin.8     Passive exposure: Past    Smokeless tobacco: Never    Tobacco comments:     Tobacco free   Vaping Use    Vaping status: Never Used   Substance and Sexual Activity    Alcohol use: Not Currently     Alcohol/week: 25.0 standard drinks of alcohol     Types: 25 Shots of liquor per week     Comment: Stopped 2022    Drug use: Not Currently     Frequency: 1.0 times per week     Types: Cocaine, Hashish, LSD, Marijuana     Comment: Not since High School    Sexual activity: Not Currently     Partners: Female     Birth control/protection: Male Sterilization     Objective     There were no vitals taken for this visit.    Physical Exam  Neurologic Exam    Physical Exam:                                                                 Vitals:            Constitutional:    /60 (BP Location: Left arm, Patient Position: Sitting, Cuff Size: Adult)   Pulse 80   Temp 98.2 °F (36.8 °C) (Temporal)   Wt 98 kg (216 lb)   SpO2 98%   BMI 32.84 kg/m²   BP Readings from Last 3 Encounters:   10/30/24 128/60   10/03/24 132/66   24 130/60     Pulse Readings from Last 3 Encounters:   10/30/24 80   10/03/24 73   24 83         Well developed, well nourished, well groomed. No dysmorphic features.       Psychiatric:  Normal behavior and appropriate affect        Neurological Examination:     Mental status/cognitive function:   Orientated to time, place and person. Recent and remote memory intact. Attention span and concentration as well as fund of knowledge are appropriate for age. Normal language and spontaneous speech.    Cranial Nerves:  II-visual fields full.   III, IV, VI-Pupils were equal, round, and reactive to light and accomodation. Extraocular movements were full and conjugate without nystagmus. Conjugate gaze, normal smooth pursuits, normal  saccades   V-facial sensation symmetric.    VII-facial expression symmetric, intact forehead wrinkle, strong eye closure, symmetric smile    VIII-hearing grossly intact bilaterally   IX, X-palate elevation symmetric, no dysarthria.   XI-shoulder shrug strength intact    XII-tongue protrusion midline.    Motor Exam: symmetric bulk and tone throughout, no pronator drift. Power/strength 5/5 bilateral upper and lower extremities, no atrophy, fasciculations or abnormal movements noted.   Sensory: grossly intact light touch in all extremities.   Reflexes: brachioradialis 2+, biceps 2+, knee 2+ bilaterally  Coordination: Finger nose finger intact bilaterally, no apparent dysmetria, ataxia or tremor noted  Gait: steady casual and tandem gait.      Administrative Statements   I have spent a total time of 20 minutes in caring for this patient on the day of the visit/encounter including Risks and benefits of tx options, Instructions for management, Patient and family education, Importance of tx compliance, Risk factor reductions, Impressions, Counseling / Coordination of care, Documenting in the medical record, Reviewing / ordering tests, medicine, procedures  , and Obtaining or reviewing history  .

## 2024-10-30 NOTE — PATIENT INSTRUCTIONS
Headache Calendar  Please maintain a headache calendar  Consider using phone applications such as Migraine Mayur or Migraine Diary    Headache/migraine treatment:     Rescue medications (for immediate treatment of a headache):   It is ok to take ibuprofen, acetaminophen or naproxen (Advil, Tylenol,  Aleve, Excedrin) if they help your headaches you should limit these to No more than 3 times a week to avoid medication overuse/rebound headaches.     For your more moderate to severe migraines take this medication early   - Continue sumatriptan 50 mg tabs - take one at the onset of headache. May repeat one time after 2 hours if pain has not resolved.   (Max 2 a day and 10 a month)     Prescription preventive medications for headaches/migraines   (to take every day to help prevent headaches - not to take at the time of headache):  - Continue Cymbalta 30 mg daily for chronic pain and also for migraine prevention as well    *Typically these types of medications take time until you see the benefit, although some may see improvement in days, often it may take weeks, especially if the medication is being titrated up to a beneficial level. Please contact us if there are any concerns or questions regarding the medication.     Over the counter preventive supplements for headaches/migraines (if you try, try for 3 months straight)  (to take every day to help prevent headaches - not to take at the time of headache):  There are combo pills online of these - none of which regulated by FDA and double check dosing - take appropriate dose only once a day- prevent a migraine, migravent, mind ease, migrelief   [] Magnesium 400mg daily (If any diarrhea or upset stomach, decrease dose  as tolerated)  [] Riboflavin (Vitamin B2) 400mg daily (may make your urine bright/neon yellow)  - All supplements can be purchased online    Lifestyle Recommendations:  [x] SLEEP - Maintain a regular sleep schedule: Adults need at least 7-8 hours of  uninterrupted a night. Maintain good sleep hygiene:  Going to bed and waking up at consistent times, avoiding excessive daytime naps, avoiding caffeinated beverages in the evening, avoid excessive stimulation in the evening and generally using bed primarily for sleeping.  One hour before bedtime would recommend turning lights down lower, decreasing your activity (may read quietly, listen to music at a low volume). When you get into bed, should eliminate all technology (no texting, emailing, playing with your phone, iPad or tablet in bed).  [x] HYDRATION - Maintain good hydration.  Drink  2L of fluid a day (4 typical small water bottles)  [x] DIET - Maintain good nutrition. In particular don't skip meals and try and eat healthy balanced meals regularly.  [x] TRIGGERS - Look for other triggers and avoid them: Limit caffeine to 1-2 cups a day or less. Avoid dietary triggers that you have noticed bring on your headaches (this could include aged cheese, peanuts, MSG, aspartame and nitrates).  [x] EXERCISE - physical exercise as we all know is good for you in many ways, and not only is good for your heart, but also is beneficial for your mental health, cognitive health and  chronic pain/headaches. I would encourage at the least 5 days of physical exercise weekly for at least 30 minutes.     Education and Follow-up  [x] Please call with any questions or concerns. Of course if any new concerning symptoms go to the emergency department.  [x] Follow up in 1 year with Vasu MACDONALD

## 2024-11-02 DIAGNOSIS — R39.12 BENIGN PROSTATIC HYPERPLASIA WITH WEAK URINARY STREAM: ICD-10-CM

## 2024-11-02 DIAGNOSIS — N40.1 BENIGN PROSTATIC HYPERPLASIA WITH WEAK URINARY STREAM: ICD-10-CM

## 2024-11-02 DIAGNOSIS — M19.90 ARTHRITIS: ICD-10-CM

## 2024-11-04 RX ORDER — IBUPROFEN 600 MG/1
600 TABLET, FILM COATED ORAL 2 TIMES DAILY PRN
Qty: 180 TABLET | Refills: 0 | Status: SHIPPED | OUTPATIENT
Start: 2024-11-04

## 2024-11-04 RX ORDER — TAMSULOSIN HYDROCHLORIDE 0.4 MG/1
0.4 CAPSULE ORAL
Qty: 30 CAPSULE | Refills: 5 | Status: SHIPPED | OUTPATIENT
Start: 2024-11-04

## 2024-11-05 NOTE — PRE-PROCEDURE INSTRUCTIONS
Pre-Surgery Instructions:   Medication Instructions    B Complex-C-Folic Acid (B-Complex/Folic Acid/Vitamin C) TBCR Avoid 1 week prior to surgery      Cholecalciferol (D-3-5) 125 MCG (5000 UT) capsule Avoid 1 week prior to surgery      cyclobenzaprine (FLEXERIL) 10 mg tablet Take Day of Surgery; unless usually taken at night -prn    DULoxetine (CYMBALTA) 30 mg delayed release capsule Take Day of Surgery; unless usually taken at night     Echinacea 400 MG CAPS Avoid 1 week prior to surgery      ezetimibe (ZETIA) 10 mg tablet Take Day of Surgery; unless usually taken at night     Fiber Adult Gummies 2 g CHEW Avoid 1 week prior to surgery      fluticasone (FLONASE) 50 mcg/act nasal spray Take Day of Surgery; unless usually taken at night     ibuprofen (MOTRIN) 600 mg tablet Avoid 1 week prior to surgery      latanoprost (XALATAN) 0.005 % ophthalmic solution Take Day of Surgery; unless usually taken at night     levothyroxine 200 mcg tablet Take Day of Surgery; unless usually taken at night     lisinopril (ZESTRIL) 10 mg tablet Do not take day of surgery; continue as prescribed excluding DOS     Magnesium 400 MG TABS Avoid 1 week prior to surgery      Omega-3 1000 MG CAPS Avoid 1 week prior to surgery      SUMAtriptan (IMITREX) 50 mg tablet Take Day of Surgery; unless usually taken at night     tamsulosin (FLOMAX) 0.4 mg Take Day of Surgery; unless usually taken at night - HS    Zinc 50 MG CAPS Avoid 1 week prior to surgery      Medication instructions for day surgery reviewed. Please use only a sip of water to take your instructed medications. Avoid all over the counter vitamins, supplements and NSAIDS for one week prior to surgery per anesthesia guidelines. Tylenol is ok to take as needed.     You will receive a call one business day prior to surgery with an arrival time and hospital directions. If your surgery is scheduled on a Monday, the hospital will be calling you on the Friday prior to your surgery. If you have  not heard from anyone by 8pm, please call the hospital supervisor through the hospital  at [phone]. ([place] [phone] or [place] [phone]).    Do not eat or drink anything after midnight the night before your surgery, including candy, mints, lifesavers, or chewing gum. Do not drink alcohol 24hrs before your surgery. Try not to smoke at least 24hrs before your surgery.       Follow the pre surgery showering instructions as listed in the “My Surgical Experience Booklet” or otherwise provided by your surgeon's office. Do not use a blade to shave the surgical area 1 week before surgery. It is okay to use a clean electric clippers up to 24 hours before surgery. Do not apply any lotions, creams, including makeup, cologne, deodorant, or perfumes after showering on the day of your surgery. Do not use dry shampoo, hair spray, hair gel, or any type of hair products.     No contact lenses, eye make-up, or artificial eyelashes. Remove nail polish, including gel polish, and any artificial, gel, or acrylic nails if possible. Remove all jewelry including rings and body piercing jewelry.     Wear causal clothing that is easy to take on and off. Consider your type of surgery.    Keep any valuables, jewelry, piercings at home. Please bring any specially ordered equipment (sling, braces) if indicated.    Arrange for a responsible person to drive you to and from the hospital on the day of your surgery. Please confirm the visitor policy for the day of your procedure when you receive your phone call with an arrival time.     Call the surgeon's office with any new illnesses, exposures, or additional questions prior to surgery.    Please reference your “My Surgical Experience Booklet” for additional information to prepare for your upcoming surgery.

## 2024-11-12 NOTE — H&P
KRISTEN Tovar   Nurse Practitioner  Specialty: Urology     H/P updated by me Domingo Mcclendon MD 11/13/2024-no changes  Signed     Encounter Date: 10/3/2024     Signed       Expand All Collapse All    10/3/2024          Chief Complaint   Patient presents with    Elevated PSA       Follow-up         Assessment and Plan     69 y.o. male manage by     1.  Elevated PSA  PSA trend  4.705 (9/6/2024)  5.035 (8/6/2024)  4.32 (3/11/2024)  3.0 (9/13/2023)  5.67 (8/2/2023)  2.2 (5/26/2021)  1.8 (4/9/2019)  Negative MP MRI March 2024.  Calculated prostate volume of 36 cc.  Patient has a relatively stable PSA with negative MRI in March which was reassuring.  However cannot definitively exclude possible malignancy.  After lengthy discussion, patient prefers to proceed with a transperineal ultrasound-guided biopsy of the prostate.  A case request was placed.  Technique, benefits, and risk of the procedure listed above were discussed with them today and informed written consent was obtained.  All questions and concerns regarding surgery and perioperative expectations have been addressed and answered.  No overall changes in their health since last visit.  Denies any prior complications with anesthesia.   Our surgery scheduler will call him to schedule this in the near future.      Interval HPI:     History of Present Illness  Yahir Orta is a 69 y.o. male here for follow-up evaluation of elevated PSA     Established patient was seen by me on 4/2/2024 for follow-up evaluation of elevated PSA.  Patient was originally seen in April 2019 for urinary frequency, urgency, and nocturia x 3.  He reported history of uric acid stones but nothing recently at the time.  It was thought that his symptoms were likely due to acute onset overactive bladder and CT imaging was negative for stones with normal PVRs and a small prostate on exam.  Patient was trialed on oxybutynin and recommended cystoscopy evaluation which revealed meatal  stenosis with coronal hypospadias, overactive bladder.     At baseline he denies any bothersome lower urinary tract symptoms. He does experience and intermittent weak urinary stream. His previous issues with OAB resolved years ago and he no longer takes oxybutynin. History of kidney stone with no recent stone episodes. No gross hematuria. No history of UTIs or prostatitis.     He had a elevated PSA of 5.67 in August 2023 previously 2.2 in May 2021 and 1.8 in April 2019.  Repeat PSA improved to 3.0 as of September 2023. His older brother is also being followed for an elevated PSA but no prostate cancer diagnosis. Family history of colon and lung cancer in his mother. Due to instability of his PSA I recommended a multiparametric MRI of the prostate.  This was completed on 3/26/2024 and was negative with PI-RADS category 2 scoring.  He has a calculated prostate volume of 36 cc.  Current PSA is 4.705 as of 9/6/2024.           PSA trend  4.705 (9/6/2024)  5.035 (8/6/2024)  4.32 (3/11/2024)  3.0 (9/13/2023)  5.67 (8/2/2023)  2.2 (5/26/2021)  1.8 (4/9/2019)              Review of Systems   Constitutional:  Negative for chills and fever.   HENT:  Negative for congestion and sore throat.    Respiratory:  Negative for cough and shortness of breath.    Cardiovascular:  Negative for chest pain and leg swelling.   Gastrointestinal:  Negative for abdominal pain, constipation and diarrhea.   Genitourinary:  Negative for difficulty urinating, dysuria, frequency, hematuria and urgency.   Musculoskeletal:  Negative for back pain and gait problem.   Skin:  Negative for wound.   Allergic/Immunologic: Negative for immunocompromised state.   Hematological:  Does not bruise/bleed easily.               AUA SYMPTOM SCORE       Flowsheet Row Most Recent Value   AUA SYMPTOM SCORE     How often have you had a sensation of not emptying your bladder completely after you finished urinating? 5 (P)     How often have you had to urinate again less  "than two hours after you finished urinating? 4 (P)     How often have you found you stopped and started again several times when you urinate? 5 (P)     How often have you found it difficult to postpone urination? 4 (P)     How often have you had a weak urinary stream? 5 (P)     How often have you had to push or strain to begin urination? 5 (P)     How many times did you most typically get up to urinate from the time you went to bed at night until the time you got up in the morning? 2 (P)     Quality of Life: If you were to spend the rest of your life with your urinary condition just the way it is now, how would you feel about that? 4 (P)     AUA SYMPTOM SCORE 30 (P)                  Vitals  Vitals       Vitals:     10/03/24 0735   BP: 132/66   Pulse: 73   Temp: (!) 97.3 °F (36.3 °C)   SpO2: 95%   Weight: 96.8 kg (213 lb 8 oz)   Height: 5' 8\" (1.727 m)            Physical Exam  Vitals reviewed.   Constitutional:       General: He is not in acute distress.     Appearance: Normal appearance. He is not ill-appearing or toxic-appearing.   HENT:      Head: Normocephalic and atraumatic.      Mouth/Throat:      Mouth: Mucous membranes are moist.      Pharynx: Oropharynx is clear.   Eyes:      General: No scleral icterus.     Conjunctiva/sclera: Conjunctivae normal.   Cardiovascular:      Rate and Rhythm: Normal rate and regular rhythm.      Pulses: Normal pulses.      Heart sounds: Normal heart sounds.   Pulmonary:      Effort: Pulmonary effort is normal. No respiratory distress.      Breath sounds: Normal breath sounds.   Abdominal:      General: Bowel sounds are normal.      Palpations: Abdomen is soft.      Tenderness: There is no abdominal tenderness. There is no right CVA tenderness or left CVA tenderness.      Hernia: No hernia is present.   Musculoskeletal:      Cervical back: Normal range of motion.      Right lower leg: No edema.      Left lower leg: No edema.   Skin:     General: Skin is warm and dry.      " Coloration: Skin is not jaundiced or pale.   Neurological:      General: No focal deficit present.      Mental Status: He is alert and oriented to person, place, and time. Mental status is at baseline.      Gait: Gait normal.   Psychiatric:         Mood and Affect: Mood normal.         Behavior: Behavior normal.         Thought Content: Thought content normal.         Judgment: Judgment normal.            Past History  Medical History        Past Medical History:   Diagnosis Date    Allergic       seasonal    Arthritis     Back pain      Colon polyp      Disease of thyroid gland      ED (erectile dysfunction)      Headache(784.0)      HL (hearing loss) 2000     Both ears    Hypertension      Hypothyroidism      IBS (irritable bowel syndrome)      Inflammatory bowel disease      Kidney stone      Kidney stones      Migraine 2022     Started when I stopped drinking    Obesity      Osteoarthritis      Overactive bladder      Vision loss 2022     Occures with Migrain         Social History   Social History            Socioeconomic History    Marital status: /Civil Union       Spouse name: None    Number of children: None    Years of education: None    Highest education level: None   Occupational History    None   Tobacco Use    Smoking status: Former       Current packs/day: 0.00       Average packs/day: 1.5 packs/day for 50.0 years (75.0 ttl pk-yrs)       Types: Cigarettes, Pipe, Cigars       Start date: 1967       Quit date: 2017       Years since quittin.7       Passive exposure: Past    Smokeless tobacco: Never    Tobacco comments:       Tobacco free   Vaping Use    Vaping status: Never Used   Substance and Sexual Activity    Alcohol use: Not Currently       Alcohol/week: 25.0 standard drinks of alcohol       Types: 25 Shots of liquor per week       Comment: Stopped 2022    Drug use: Not Currently       Frequency: 1.0 times per week       Types: Cocaine, Hashish, LSD, Marijuana        Comment: Not since High School    Sexual activity: Not Currently       Partners: Female       Birth control/protection: Male Sterilization   Other Topics Concern    None   Social History Narrative     Retired  and govt employee      x 2     Lives wife     Three children.                 Do you have pets? dog, cat, and fish Is pet allowed in bedroom?Yes     Are you a smoker? Former quit 2019 -  PPD X 50 yrs      Does anyone smoke in your home? No        Do you live with smokers? No     [place] frequently? No   How many times a year? N/A       Social Determinants of Health           Financial Resource Strain: Low Risk  (2023)     Overall Financial Resource Strain (CARDIA)      Difficulty of Paying Living Expenses: Not very hard   Food Insecurity: No Food Insecurity (2024)     Hunger Vital Sign      Worried About Running Out of Food in the Last Year: Never true      Ran Out of Food in the Last Year: Never true   Transportation Needs: No Transportation Needs (2024)     PRAPARE - Transportation      Lack of Transportation (Medical): No      Lack of Transportation (Non-Medical): No   Physical Activity: Not on file   Stress: Not on file   Social Connections: Not on file   Intimate Partner Violence: Not on file   Housing Stability: Low Risk  (2024)     Housing Stability Vital Sign      Unable to Pay for Housing in the Last Year: No      Number of Times Moved in the Last Year: 0      Homeless in the Last Year: No         Tobacco Use History   Social History           Tobacco Use   Smoking Status Former    Current packs/day: 0.00    Average packs/day: 1.5 packs/day for 50.0 years (75.0 ttl pk-yrs)    Types: Cigarettes, Pipe, Cigars    Start date: 1967    Quit date: 2017    Years since quittin.7    Passive exposure: Past   Smokeless Tobacco Never   Tobacco Comments     Tobacco free         Family History         Family History   Problem Relation Age of Onset     Heart disease Father      Hypertension Father      Early death Father           Heart Attack @ 36    Cancer Mother      Alcohol abuse Mother           Dead    Suicide Attempts Mother      Stroke Maternal Aunt      Dementia Maternal Aunt      Diabetes Maternal Grandmother      Cancer Maternal Grandmother      Heart disease Sister      Hypertension Sister      Alcohol abuse Cousin           Dead    Stroke Cousin      Suicide Attempts Brother              The following portions of the patient's history were reviewed and updated as appropriate allergies, current medications, past medical history, past social history, past surgical history and problem list     Imaging:     3/26/2024     MULTIPARAMETRIC MRI OF THE PROSTATE WITH AND WITHOUT CONTRAST-WITH 3-D POSTPROCESSING     INDICATION: R97.20: Elevated prostate specific antigen (PSA).     COMPARISON: None.     PSA LEVEL: 4.32 ng/mL on 3/11/2024.  PRIOR BIOPSY: Unknown.     TECHNIQUE: The following pulse sequences were obtained: Small field-of-view axial T1-weighted and multiplanar T2-weighted images; DWI axial and ADC map; large field of view axial T2 weighted images; T1w in-phase and opposed-phase axials of entire pelvis   and dynamic 3D T1w axial before and during IV contrast injection. Imaging performed on 3T MRI.     CONTRAST: Gadobutrol (Gadavist) 9 mL of Gadobutrol injection (SINGLE-DOSE)     TECHNICAL LIMITATIONS: Nondiagnostic DWI/ADC images due to image degradation by susceptibility artifacts from right hip arthroplasty.     FINDINGS:     PROSTATE:  Size: 5.1 x 3.6 x 4.0 cm = 36 cc.  Post-biopsy hemorrhage: None.  Central gland enlargement (BPH): Mild.     Focal lesions - No dominant lesion on evaluation of the T2 weighted and DCE images. The DWI/ADC sequences are degraded by susceptibility artifacts. Heterogeneous peripheral zone. PI-RADSv2.1 Category 2 - Low (clinically significant cancer unlikely).     SEMINAL VESICLES: Unremarkable     Note: Clinically  "significant cancer is defined on pathology/histology as Greenwald score greater than or equal to 7, and/or volume of greater than or equal to 0.5 mL, and/or extraprostatic extension.     URINARY BLADDER: Unremarkable.     LYMPH NODES: No pelvic lymphadenopathy.     BONES: No suspicious osseous lesion. Spinal degenerative changes. Right hip arthroplasty with susceptibility artifacts.     IMPRESSION:     1. PI-RADSv2.1 Category 2 - Low (clinically significant cancer is unlikely to be present).     2. Calculated prostate volume of   36  cc.     Results  Recent Results   No results found for this or any previous visit (from the past 1 hour(s)).   ]        Lab Results   Component Value Date     PSA 4.705 (H) 09/06/2024     PSA 5.035 (H) 08/06/2024     PSA 4.32 (H) 03/11/2024     PSA 3.0 09/13/2023            Lab Results   Component Value Date     CALCIUM 9.5 03/25/2024     K 4.2 03/25/2024     CO2 29 03/25/2024      03/25/2024     BUN 15 03/25/2024     CREATININE 1.11 03/25/2024            Lab Results   Component Value Date     WBC 7.43 03/25/2024     HGB 17.0 03/25/2024     HCT 50.7 (H) 03/25/2024     MCV 90 03/25/2024      03/25/2024         Please Note:  Voice dictation software has been used to create this document. There may be inadvertent transcriptions errors.      KRISTEN Tovar 10/03/24             Electronically signed by KRISTEN Tovar at 10/3/2024  8:05 AM         Office Visit on 10/3/2024          Note shared with patient  Additional Documentation    Vitals: /66     Pulse 73     Temp 97.3 °F (36.3 °C) Important      Ht 5' 8\" (1.727 m)     Wt 96.8 kg (213 lb 8 oz)     SpO2 95%     BMI 32.46 kg/m²     BSA 2.1 m²   Flowsheets: Patient-Reported Data   SmartForms:  [facility] PRE-CHARTING   Encounter Info: Billing Info,     History,     Allergies,     Detailed Report     Orders Placed      UA (URINE) with reflex to Scope    Case request operating room: TRANSPERINEAL ULTRASOUND GUIDED " BIOPSY PROSTATE Once  Medication Changes          Medication List  Visit Diagnoses      Elevated PSA    Benign prostatic hyperplasia with weak urinary stream  Problem List

## 2024-11-13 ENCOUNTER — ANESTHESIA EVENT (OUTPATIENT)
Dept: PERIOP | Facility: HOSPITAL | Age: 69
End: 2024-11-13
Payer: MEDICARE

## 2024-11-13 ENCOUNTER — HOSPITAL ENCOUNTER (OUTPATIENT)
Facility: HOSPITAL | Age: 69
Setting detail: OUTPATIENT SURGERY
Discharge: HOME | End: 2024-11-13
Attending: UROLOGY | Admitting: UROLOGY
Payer: MEDICARE

## 2024-11-13 ENCOUNTER — ANESTHESIA (OUTPATIENT)
Dept: PERIOP | Facility: HOSPITAL | Age: 69
End: 2024-11-13
Payer: MEDICARE

## 2024-11-13 VITALS
HEART RATE: 58 BPM | SYSTOLIC BLOOD PRESSURE: 125 MMHG | TEMPERATURE: 97.2 F | BODY MASS INDEX: 32.74 KG/M2 | WEIGHT: 216 LBS | DIASTOLIC BLOOD PRESSURE: 60 MMHG | OXYGEN SATURATION: 94 % | RESPIRATION RATE: 17 BRPM | HEIGHT: 68 IN

## 2024-11-13 DIAGNOSIS — R97.20 ELEVATED PSA: ICD-10-CM

## 2024-11-13 PROCEDURE — 55700 PR PROSTATE NEEDLE BIOPSY ANY APPROACH: CPT | Performed by: UROLOGY

## 2024-11-13 PROCEDURE — 76942 ECHO GUIDE FOR BIOPSY: CPT | Performed by: UROLOGY

## 2024-11-13 PROCEDURE — G0416 PROSTATE BIOPSY, ANY MTHD: HCPCS | Performed by: PATHOLOGY

## 2024-11-13 PROCEDURE — NC001 PR NO CHARGE: Performed by: UROLOGY

## 2024-11-13 RX ORDER — ONDANSETRON 2 MG/ML
4 INJECTION INTRAMUSCULAR; INTRAVENOUS ONCE AS NEEDED
Status: DISCONTINUED | OUTPATIENT
Start: 2024-11-13 | End: 2024-11-13 | Stop reason: HOSPADM

## 2024-11-13 RX ORDER — CEFAZOLIN SODIUM 2 G/50ML
2000 SOLUTION INTRAVENOUS ONCE
Status: COMPLETED | OUTPATIENT
Start: 2024-11-13 | End: 2024-11-13

## 2024-11-13 RX ORDER — FENTANYL CITRATE 50 UG/ML
INJECTION, SOLUTION INTRAMUSCULAR; INTRAVENOUS AS NEEDED
Status: DISCONTINUED | OUTPATIENT
Start: 2024-11-13 | End: 2024-11-13

## 2024-11-13 RX ORDER — SODIUM CHLORIDE, SODIUM LACTATE, POTASSIUM CHLORIDE, CALCIUM CHLORIDE 600; 310; 30; 20 MG/100ML; MG/100ML; MG/100ML; MG/100ML
75 INJECTION, SOLUTION INTRAVENOUS CONTINUOUS
Status: DISCONTINUED | OUTPATIENT
Start: 2024-11-13 | End: 2024-11-13 | Stop reason: HOSPADM

## 2024-11-13 RX ORDER — SODIUM CHLORIDE, SODIUM LACTATE, POTASSIUM CHLORIDE, CALCIUM CHLORIDE 600; 310; 30; 20 MG/100ML; MG/100ML; MG/100ML; MG/100ML
INJECTION, SOLUTION INTRAVENOUS CONTINUOUS PRN
Status: DISCONTINUED | OUTPATIENT
Start: 2024-11-13 | End: 2024-11-13

## 2024-11-13 RX ORDER — PROPOFOL 10 MG/ML
INJECTION, EMULSION INTRAVENOUS AS NEEDED
Status: DISCONTINUED | OUTPATIENT
Start: 2024-11-13 | End: 2024-11-13

## 2024-11-13 RX ORDER — HYDROCODONE BITARTRATE AND ACETAMINOPHEN 5; 325 MG/1; MG/1
1 TABLET ORAL EVERY 6 HOURS PRN
Refills: 0 | Status: CANCELLED | OUTPATIENT
Start: 2024-11-13

## 2024-11-13 RX ORDER — LIDOCAINE HYDROCHLORIDE 10 MG/ML
INJECTION, SOLUTION EPIDURAL; INFILTRATION; INTRACAUDAL; PERINEURAL AS NEEDED
Status: DISCONTINUED | OUTPATIENT
Start: 2024-11-13 | End: 2024-11-13

## 2024-11-13 RX ORDER — LIDOCAINE HYDROCHLORIDE 20 MG/ML
INJECTION, SOLUTION EPIDURAL; INFILTRATION; INTRACAUDAL; PERINEURAL AS NEEDED
Status: DISCONTINUED | OUTPATIENT
Start: 2024-11-13 | End: 2024-11-13 | Stop reason: HOSPADM

## 2024-11-13 RX ADMIN — LIDOCAINE HYDROCHLORIDE 50 MG: 10 INJECTION, SOLUTION EPIDURAL; INFILTRATION; INTRACAUDAL; PERINEURAL at 11:44

## 2024-11-13 RX ADMIN — PROPOFOL 120 MCG/KG/MIN: 10 INJECTION, EMULSION INTRAVENOUS at 11:46

## 2024-11-13 RX ADMIN — SODIUM CHLORIDE, SODIUM LACTATE, POTASSIUM CHLORIDE, AND CALCIUM CHLORIDE 75 ML/HR: .6; .31; .03; .02 INJECTION, SOLUTION INTRAVENOUS at 11:06

## 2024-11-13 RX ADMIN — FENTANYL CITRATE 50 MCG: 50 INJECTION INTRAMUSCULAR; INTRAVENOUS at 12:03

## 2024-11-13 RX ADMIN — CEFAZOLIN SODIUM 2000 MG: 2 SOLUTION INTRAVENOUS at 11:44

## 2024-11-13 RX ADMIN — PROPOFOL 100 MG: 10 INJECTION, EMULSION INTRAVENOUS at 11:44

## 2024-11-13 RX ADMIN — FENTANYL CITRATE 50 MCG: 50 INJECTION INTRAMUSCULAR; INTRAVENOUS at 11:44

## 2024-11-13 RX ADMIN — SODIUM CHLORIDE, SODIUM LACTATE, POTASSIUM CHLORIDE, AND CALCIUM CHLORIDE: .6; .31; .03; .02 INJECTION, SOLUTION INTRAVENOUS at 11:41

## 2024-11-13 NOTE — OP NOTE
OPERATIVE REPORT  PATIENT NAME: Yahir Orta    :  1955  MRN: [id number]  Pt Location: [facility] OR ROOM 01    SURGERY DATE: 2024    Surgeons and Role:     * Domingo Mcclendon MD - Primary    Preop Diagnosis:  Elevated PSA [R97.20], negative MRI with 36 cc gland, no previous biopsies    Post-Op Diagnosis Codes:     * Elevated PSA [R97.20] same    Procedure(s):  TRANSPERINEAL ULTRASOUND GUIDED BIOPSY PROSTATE    Specimen(s):  ID Type Source Tests Collected by Time Destination   1 : Right posterior medial Tissue Prostate TISSUE EXAM Domingo Mcclendon MD 2024 11:26 AM    2 : Right posterior lateral Tissue Prostate TISSUE EXAM Domingo Mcclendon MD 2024 11:26 AM    3 : Right anterior lateral Tissue Prostate TISSUE EXAM Domingo Mcclendon MD 2024 11:26 AM    4 : Right anterior medial Tissue Prostate TISSUE EXAM Domingo Mcclendon MD 2024 11:26 AM    5 : Right base Tissue Prostate TISSUE EXAM Domingo Mcclendon MD 2024 11:26 AM    6 : Left posterior medial Tissue Prostate TISSUE EXAM Domingo Mcclendon MD 2024 11:26 AM    7 : Left posterior lateral Tissue Prostate TISSUE EXAM Domingo Mcclendon MD 2024 11:26 AM    8 : Left anterior lateral Tissue Prostate TISSUE EXAM Domingo Mcclendon MD 2024 11:26 AM    9 : Left anterior medial Tissue Prostate TISSUE EXAM Domingo Mcclendon MD 2024 11:26 AM    10 : Left base Tissue Prostate TISSUE EXAM Domingo Mcclendon MD 2024 11:26 AM        Estimated Blood Loss:   Minimal    Drains:  * No LDAs found *    Anesthesia Type:   IV Sedation with Anesthesia    Operative Indications:  Elevated PSA [R97.20]  As above    Operative Findings:  Nothing overt      Complications:   None    Procedure and Technique:  The patient is  is here for transperineal prostate biopsy guided by biplanar transrectal ultrasound for elevated PSA. The procedure, as well as the risks of bleeding, infection, and urinary retention have been explained he gives informed consent.     The patient was brought to the  operating room and identified properly.  IV sedation was induced, and he was placed in the dorsal lithotomy position.  The perineum was shaved, a ChloraPrep scrub was used of the perineum and anal regions, and dried.  A large Tegaderm was used to tape the scrotum in a cranial direction to keep it out of the way the perineum.  A time-out was performed.  Care was taken when placing the patient in the dorsal lithotomy position to make sure all pressure points were protected.  .  I then prepared the biplanar ultrasound probe with ultrasound gel covering mostly the distal sensor, and covered it with a condom.  The precision Point kit was secured to this.   The condom was secured , and I placed the transrectal ultrasound probe into the rectum and visualized the prostate in sagittal and transverse views.  This was used with a split screen.  The perineum was anesthetized with 2% xylocaine 10 cc both superficially and deep with a spinal needle on both sides of the median raphe through the central aperture of the precision Point kit.  I then placed the introducer needle through the upper apertures to start taking the anterior portions of the peripheral zone of the prostate.  Samples were taken right anterior medial, right anterior lateral, then left anterior medial left anterior lateral.  The biopsy needle was dipped in sterile water in between each biopsy.  I then switched to the lower apertures and performed bilateral posterior lateral zone, posterior medial zone and base biopsies..  .  These were all peripheral zone biopsies.  Extra biopsies were taken in zones where the initial biopsy was suboptimal tissue.  Care was taken to try to include the entire length of the prostate as much as possible for complete coverage.   Excellent prostate tissue cores were obtained, and specimens were sent to pathology.  I withdrew the guide needle and I held pressure on the perineum for 1 minutes using a folded towel.  The perineum was  then cleansed with sterile water.    I was present for the entire procedure. and A qualified resident physician was not available.    Patient Disposition:  PACU  and hemodynamically stable             SIGNATURE: Domingo Mcclendon MD  DATE: November 14, 2024  TIME: 3:52 PM

## 2024-11-13 NOTE — ANESTHESIA POSTPROCEDURE EVALUATION
Post-Op Assessment Note    CV Status:  Stable  Pain Score: 0    Pain management: satisfactory to patient       Mental Status:  Alert and awake   Hydration Status:  Euvolemic   PONV Controlled:  Controlled   Airway Patency:  Patent     Post Op Vitals Reviewed: Yes    No anethesia notable event occurred.    Staff: Anesthesiologist           Last Filed PACU Vitals:  Vitals Value Taken Time   Temp 98.6 °F (37 °C) 11/13/24 1225   Pulse 59 11/13/24 1228   /74 11/13/24 1225   Resp 28 11/13/24 1228   SpO2 96 % 11/13/24 1228   Vitals shown include unfiled device data.    Modified Gena:  Activity: 2 (11/13/2024 12:25 PM)  Respiration: 2 (11/13/2024 12:25 PM)  Circulation: 2 (11/13/2024 12:25 PM)  Consciousness: 1 (11/13/2024 12:25 PM)  Oxygen Saturation: 2 (11/13/2024 12:25 PM)  Modified Gena Score: 9 (11/13/2024 12:25 PM)

## 2024-11-13 NOTE — ANESTHESIA PREPROCEDURE EVALUATION
Procedure:  TRANSPERINEAL ULTRASOUND GUIDED BIOPSY PROSTATE (Perineum)    Relevant Problems   CARDIO   (+) Hypercholesterolemia   (+) Hypertension   (+) Migraine      ENDO   (+) Hypothyroidism      MUSCULOSKELETAL   (+) Chronic right-sided low back pain with right-sided sciatica   (+) Localized, primary osteoarthritis of shoulder region   (+) Lumbar spondylosis   (+) Osteoarthritis of hip   (+) Sciatica of right side      NEURO/PSYCH   (+) Chronic pain syndrome   (+) Chronic right-sided low back pain with right-sided sciatica   (+) Migraine      PULMONARY   (+) RORO (obstructive sleep apnea)        Physical Exam    Airway    Mallampati score: III  TM Distance: >3 FB  Neck ROM: full     Dental    lower dentures and upper dentures    Cardiovascular      Pulmonary      Other Findings        Anesthesia Plan  ASA Score- 3     Anesthesia Type- IV sedation with anesthesia with ASA Monitors.         Additional Monitors:     Airway Plan:            Plan Factors-Exercise tolerance (METS): >4 METS.    Chart reviewed.    Patient summary reviewed.    Patient is not a current smoker.      There is medical exclusion for perioperative obstructive sleep apnea risk education.        Induction- intravenous.    Postoperative Plan-         Informed Consent- Anesthetic plan and risks discussed with patient.  I personally reviewed this patient with the CRNA. Discussed and agreed on the Anesthesia Plan with the CRNA..

## 2024-11-14 ENCOUNTER — TELEPHONE (OUTPATIENT)
Dept: UROLOGY | Facility: CLINIC | Age: 69
End: 2024-11-14

## 2024-11-14 NOTE — TELEPHONE ENCOUNTER
Post Op Note    Yahir Orta is a 69 y.o. male s/p TRANSPERINEAL ULTRASOUND GUIDED BIOPSY PROSTATE (Perineum)  performed 11/13/2024.  Yahir rOta is a patient of Dr. Dr. Mcclendon and is seen at the [place] office.     How would you rate your pain on a scale from 1 to 10, 10 being the worst pain ever? 0    Have you had a fever? No    Have your bowel movements been regular? Yes    Do you have any difficulty urinating? No    Do you have any other questions or concerns that I can address at this time?     Patient denies concerns at this time. He is urinating without difficulty. Advised patient he may notice intermittent blood in the urine, stool, or semen over the next few weeks. Patient confirmed his pathology review appointment with Dr. Mcclendon on 11/20 in [place].

## 2024-11-18 PROCEDURE — G0416 PROSTATE BIOPSY, ANY MTHD: HCPCS | Performed by: PATHOLOGY

## 2024-11-20 ENCOUNTER — OFFICE VISIT (OUTPATIENT)
Dept: UROLOGY | Facility: CLINIC | Age: 69
End: 2024-11-20
Payer: MEDICARE

## 2024-11-20 VITALS
WEIGHT: 218.3 LBS | TEMPERATURE: 97.3 F | BODY MASS INDEX: 33.08 KG/M2 | HEIGHT: 68 IN | HEART RATE: 80 BPM | DIASTOLIC BLOOD PRESSURE: 64 MMHG | OXYGEN SATURATION: 97 % | SYSTOLIC BLOOD PRESSURE: 130 MMHG

## 2024-11-20 DIAGNOSIS — N40.1 BENIGN PROSTATIC HYPERPLASIA WITH WEAK URINARY STREAM: ICD-10-CM

## 2024-11-20 DIAGNOSIS — M51.369 DEGENERATIVE LUMBAR DISC: ICD-10-CM

## 2024-11-20 DIAGNOSIS — R39.12 BENIGN PROSTATIC HYPERPLASIA WITH WEAK URINARY STREAM: ICD-10-CM

## 2024-11-20 DIAGNOSIS — N52.8 OTHER MALE ERECTILE DYSFUNCTION: ICD-10-CM

## 2024-11-20 DIAGNOSIS — G43.109 MIGRAINE WITH AURA AND WITHOUT STATUS MIGRAINOSUS, NOT INTRACTABLE: ICD-10-CM

## 2024-11-20 DIAGNOSIS — R97.20 ELEVATED PSA: Primary | ICD-10-CM

## 2024-11-20 PROCEDURE — 99213 OFFICE O/P EST LOW 20 MIN: CPT | Performed by: UROLOGY

## 2024-11-20 NOTE — PROGRESS NOTES
UROLOGY PROGRESS NOTE   [facility]  [address] [place] [address] [place]  [address]  [place] [address]  [phone]  Fax:[phone]  [email or web address]      NAME: Yahir Orta  AGE: 69 y.o. SEX: male  : 1955   MRN: [id number]    DATE: 2024  TIME: 2:33 PM    Assessment and Plan:    Elevated PSA: Negative MRI status post transperineal prostate biopsy showing completely benign no suspicious lesions or premalignant lesions.  Repeat PSA in 6 months.    ED: Cialis and Viagra have failed.Discussed shots and implants, will discuss with his wife.    BPH - very mild, no treatment needed.    Recheck U/a for protein next OV.                   Chief Complaint     Chief Complaint   Patient presents with    Follow-up     Path review       History of Present Illness   Follow-up elevated PSA status post nonfusion transperineal prostate biopsy by me 2024-completely benign.  No cancer.  He did have an MRI in 2024 which was completely negative.  36 cc gland.  Overall did well with a biopsy, little bit of hematuria afterwards but this has cleared.  He has to push a little bit to void which is normal for him.  We reviewed his other laboratory work and it all looks good.  Trace protein to 1+ protein on dipstick and we will simply recheck this in the future.  Creatinine excellent at 0.94 with GFR 83.      The following portions of the patient's history were reviewed and updated as appropriate: allergies, current medications, past family history, past medical history, past social history, past surgical history and problem list.  Past Medical History:   Diagnosis Date    Allergic     seasonal    Arthritis     Back pain     Colon polyp     CPAP (continuous positive airway pressure) dependence     Disease of thyroid gland     ED (erectile dysfunction)     Headache(784.0)     HL (hearing loss) 2000    Both ears    Hypertension     Hypothyroidism     IBS (irritable bowel syndrome)      "Inflammatory bowel disease     Kidney stone     Kidney stones     Migraine July 2022    Started when I stopped drinking    Obesity     Osteoarthritis     Overactive bladder     Sleep apnea     Vision loss july 2022    Occures with Migrain     Past Surgical History:   Procedure Laterality Date    APPENDECTOMY      COLONOSCOPY      DENTAL SURGERY      HERNIA REPAIR      umbillical    JOINT REPLACEMENT      ORTHOPEDIC SURGERY      PARTIAL HIP ARTHROPLASTY Right     KS PROSTATE NEEDLE BIOPSY ANY APPROACH N/A 11/13/2024    Procedure: TRANSPERINEAL ULTRASOUND GUIDED BIOPSY PROSTATE;  Surgeon: Domingo Mcclendon MD;  Location: [facility];  Service: Urology    ROTATOR CUFF REPAIR Left     TRIGGER FINGER RELEASE  02/2021    Left ring finger    TRIGGER POINT INJECTION         Review of Systems   Review of Systems   Genitourinary: Negative.        Active Problem List     Patient Active Problem List   Diagnosis    Erectile dysfunction    History of kidney stones    Hypercholesterolemia    Hypertension    Hypothyroidism    Localized, primary osteoarthritis of shoulder region    Osteoarthritis of hip    Coronal hypospadias    Irritable bowel syndrome with diarrhea    Obesity (BMI 35.0-39.9 without comorbidity)    Vitamin D deficiency    Glaucoma of both eyes    Chronic right-sided low back pain with right-sided sciatica    Chronic sinusitis    Allergic rhinitis    Migraine    Sciatica of right side    Chronic pain syndrome    Lumbar radiculopathy    Lumbar spondylosis    RORO (obstructive sleep apnea)    BMI 33.0-33.9,adult    Former smoker    History of gonorrhea    History of ETOH abuse    MCI (mild cognitive impairment)    Cerebral microvascular disease    Elevated PSA       Objective   /64 (Patient Position: Sitting, Cuff Size: Extra-Large)   Pulse 80   Temp (!) 97.3 °F (36.3 °C) (Temporal)   Ht 5' 8\" (1.727 m)   Wt 99 kg (218 lb 4.8 oz)   SpO2 97%   BMI 33.19 kg/m²     Physical Exam  Vitals reviewed.   Constitutional:  "      Appearance: Normal appearance.   HENT:      Head: Normocephalic and atraumatic.   Eyes:      Extraocular Movements: Extraocular movements intact.   Pulmonary:      Effort: Pulmonary effort is normal.   Musculoskeletal:         General: Normal range of motion.      Cervical back: Normal range of motion.   Skin:     Coloration: Skin is not jaundiced or pale.   Neurological:      General: No focal deficit present.      Mental Status: He is alert and oriented to person, place, and time. Mental status is at baseline.   Psychiatric:         Mood and Affect: Mood normal.         Behavior: Behavior normal.         Thought Content: Thought content normal.         Judgment: Judgment normal.             Current Medications     Current Outpatient Medications:     B Complex-C-Folic Acid (B-Complex/Folic Acid/Vitamin C) TBCR, , Disp: , Rfl:     Cholecalciferol (D-3-5) 125 MCG (5000 UT) capsule, , Disp: , Rfl:     cyclobenzaprine (FLEXERIL) 10 mg tablet, Take 1 tablet (10 mg total) by mouth as neededfor muscle spasms, Disp: 90 tablet, Rfl: 0    DULoxetine (CYMBALTA) 30 mg delayed release capsule, Take 1 capsule (30 mg total) by mouth daily at bedtime, Disp: 90 capsule, Rfl: 1    Echinacea 400 MG CAPS, , Disp: , Rfl:     ezetimibe (ZETIA) 10 mg tablet, Take 1 tablet (10 mg total) by mouth daily, Disp: 30 tablet, Rfl: 0    Fiber Adult Gummies 2 g CHEW, , Disp: , Rfl:     fluticasone (FLONASE) 50 mcg/act nasal spray, 2 sprays into each nostril daily, Disp: 16 g, Rfl: 11    ibuprofen (MOTRIN) 600 mg tablet, Take 1 tablet (600 mg total) by mouth 2 (two) times a day as needed for mild pain or moderate pain, Disp: 180 tablet, Rfl: 0    latanoprost (XALATAN) 0.005 % ophthalmic solution, Administer 1 drop to both eyes daily, Disp: , Rfl:     levothyroxine 200 mcg tablet, Take 1 tablet (200 mcg total) by mouth daily in the early morning, Disp: 100 tablet, Rfl: 3    lisinopril (ZESTRIL) 10 mg tablet, Take 1 tablet (10 mg total) by mouth  daily, Disp: 100 tablet, Rfl: 1    Magnesium 400 MG TABS, Take 1 tablet by mouth in the morning, Disp: , Rfl:     Omega-3 1000 MG CAPS, Take 1 capsule by mouth daily in the early morning, Disp: , Rfl:     SUMAtriptan (IMITREX) 50 mg tablet, Take 1 tablet (50 mg total) by mouth once as needed for migraine for up to 12 doses, Disp: 12 tablet, Rfl: 1    tamsulosin (FLOMAX) 0.4 mg, Take 1 capsule (0.4 mg total) by mouth daily with dinner, Disp: 30 capsule, Rfl: 5    Zinc 50 MG CAPS, Take 1 capsule by mouth in the morning, Disp: , Rfl:         Domingo Mcclendon MD

## 2024-11-21 RX ORDER — DULOXETIN HYDROCHLORIDE 30 MG/1
30 CAPSULE, DELAYED RELEASE ORAL
Qty: 90 CAPSULE | Refills: 1 | Status: SHIPPED | OUTPATIENT
Start: 2024-11-21 | End: 2025-05-20

## 2024-12-11 ENCOUNTER — RA CDI HCC (OUTPATIENT)
Dept: OTHER | Facility: HOSPITAL | Age: 69
End: 2024-12-11

## 2024-12-17 ENCOUNTER — OFFICE VISIT (OUTPATIENT)
Dept: INTERNAL MEDICINE CLINIC | Facility: CLINIC | Age: 69
End: 2024-12-17
Payer: MEDICARE

## 2024-12-17 VITALS
HEART RATE: 63 BPM | SYSTOLIC BLOOD PRESSURE: 130 MMHG | DIASTOLIC BLOOD PRESSURE: 70 MMHG | BODY MASS INDEX: 33.4 KG/M2 | WEIGHT: 220.4 LBS | HEIGHT: 68 IN | TEMPERATURE: 97.7 F

## 2024-12-17 DIAGNOSIS — I10 PRIMARY HYPERTENSION: Primary | ICD-10-CM

## 2024-12-17 DIAGNOSIS — Z23 ENCOUNTER FOR IMMUNIZATION: ICD-10-CM

## 2024-12-17 DIAGNOSIS — G47.33 OSA (OBSTRUCTIVE SLEEP APNEA): ICD-10-CM

## 2024-12-17 DIAGNOSIS — G89.4 CHRONIC PAIN SYNDROME: ICD-10-CM

## 2024-12-17 DIAGNOSIS — E55.9 VITAMIN D DEFICIENCY: ICD-10-CM

## 2024-12-17 DIAGNOSIS — G31.84 MCI (MILD COGNITIVE IMPAIRMENT): ICD-10-CM

## 2024-12-17 DIAGNOSIS — G43.109 MIGRAINE WITH AURA AND WITHOUT STATUS MIGRAINOSUS, NOT INTRACTABLE: ICD-10-CM

## 2024-12-17 DIAGNOSIS — K58.0 IRRITABLE BOWEL SYNDROME WITH DIARRHEA: ICD-10-CM

## 2024-12-17 DIAGNOSIS — E78.00 HYPERCHOLESTEROLEMIA: ICD-10-CM

## 2024-12-17 PROCEDURE — 99214 OFFICE O/P EST MOD 30 MIN: CPT | Performed by: INTERNAL MEDICINE

## 2024-12-17 PROCEDURE — G2211 COMPLEX E/M VISIT ADD ON: HCPCS | Performed by: INTERNAL MEDICINE

## 2024-12-17 NOTE — PATIENT INSTRUCTIONS
"Patient Education     High blood pressure in adults   The Basics   Written by the doctors and editors at [facility]   What is high blood pressure? -- High blood pressure is a condition that puts you at risk for heart attack, stroke, and kidney disease. It does not usually cause symptoms. But it can be serious.  When your doctor or nurse tells you your blood pressure, they say 2 numbers. For instance, your doctor or nurse might say that your blood pressure is \"130 over 80.\" The top number is the pressure inside your arteries when your heart is jean. The bottom number is the pressure inside your arteries when your heart is relaxed.  \"Elevated blood pressure\" is a term doctors or nurses use as a warning. People with elevated blood pressure do not yet have high blood pressure. But their blood pressure is not as low as it should be for good health.  Many experts define high, elevated, and normal blood pressure as follows:   High - Top number of 130 or above and/or bottom number of 80 or above.   Elevated - Top number between 120 and 129 and bottom number of 79 or below.   Normal - Top number of 119 or below and bottom number of 79 or below.  This information is also in the table (table 1).  How can I lower my blood pressure? -- If your doctor or nurse prescribed blood pressure medicine, the most important thing you can do is to take it. If it causes side effects, do not just stop taking it. Instead, talk to your doctor or nurse about the problems it causes. They might be able to lower your dose or switch you to another medicine. If cost is a problem, mention that, too. They might be able to put you on a less expensive medicine. Taking your blood pressure medicine can keep you from having a heart attack or stroke, and it can save your life!  Can I do anything on my own? -- You have a lot of control over your blood pressure. To lower it:   Lose weight (if you are overweight).   Choose a diet low in fat and rich in " "fruits, vegetables, and low-fat dairy products.   Eat less salt.   Do something active for at least 30 minutes a day on most days of the week.   Drink less alcohol (if you drink more than 2 alcoholic drinks per day).  It's also a good idea to get a home blood pressure meter. People who check their own blood pressure at home do better at keeping it low and can sometimes even reduce the amount of medicine they take.  All topics are updated as new evidence becomes available and our peer review process is complete.  This topic retrieved from [employer] on: Feb 26, 2024.  Topic 03238 Version 23.0  Release: 32.2.4 - C32.56  © 2024 UpToDate, Inc. and/or its affiliates. All rights reserved.  table 1: Definition of normal and high blood pressure  Level  Top number  Bottom number    High 130 or above 80 or above   Elevated 120 to 129 79 or below   Normal 119 or below 79 or below   These definitions are from the American College of Cardiology/American Heart Association. Other expert groups might use slightly different definitions.  \"Elevated blood pressure\" is a term doctor or nurses use as a warning. It means you do not yet have high blood pressure, but your blood pressure is not as low as it should be for good health.  Graphic 06201 Version 6.0  Consumer Information Use and Disclaimer   Disclaimer: This generalized information is a limited summary of diagnosis, treatment, and/or medication information. It is not meant to be comprehensive and should be used as a tool to help the user understand and/or assess potential diagnostic and treatment options. It does NOT include all information about conditions, treatments, medications, side effects, or risks that may apply to a specific patient. It is not intended to be medical advice or a substitute for the medical advice, diagnosis, or treatment of a health care provider based on the health care provider's examination and assessment of a patient's specific and unique circumstances. " Patients must speak with a health care provider for complete information about their health, medical questions, and treatment options, including any risks or benefits regarding use of medications. This information does not endorse any treatments or medications as safe, effective, or approved for treating a specific patient. UpToDate, Inc. and its affiliates disclaim any warranty or liability relating to this information or the use thereof.The use of this information is governed by the Terms of Use, available at https://www.[employer] Communicationsuwer.com/en/know/clinical-effectiveness-terms. 2024© UpToDate, Inc. and its affiliates and/or licensors. All rights reserved.  Copyright   © 2024 UpToDate, Inc. and/or its affiliates. All rights reserved.

## 2024-12-17 NOTE — PROGRESS NOTES
Name: Yahir Orta      : 1955      MRN: [id number]  Encounter Provider: Estevan Catalan DO  Encounter Date: 2024   Encounter department: [facility]  :  Assessment & Plan  Encounter for immunization         Primary hypertension         Migraine with aura and without status migrainosus, not intractable         RORO (obstructive sleep apnea)         Irritable bowel syndrome with diarrhea    Orders:    TSH, 3rd generation; Future    Chronic pain syndrome         MCI (mild cognitive impairment)    Orders:    TSH, 3rd generation; Future    BMI 33.0-33.9,adult         Hypercholesterolemia    Orders:    Lipid Panel with Direct LDL reflex; Future    Vitamin D deficiency    Orders:    Vitamin D 25 hydroxy; Future      A/P: DOing ok and will check labs. Discussed vaccines defers. Keep f/u with neuro and . Continue current treatment and RTC six months for routine.      History of Present Illness     WM RTC for f/u HTN, IBS, etc. Doing ok and no new issues.  seeing pt and negative prostate  bx. Sees neuro for the headaches.  Remains active w/o difficulty and no falls. Chronic pain and migraines are manageable. No stroke like events. RORO and IBS are controllled. MDI no worse. Abstaining from ETOH use, but did admit to falling off the wagon for a short time, but has been doing well. . Due for labs and vaccines.       Review of Systems   Constitutional:  Negative for activity change, chills, diaphoresis, fatigue and fever.   HENT: Negative.     Eyes:  Negative for visual disturbance.   Respiratory:  Negative for cough, chest tightness, shortness of breath and wheezing.    Cardiovascular:  Negative for chest pain, palpitations and leg swelling.   Gastrointestinal:  Negative for abdominal pain, constipation, diarrhea, nausea and vomiting.   Endocrine: Negative for cold intolerance and heat intolerance.   Genitourinary:  Negative for difficulty urinating, dysuria and frequency.  "  Musculoskeletal:  Negative for arthralgias, gait problem and myalgias.   Neurological:  Negative for dizziness, seizures, syncope, weakness, light-headedness and headaches.   Psychiatric/Behavioral:  Negative for confusion, dysphoric mood and sleep disturbance. The patient is not nervous/anxious.        Objective   /70   Pulse 63   Temp 97.7 °F (36.5 °C)   Ht 5' 8\" (1.727 m)   Wt 100 kg (220 lb 6.4 oz)   PF 99 L/min   BMI 33.51 kg/m²      Physical Exam  Vitals and nursing note reviewed.   Constitutional:       General: He is not in acute distress.     Appearance: Normal appearance. He is not ill-appearing.   HENT:      Head: Normocephalic and atraumatic.      Mouth/Throat:      Mouth: Mucous membranes are moist.   Eyes:      Extraocular Movements: Extraocular movements intact.      Conjunctiva/sclera: Conjunctivae normal.      Pupils: Pupils are equal, round, and reactive to light.   Neck:      Vascular: No carotid bruit.   Cardiovascular:      Rate and Rhythm: Normal rate and regular rhythm.      Heart sounds: Normal heart sounds. No murmur heard.  Pulmonary:      Effort: Pulmonary effort is normal. No respiratory distress.      Breath sounds: Normal breath sounds. No wheezing, rhonchi or rales.   Abdominal:      General: Bowel sounds are normal. There is no distension.      Palpations: Abdomen is soft.      Tenderness: There is no abdominal tenderness.   Musculoskeletal:      Cervical back: Neck supple.      Right lower leg: No edema.      Left lower leg: No edema.   Neurological:      General: No focal deficit present.      Mental Status: He is alert and oriented to person, place, and time. Mental status is at baseline.   Psychiatric:         Mood and Affect: Mood normal.         Behavior: Behavior normal.         Thought Content: Thought content normal.         Judgment: Judgment normal.         "

## 2025-01-09 ENCOUNTER — APPOINTMENT (OUTPATIENT)
Age: 70
End: 2025-01-09
Payer: MEDICARE

## 2025-01-09 DIAGNOSIS — E55.9 VITAMIN D DEFICIENCY: ICD-10-CM

## 2025-01-09 DIAGNOSIS — I10 ESSENTIAL HYPERTENSION: ICD-10-CM

## 2025-01-09 DIAGNOSIS — K58.0 IRRITABLE BOWEL SYNDROME WITH DIARRHEA: ICD-10-CM

## 2025-01-09 DIAGNOSIS — G31.84 MCI (MILD COGNITIVE IMPAIRMENT): ICD-10-CM

## 2025-01-09 DIAGNOSIS — E78.00 HYPERCHOLESTEROLEMIA: ICD-10-CM

## 2025-01-09 LAB
25(OH)D3 SERPL-MCNC: 63.1 NG/ML (ref 30–100)
CHOLEST SERPL-MCNC: 190 MG/DL (ref ?–200)
HDLC SERPL-MCNC: 50 MG/DL
LDLC SERPL CALC-MCNC: 108 MG/DL (ref 0–100)
TRIGL SERPL-MCNC: 160 MG/DL (ref ?–150)
TSH SERPL DL<=0.05 MIU/L-ACNC: 2.01 UIU/ML (ref 0.45–4.5)

## 2025-01-09 PROCEDURE — 80061 LIPID PANEL: CPT

## 2025-01-09 PROCEDURE — 82306 VITAMIN D 25 HYDROXY: CPT

## 2025-01-09 PROCEDURE — 84443 ASSAY THYROID STIM HORMONE: CPT

## 2025-01-09 PROCEDURE — 36415 COLL VENOUS BLD VENIPUNCTURE: CPT

## 2025-01-10 ENCOUNTER — RESULTS FOLLOW-UP (OUTPATIENT)
Dept: INTERNAL MEDICINE CLINIC | Facility: CLINIC | Age: 70
End: 2025-01-10

## 2025-01-10 DIAGNOSIS — E78.00 HYPERCHOLESTEROLEMIA: ICD-10-CM

## 2025-01-10 RX ORDER — LISINOPRIL 10 MG/1
10 TABLET ORAL DAILY
Qty: 100 TABLET | Refills: 1 | Status: SHIPPED | OUTPATIENT
Start: 2025-01-10

## 2025-01-13 RX ORDER — EZETIMIBE 10 MG/1
10 TABLET ORAL DAILY
Qty: 90 TABLET | Refills: 0 | Status: SHIPPED | OUTPATIENT
Start: 2025-01-13

## 2025-02-04 ENCOUNTER — TELEPHONE (OUTPATIENT)
Dept: FAMILY MEDICINE CLINIC | Facility: CLINIC | Age: 70
End: 2025-02-04

## 2025-02-04 NOTE — TELEPHONE ENCOUNTER
----- Message from Mona MARROQUIN sent at 11/20/2024  2:43 PM EST -----  Return in about 6 months (around 5/20/2025) for Recheck with Dr. Mcclendon,   Check out comments: See em 6 months with PSA

## 2025-02-10 DIAGNOSIS — R39.12 BENIGN PROSTATIC HYPERPLASIA WITH WEAK URINARY STREAM: ICD-10-CM

## 2025-02-10 DIAGNOSIS — N40.1 BENIGN PROSTATIC HYPERPLASIA WITH WEAK URINARY STREAM: ICD-10-CM

## 2025-02-11 RX ORDER — TAMSULOSIN HYDROCHLORIDE 0.4 MG/1
CAPSULE ORAL
Qty: 30 CAPSULE | Refills: 5 | Status: SHIPPED | OUTPATIENT
Start: 2025-02-11

## 2025-02-18 DIAGNOSIS — M19.90 ARTHRITIS: ICD-10-CM

## 2025-02-19 RX ORDER — IBUPROFEN 600 MG/1
600 TABLET, FILM COATED ORAL 2 TIMES DAILY PRN
Qty: 180 TABLET | Refills: 0 | Status: SHIPPED | OUTPATIENT
Start: 2025-02-19

## 2025-03-06 ENCOUNTER — TELEPHONE (OUTPATIENT)
Age: 70
End: 2025-03-06

## 2025-03-06 DIAGNOSIS — M25.50 ARTHRALGIA, UNSPECIFIED JOINT: Primary | ICD-10-CM

## 2025-03-06 NOTE — TELEPHONE ENCOUNTER
Phone call from patient asking if he can get a referral to see Rheumatology again, previously saw Keily Hayden but if there is a rheumatologist closer to his home he would prefer that. Patient states the prescription motrin is no longer helping the pain in his back and hands.

## 2025-03-07 NOTE — TELEPHONE ENCOUNTER
Pt notified of referral placement. Has appointment in April. Will call us if he needs anything else.

## 2025-04-08 ENCOUNTER — CONSULT (OUTPATIENT)
Dept: RHEUMATOLOGY | Facility: CLINIC | Age: 70
End: 2025-04-08
Payer: MEDICARE

## 2025-04-08 VITALS — BODY MASS INDEX: 33.51 KG/M2 | SYSTOLIC BLOOD PRESSURE: 124 MMHG | HEIGHT: 68 IN | DIASTOLIC BLOOD PRESSURE: 72 MMHG

## 2025-04-08 DIAGNOSIS — M15.0 PRIMARY GENERALIZED (OSTEO)ARTHRITIS: Primary | ICD-10-CM

## 2025-04-08 DIAGNOSIS — M25.50 ARTHRALGIA, UNSPECIFIED JOINT: ICD-10-CM

## 2025-04-08 DIAGNOSIS — M19.042 PRIMARY OSTEOARTHRITIS OF BOTH HANDS: ICD-10-CM

## 2025-04-08 DIAGNOSIS — M51.370 DEGENERATION OF INTERVERTEBRAL DISC OF LUMBOSACRAL REGION WITH DISCOGENIC BACK PAIN: ICD-10-CM

## 2025-04-08 DIAGNOSIS — M19.041 PRIMARY OSTEOARTHRITIS OF BOTH HANDS: ICD-10-CM

## 2025-04-08 PROCEDURE — 99204 OFFICE O/P NEW MOD 45 MIN: CPT | Performed by: INTERNAL MEDICINE

## 2025-04-08 NOTE — PROGRESS NOTES
Name: Yahir Orta      : 1955      MRN: [id number]  Encounter Provider: Ebenezer Guillermo MD  Encounter Date: 2025   Encounter department: [facility] RHEUMATOLOGY [facility]  :  Assessment & Plan  Primary generalized (osteo)arthritis  This is a 69 y/o male with clinical s/s osteoarthritis, DDD, chronic pain syndrome  Reviewed prior records per Dr Hayden  Low titer RF noted, negative CCP  Agree this is less likely rheumatoid arthritis  Reviewed osteoarthritis and provided handouts  Refer to [facility] OA  F/u pain management for non-inflammatory chronic pain  Orders:  •  Ambulatory Referral to Orthopedic Surgery; Future    Degeneration of intervertebral disc of lumbosacral region with discogenic back pain  F/u pain management   Orders:  •  Ambulatory Referral to Orthopedic Surgery; Future    Primary osteoarthritis of both hands    Orders:  •  Ambulatory Referral to Orthopedic Surgery; Future        History of Present Illness   HPI  Yahir Orta is a 70 y.o. male who presents for further f/u osteoarthritis, chronic pain, DDD, + low titer RF  History obtained from: patient    He has past medical history HTN, sleep apnea, headaches, hypothyroidism, osteoarthritis     Was last seen per Dr Hayden back in . Low titer RF with negative CCP. Was felt to have more of osteoarthritis picture    H/o subacromial bursitis s/p injections per Ortho    Chronic LBP, DDD, radiculopathy, follows with pain management here at [facility]. He has had injections in the past.    On Cymbalta and Flexeril    Was on Ibuprofen but this caused headaches    + left lateral hip pain    Chronic arthralgias base of thumb OA. Also has had trigger finger surgery at [facility]    Review of Systems  Pertinent Medical History         --------------------------------------------------------------------------------------------------------        ROS:        - for: Fevers, Chills or sweats.  No HAs or scalp tenderness.   No jaw claudication.  No acute visual or eye changes.  No dry eyes.  No auditory complaints.  No oral lesions or ulcers.  No dry mouth.  No sore throat or cough.  No chest pains or palpitations.  No shortness of breath, dyspnea on exertion or wheezing.  No hemotpysis.  No abdominal pain, GERD symptoms, diarrhea or constipation.  No urinary complaints.  No numbness, tingling or weakness.  No rashes.    All other ROS was reviewed and negative except as above         --------------------------------------------------------------------------------------------------------    Past Medical History    Past Medical History:   Diagnosis Date   • Allergic     seasonal   • Arthritis 2000   • Back pain    • Colon polyp    • CPAP (continuous positive airway pressure) dependence    • Disease of thyroid gland    • ED (erectile dysfunction)    • Headache(784.0)    • HL (hearing loss) 2000    Both ears   • Hypertension    • Hypothyroidism    • IBS (irritable bowel syndrome)    • Inflammatory bowel disease    • Kidney stone    • Kidney stones    • Migraine July 2022    Started when I stopped drinking   • Obesity    • Osteoarthritis    • Overactive bladder    • Sleep apnea    • Vision loss july 2022    Occures with Migrain           Past Surgical History    Past Surgical History:   Procedure Laterality Date   • APPENDECTOMY     • COLONOSCOPY     • DENTAL SURGERY     • HERNIA REPAIR      umbillical   • JOINT REPLACEMENT     • ORTHOPEDIC SURGERY     • PARTIAL HIP ARTHROPLASTY Right    • MD PROSTATE NEEDLE BIOPSY ANY APPROACH N/A 11/13/2024    Procedure: TRANSPERINEAL ULTRASOUND GUIDED BIOPSY PROSTATE;  Surgeon: Domingo Mcclendon MD;  Location: [facility];  Service: Urology   • ROTATOR CUFF REPAIR Left    • TRIGGER FINGER RELEASE  02/2021    Left ring finger   • TRIGGER POINT INJECTION             Family History    Family History   Problem Relation Age of Onset   • Heart disease Father    • Hypertension Father    • Early death Father          Heart Attack @ 36   • Cancer Mother    • Alcohol abuse Mother         Dead   • Suicide Attempts Mother    • Stroke Maternal Aunt    • Dementia Maternal Aunt    • Diabetes Maternal Grandmother    • Cancer Maternal Grandmother    • Heart disease Sister    • Hypertension Sister    • Alcohol abuse Cousin         Dead   • Stroke Cousin    • Suicide Attempts Brother             Social History    Social History     Tobacco Use   • Smoking status: Former     Current packs/day: 0.00     Average packs/day: 1.5 packs/day for 50.0 years (75.0 ttl pk-yrs)     Types: Cigarettes, Pipe, Cigars     Start date: 1967     Quit date: 2017     Years since quittin.2     Passive exposure: Past   • Smokeless tobacco: Never   • Tobacco comments:     Tobacco free   Vaping Use   • Vaping status: Never Used   Substance Use Topics   • Alcohol use: Yes     Alcohol/week: 14.0 standard drinks of alcohol     Types: 14 Shots of liquor per week     Comment: acacia   • Drug use: Not Currently     Frequency: 1.0 times per week     Types: Cocaine, Hashish, LSD, Marijuana     Comment: Not since High School     He is retired     Allergies    Allergies   Allergen Reactions   • Levofloxacin Rash     Tolerated Cipro and Avelox (moxifloxasin).     • Amoxicillin-Pot Clavulanate Abdominal Pain, Diarrhea, Dizziness and Headache   • Cetirizine Diarrhea, Dizziness, Drowsiness, Headache, Lightheadedness and Palpitations         Medications    Current Outpatient Medications   Medication Instructions   • B Complex-C-Folic Acid (B-Complex/Folic Acid/Vitamin C) TBCR No dose, route, or frequency recorded.   • Cholecalciferol (D-3-5) 125 MCG (5000 UT) capsule No dose, route, or frequency recorded.   • cyclobenzaprine (FLEXERIL) 10 mg tablet Take 1 tablet (10 mg total) by mouth as neededfor muscle spasms   • DULoxetine (CYMBALTA) 30 mg, Oral, Daily at bedtime   • Echinacea 400 MG CAPS No dose, route, or frequency recorded.   • ezetimibe (ZETIA) 10 mg, Oral,  "Daily   • Fiber Adult Gummies 2 g CHEW No dose, route, or frequency recorded.   • fluticasone (FLONASE) 50 mcg/act nasal spray 2 sprays, Nasal, Daily   • ibuprofen (MOTRIN) 600 mg, Oral, 2 times daily PRN   • latanoprost (XALATAN) 0.005 % ophthalmic solution 1 drop, Daily   • levothyroxine 200 mcg, Oral, Daily (early morning)   • lisinopril (ZESTRIL) 10 mg, Oral, Daily   • Magnesium 400 MG TABS 1 tablet, Daily   • Omega-3 1000 MG CAPS 1 capsule, Daily (early morning)   • SUMAtriptan (IMITREX) 50 mg, Oral, Once as needed   • tamsulosin (FLOMAX) 0.4 mg Take 1 capsule (0.4 mg total)by mouth daily with dinner   • Zinc 50 MG CAPS 1 capsule, Daily                ________________________________________________________________________    Results Review      Component      Latest Ref Rng 1/9/2025   TSH 3RD GENERATON      0.450 - 4.500 uIU/mL 2.007    VITAMIN D, 25-HYDROXY      30.0 - 100.0 ng/mL 63.1           Latest Reference Range & Units 02/23/21 10:59 03/08/21 10:35 07/09/21 07:54 03/25/24 07:13   ANTI-NUCLEAR ANTIBODY (DONNA) Negative  Negative  Negative    CYCLIC CITRULLINATED PEPTIDE ANTIBODY See comment   0.4     RHEUMATOID FACTOR Negative  Positive !   Negative   RF Quantitation (none)  10 IU/mL !      !: Data is abnormal                  Objective   /72   Ht 5' 8\" (1.727 m)   BMI 33.51 kg/m²      Physical Exam    GEN: AAO, No apparent distress.  Patient is well developed.  HEENT:  Pupils are equal, round and reactive.  Sclera are clear.  Fundoscopic exam is normal.  External ears are without lesions.  Oral pharynx is clear of ulcers or other lesions.  MMM.   NECK:  Supple.  There is no adenopathy appreciable in anterior or posterior cervical chains or supraclavicularly.  JVP is normal.    HEART: Regular rate and rhythm.  There is no appreciable murmur, gallop or rub.  LUNGS: Clear to auscultation.  ABD:  Soft, without tenderness, rebound or guarding.  No appreciable organomegally.  NEURO: Speech and " cognition are normal.  Strength is 5/5 throughout.  Tone is normal.  DTRs are 2/4 at the knees, ankles and elbows.  Gait is normal.  SKIN: There are no rashes or lesions    MUSCULOSKELETAL:   No synovitis noted        Thank you for involving me in this patient's care.        Ebenezer Guillermo MD  [facility] Rheumatology

## 2025-05-05 DIAGNOSIS — E03.9 HYPOTHYROIDISM, UNSPECIFIED TYPE: ICD-10-CM

## 2025-05-05 RX ORDER — LEVOTHYROXINE SODIUM 200 UG/1
200 TABLET ORAL
Qty: 100 TABLET | Refills: 1 | Status: SHIPPED | OUTPATIENT
Start: 2025-05-05

## 2025-05-30 ENCOUNTER — APPOINTMENT (OUTPATIENT)
Age: 70
End: 2025-05-30
Payer: MEDICARE

## 2025-05-30 DIAGNOSIS — R97.20 ELEVATED PSA: ICD-10-CM

## 2025-05-30 LAB
PSA FREE MFR SERPL: 16.49 %
PSA FREE SERPL-MCNC: 0.37 NG/ML
PSA SERPL-MCNC: 2.22 NG/ML (ref 0–4)

## 2025-05-30 PROCEDURE — 84153 ASSAY OF PSA TOTAL: CPT

## 2025-05-30 PROCEDURE — 36415 COLL VENOUS BLD VENIPUNCTURE: CPT

## 2025-05-30 PROCEDURE — 84154 ASSAY OF PSA FREE: CPT

## 2025-06-03 ENCOUNTER — APPOINTMENT (OUTPATIENT)
Dept: LAB | Facility: CLINIC | Age: 70
End: 2025-06-03
Attending: INTERNAL MEDICINE
Payer: MEDICARE

## 2025-06-03 ENCOUNTER — OFFICE VISIT (OUTPATIENT)
Dept: INTERNAL MEDICINE CLINIC | Facility: CLINIC | Age: 70
End: 2025-06-03
Payer: MEDICARE

## 2025-06-03 ENCOUNTER — RESULTS FOLLOW-UP (OUTPATIENT)
Dept: INTERNAL MEDICINE CLINIC | Facility: CLINIC | Age: 70
End: 2025-06-03

## 2025-06-03 VITALS
HEART RATE: 71 BPM | OXYGEN SATURATION: 98 % | DIASTOLIC BLOOD PRESSURE: 74 MMHG | BODY MASS INDEX: 32.58 KG/M2 | SYSTOLIC BLOOD PRESSURE: 126 MMHG | TEMPERATURE: 97.4 F | WEIGHT: 215 LBS | HEIGHT: 68 IN

## 2025-06-03 DIAGNOSIS — R73.03 PREDIABETES: ICD-10-CM

## 2025-06-03 DIAGNOSIS — R53.82 CHRONIC FATIGUE: Primary | ICD-10-CM

## 2025-06-03 DIAGNOSIS — R53.82 CHRONIC FATIGUE: ICD-10-CM

## 2025-06-03 DIAGNOSIS — E53.8 DEFICIENCY OF OTHER SPECIFIED B GROUP VITAMINS: ICD-10-CM

## 2025-06-03 DIAGNOSIS — D52.0 DIETARY FOLATE DEFICIENCY ANEMIA: ICD-10-CM

## 2025-06-03 DIAGNOSIS — F17.211 NICOTINE DEPENDENCE, CIGARETTES, IN REMISSION: ICD-10-CM

## 2025-06-03 LAB
ALBUMIN SERPL BCG-MCNC: 4.6 G/DL (ref 3.5–5)
ALP SERPL-CCNC: 67 U/L (ref 34–104)
ALT SERPL W P-5'-P-CCNC: 39 U/L (ref 7–52)
ANION GAP SERPL CALCULATED.3IONS-SCNC: 9 MMOL/L (ref 4–13)
AST SERPL W P-5'-P-CCNC: 34 U/L (ref 13–39)
BACTERIA UR QL AUTO: NORMAL /HPF
BASOPHILS # BLD AUTO: 0.02 THOUSANDS/ÂΜL (ref 0–0.1)
BASOPHILS NFR BLD AUTO: 0 % (ref 0–1)
BILIRUB SERPL-MCNC: 0.6 MG/DL (ref 0.2–1)
BILIRUB UR QL STRIP: NEGATIVE
BUN SERPL-MCNC: 15 MG/DL (ref 5–25)
CALCIUM SERPL-MCNC: 9.4 MG/DL (ref 8.4–10.2)
CHLORIDE SERPL-SCNC: 103 MMOL/L (ref 96–108)
CLARITY UR: CLEAR
CO2 SERPL-SCNC: 28 MMOL/L (ref 21–32)
COLOR UR: YELLOW
CREAT SERPL-MCNC: 0.96 MG/DL (ref 0.6–1.3)
EOSINOPHIL # BLD AUTO: 0.2 THOUSAND/ÂΜL (ref 0–0.61)
EOSINOPHIL NFR BLD AUTO: 4 % (ref 0–6)
ERYTHROCYTE [DISTWIDTH] IN BLOOD BY AUTOMATED COUNT: 13.1 % (ref 11.6–15.1)
EST. AVERAGE GLUCOSE BLD GHB EST-MCNC: 120 MG/DL
FOLATE SERPL-MCNC: 17.4 NG/ML
GFR SERPL CREATININE-BSD FRML MDRD: 80 ML/MIN/1.73SQ M
GLUCOSE SERPL-MCNC: 96 MG/DL (ref 65–140)
GLUCOSE UR STRIP-MCNC: NEGATIVE MG/DL
HBA1C MFR BLD: 5.8 %
HCT VFR BLD AUTO: 47.8 % (ref 36.5–49.3)
HGB BLD-MCNC: 16.5 G/DL (ref 12–17)
HGB UR QL STRIP.AUTO: NEGATIVE
IMM GRANULOCYTES # BLD AUTO: 0.01 THOUSAND/UL (ref 0–0.2)
IMM GRANULOCYTES NFR BLD AUTO: 0 % (ref 0–2)
KETONES UR STRIP-MCNC: NEGATIVE MG/DL
LEUKOCYTE ESTERASE UR QL STRIP: NEGATIVE
LYMPHOCYTES # BLD AUTO: 1.23 THOUSANDS/ÂΜL (ref 0.6–4.47)
LYMPHOCYTES NFR BLD AUTO: 24 % (ref 14–44)
MCH RBC QN AUTO: 31.8 PG (ref 26.8–34.3)
MCHC RBC AUTO-ENTMCNC: 34.5 G/DL (ref 31.4–37.4)
MCV RBC AUTO: 92 FL (ref 82–98)
MONOCYTES # BLD AUTO: 0.54 THOUSAND/ÂΜL (ref 0.17–1.22)
MONOCYTES NFR BLD AUTO: 11 % (ref 4–12)
NEUTROPHILS # BLD AUTO: 3.11 THOUSANDS/ÂΜL (ref 1.85–7.62)
NEUTS SEG NFR BLD AUTO: 61 % (ref 43–75)
NITRITE UR QL STRIP: NEGATIVE
NON-SQ EPI CELLS URNS QL MICRO: NORMAL /HPF
NRBC BLD AUTO-RTO: 0 /100 WBCS
PH UR STRIP.AUTO: 5.5 [PH]
PLATELET # BLD AUTO: 247 THOUSANDS/UL (ref 149–390)
PMV BLD AUTO: 10.8 FL (ref 8.9–12.7)
POTASSIUM SERPL-SCNC: 4.5 MMOL/L (ref 3.5–5.3)
PROT SERPL-MCNC: 7.4 G/DL (ref 6.4–8.4)
PROT UR STRIP-MCNC: ABNORMAL MG/DL
RBC # BLD AUTO: 5.19 MILLION/UL (ref 3.88–5.62)
RBC #/AREA URNS AUTO: NORMAL /HPF
SODIUM SERPL-SCNC: 140 MMOL/L (ref 135–147)
SP GR UR STRIP.AUTO: 1.02 (ref 1–1.03)
UROBILINOGEN UR STRIP-ACNC: <2 MG/DL
VIT B12 SERPL-MCNC: 310 PG/ML (ref 180–914)
WBC # BLD AUTO: 5.11 THOUSAND/UL (ref 4.31–10.16)
WBC #/AREA URNS AUTO: NORMAL /HPF

## 2025-06-03 PROCEDURE — 99213 OFFICE O/P EST LOW 20 MIN: CPT | Performed by: INTERNAL MEDICINE

## 2025-06-03 PROCEDURE — 36415 COLL VENOUS BLD VENIPUNCTURE: CPT

## 2025-06-03 PROCEDURE — G2211 COMPLEX E/M VISIT ADD ON: HCPCS | Performed by: INTERNAL MEDICINE

## 2025-06-03 PROCEDURE — 82746 ASSAY OF FOLIC ACID SERUM: CPT

## 2025-06-03 PROCEDURE — 84403 ASSAY OF TOTAL TESTOSTERONE: CPT

## 2025-06-03 PROCEDURE — 84402 ASSAY OF FREE TESTOSTERONE: CPT

## 2025-06-03 PROCEDURE — 82607 VITAMIN B-12: CPT

## 2025-06-03 PROCEDURE — 80053 COMPREHEN METABOLIC PANEL: CPT

## 2025-06-03 PROCEDURE — 86665 EPSTEIN-BARR CAPSID VCA: CPT

## 2025-06-03 PROCEDURE — 85025 COMPLETE CBC W/AUTO DIFF WBC: CPT

## 2025-06-03 PROCEDURE — 86663 EPSTEIN-BARR ANTIBODY: CPT

## 2025-06-03 PROCEDURE — 81001 URINALYSIS AUTO W/SCOPE: CPT

## 2025-06-03 PROCEDURE — 86664 EPSTEIN-BARR NUCLEAR ANTIGEN: CPT

## 2025-06-03 PROCEDURE — 83036 HEMOGLOBIN GLYCOSYLATED A1C: CPT

## 2025-06-03 NOTE — PROGRESS NOTES
Name: Yahir Orta      : 1955      MRN: [id number]  Encounter Provider: Estevan Catalan DO  Encounter Date: 6/3/2025   Encounter department: [facility]  :  Assessment & Plan  Nicotine dependence, cigarettes, in remission         Chronic fatigue    Orders:    CBC and differential; Future    Comprehensive metabolic panel; Future    Hemoglobin A1C; Future    UA w Reflex to Microscopic w Reflex to Culture; Future    Folate; Future    Vitamin B12; Future    EBV acute panel; Future    Testosterone, free, total; Future    Prediabetes    Orders:    Hemoglobin A1C; Future    Deficiency of other specified B group vitamins    Orders:    Folate; Future    Dietary folate deficiency anemia        Orders:    Vitamin B12; Future      A/P; Stable and VSS ok. Wt loss intentional. H and P not pointing towards a cause. DDX includes anemia, thyroid, infection, diabetes, MDD, RORO, CFS, etc. Will check labs. No s/s of AFIB and recommended EKG and defers. Continue current treatment pending the labs. RTC as schedule pending the labs.      History of Present Illness   WM with PMH of HTN, hypothyroidism, and RORO, presents c/o several months of extreme fatigue. No travel. No new meds, dietary changes, or exposures. No recent vaccines or illnesses. No fever or chills. No wt changes. Compliant with his meds. Was on CPAP in the  past, but stopped it. . Denies MDD. Recent TSH was ok. NO cough or SOB. Denies CP, SOB, palpitations, orthopnea or PND. No changes in bowel or bladder habits.       Review of Systems   Constitutional:  Positive for fatigue. Negative for activity change, chills, diaphoresis and fever.   HENT: Negative.     Eyes:  Negative for visual disturbance.   Respiratory:  Negative for cough, chest tightness, shortness of breath and wheezing.    Cardiovascular:  Negative for chest pain, palpitations and leg swelling.   Gastrointestinal:  Negative for abdominal pain, constipation, diarrhea, nausea and  "vomiting.   Endocrine: Negative for cold intolerance and heat intolerance.   Genitourinary:  Negative for difficulty urinating, dysuria and frequency.   Musculoskeletal:  Negative for arthralgias, gait problem and myalgias.   Neurological:  Negative for dizziness, seizures, syncope, weakness, light-headedness and headaches.   Psychiatric/Behavioral:  Negative for confusion, dysphoric mood and sleep disturbance. The patient is not nervous/anxious.        Objective   /74   Pulse 71   Temp (!) 97.4 °F (36.3 °C)   Ht 5' 8\" (1.727 m)   Wt 97.5 kg (215 lb)   SpO2 98%   BMI 32.69 kg/m²      Physical Exam  Vitals and nursing note reviewed.   Constitutional:       General: He is not in acute distress.     Appearance: Normal appearance. He is obese. He is not ill-appearing.   HENT:      Head: Normocephalic and atraumatic.      Mouth/Throat:      Mouth: Mucous membranes are moist.     Eyes:      Extraocular Movements: Extraocular movements intact.      Conjunctiva/sclera: Conjunctivae normal.      Pupils: Pupils are equal, round, and reactive to light.     Neck:      Vascular: No carotid bruit.     Cardiovascular:      Rate and Rhythm: Normal rate and regular rhythm.      Heart sounds: Normal heart sounds. No murmur heard.  Pulmonary:      Effort: Pulmonary effort is normal. No respiratory distress.      Breath sounds: Normal breath sounds. No wheezing, rhonchi or rales.   Abdominal:      General: There is no distension.      Palpations: Abdomen is soft.      Tenderness: There is no abdominal tenderness.     Musculoskeletal:      Cervical back: Neck supple.      Right lower leg: No edema.      Left lower leg: No edema.     Neurological:      General: No focal deficit present.      Mental Status: He is alert and oriented to person, place, and time. Mental status is at baseline.     Psychiatric:         Mood and Affect: Mood normal.         Behavior: Behavior normal.         Thought Content: Thought content normal.   "       Judgment: Judgment normal.

## 2025-06-04 LAB
EBV EA IGG SER QL IA: NEGATIVE
EBV NA IGG SER QL IA: POSITIVE
EBV VCA IGG SER QL IA: POSITIVE
EBV VCA IGM SER QL IA: NEGATIVE

## 2025-06-05 LAB
TESTOST FREE SERPL-MCNC: 12.8 PG/ML (ref 6.6–18.1)
TESTOST SERPL-MCNC: 553 NG/DL (ref 264–916)

## 2025-06-17 ENCOUNTER — OFFICE VISIT (OUTPATIENT)
Age: 70
End: 2025-06-17
Payer: MEDICARE

## 2025-06-17 VITALS — HEIGHT: 68 IN | BODY MASS INDEX: 32.58 KG/M2 | WEIGHT: 215 LBS

## 2025-06-17 DIAGNOSIS — M54.16 LUMBAR RADICULOPATHY: Primary | ICD-10-CM

## 2025-06-17 DIAGNOSIS — M51.16 LUMBAR DISC DISEASE WITH RADICULOPATHY: ICD-10-CM

## 2025-06-17 DIAGNOSIS — G89.4 CHRONIC PAIN SYNDROME: ICD-10-CM

## 2025-06-17 PROCEDURE — G2211 COMPLEX E/M VISIT ADD ON: HCPCS | Performed by: ANESTHESIOLOGY

## 2025-06-17 PROCEDURE — 99214 OFFICE O/P EST MOD 30 MIN: CPT | Performed by: ANESTHESIOLOGY

## 2025-06-17 NOTE — H&P (VIEW-ONLY)
Name: Yahir Orta      : 1955      MRN: [id number]  Encounter Provider: Rocio Reeder MD  Encounter Date: 2025   Encounter department: [facility] SPINE AND PAIN [place]  :  Assessment & Plan  Lumbar radiculopathy    Orders:    FL spine and pain procedure; Future    Lumbar disc disease with radiculopathy    Orders:    FL spine and pain procedure; Future    Chronic pain syndrome    Orders:    FL spine and pain procedure; Future    Patient is a 70-year-old male complains of low back pain and bilateral leg pain with chronic patient secondary to lumbar spondylosis, lumbar degenerative disease, lumbar radiculopathy, lumbar disc herniation presents to office for follow-up visit.  Patient presents with radiculopathy in the L2 and L3 nerve root distribution bilateral lower extremities which is off-and-on.  Patient will report cramping throbbing sensation of back which then wraparound into the thighs which she will then experience burning numbness and tingling.  Patient also has facet agenic low back pain which does limit his range of motion however at this time we will try to focus on the radicular symptoms first.  1.  Will schedule patient for a bilateral L2-L3 transfer epidural steroid injection  2.  We will provide patient physical therapy lumbar core strengthening exercise  3.  Follow-up 1 month after injection    Complete risks and benefits including bleeding, infection, tissue reaction, nerve injury and allergic reaction were discussed. The approach was demonstrated using models and literature was provided. Verbal and written consent was obtained.    My impressions and treatment recommendations were discussed in detail with the patient who verbalized understanding and had no further questions.  Discharge instructions were provided. I personally saw and examined the patient and I agree with the above discussed plan of care.    History of Present Illness     Yahir Orta is a 70 y.o. male  "who presents for a follow up office visit in regards to Shoulder Pain, Back Pain, and Leg Pain. The patient’s current symptoms include 3 out of 10 intermittent burning, numbness without particular time pattern.        Opioid contract date    Last UDS Date: No results in last 5 years                    last taken on    Review of Systems   Constitutional:  Negative for unexpected weight change.   HENT:  Negative for hearing loss.    Eyes:  Negative for visual disturbance.   Respiratory:  Negative for shortness of breath.    Cardiovascular:  Negative for leg swelling.   Gastrointestinal:  Negative for constipation.   Endocrine: Negative for polyuria.   Genitourinary:  Negative for difficulty urinating.   Musculoskeletal:  Positive for gait problem and myalgias. Negative for joint swelling.        Decreased range of motion  Joint stiffness     Skin:  Negative for rash.   Neurological:  Negative for weakness and headaches.   Psychiatric/Behavioral:  Negative for decreased concentration.    All other systems reviewed and are negative.    Medical History Reviewed by provider this encounter:     .       Objective   Ht 5' 8\" (1.727 m)   Wt 97.5 kg (215 lb)   BMI 32.69 kg/m²      Pain Score:   3  Physical Exam  Constitutional: normal, well developed, well nourished, alert, in no distress and non-toxic and no overt pain behavior. and obese  Eyes: anicteric  HEENT: grossly intact  Neck: supple, symmetric, trachea midline and no masses   Pulmonary: even and unlabored  Cardiovascular: No edema or pitting edema present  Skin: Normal without rashes or lesions and well hydrated  Psychiatric: Mood and affect appropriate  Neurologic: Cranial Nerves II-XII grossly intact  Musculoskeletal: antalgic    Lumbar/Sacral Spine examination demonstrates.  Decreased range of motion lumbar spine with pain upon: flexion, lateral rotation to the left/right, and bending to the left/right.  Bilateral lumbar paraspinals tender to palpation. Muscle " spasms noted in the lumbar area bilaterally. 4/5 lower extremity strength in all muscle groups bilaterally. Positive seated straight leg raise for bilateral lower extremities.  Sensitivity to light touch intact bilateral lower extremities. 2+ reflexes in the patella and Achilles.  No ankle clonus

## 2025-06-17 NOTE — PROGRESS NOTES
Name: Yahir Orta      : 1955      MRN: [id number]  Encounter Provider: Rocio Reeder MD  Encounter Date: 2025   Encounter department: [facility] SPINE AND PAIN [place]  :  Assessment & Plan  Lumbar radiculopathy    Orders:    FL spine and pain procedure; Future    Lumbar disc disease with radiculopathy    Orders:    FL spine and pain procedure; Future    Chronic pain syndrome    Orders:    FL spine and pain procedure; Future    Patient is a 70-year-old male complains of low back pain and bilateral leg pain with chronic patient secondary to lumbar spondylosis, lumbar degenerative disease, lumbar radiculopathy, lumbar disc herniation presents to office for follow-up visit.  Patient presents with radiculopathy in the L2 and L3 nerve root distribution bilateral lower extremities which is off-and-on.  Patient will report cramping throbbing sensation of back which then wraparound into the thighs which she will then experience burning numbness and tingling.  Patient also has facet agenic low back pain which does limit his range of motion however at this time we will try to focus on the radicular symptoms first.  1.  Will schedule patient for a bilateral L2-L3 transfer epidural steroid injection  2.  We will provide patient physical therapy lumbar core strengthening exercise  3.  Follow-up 1 month after injection    Complete risks and benefits including bleeding, infection, tissue reaction, nerve injury and allergic reaction were discussed. The approach was demonstrated using models and literature was provided. Verbal and written consent was obtained.    My impressions and treatment recommendations were discussed in detail with the patient who verbalized understanding and had no further questions.  Discharge instructions were provided. I personally saw and examined the patient and I agree with the above discussed plan of care.    History of Present Illness     Yahir Orta is a 70 y.o. male  "who presents for a follow up office visit in regards to Shoulder Pain, Back Pain, and Leg Pain. The patient’s current symptoms include 3 out of 10 intermittent burning, numbness without particular time pattern.        Opioid contract date    Last UDS Date: No results in last 5 years                    last taken on    Review of Systems   Constitutional:  Negative for unexpected weight change.   HENT:  Negative for hearing loss.    Eyes:  Negative for visual disturbance.   Respiratory:  Negative for shortness of breath.    Cardiovascular:  Negative for leg swelling.   Gastrointestinal:  Negative for constipation.   Endocrine: Negative for polyuria.   Genitourinary:  Negative for difficulty urinating.   Musculoskeletal:  Positive for gait problem and myalgias. Negative for joint swelling.        Decreased range of motion  Joint stiffness     Skin:  Negative for rash.   Neurological:  Negative for weakness and headaches.   Psychiatric/Behavioral:  Negative for decreased concentration.    All other systems reviewed and are negative.    Medical History Reviewed by provider this encounter:     .       Objective   Ht 5' 8\" (1.727 m)   Wt 97.5 kg (215 lb)   BMI 32.69 kg/m²      Pain Score:   3  Physical Exam  Constitutional: normal, well developed, well nourished, alert, in no distress and non-toxic and no overt pain behavior. and obese  Eyes: anicteric  HEENT: grossly intact  Neck: supple, symmetric, trachea midline and no masses   Pulmonary: even and unlabored  Cardiovascular: No edema or pitting edema present  Skin: Normal without rashes or lesions and well hydrated  Psychiatric: Mood and affect appropriate  Neurologic: Cranial Nerves II-XII grossly intact  Musculoskeletal: antalgic    Lumbar/Sacral Spine examination demonstrates.  Decreased range of motion lumbar spine with pain upon: flexion, lateral rotation to the left/right, and bending to the left/right.  Bilateral lumbar paraspinals tender to palpation. Muscle " spasms noted in the lumbar area bilaterally. 4/5 lower extremity strength in all muscle groups bilaterally. Positive seated straight leg raise for bilateral lower extremities.  Sensitivity to light touch intact bilateral lower extremities. 2+ reflexes in the patella and Achilles.  No ankle clonus

## 2025-07-01 ENCOUNTER — EVALUATION (OUTPATIENT)
Dept: PHYSICAL THERAPY | Facility: HOME HEALTHCARE | Age: 70
End: 2025-07-01
Payer: MEDICARE

## 2025-07-01 DIAGNOSIS — M54.16 LUMBAR RADICULOPATHY: Primary | ICD-10-CM

## 2025-07-01 DIAGNOSIS — G89.4 CHRONIC PAIN SYNDROME: ICD-10-CM

## 2025-07-01 DIAGNOSIS — M51.16 LUMBAR DISC DISEASE WITH RADICULOPATHY: ICD-10-CM

## 2025-07-01 PROCEDURE — 97110 THERAPEUTIC EXERCISES: CPT

## 2025-07-01 PROCEDURE — 97161 PT EVAL LOW COMPLEX 20 MIN: CPT

## 2025-07-01 NOTE — PROGRESS NOTES
PT Evaluation     Today's date: 2025  Patient name: Yahir Orta  : 1955  MRN: [id number]  Referring provider: Rocio Reeder MD  Dx:   Encounter Diagnosis     ICD-10-CM    1. Lumbar radiculopathy  M54.16       2. Lumbar disc disease with radiculopathy  M51.16       3. Chronic pain syndrome  G89.4           Start Time: 1004  Stop Time: 1050  Total time in clinic (min): 46 minutes    Assessment  Impairments: abnormal or restricted ROM, activity intolerance, impaired physical strength, lacks appropriate home exercise program, pain with function, weight-bearing intolerance, poor posture , participation limitations and activity limitations  Symptom irritability: moderate    Assessment details: Yahir Orta is a 70 y.o. male presenting to OP PT clinic in [place] w/ referral for lumbar disc disease, lumbar radiculopathy, and chronic pain syndrome.  Pt presents w/ increased lordosis in L/S with standing causing increased radicular sx in BLEs, once posture is corrected, pt's sx decrease.  Pt has decreased strength in core and RLE, decreased ROM in L/S mvmts, intermittent radicular sx in BLEs following L1-L2 dermatome, pain w/ prolonged standing, ambulation, stair climbing, and pain w/ functional activities.  Pt has difficulty performing ADLs and recreational activities due to above mentioned deficits.  Pt would benefit from skilled therapy to address impairments, allowing pt to perform ADLs and recreational activities w/o restriction or pain to improve QoL and return to PLOF.  PT gave pt HEP focusing on performing exercises/ activities outside of the clinic to further improve and address impairments.  Thank you for this referral!  Understanding of Dx/Px/POC: good     Prognosis: good    Goals  STG:  -Pt will improve pain from 8/10 to 5/10 at worst to allow reduced difficulty performing ADLs due to pain in 4 weeks.  -Pt will increase L/S flexion AROM from moderate to minimal restriction to allow pt  w/ improved ability to perform house chores w/ less difficulty in 4 weeks.  -Pt will increase R hip flexion strength from 4-/5 to 4/5 to allow pt w/ improved ability of performing a STS tx w/ less difficulty in 4 weeks.    LTG:  -Pt will be d/c from OP PT w/ I HEP to allow pt to continue improving upon their impairments for improved ADLs/ recreational activities in 12 weeks.  -Pt will improve LS radicular sx from intermittent in BLEs to abolished to allow improved ability to perform ADLs/ recreational activities w/o need for a rest break in 12 weeks.  -Pt will improve FOTO score to at or above predicted level in 12 weeks.     Plan  Patient would benefit from: skilled physical therapy  Planned modality interventions: thermotherapy: hydrocollator packs, cryotherapy and neuromuscular electric stimulation    Planned therapy interventions: abdominal trunk stabilization, manual therapy, massage, neuromuscular re-education, postural training, therapeutic activities, therapeutic exercise, home exercise program, functional ROM exercises, flexibility and balance/weight bearing training    Frequency: 2x week  Duration in weeks: 12  Plan of Care beginning date: 2025  Plan of Care expiration date: 2025  Treatment plan discussed with: patient  Plan details: Begin POC focusing on addressing & improving impairments listed in eval.        Subjective Evaluation    History of Present Illness  Mechanism of injury: Pt states he has LBP w/ radicular sx in BLEs (R>L) that is intermittent.  Pt states that he is able to functionally perform all tasks, however pt states that he has increased radicular sx in anterior thighs w/ prolonged standing and ambulating.  Pt has injection scheduled w/ pain management next week for L/S.  Patient Goals  Patient goals for therapy: decreased pain, improved balance, increased motion and return to sport/leisure activities    Pain  Current pain rating: 3  At best pain ratin  At worst pain rating:  8  Quality: burning, dull ache, radiating and needle-like  Aggravating factors: standing, walking, stair climbing and lifting    Social Support    Employment status: not working  Treatments  Previous treatment: physical therapy  Current treatment: injection treatment and physical therapy        Objective     Static Posture     Lumbar Spine   Increased lordosis.     Postural Observations  Seated posture: fair  Standing posture: fair  Correction of posture: makes symptoms worse      Neurological Testing     Sensation     Lumbar   Left   Diminished: light touch    Right   Diminished: light touch    Active Range of Motion     Lumbar   Flexion:  with pain Restriction level: moderate  Extension:  with pain Restriction level: maximal  Left lateral flexion:  Restriction level: moderate  Right lateral flexion:  Restriction level: moderate  Left Hip   Normal active range of motion    Right Hip   Normal active range of motion  Mechanical Assessment    Cervical      Thoracic    Seated flexion: sustained positions  Pain location: centralized  Pain intensity: better  Pain level: abolished  Seated extension: sustained positions  Pain intensity: worse  Pain level: increased    Lumbar    Standing flexion: sustained positions   Pain location:centralized  Pain intensity: better  Pain level: abolished    Strength/Myotome Testing     Left Hip   Planes of Motion   Flexion: 4+  Abduction: 4+  Adduction: 4+    Right Hip   Planes of Motion   Flexion: 4-  Abduction: 4+  Adduction: 4+    Left Knee   Flexion: 4+  Extension: 4+    Right Knee   Flexion: 4+  Extension: 4+    Tests     Lumbar     Left   Negative crossed SLR and passive SLR.     Right   Negative crossed SLR and passive SLR.     Left Hip   Negative piriformis.     Right Hip   Negative piriformis.              Precautions: R SHELDON 11 yrs ago      Visit Count 1           Manuals 7/1           BLE Hip Stretching (Greg, JAD, UMAIR)                                                       Neuro  "Re-Ed             Supine PPT             Supine PPT w/ Physioball roll             Supine PPT w/ Marches             PPT w/ OH lift                                                                                                 Ther Ex             *Postural Education*             NuStep             MTP/ LTP             Palloff Press             SLR 3 way             Mini Squats             Standing Marches w/ PPT                                         Seated Physioball roll outs fwd & R             Hip ADD w/ Ball seated                           Supine LTR 5\" x10 ea R/L    HEP                      Self HS stretch 30\" x4 ea b/l    HEP             Self DKTC chest HEP    30\" x4            Self QL stretch seated            Self Piriformis Stretch                          Ther Activity                                         Gait Training                                         Modalities             HP on LS                              Access Code: [email or web address]  URL: [email or web address]  Date: 07/01/2025  Prepared by: Edward Herrera    Exercises  - Supine Lower Trunk Rotation  - 1 x daily - 7 x weekly - 3 sets - 10 reps  - Supine Pelvic Tilt  - 1 x daily - 7 x weekly - 3 sets - 10 reps  - Seated Hamstring Stretch  - 1 x daily - 7 x weekly - 3 sets - 10 reps  "

## 2025-07-02 ENCOUNTER — OFFICE VISIT (OUTPATIENT)
Dept: OBGYN CLINIC | Facility: CLINIC | Age: 70
End: 2025-07-02
Payer: MEDICARE

## 2025-07-02 ENCOUNTER — APPOINTMENT (OUTPATIENT)
Dept: RADIOLOGY | Facility: MEDICAL CENTER | Age: 70
End: 2025-07-02
Attending: STUDENT IN AN ORGANIZED HEALTH CARE EDUCATION/TRAINING PROGRAM
Payer: MEDICARE

## 2025-07-02 VITALS
HEIGHT: 68 IN | OXYGEN SATURATION: 97 % | RESPIRATION RATE: 16 BRPM | WEIGHT: 205 LBS | HEART RATE: 64 BPM | TEMPERATURE: 97.2 F | BODY MASS INDEX: 31.07 KG/M2

## 2025-07-02 DIAGNOSIS — M18.12 PRIMARY OSTEOARTHRITIS OF FIRST CARPOMETACARPAL JOINT OF LEFT HAND: Primary | ICD-10-CM

## 2025-07-02 PROCEDURE — 99204 OFFICE O/P NEW MOD 45 MIN: CPT | Performed by: STUDENT IN AN ORGANIZED HEALTH CARE EDUCATION/TRAINING PROGRAM

## 2025-07-02 PROCEDURE — 20600 DRAIN/INJ JOINT/BURSA W/O US: CPT | Performed by: STUDENT IN AN ORGANIZED HEALTH CARE EDUCATION/TRAINING PROGRAM

## 2025-07-02 PROCEDURE — 73130 X-RAY EXAM OF HAND: CPT

## 2025-07-02 RX ORDER — BETAMETHASONE SODIUM PHOSPHATE AND BETAMETHASONE ACETATE 3; 3 MG/ML; MG/ML
3 INJECTION, SUSPENSION INTRA-ARTICULAR; INTRALESIONAL; INTRAMUSCULAR; SOFT TISSUE
Status: COMPLETED | OUTPATIENT
Start: 2025-07-02 | End: 2025-07-02

## 2025-07-02 RX ORDER — LIDOCAINE HYDROCHLORIDE 10 MG/ML
1 INJECTION, SOLUTION INFILTRATION; PERINEURAL
Status: COMPLETED | OUTPATIENT
Start: 2025-07-02 | End: 2025-07-02

## 2025-07-02 RX ADMIN — LIDOCAINE HYDROCHLORIDE 1 ML: 10 INJECTION, SOLUTION INFILTRATION; PERINEURAL at 07:15

## 2025-07-02 RX ADMIN — BETAMETHASONE SODIUM PHOSPHATE AND BETAMETHASONE ACETATE 3 MG: 3; 3 INJECTION, SUSPENSION INTRA-ARTICULAR; INTRALESIONAL; INTRAMUSCULAR; SOFT TISSUE at 07:15

## 2025-07-02 NOTE — ASSESSMENT & PLAN NOTE
70 y.o. male presents with signs and symptoms consistent with the above diagnosis.        We discussed the natural history of this condition and its pathogenesis.  We discussed operative and nonoperative treatment options.    The patient was previously started on treatment for his CMC arthritis at [facility] with bracing but has had a progression of his chronic disease with exacerbation of pain.     The anatomy and physiology of carpometacarpal joint arthritis was discussed with the patient today in the office.  Deterioration of the articular cartilage eventually leads to hypermobility at the thumb CMC joint, resulting in joint subluxation, osteophyte formation, cystic changes within the trapezium and base of the first metacarpal, as well as subchondral sclerosis.  Eventually, pain, limited mobility, and compensatory hyperextension at the metacarpophalangeal joint may develop.  While normal activity and usage of the thumb joint may provide a painful experience to the patient, this typically does not result in damage to the thumb or hand.  Treatment options include resting thumb spica splints to decreased joint edema, pain, and inflammation.  Therapy exercises to strengthen the thenar musculature may relieve pain, but do not alter the overall continued development of osteoarthritis.  Oral medications, topical medications, corticosteroid injections may decrease pain and increase overall function.  Eventually, approximately 5% of patients may require surgical intervention.                                                                                                                                                                                                 He was offered and accepted an injection(s) of celestone and marcaine to his Left thumb(s) for symptomatic relief of pain and inflammation. Patient tolerated the treatment(s) well. Ice and post injection protocol advised. Weightbearing activities as tolerated. He will  be seen for follow-up in 3 months for re-evaluation. Patient expresses understanding and is in agreement with this treatment plan. The patient was given the opportunity to ask questions or present concerns.    Today was his first injection in the CMC.     I reviewed, independently interpreted and discussed the xrays of the bilateral hands with the patient today    He will follow up as needed    Orders:    XR hand 3+ vw right    XR hand 3+ vw left    Small joint arthrocentesis: L thumb CMC

## 2025-07-02 NOTE — PROGRESS NOTES
ASSESSMENT/PLAN:    Assessment & Plan  Primary osteoarthritis of first carpometacarpal joint of left hand  70 y.o. male presents with signs and symptoms consistent with the above diagnosis.        We discussed the natural history of this condition and its pathogenesis.  We discussed operative and nonoperative treatment options.    The patient was previously started on treatment for his CMC arthritis at [facility] with bracing but has had a progression of his chronic disease with exacerbation of pain.     The anatomy and physiology of carpometacarpal joint arthritis was discussed with the patient today in the office.  Deterioration of the articular cartilage eventually leads to hypermobility at the thumb CMC joint, resulting in joint subluxation, osteophyte formation, cystic changes within the trapezium and base of the first metacarpal, as well as subchondral sclerosis.  Eventually, pain, limited mobility, and compensatory hyperextension at the metacarpophalangeal joint may develop.  While normal activity and usage of the thumb joint may provide a painful experience to the patient, this typically does not result in damage to the thumb or hand.  Treatment options include resting thumb spica splints to decreased joint edema, pain, and inflammation.  Therapy exercises to strengthen the thenar musculature may relieve pain, but do not alter the overall continued development of osteoarthritis.  Oral medications, topical medications, corticosteroid injections may decrease pain and increase overall function.  Eventually, approximately 5% of patients may require surgical intervention.                                                                                                                                                                                                 He was offered and accepted an injection(s) of celestone and marcaine to his Left thumb(s) for symptomatic relief of pain and inflammation. Patient tolerated the  treatment(s) well. Ice and post injection protocol advised. Weightbearing activities as tolerated. He will be seen for follow-up in 3 months for re-evaluation. Patient expresses understanding and is in agreement with this treatment plan. The patient was given the opportunity to ask questions or present concerns.    Today was his first injection in the CMC.     I reviewed, independently interpreted and discussed the xrays of the bilateral hands with the patient today    He will follow up as needed    Orders:    XR hand 3+ vw right    XR hand 3+ vw left    Small joint arthrocentesis: L thumb CMC         1. Primary osteoarthritis of first carpometacarpal joint of left hand  XR hand 3+ vw right    XR hand 3+ vw left    Small joint arthrocentesis: L thumb CMC        _____________________________________________________  CHIEF COMPLAINT:  Bilateral Hand Pain    Referred By:  Estevan Catalan Do  [address] [place] [address]    SUBJECTIVE:  Yahir Orta is a 70 y.o. right hand dominant male presenting for evaluation of bilateral hands. Notes he has had general aches and pains in his hands for years. He states his left thumb at the base is painful. He has tried bracing and OTC NSAIDS which was started at . He also has a history of a left ring trigger finger. He will occasionally drop objects. He does have trouble opening tight jars. Here to establish care. Denies numbness or tingling.    ADLs: Community ambulator  Smoke: No   ETOH: yes   Drugs:  No  Job: Retired       Occupation/hobbies: Gardening, Cooking      PAST MEDICAL HISTORY:  Past Medical History[1]    PAST SURGICAL HISTORY:  Past Surgical History[2]    FAMILY HISTORY:  Family History[3]    SOCIAL HISTORY:  Social History[4]    MEDICATIONS:  Current Medications[5]    ALLERGIES:  Allergies[6]    REVIEW OF SYSTEMS:  Pertinent items are noted in HPI.  A comprehensive review of systems was negative.    LABS:  HgA1c:   Lab Results   Component  "Value Date    HGBA1C 5.8 (H) 06/03/2025     BMP:   Lab Results   Component Value Date    CALCIUM 9.4 06/03/2025    K 4.5 06/03/2025    CO2 28 06/03/2025     06/03/2025    BUN 15 06/03/2025    CREATININE 0.96 06/03/2025         _____________________________________________________  PHYSICAL EXAMINATION:  Pulse 64   Temp (!) 97.2 °F (36.2 °C) (Temporal)   Resp 16   Ht 5' 8\" (1.727 m)   Wt 93 kg (205 lb)   SpO2 97%   BMI 31.17 kg/m²     General: Well developed and well nourished, alert & oriented x 3, appears comfortable   Psychiatric: Normal   HEENT: Normocephalic, Atraumatic Trachea Midline, No torticollis   Pulmonary: No audible wheezing or respiratory distress   Abdomen/GI: Non tender, non distended   Cardiovascular: No pitting edema, 2+ radial pulse     Cervcial Spine: Negative Spurling's    MUSCULOSKELETAL EXAMINATION:  Bilateral Hands   Skin intact  No soft tissue swelling  Left Thumb- Tenderness at CMC. Palpable crepitus. Traction Shift test positive. Finkelstein negative. No tenderness at the first dorsal compartment. He has 10 degrees of hyperextension of MP joint of thumb. Shoulder sign present.      Range of Motion:  Elbow: extension/flexion 0/140  Forearm: pronation/supination 80/80  Wrist: extension/flexion 70/70  Digit: full AROM in DIP, PIP, MP joints  Neurovascular:  Motor Exam: firing AIN/PIN/U. 6/5 motor   Sensory Exam: Sensation intact to light touch in FDWS (radial), volar IF (median), volar SF (ulnar)  Vascular Exam: < 2 sec capillary refill     _____________________________________________________  STUDIES REVIEWED:  Radiographs: I personally reviewed and independently interpreted the available radiographs.  7/2/2025: Radiographs of the bilateral hands, multiple views, demonstrate moderate degenerative changes throughout the bilateral hands with significant CMC arthritis bilaterally.     Procedure:  Small joint arthrocentesis: L thumb CMC    Performed by: Raul Roberson MD  Authorized " by: Raul Roberson MD    Universal Protocol:  procedure performed by consultantConsent: Verbal consent obtained  Risks and benefits: risks, benefits and alternatives were discussed  Consent given by: patient  Timeout called at: 7/2/2025 7:29 AM.  Patient understanding: patient states understanding of the procedure being performed  Patient consent: the patient's understanding of the procedure matches consent given  Site marked: the operative site was marked  Radiology Images displayed and confirmed. If images not available, report reviewed: imaging studies available  Patient identity confirmed: verbally with patient  Supporting Documentation  Indications: pain and joint swelling   Procedure Details  Location: thumb - L thumb CMC  Needle size: 25 G  Ultrasound guidance: no  Approach: lateral  Medications administered: 1 mL lidocaine 1 %; 3 mg betamethasone acetate-betamethasone sodium phosphate 6 (3-3) mg/mL    Patient tolerance: patient tolerated the procedure well with no immediate complications  Dressing:  Sterile dressing applied          Raul Roberson MD  Hand and Upper Extremity Surgery        *This note was dictated using Dragon voice recognition software. Please excuse any word substitutions or errors.*       Scribe Attestation      I,:  Essie Glez am acting as a scribe while in the presence of the attending physician.:       I,:  Raul Roberson MD personally performed the services described in this documentation    as scribed in my presence.:                  [1]   Past Medical History:  Diagnosis Date    Allergic     seasonal    Arthritis 2000    Back pain     Colon polyp     CPAP (continuous positive airway pressure) dependence     Disease of thyroid gland     ED (erectile dysfunction)     Headache(784.0)     HL (hearing loss) 2000    Both ears    Hypertension     Hypothyroidism     IBS (irritable bowel syndrome)     Inflammatory bowel disease     Kidney stone     Kidney stones     Migraine July 2022     Started when I stopped drinking    Obesity     Osteoarthritis     Overactive bladder     Sleep apnea     Vision loss 2022    Occures with Migrain   [2]   Past Surgical History:  Procedure Laterality Date    APPENDECTOMY      COLONOSCOPY      DENTAL SURGERY      HERNIA REPAIR      umbillical    JOINT REPLACEMENT      ORTHOPEDIC SURGERY      PARTIAL HIP ARTHROPLASTY Right     MO PROSTATE NEEDLE BIOPSY ANY APPROACH N/A 2024    Procedure: TRANSPERINEAL ULTRASOUND GUIDED BIOPSY PROSTATE;  Surgeon: Domingo Mcclendon MD;  Location: [facility];  Service: Urology    ROTATOR CUFF REPAIR Left     TRIGGER FINGER RELEASE  2021    Left ring finger    TRIGGER POINT INJECTION     [3]   Family History  Problem Relation Name Age of Onset    Heart disease Father Kendall Orta     Hypertension Father Kendall Orta     Early death Father Kendall Orta         Heart Attack @ 36    Cancer Mother Estrella Orta     Alcohol abuse Mother Estrella Orta         Dead    Suicide Attempts Mother Estrella Orta     Stroke Maternal Aunt Ramona Gil     Dementia Maternal Aunt Carol Cruz     Diabetes Maternal Grandmother Unknown Wolek     Cancer Maternal Grandmother Unknown [place]olek     Heart disease Sister Germaine Card     Hypertension Sister Germaine Card     Alcohol abuse Cousin Simon Cruz         Dead    Stroke Cousin Sasha Goldmanta     Suicide Attempts Brother Kendall Orta    [4]   Social History  Tobacco Use    Smoking status: Former     Current packs/day: 0.00     Average packs/day: 1.5 packs/day for 50.0 years (75.0 ttl pk-yrs)     Types: Cigarettes, Pipe, Cigars     Start date: 1967     Quit date: 2017     Years since quittin.5     Passive exposure: Past    Smokeless tobacco: Never    Tobacco comments:     Tobacco free   Vaping Use    Vaping status: Never Used   Substance Use Topics    Alcohol use: Yes     Alcohol/week: 14.0 standard drinks of alcohol     Types: 14 Shots of liquor per week      Comment: dailly    Drug use: Not Currently     Frequency: 1.0 times per week     Types: Cocaine, Hashish, LSD, Marijuana     Comment: Not since High School   [5]   Current Outpatient Medications:     B Complex-C-Folic Acid (B-Complex/Folic Acid/Vitamin C) TBCR, , Disp: , Rfl:     Cholecalciferol (D-3-5) 125 MCG (5000 UT) capsule, , Disp: , Rfl:     cyclobenzaprine (FLEXERIL) 10 mg tablet, Take 1 tablet (10 mg total) by mouth as neededfor muscle spasms, Disp: 90 tablet, Rfl: 0    DULoxetine (CYMBALTA) 30 mg delayed release capsule, Take 1 capsule (30 mg total) by mouth daily at bedtime, Disp: 90 capsule, Rfl: 1    Echinacea 400 MG CAPS, , Disp: , Rfl:     ezetimibe (ZETIA) 10 mg tablet, Take 1 tablet (10 mg total) by mouth daily, Disp: 90 tablet, Rfl: 0    fluticasone (FLONASE) 50 mcg/act nasal spray, 2 sprays into each nostril daily, Disp: 16 g, Rfl: 11    latanoprost (XALATAN) 0.005 % ophthalmic solution, Administer 1 drop to both eyes in the morning., Disp: , Rfl:     levothyroxine 200 mcg tablet, Take 1 tablet (200 mcg total) by mouth daily in the early morning, Disp: 100 tablet, Rfl: 1    lisinopril (ZESTRIL) 10 mg tablet, Take 1 tablet (10 mg total) by mouth daily, Disp: 100 tablet, Rfl: 1    Magnesium 400 MG TABS, Take 1 tablet by mouth in the morning, Disp: , Rfl:     Omega-3 1000 MG CAPS, Take 1 capsule by mouth daily in the early morning, Disp: , Rfl:     SUMAtriptan (IMITREX) 50 mg tablet, Take 1 tablet (50 mg total) by mouth once as needed for migraine for up to 12 doses, Disp: 12 tablet, Rfl: 1    tamsulosin (FLOMAX) 0.4 mg, Take 1 capsule (0.4 mg total)by mouth daily with dinner, Disp: 30 capsule, Rfl: 5    Zinc 50 MG CAPS, Take 1 capsule by mouth in the morning, Disp: , Rfl:   [6]   Allergies  Allergen Reactions    Levofloxacin Rash     Tolerated Cipro and Avelox (moxifloxasin).      Amoxicillin-Pot Clavulanate Abdominal Pain, Diarrhea, Dizziness and Headache    Acetaminophen Blisters, Diarrhea,  Headache, Hives, Itching and Rash    Cetirizine Diarrhea, Dizziness, Drowsiness, Headache, Lightheadedness and Palpitations    Diphenhydramine-Apap (Sleep) Blisters, Headache, Itching, Rash and Tachycardia

## 2025-07-08 ENCOUNTER — OFFICE VISIT (OUTPATIENT)
Dept: PHYSICAL THERAPY | Facility: HOME HEALTHCARE | Age: 70
End: 2025-07-08
Attending: ANESTHESIOLOGY
Payer: MEDICARE

## 2025-07-08 DIAGNOSIS — M54.16 LUMBAR RADICULOPATHY: Primary | ICD-10-CM

## 2025-07-08 DIAGNOSIS — M51.16 LUMBAR DISC DISEASE WITH RADICULOPATHY: ICD-10-CM

## 2025-07-08 DIAGNOSIS — G89.4 CHRONIC PAIN SYNDROME: ICD-10-CM

## 2025-07-08 DIAGNOSIS — I10 ESSENTIAL HYPERTENSION: ICD-10-CM

## 2025-07-08 DIAGNOSIS — M62.838 MUSCLE SPASM: ICD-10-CM

## 2025-07-08 DIAGNOSIS — E78.00 HYPERCHOLESTEROLEMIA: ICD-10-CM

## 2025-07-08 PROCEDURE — 97112 NEUROMUSCULAR REEDUCATION: CPT

## 2025-07-08 PROCEDURE — 97110 THERAPEUTIC EXERCISES: CPT

## 2025-07-08 NOTE — PROGRESS NOTES
"Daily Note     Today's date: 2025  Patient name: Yahir Orta  : 1955  MRN: [id number]  Referring provider: Rocio Reeder MD  Dx:   Encounter Diagnosis     ICD-10-CM    1. Lumbar radiculopathy  M54.16       2. Lumbar disc disease with radiculopathy  M51.16       3. Chronic pain syndrome  G89.4           Start Time: 743  Stop Time: 835  Total time in clinic (min): 52 minutes    Subjective: Pt states that he has roughly the same radicular sx in the anterior thigh, soreness in L/S post-PPT, PT explained that soreness is common due to core directly impacting L/S.  Pt states that he has been performing stretching that has made the LBP decrease and feel good.        Objective: See treatment diary below      Assessment: Pt tolerated treatment session w/o increase in LBP.  Pt was able to perform increased core exercises w/ a neutral spine position.  PT added dynamic mvmts to pt's core exercises challenging pt's core stability.  PT performed manual stretching at end of session, pt w/ increased tightness in RLE compared to LLE.   PT to monitor pt's LBP and post-session soreness when pt presents to NV.       Plan: Continue per plan of care.      Precautions: R SHELDON 11 yrs ago      Visit Count 1 2          Manuals          BLE Hip Stretching (Greg, JAD, KTC)    DF  B/L Piri, HS                                                   Neuro Re-Ed             Supine PPT  5\" x10  5\" x10           Supine PPT w/ Physioball roll             Supine PPT w/ Marches    Contract, BLE march, relax    10x ea           PPT w/ OH lift    Red Medball 5x    Yellow Medball 5x           PPT w/ OH Lift & March  Combination of both    5x Red medball    5x yellow medball         MTP/ LTP    Blue    3\" hold x15 ea            Pallof Press    Blue    5\" x10 ea R/L           PPT w/ Medball trunk rotation   Seated    Yellow Medball     10x ea dir R/L                                                     Ther Ex             *Postural " "Education*             NuStep    L6 8'         SLR 3 way             Mini Squats             Standing Marches w/ PPT              Squats    TA hold w/ eccentric lowering    10x                         Seated Physioball roll outs fwd & R             Hip ADD w/ Ball seated                           Supine LTR 5\" x10 ea R/L    HEP    5\" x10 ea R/L                  Self HS stretch 30\" x4 ea b/l    HEP             Self DKTC chest HEP    30\" x4            Self QL stretch seated            Self Piriformis Stretch                          Ther Activity                                         Gait Training                                         Modalities             HP on LS                                "

## 2025-07-09 RX ORDER — EZETIMIBE 10 MG/1
10 TABLET ORAL DAILY
Qty: 90 TABLET | Refills: 1 | Status: SHIPPED | OUTPATIENT
Start: 2025-07-09

## 2025-07-09 RX ORDER — CYCLOBENZAPRINE HCL 10 MG
TABLET ORAL
Qty: 90 TABLET | Refills: 0 | Status: SHIPPED | OUTPATIENT
Start: 2025-07-09

## 2025-07-09 RX ORDER — LISINOPRIL 10 MG/1
10 TABLET ORAL DAILY
Qty: 100 TABLET | Refills: 1 | Status: SHIPPED | OUTPATIENT
Start: 2025-07-09

## 2025-07-10 ENCOUNTER — OFFICE VISIT (OUTPATIENT)
Dept: PHYSICAL THERAPY | Facility: HOME HEALTHCARE | Age: 70
End: 2025-07-10
Attending: ANESTHESIOLOGY
Payer: MEDICARE

## 2025-07-10 DIAGNOSIS — G89.4 CHRONIC PAIN SYNDROME: ICD-10-CM

## 2025-07-10 DIAGNOSIS — M54.16 LUMBAR RADICULOPATHY: Primary | ICD-10-CM

## 2025-07-10 DIAGNOSIS — M51.16 LUMBAR DISC DISEASE WITH RADICULOPATHY: ICD-10-CM

## 2025-07-10 PROCEDURE — 97112 NEUROMUSCULAR REEDUCATION: CPT

## 2025-07-10 PROCEDURE — 97110 THERAPEUTIC EXERCISES: CPT

## 2025-07-10 NOTE — PROGRESS NOTES
"Daily Note     Today's date: 7/10/2025  Patient name: Yahir Orta  : 1955  MRN: [id number]  Referring provider: Rocio Reeder MD  Dx:   Encounter Diagnosis     ICD-10-CM    1. Lumbar radiculopathy  M54.16       2. Lumbar disc disease with radiculopathy  M51.16       3. Chronic pain syndrome  G89.4           Start Time: 0800  Stop Time: 08  Total time in clinic (min): 38 minutes    Subjective: Pt states that he had mild soreness after last session but noticed decreased occurrences of n/t in BLEs.      Objective: See treatment diary below      Assessment: Pt tolerated treatment session w/ increase in difficulty of core strengthening exercises.  Pt was able to perform all exercises w/ VC needed for technique and form of exercises, specifically the pattern of performing OH lifts w/ marches, pt was able to successfully perform after cueing.  PT applied MHP to pt's L/S which pt stated was effective in reducing painful sx.        Plan: Continue per plan of care.      Precautions: R SHELDON 11 yrs ago      Visit Count 1 2 3         Manuals 7/1  7/8  7/10       BLE Hip Stretching (Piri, HS, KTC)    DF  B/L Piri, HS  DF   R hip flexor & quad                                                 Neuro Re-Ed             Supine PPT  5\" x10  5\" x10           Supine PPT w/ Physioball roll             Supine PPT w/ Marches    Contract, BLE march, relax    10x ea           PPT w/ OH lift    Red Medball 5x    Yellow Medball 5x           PPT w/ OH Lift & March  Combination of both    5x Red medball    5x yellow medball   Combination of both    10x yellow medball      MTP/ LTP    Blue    3\" hold x15 ea    Matrix     32.5# 3\" hold x15 ea          Pallof Press    Blue    5\" x10 ea R/L   Matrix    32.5# 5\" hold x10 ea R/L          PPT w/ Medball trunk rotation   Seated    Yellow Medball     10x ea dir R/L    Seated    Yellow Medball   10x ea dir R/L    Blue Medball  10x ea dir R/L          Supine LE ball roll outs     " "Physioball under heels    Flexion and extension x15          Supine SLR from physio ball     Physioball under heels    SLR from physio ball w/ 3\" hold 10x ea                       Ther Ex             *Postural Education*             NuStep    L6 8'  NP       SLR 3 way             Mini Squats             Standing Marches w/ PPT              Squats    TA hold w/ eccentric lowering    10x    TA hold w/ eccentric lowering    15x                     Seated Physioball roll outs fwd & R             Hip ADD w/ Ball seated                           Supine LTR 5\" x10 ea R/L    HEP    5\" x10 ea R/L  5\" x10 ea R/L                Self HS stretch 30\" x4 ea b/l    HEP      30\" x4 ea b/l       Self DKTC chest HEP    30\" x4            Self QL stretch seated            Self Piriformis Stretch     30\" x4 ea b/l                     Ther Activity                                         Gait Training                                         Modalities             HP on LS      12'                             "

## 2025-07-11 ENCOUNTER — HOSPITAL ENCOUNTER (OUTPATIENT)
Dept: RADIOLOGY | Facility: HOSPITAL | Age: 70
Discharge: HOME | End: 2025-07-11
Attending: ANESTHESIOLOGY
Payer: MEDICARE

## 2025-07-11 VITALS
RESPIRATION RATE: 20 BRPM | DIASTOLIC BLOOD PRESSURE: 72 MMHG | HEART RATE: 63 BPM | OXYGEN SATURATION: 100 % | SYSTOLIC BLOOD PRESSURE: 133 MMHG | TEMPERATURE: 97.8 F

## 2025-07-11 DIAGNOSIS — G89.4 CHRONIC PAIN SYNDROME: ICD-10-CM

## 2025-07-11 DIAGNOSIS — M51.16 LUMBAR DISC DISEASE WITH RADICULOPATHY: ICD-10-CM

## 2025-07-11 DIAGNOSIS — M54.16 LUMBAR RADICULOPATHY: ICD-10-CM

## 2025-07-11 PROCEDURE — 64483 NJX AA&/STRD TFRM EPI L/S 1: CPT | Performed by: ANESTHESIOLOGY

## 2025-07-11 RX ORDER — PAPAVERINE HCL 150 MG
15 CAPSULE, EXTENDED RELEASE ORAL ONCE
Status: COMPLETED | OUTPATIENT
Start: 2025-07-11 | End: 2025-07-11

## 2025-07-11 RX ADMIN — Medication 15 MG: at 11:06

## 2025-07-11 RX ADMIN — IOHEXOL 2 ML: 300 INJECTION, SOLUTION INTRAVENOUS at 11:05

## 2025-07-11 NOTE — INTERVAL H&P NOTE
Update: (This section must be completed if the H&P was completed greater than 24 hrs to procedure or admission)    H&P reviewed. After examining the patient, I find no changed to the H&P since it had been written.    Patient re-evaluated. Accept as history and physical.    Rocio Reeder MD/July 11, 2025/10:56 AM

## 2025-07-11 NOTE — DISCHARGE INSTR - LAB
Epidural Steroid Injection   WHAT YOU NEED TO KNOW:   An epidural steroid injection (BENNY) is a procedure to inject steroid medicine into the epidural space. The epidural space is between your spinal cord and vertebrae. Steroids reduce inflammation and fluid buildup in your spine that may be causing pain. You may be given pain medicine along with the steroids.          ACTIVITY  Do not drive or operate machinery today.  No strenuous activity today - bending, lifting, etc.  You may resume normal activites starting tomorrow - start slowly and as tolerated.  You may shower today, but no tub baths or hot tubs.  You may have numbness for several hours from the local anesthetic. Please use caution and common sense, especially with weight-bearing activities.    CARE OF THE INJECTION SITE  If you have soreness or pain, apply ice to the area today (20 minutes on/20 minutes off).  Starting tomorrow, you may use warm, moist heat or ice if needed.  You may have an increase or change in your discomfort for 36-48 hours after your treatment.  Apply ice and continue with any pain medication you have been prescribed.  Notify the [facility] if you have any of the following: redness, drainage, swelling, headache, stiff neck or fever above 100°F.    SPECIAL INSTRUCTIONS  Our office will contact you in approximately 14 days for a progress report.    MEDICATIONS  Continue to take all routine medications.  Our office may have instructed you to hold some medications.    As no general anesthesia was used in today's procedure, you should not experience any side effects related to anesthesia.     If you are diabetic, the steroids used in today's injection may temporarily increase your blood sugar levels after the first few days after your injection. Please keep a close eye on your sugars and alert the doctor who manages your diabetes if your sugars are significantly high from your baseline or you are symptomatic.     If you have a  problem specifically related to your procedure, please call our office at [phone].  Problems not related to your procedure should be directed to your primary care physician.

## 2025-07-15 ENCOUNTER — OFFICE VISIT (OUTPATIENT)
Dept: PHYSICAL THERAPY | Facility: HOME HEALTHCARE | Age: 70
End: 2025-07-15
Attending: ANESTHESIOLOGY
Payer: MEDICARE

## 2025-07-15 DIAGNOSIS — G89.4 CHRONIC PAIN SYNDROME: ICD-10-CM

## 2025-07-15 DIAGNOSIS — M54.16 LUMBAR RADICULOPATHY: Primary | ICD-10-CM

## 2025-07-15 DIAGNOSIS — M51.16 LUMBAR DISC DISEASE WITH RADICULOPATHY: ICD-10-CM

## 2025-07-15 PROCEDURE — 97112 NEUROMUSCULAR REEDUCATION: CPT

## 2025-07-15 PROCEDURE — 97110 THERAPEUTIC EXERCISES: CPT

## 2025-07-17 ENCOUNTER — OFFICE VISIT (OUTPATIENT)
Dept: PHYSICAL THERAPY | Facility: HOME HEALTHCARE | Age: 70
End: 2025-07-17
Attending: ANESTHESIOLOGY
Payer: MEDICARE

## 2025-07-17 DIAGNOSIS — M54.16 LUMBAR RADICULOPATHY: Primary | ICD-10-CM

## 2025-07-17 DIAGNOSIS — G89.4 CHRONIC PAIN SYNDROME: ICD-10-CM

## 2025-07-17 DIAGNOSIS — M51.16 LUMBAR DISC DISEASE WITH RADICULOPATHY: ICD-10-CM

## 2025-07-17 PROCEDURE — 97112 NEUROMUSCULAR REEDUCATION: CPT | Performed by: PHYSICAL THERAPIST

## 2025-07-17 NOTE — PROGRESS NOTES
"Daily Note     Today's date: 2025  Patient name: Yahir Orta  : 1955  MRN: [id number]  Referring provider: Rocio Reeder MD  Dx:   Encounter Diagnosis     ICD-10-CM    1. Lumbar radiculopathy  M54.16       2. Lumbar disc disease with radiculopathy  M51.16       3. Chronic pain syndrome  G89.4                      Subjective: Pt states that he is a little sore from last session but nothing bad.       Objective: See treatment diary below      Assessment: Pt tolerable to increased reps and/or increased resistance with select TE this date. Will monitor response to session and continue progression as appropriate.     Plan: Continue per plan of care.      Precautions: R SHELDON 11 yrs ago      Visit Count 1 2 3 4 5       Manuals 7/1  7/8  7/10  7/15  7/17   BLE Hip Stretching (Piri, HS, KTC)    DF  B/L Piri, HS  DF   R hip flexor & quad                                                 Neuro Re-Ed             Supine PPT  5\" x10  5\" x10           Supine PPT w/ Physioball roll             Supine PPT w/ Marches    Contract, BLE march, relax    10x ea           PPT w/ OH lift    Red Medball 5x    Yellow Medball 5x           PPT w/ OH Lift & March  Combination of both    5x Red medball    5x yellow medball   Combination of both    10x yellow medball Combination of both    10x Blue medball     MTP/ LTP    Blue    3\" hold x15 ea    Matrix     32.5# 3\" hold x15 ea    Matrix     32.5# 3\" hold x15 ea  Matrix     32.5#   X 20 ea     Pallof Press    Blue    5\" x10 ea R/L   Matrix    32.5# 5\" hold x10 ea R/L     Matrix    32.5# 5\" hold x20 ea R/L    (NV increase hold decrease reps)  Matrix     32.5#   10\" hold x 10 R/L    PPT w/ Medball trunk rotation   Seated    Yellow Medball     10x ea dir R/L    Seated    Yellow Medball   10x ea dir R/L    Blue Medball  10x ea dir R/L    Seated     Blue Medball  10x ea dir R/L    7# Medball 10x ea dir R/L  Seated     7# med ball   X 15 ea dir R/L     Supine LE ball roll outs     " "Physioball under heels    Flexion and extension x15    Physioball under heels    Flexion and extension x15    7# medball chest presses w/ physioball roll combination   X15    Physioball under heels    Flexion and extension x15    7# medball chest presses w/ physioball roll combination   X15    Supine SLR from physio ball     Physioball under heels    SLR from physio ball w/ 3\" hold 10x ea    Physioball under heels    SLR from physio ball w/ 3\" hold 10x ea  Physioball under heels    SLR from physio ball w/ 3\" hold 10x ea   Physioball Bridges    Straight LE  Bridge w/ Physioball under heels  X10   Straight LE  Bridge w/ Physioball under heels  X10                 Ther Ex             *Postural Education*             NuStep    L6 8'  NP  L5 8'  L5 10 minutes    SLR 3 way             Mini Squats             Standing Marches w/ PPT              Squats    TA hold w/ eccentric lowering    10x    TA hold w/ eccentric lowering    15x  TA hold w/ eccentric lowering    15x  TA hold with eccentric lowering     X 15     Hip ABD & ADD            LF  Hip ABD 65# x15    Hip ADD 50# x15    LF   Hip ABD 65#   X 20     Hip ADD 50#   X 20    Seated Physioball roll outs fwd & R             Hip ADD w/ Ball seated                           Supine LTR 5\" x10 ea R/L    HEP    5\" x10 ea R/L  5\" x10 ea R/L  5\" x10 ea R/L  10\" x 5 ea R/L             Self HS stretch 30\" x4 ea b/l    HEP      30\" x4 ea b/l  30\" x4 ea b/l     Self DKTC chest HEP    30\" x4            Self QL stretch seated            Self Piriformis Stretch     30\" x4 ea b/l  30\" x4 ea b/l                   Ther Activity                                         Gait Training                                         Modalities             HP on LS      12'                             "

## 2025-07-22 ENCOUNTER — OFFICE VISIT (OUTPATIENT)
Dept: PHYSICAL THERAPY | Facility: HOME HEALTHCARE | Age: 70
End: 2025-07-22
Attending: ANESTHESIOLOGY
Payer: MEDICARE

## 2025-07-22 DIAGNOSIS — M51.16 LUMBAR DISC DISEASE WITH RADICULOPATHY: ICD-10-CM

## 2025-07-22 DIAGNOSIS — M54.16 LUMBAR RADICULOPATHY: Primary | ICD-10-CM

## 2025-07-22 DIAGNOSIS — G89.4 CHRONIC PAIN SYNDROME: ICD-10-CM

## 2025-07-22 PROCEDURE — 97112 NEUROMUSCULAR REEDUCATION: CPT

## 2025-07-22 PROCEDURE — 97110 THERAPEUTIC EXERCISES: CPT

## 2025-07-22 NOTE — PROGRESS NOTES
"Daily Note     Today's date: 2025  Patient name: Yahir Orta  : 1955  MRN: [id number]  Referring provider: Rocio Reeder MD  Dx:   Encounter Diagnosis     ICD-10-CM    1. Lumbar radiculopathy  M54.16       2. Lumbar disc disease with radiculopathy  M51.16       3. Chronic pain syndrome  G89.4           Start Time: 1001  Stop Time: 1045  Total time in clinic (min): 44 minutes    Subjective: Pt states that he was painting pipes in his basement over the weekend and has increased soreness in L/S today.  Pt states that radicular sx in BLEs have decreased substantially, pt states that he experiences it when he stands for prolonged periods of time.        Objective: See treatment diary below      Assessment: Pt tolerated treatment session w/o increase in LBP.  PT had pt perform 2 new exercises; seated pallof press and physioball bridges w/ medball chest press.  Pt was able to perform both after VC from PT, pt w/ appropriate form and technique to target and strengthen core musculature.  PT to monitor pt's post-session fatigue when he presents to [place].       Plan: Continue per plan of care.      Precautions: R SHELDON 11 yrs ago      Visit Count 6 2 3 4 5       Manuals   7/8  7/10  7/15  7/17   BLE Hip Stretching (Piri, HS, KTC)    DF  B/L Piri, HS  DF   R hip flexor & quad                                                 Neuro Re-Ed             Supine PPT  5\" x10  5\" x10           Supine PPT w/ Physioball roll             Supine PPT w/ Marches    Contract, BLE march, relax    10x ea           PPT w/ OH lift    Red Medball 5x    Yellow Medball 5x           PPT w/ OH Lift & March  Combination of both    5x Red medball    5x yellow medball   Combination of both    10x yellow medball Combination of both    10x Blue medball     MTP/ LTP  Matrix  LTP: 37.5#   3\" hold x20    MTP: 37.5#   3\" hold x17    Blue    3\" hold x15 ea    Matrix     32.5# 3\" hold x15 ea    Matrix     32.5# 3\" hold x15 ea  Matrix " "    32.5#   X 20 ea     Pallof Press  Matrix     37.5#   10\" hold x 10 R/L       Blue    5\" x10 ea R/L   Matrix    32.5# 5\" hold x10 ea R/L     Matrix    32.5# 5\" hold x20 ea R/L    (NV increase hold decrease reps)  Matrix     32.5#   10\" hold x 10 R/L    Seated Pallof Press Matrix    32.5# w/ focus on neutral/ flexed L/S    10\"hold x10 R/L       PPT w/ Medball trunk rotation   Seated    Yellow Medball     10x ea dir R/L    Seated    Yellow Medball   10x ea dir R/L    Blue Medball  10x ea dir R/L    Seated     Blue Medball  10x ea dir R/L    7# Medball 10x ea dir R/L  Seated     7# med ball   X 15 ea dir R/L     Supine LE ball roll outs     Physioball under heels    Flexion and extension x15    Physioball under heels    Flexion and extension x15    7# medball chest presses w/ physioball roll combination   X15    Physioball under heels    Flexion and extension x15    7# medball chest presses w/ physioball roll combination   X15    Supine SLR from physio ball  Physioball under heels    SLR from physio ball w/ 3\" hold 10x ea   Physioball under heels    SLR from physio ball w/ 3\" hold 10x ea    Physioball under heels    SLR from physio ball w/ 3\" hold 10x ea  Physioball under heels    SLR from physio ball w/ 3\" hold 10x ea   Physioball Bridges Straight LE  Bridge w/ Physioball under heels  5\" hold X10        Physioball bridges w/ medball press    7# medball UE extension  5\" hold x10   Straight LE  Bridge w/ Physioball under heels  X10   Straight LE  Bridge w/ Physioball under heels  X10                 Ther Ex             *Postural Education*             NuStep  L6 10'  L6 8'  NP  L5 8'  L5 10 minutes    SLR 3 way             Mini Squats             Standing Marches w/ PPT              Squats    TA hold w/ eccentric lowering    10x    TA hold w/ eccentric lowering    15x  TA hold w/ eccentric lowering    15x  TA hold with eccentric lowering     X 15     Hip ABD & ADD      LF   Hip ABD 65#   X 20     Hip ADD 50#   X 20   " "    LF  Hip ABD 65# x15    Hip ADD 50# x15    LF   Hip ABD 65#   X 20     Hip ADD 50#   X 20    Seated Physioball roll outs fwd & R             Hip ADD w/ Ball seated                           Supine LTR   5\" x10 ea R/L  5\" x10 ea R/L  5\" x10 ea R/L  10\" x 5 ea R/L             Self HS stretch     30\" x4 ea b/l  30\" x4 ea b/l     Self DKTC chest            Self QL stretch seated            Self Piriformis Stretch     30\" x4 ea b/l  30\" x4 ea b/l                   Ther Activity                                         Gait Training                                         Modalities             HP on LS      12'                               "

## 2025-07-24 ENCOUNTER — OFFICE VISIT (OUTPATIENT)
Dept: PHYSICAL THERAPY | Facility: HOME HEALTHCARE | Age: 70
End: 2025-07-24
Attending: ANESTHESIOLOGY
Payer: MEDICARE

## 2025-07-24 DIAGNOSIS — G89.4 CHRONIC PAIN SYNDROME: ICD-10-CM

## 2025-07-24 DIAGNOSIS — M54.16 LUMBAR RADICULOPATHY: Primary | ICD-10-CM

## 2025-07-24 DIAGNOSIS — M51.16 LUMBAR DISC DISEASE WITH RADICULOPATHY: ICD-10-CM

## 2025-07-24 PROCEDURE — 97110 THERAPEUTIC EXERCISES: CPT

## 2025-07-24 PROCEDURE — 97112 NEUROMUSCULAR REEDUCATION: CPT

## 2025-07-24 NOTE — PROGRESS NOTES
"Daily Note     Today's date: 2025  Patient name: Yahir Orta  : 1955  MRN: [id number]  Referring provider: Rocio Reeder MD  Dx:   Encounter Diagnosis     ICD-10-CM    1. Lumbar radiculopathy  M54.16       2. Lumbar disc disease with radiculopathy  M51.16       3. Chronic pain syndrome  G89.4           Start Time: 1005  Stop Time: 1049  Total time in clinic (min): 44 minutes    Subjective: Pt states he has mild soreness that he attributes to post-session fatigue and still painting basement pipes, floor, and walls.        Objective: See treatment diary below      Assessment: Pt tolerated treatment session w/o increase in LBP or any occurrence of radicular sx.  PT had pt perform self-stretching this session due to pt stating that he had increased stiffness in L/S and b/l hip structures, pt still has evident tightness in hip IR, specifically RLE.  PT increased difficulty of core strengthening exercises, pt performed after demonstration and education by PT.  PT to monitor pt's post-session fatigue NV.      Plan: Continue per plan of care.      Precautions: R SHELDON 11 yrs ago      Visit Count 6 7 3 4 5       Manuals 7/22  7/24  7/10  7/15  7/17   BLE Hip Stretching (Piri, HS, KTC)    DF  B/L Piri, HS  DF   R hip flexor & quad                                                 Neuro Re-Ed             Supine PPT  5\" x10  5\" x10           Supine PPT w/ Physioball roll             Supine PPT w/ Marches             PPT w/ OH lift             PPT w/ OH Lift & March   Combination of both    10x yellow medball Combination of both    10x Blue medball     MTP/ LTP  Matrix  LTP: 37.5#   3\" hold x20    MTP: 37.5#   3\" hold x17    Matrix  LTP: 37.5#   3\" hold x20    MTP: 37.5#   3\" hold x20    Matrix     32.5# 3\" hold x15 ea    Matrix     32.5# 3\" hold x15 ea  Matrix     32.5#   X 20 ea     Pallof Press  Matrix     37.5#   10\" hold x 10 R/L       Seated pallof press   Matrix    32.5# 5\" hold x10 ea R/L     " "Matrix    32.5# 5\" hold x20 ea R/L    (NV increase hold decrease reps)  Matrix     32.5#   10\" hold x 10 R/L    Seated Pallof Press Matrix    32.5# w/ focus on neutral/ flexed L/S    10\"hold x10 R/L Matrix    37.5# w/ focus on neutral/ flexed L/S    10\"hold x10 R/L        PPT w/ Medball trunk rotation       Seated    Yellow Medball   10x ea dir R/L    Blue Medball  10x ea dir R/L    Seated     Blue Medball  10x ea dir R/L    7# Medball 10x ea dir R/L  Seated     7# med ball   X 15 ea dir R/L     Supine LE ball roll outs     Physioball under heels    Flexion and extension x15    Physioball under heels    Flexion and extension x15    7# medball chest presses w/ physioball roll combination   X15    Physioball under heels    Flexion and extension x15    7# medball chest presses w/ physioball roll combination   X15    Supine SLR from physio ball  Physioball under heels    SLR from physio ball w/ 3\" hold 10x ea   Physioball under heels    SLR from physio ball w/ 3\" hold 10x ea    Physioball under heels    SLR from physio ball w/ 3\" hold 10x ea  Physioball under heels    SLR from physio ball w/ 3\" hold 10x ea   Physioball Bridges Straight LE  Bridge w/ Physioball under heels  5\" hold X10        Physioball bridges w/ medball press    7# medball UE extension  5\" hold x10   Physioball bridges w/ medball press    7# medball UE extension  5\" hold x10  Straight LE  Bridge w/ Physioball under heels  X10   Straight LE  Bridge w/ Physioball under heels  X10   Planks  5\" x10 supine    Trialed S/L Plank  -LBP occurred                     Ther Ex             *Postural Education*             NuStep  L6 10'  L6 8'  NP  L5 8'  L5 10 minutes    SLR 3 way             Mini Squats             Standing Marches w/ PPT              Squats        TA hold w/ eccentric lowering    15x  TA hold w/ eccentric lowering    15x  TA hold with eccentric lowering     X 15     Hip ABD & ADD      LF   Hip ABD 65#   X 20     Hip ADD 50#   X 20   LF   Hip ABD " "65#   X 20     Hip ADD 50#   X 20     LF  Hip ABD 65# x15    Hip ADD 50# x15    LF   Hip ABD 65#   X 20     Hip ADD 50#   X 20    Seated Physioball roll outs fwd & R             Hip ADD w/ Ball seated                           Supine LTR     5\" x10 ea R/L  5\" x10 ea R/L  10\" x 5 ea R/L             Self HS stretch     30\" x4 ea b/l  30\" x4 ea b/l     Self DKTC chest            Self QL stretch seated   To the L in chelo chair    20\"x6           Self Piriformis Stretch   30\" x4 ea b/l  30\" x4 ea b/l  30\" x4 ea b/l                   Ther Activity                                         Gait Training                                         Modalities             HP on LS      12'                                 "

## 2025-07-25 ENCOUNTER — TELEPHONE (OUTPATIENT)
Dept: PAIN MEDICINE | Facility: CLINIC | Age: 70
End: 2025-07-25

## 2025-08-01 ENCOUNTER — OFFICE VISIT (OUTPATIENT)
Dept: PHYSICAL THERAPY | Facility: HOME HEALTHCARE | Age: 70
End: 2025-08-01
Attending: ANESTHESIOLOGY
Payer: MEDICARE

## 2025-08-01 DIAGNOSIS — M54.16 LUMBAR RADICULOPATHY: Primary | ICD-10-CM

## 2025-08-01 DIAGNOSIS — G89.4 CHRONIC PAIN SYNDROME: ICD-10-CM

## 2025-08-01 DIAGNOSIS — M51.16 LUMBAR DISC DISEASE WITH RADICULOPATHY: ICD-10-CM

## 2025-08-01 PROCEDURE — 97110 THERAPEUTIC EXERCISES: CPT

## 2025-08-01 PROCEDURE — 97112 NEUROMUSCULAR REEDUCATION: CPT

## 2025-08-05 ENCOUNTER — OFFICE VISIT (OUTPATIENT)
Dept: PHYSICAL THERAPY | Facility: HOME HEALTHCARE | Age: 70
End: 2025-08-05
Attending: ANESTHESIOLOGY
Payer: MEDICARE

## 2025-08-05 DIAGNOSIS — G89.4 CHRONIC PAIN SYNDROME: ICD-10-CM

## 2025-08-05 DIAGNOSIS — M54.16 LUMBAR RADICULOPATHY: Primary | ICD-10-CM

## 2025-08-05 DIAGNOSIS — M51.16 LUMBAR DISC DISEASE WITH RADICULOPATHY: ICD-10-CM

## 2025-08-05 PROCEDURE — 97112 NEUROMUSCULAR REEDUCATION: CPT

## 2025-08-05 PROCEDURE — 97110 THERAPEUTIC EXERCISES: CPT

## 2025-08-06 ENCOUNTER — OFFICE VISIT (OUTPATIENT)
Age: 70
End: 2025-08-06
Payer: MEDICARE

## 2025-08-06 VITALS — HEIGHT: 68 IN | WEIGHT: 201 LBS | BODY MASS INDEX: 30.46 KG/M2

## 2025-08-06 DIAGNOSIS — M54.41 CHRONIC BILATERAL LOW BACK PAIN WITH BILATERAL SCIATICA: ICD-10-CM

## 2025-08-06 DIAGNOSIS — G89.4 CHRONIC PAIN SYNDROME: Primary | ICD-10-CM

## 2025-08-06 DIAGNOSIS — M54.16 LUMBAR RADICULOPATHY: ICD-10-CM

## 2025-08-06 DIAGNOSIS — G89.29 CHRONIC BILATERAL LOW BACK PAIN WITH BILATERAL SCIATICA: ICD-10-CM

## 2025-08-06 DIAGNOSIS — M54.42 CHRONIC BILATERAL LOW BACK PAIN WITH BILATERAL SCIATICA: ICD-10-CM

## 2025-08-06 DIAGNOSIS — M47.816 LUMBAR SPONDYLOSIS: ICD-10-CM

## 2025-08-06 PROBLEM — M54.50 CHRONIC BILATERAL LOW BACK PAIN WITHOUT SCIATICA: Status: ACTIVE | Noted: 2021-06-03

## 2025-08-06 PROCEDURE — 99214 OFFICE O/P EST MOD 30 MIN: CPT | Performed by: NURSE PRACTITIONER

## 2025-08-07 ENCOUNTER — EVALUATION (OUTPATIENT)
Dept: PHYSICAL THERAPY | Facility: HOME HEALTHCARE | Age: 70
End: 2025-08-07
Attending: ANESTHESIOLOGY
Payer: MEDICARE

## 2025-08-07 DIAGNOSIS — M51.16 LUMBAR DISC DISEASE WITH RADICULOPATHY: ICD-10-CM

## 2025-08-07 DIAGNOSIS — M54.16 LUMBAR RADICULOPATHY: Primary | ICD-10-CM

## 2025-08-07 DIAGNOSIS — G89.4 CHRONIC PAIN SYNDROME: ICD-10-CM

## 2025-08-07 PROCEDURE — 97112 NEUROMUSCULAR REEDUCATION: CPT

## 2025-08-07 PROCEDURE — 97110 THERAPEUTIC EXERCISES: CPT

## 2025-08-11 ENCOUNTER — OFFICE VISIT (OUTPATIENT)
Dept: PHYSICAL THERAPY | Facility: HOME HEALTHCARE | Age: 70
End: 2025-08-11
Attending: ANESTHESIOLOGY
Payer: MEDICARE

## 2025-08-14 ENCOUNTER — OFFICE VISIT (OUTPATIENT)
Dept: PHYSICAL THERAPY | Facility: HOME HEALTHCARE | Age: 70
End: 2025-08-14
Attending: ANESTHESIOLOGY
Payer: MEDICARE

## 2025-08-19 ENCOUNTER — OFFICE VISIT (OUTPATIENT)
Age: 70
End: 2025-08-19
Payer: MEDICARE

## 2025-08-19 ENCOUNTER — OFFICE VISIT (OUTPATIENT)
Dept: PHYSICAL THERAPY | Facility: HOME HEALTHCARE | Age: 70
End: 2025-08-19
Attending: ANESTHESIOLOGY
Payer: MEDICARE

## 2025-08-19 VITALS
HEIGHT: 68 IN | DIASTOLIC BLOOD PRESSURE: 70 MMHG | OXYGEN SATURATION: 97 % | HEART RATE: 65 BPM | TEMPERATURE: 98.2 F | WEIGHT: 202.6 LBS | SYSTOLIC BLOOD PRESSURE: 122 MMHG | BODY MASS INDEX: 30.71 KG/M2

## 2025-08-19 DIAGNOSIS — R97.20 ELEVATED PSA: Primary | ICD-10-CM

## 2025-08-19 DIAGNOSIS — M54.16 LUMBAR RADICULOPATHY: Primary | ICD-10-CM

## 2025-08-19 PROCEDURE — 97112 NEUROMUSCULAR REEDUCATION: CPT

## 2025-08-19 PROCEDURE — 97110 THERAPEUTIC EXERCISES: CPT

## 2025-08-19 PROCEDURE — 99213 OFFICE O/P EST LOW 20 MIN: CPT

## 2025-08-21 ENCOUNTER — OFFICE VISIT (OUTPATIENT)
Dept: PHYSICAL THERAPY | Facility: HOME HEALTHCARE | Age: 70
End: 2025-08-21
Attending: ANESTHESIOLOGY
Payer: MEDICARE

## 2025-08-21 DIAGNOSIS — M51.16 LUMBAR DISC DISEASE WITH RADICULOPATHY: ICD-10-CM

## 2025-08-21 DIAGNOSIS — M54.16 LUMBAR RADICULOPATHY: Primary | ICD-10-CM

## 2025-08-21 DIAGNOSIS — G89.4 CHRONIC PAIN SYNDROME: ICD-10-CM

## 2025-08-21 PROCEDURE — 97112 NEUROMUSCULAR REEDUCATION: CPT

## 2025-08-21 PROCEDURE — 97110 THERAPEUTIC EXERCISES: CPT

## (undated) DEVICE — SPONGE LAP 18 X 18 IN

## (undated) DEVICE — TELFA NON-ADHERENT ABSORBENT DRESSING: Brand: TELFA

## (undated) DEVICE — SYRINGE 10ML LL

## (undated) DEVICE — HEAVY DUTY TABLE COVER: Brand: CONVERTORS

## (undated) DEVICE — 1820 FOAM BLOCK NEEDLE COUNTER: Brand: DEVON

## (undated) DEVICE — MAX-CORE® DISPOSABLE CORE BIOPSY INSTRUMENT, 18G X 25CM: Brand: MAX-CORE

## (undated) DEVICE — NEEDLE 18 G X 1 1/2

## (undated) DEVICE — CHLORAPREP HI-LITE 26ML ORANGE

## (undated) DEVICE — UTILITY MARKER,BLACK WITH LABELS: Brand: DEVON

## (undated) DEVICE — 3M™ TEGADERM™ TRANSPARENT FILM DRESSING FRAME STYLE, 1628, 6 IN X 8 IN (15 CM X 20 CM), 10/CT 8CT/CASE: Brand: 3M™ TEGADERM™

## (undated) DEVICE — FORMALIN PRE-FILLED 10 PCT NEUTRAL 20ML

## (undated) DEVICE — NEEDLE SPINAL 22G X 3.5IN  QUINCKE

## (undated) DEVICE — GLOVE SRG BIOGEL 7